# Patient Record
Sex: FEMALE | Race: WHITE | NOT HISPANIC OR LATINO | Employment: OTHER | URBAN - METROPOLITAN AREA
[De-identification: names, ages, dates, MRNs, and addresses within clinical notes are randomized per-mention and may not be internally consistent; named-entity substitution may affect disease eponyms.]

---

## 2020-07-05 ENCOUNTER — TRANSFERRED RECORDS (OUTPATIENT)
Dept: HEALTH INFORMATION MANAGEMENT | Facility: CLINIC | Age: 70
End: 2020-07-05

## 2020-07-10 ENCOUNTER — TRANSFERRED RECORDS (OUTPATIENT)
Dept: HEALTH INFORMATION MANAGEMENT | Facility: CLINIC | Age: 70
End: 2020-07-10

## 2020-07-13 ENCOUNTER — TRANSFERRED RECORDS (OUTPATIENT)
Dept: HEALTH INFORMATION MANAGEMENT | Facility: CLINIC | Age: 70
End: 2020-07-13

## 2020-10-21 ENCOUNTER — TRANSFERRED RECORDS (OUTPATIENT)
Dept: HEALTH INFORMATION MANAGEMENT | Facility: CLINIC | Age: 70
End: 2020-10-21

## 2021-02-09 ENCOUNTER — TRANSFERRED RECORDS (OUTPATIENT)
Dept: HEALTH INFORMATION MANAGEMENT | Facility: CLINIC | Age: 71
End: 2021-02-09

## 2021-02-09 LAB
ALBUMIN (URINE) MG/SPEC: 4 MG/DL
ALBUMIN/CREATININE RATIO: 19.9 MG/G CR (ref 0–30)
ALT SERPL-CCNC: 42 U/L (ref 12–78)
AST SERPL-CCNC: 37 U/L (ref 15–37)
CHOLEST SERPL-MCNC: 155 MG/DL (ref 0–200)
CREAT SERPL-MCNC: 0.87 MG/DL (ref 0.6–1)
CREATININE (URINE): 200 MG/DL (ref 30–150)
GFR SERPL CREATININE-BSD FRML MDRD: >60 ML/MIN/1.73M2 (ref 60–99)
GLUCOSE SERPL-MCNC: 180 MG/DL (ref 70–100)
HBA1C MFR BLD: 8.4 % (ref 4.2–6.3)
HDLC SERPL-MCNC: 66 MG/DL (ref 40–60)
LDLC SERPL CALC-MCNC: 63.7 MG/DL (ref 0–130)
POTASSIUM SERPL-SCNC: 3.9 MEQ/L (ref 3.5–5.1)
TRIGL SERPL-MCNC: 125 MG/DL (ref 30–150)

## 2021-04-13 ENCOUNTER — OFFICE VISIT (OUTPATIENT)
Dept: FAMILY MEDICINE | Facility: CLINIC | Age: 71
End: 2021-04-13
Payer: MEDICARE

## 2021-04-13 ENCOUNTER — TELEPHONE (OUTPATIENT)
Dept: FAMILY MEDICINE | Facility: CLINIC | Age: 71
End: 2021-04-13

## 2021-04-13 VITALS
DIASTOLIC BLOOD PRESSURE: 70 MMHG | HEIGHT: 60 IN | HEART RATE: 80 BPM | SYSTOLIC BLOOD PRESSURE: 122 MMHG | BODY MASS INDEX: 34.08 KG/M2 | RESPIRATION RATE: 20 BRPM | OXYGEN SATURATION: 99 % | WEIGHT: 173.6 LBS | TEMPERATURE: 98.1 F

## 2021-04-13 DIAGNOSIS — M54.50 CHRONIC BILATERAL LOW BACK PAIN, UNSPECIFIED WHETHER SCIATICA PRESENT: Primary | ICD-10-CM

## 2021-04-13 DIAGNOSIS — I10 BENIGN ESSENTIAL HYPERTENSION: ICD-10-CM

## 2021-04-13 DIAGNOSIS — M06.9 RHEUMATOID ARTHRITIS INVOLVING BOTH HANDS, UNSPECIFIED WHETHER RHEUMATOID FACTOR PRESENT (H): ICD-10-CM

## 2021-04-13 DIAGNOSIS — J45.40 MODERATE PERSISTENT ASTHMA, UNSPECIFIED WHETHER COMPLICATED: ICD-10-CM

## 2021-04-13 DIAGNOSIS — G89.29 CHRONIC BILATERAL LOW BACK PAIN, UNSPECIFIED WHETHER SCIATICA PRESENT: Primary | ICD-10-CM

## 2021-04-13 DIAGNOSIS — E11.9 TYPE 2 DIABETES MELLITUS WITHOUT COMPLICATION, WITHOUT LONG-TERM CURRENT USE OF INSULIN (H): ICD-10-CM

## 2021-04-13 DIAGNOSIS — K50.919 CROHN'S DISEASE WITH COMPLICATION, UNSPECIFIED GASTROINTESTINAL TRACT LOCATION (H): ICD-10-CM

## 2021-04-13 LAB — HBA1C MFR BLD: 7 % (ref 0–5.6)

## 2021-04-13 PROCEDURE — 80048 BASIC METABOLIC PNL TOTAL CA: CPT | Performed by: FAMILY MEDICINE

## 2021-04-13 PROCEDURE — 83036 HEMOGLOBIN GLYCOSYLATED A1C: CPT | Performed by: FAMILY MEDICINE

## 2021-04-13 PROCEDURE — 36415 COLL VENOUS BLD VENIPUNCTURE: CPT | Performed by: FAMILY MEDICINE

## 2021-04-13 PROCEDURE — 99204 OFFICE O/P NEW MOD 45 MIN: CPT | Performed by: FAMILY MEDICINE

## 2021-04-13 RX ORDER — FOLIC ACID 1 MG/1
1 TABLET ORAL DAILY
COMMUNITY
End: 2021-05-11

## 2021-04-13 RX ORDER — MULTIPLE VITAMINS W/ MINERALS TAB 9MG-400MCG
1 TAB ORAL DAILY
COMMUNITY

## 2021-04-13 RX ORDER — AMLODIPINE BESYLATE 2.5 MG/1
2.5 TABLET ORAL DAILY
COMMUNITY
End: 2021-05-11

## 2021-04-13 RX ORDER — BUDESONIDE AND FORMOTEROL FUMARATE DIHYDRATE 160; 4.5 UG/1; UG/1
2 AEROSOL RESPIRATORY (INHALATION) 2 TIMES DAILY
COMMUNITY
End: 2021-04-13

## 2021-04-13 RX ORDER — TRAMADOL HYDROCHLORIDE 50 MG/1
50 TABLET ORAL EVERY 6 HOURS PRN
COMMUNITY
End: 2021-04-13

## 2021-04-13 RX ORDER — GLIPIZIDE 5 MG/1
5 TABLET ORAL
COMMUNITY
End: 2021-05-11

## 2021-04-13 RX ORDER — ALBUTEROL SULFATE 90 UG/1
2 AEROSOL, METERED RESPIRATORY (INHALATION) 4 TIMES DAILY PRN
COMMUNITY
End: 2023-08-21

## 2021-04-13 RX ORDER — MONTELUKAST SODIUM 10 MG/1
10 TABLET ORAL AT BEDTIME
COMMUNITY
End: 2021-08-05

## 2021-04-13 RX ORDER — METFORMIN HCL 500 MG
500 TABLET, EXTENDED RELEASE 24 HR ORAL
COMMUNITY
End: 2021-08-05

## 2021-04-13 RX ORDER — MULTIVIT-MIN/IRON/FOLIC ACID/K 18-600-40
2 CAPSULE ORAL DAILY
COMMUNITY

## 2021-04-13 RX ORDER — BUDESONIDE AND FORMOTEROL FUMARATE DIHYDRATE 160; 4.5 UG/1; UG/1
2 AEROSOL RESPIRATORY (INHALATION) 2 TIMES DAILY
Qty: 10.2 G | Refills: 4 | Status: SHIPPED | OUTPATIENT
Start: 2021-04-13 | End: 2023-05-08

## 2021-04-13 RX ORDER — GABAPENTIN 100 MG/1
100 CAPSULE ORAL AT BEDTIME
Qty: 30 CAPSULE | Refills: 0 | Status: SHIPPED | OUTPATIENT
Start: 2021-04-13 | End: 2022-04-26

## 2021-04-13 RX ORDER — LOSARTAN POTASSIUM 100 MG/1
100 TABLET ORAL DAILY
COMMUNITY
End: 2021-07-20

## 2021-04-13 RX ORDER — HYDROCODONE BITARTRATE AND ACETAMINOPHEN 5; 325 MG/1; MG/1
1 TABLET ORAL EVERY 6 HOURS PRN
COMMUNITY
End: 2021-12-27

## 2021-04-13 RX ORDER — ESOMEPRAZOLE MAGNESIUM 40 MG/1
40 CAPSULE, DELAYED RELEASE ORAL
COMMUNITY
End: 2021-06-30

## 2021-04-13 ASSESSMENT — PAIN SCALES - GENERAL: PAINLEVEL: MILD PAIN (3)

## 2021-04-13 ASSESSMENT — MIFFLIN-ST. JEOR: SCORE: 1229.97

## 2021-04-13 NOTE — PROGRESS NOTES
Assessment & Plan     Chronic bilateral low back pain, unspecified whether sciatica present    The patient has a history of an L4-L5 fusion.  I have given her some basic low back stretches and clamshell strengthening exercise.  I am also having her trial low-dose gabapentin at night.  Follow-up in 2 to 4 weeks to recheck her pain.    - gabapentin (NEURONTIN) 100 MG capsule; Take 1 capsule (100 mg) by mouth At Bedtime    Type 2 diabetes mellitus without complication, without long-term current use of insulin (H)  We will check an A1c today as this was last checked 2 months ago and she had started glipizide 2 months ago also  She is not on a statin because her LDL is less than 70  - Hemoglobin A1c  - Basic metabolic panel  (Ca, Cl, CO2, Creat, Gluc, K, Na, BUN)    Rheumatoid arthritis involving both hands, unspecified whether rheumatoid factor present (H)  She is to establish care with rheumatology.  She has been off methotrexate for over a year due to shortness of breath  - Rheumatology Referral; Future    Crohn's disease with complication, unspecified gastrointestinal tract location (H)  Needs to establish care with GI  - GASTROENTEROLOGY ADULT REF CONSULT ONLY; Future    Moderate persistent asthma, unspecified whether complicated  It is unclear why she had a severe episode of shortness of breath last year.  She states her Covid was negative x2.  She was diagnosed with asthma at that time.  We will get pulmonology consult and refill her  Symbicort  - PULMONARY MEDICINE REFERRAL  - budesonide-formoterol (SYMBICORT) 160-4.5 MCG/ACT Inhaler; Inhale 2 puffs into the lungs 2 times daily    Benign essential hypertension  The current medical regimen is effective;  continue present plan and medications.    - Basic metabolic panel  (Ca, Cl, CO2, Creat, Gluc, K, Na, BUN)    Review of prior external note(s) from - Outside records from [place]  48 minutes spent on the date of the encounter doing chart review, review of  "outside records, review of test results, interpretation of tests, patient visit, documentation and discussion with family        BMI:   Estimated body mass index is 34.19 kg/m  as calculated from the following:    Height as of this encounter: 1.518 m (4' 11.75\").    Weight as of this encounter: 78.7 kg (173 lb 9.6 oz).   Weight management plan: Discussed healthy diet and exercise guidelines        Return in about 4 weeks (around 5/11/2021) for pain.    Iza Cage DO  [facility]    Mary Boyce is a 70 year old who presents for the following health issues  accompanied by her spouse:    HPI     New Patient/Transfer of Care- moved here last month from [place]    Diabetes Follow-up    How often are you checking your blood sugar? One time daily  What time of day are you checking your blood sugars (select all that apply)?  Before meals  Have you had any blood sugars above 200?  No  Have you had any blood sugars below 70?  No    What symptoms do you notice when your blood sugar is low?  None    What concerns do you have today about your diabetes? None     Do you have any of these symptoms? (Select all that apply)  Numbness in feet and Burning in feet     Glipizide 5 mg twice daily and Metformin 500 mg daily    The glipizide was started two month ago.        BP Readings from Last 2 Encounters:   04/13/21 122/70     No results found for: A1C, LDL            Hypertension Follow-up      Do you check your blood pressure regularly outside of the clinic? No     Are you following a low salt diet? No    Are your blood pressures ever more than 140 on the top number (systolic) OR more   than 90 on the bottom number (diastolic), for example 140/90? Yes   Cozaar 100 mg daily and Norvasc 2.5 mg daily    Asthma Follow-Up    Was ACT completed today?  Yes  No flowsheet data found.   Symbicort and albuterol.  She also has mild copd        How many days per week do you miss taking your asthma controller " "medication?  1    Please describe any recent triggers for your asthma: dust mites, pollens and Trees    Have you had any Emergency Room Visits, Urgent Care Visits, or Hospital Admissions since your last office visit?  Yes  Number of hospitalizations for asthma:2     Patient has rheumatoid arthritis and is on methotrexate.  She also has a prescription for Norco and tramadol.  She is looking for a referral to see a rheumatologist.  She has been off methotrexate due to breathing issues for the last year.  She was given hydrocodone and ultram in the past but they do not help much.  She has a lumbar fusion at L4-L5.  She was never on cymbalta or gabapentin.  She doesn't like the tramadol.  She has trouble with sleep due to pain.          How many servings of fruits and vegetables do you eat daily?  2-3    On average, how many sweetened beverages do you drink each day (Examples: soda, juice, sweet tea, etc.  Do NOT count diet or artificially sweetened beverages)?   0-1    How many days per week do you exercise enough to make your heart beat faster? 3 or less    How many minutes a day do you exercise enough to make your heart beat faster? 9 or less  How many days per week do you miss taking your medication? 2-3    What makes it hard for you to take your medications?  remembering to take      Review of Systems   Constitutional, HEENT, cardiovascular, pulmonary, gi and gu systems are negative, except as otherwise noted.      Objective    /70 (BP Location: Right arm, Patient Position: Sitting, Cuff Size: Adult Large)   Pulse 80   Temp 98.1  F (36.7  C) (Oral)   Resp 20   Ht 1.518 m (4' 11.75\")   Wt 78.7 kg (173 lb 9.6 oz)   SpO2 99%   BMI 34.19 kg/m    Body mass index is 34.19 kg/m .  Physical Exam   GENERAL: healthy, alert and no distress  HENT: normal cephalic/atraumatic, oropharynx clear and oral mucous membranes moist  NECK: no adenopathy, no asymmetry, masses, or scars and thyroid normal to palpation  RESP: " lungs clear to auscultation - no rales, rhonchi or wheezes  CV: regular rate and rhythm, normal S1 S2, no S3 or S4, no murmur, click or rub, no peripheral edema and peripheral pulses strong  MS: no gross musculoskeletal defects noted, no edema  Comprehensive back pain exam:  Tenderness of Lumbosacral junction and Lower extremity reflexes within normal limits bilaterally  PSYCH: mentation appears normal, affect normal/bright

## 2021-04-13 NOTE — TELEPHONE ENCOUNTER
Please call the patient and let her know her hemoglobin A1c was very good at 7.0.  You can ask her if she would like to set up a [employer] account because she can get her results quicker that way.    Iza Cage DO

## 2021-04-13 NOTE — PATIENT INSTRUCTIONS
Passive spinal twist both sides          Figure four stretch **      Psoas/hip flexor stretch       Clam shell     Do these daily     Start gabapentin 100 mg at night to help with the pain overnight.  If you tolerate this we can increase it and add a daytime dose    Follow-up in person or virtually in 2  to 4 weeks    I have sent referrals for GI, rheumatology, and pulmonology    Iza Cage D.O.

## 2021-04-14 DIAGNOSIS — E87.6 HYPOKALEMIA: Primary | ICD-10-CM

## 2021-04-14 LAB
ANION GAP SERPL CALCULATED.3IONS-SCNC: 9 MMOL/L (ref 3–14)
BUN SERPL-MCNC: 8 MG/DL (ref 7–30)
CALCIUM SERPL-MCNC: 9.5 MG/DL (ref 8.5–10.1)
CHLORIDE SERPL-SCNC: 105 MMOL/L (ref 94–109)
CO2 SERPL-SCNC: 27 MMOL/L (ref 20–32)
CREAT SERPL-MCNC: 0.76 MG/DL (ref 0.52–1.04)
GFR SERPL CREATININE-BSD FRML MDRD: 79 ML/MIN/{1.73_M2}
GLUCOSE SERPL-MCNC: 119 MG/DL (ref 70–99)
POTASSIUM SERPL-SCNC: 3 MMOL/L (ref 3.4–5.3)
SODIUM SERPL-SCNC: 141 MMOL/L (ref 133–144)

## 2021-04-14 RX ORDER — POTASSIUM CHLORIDE 1500 MG/1
20 TABLET, EXTENDED RELEASE ORAL DAILY
Qty: 30 TABLET | Refills: 1 | Status: SHIPPED | OUTPATIENT
Start: 2021-04-14 | End: 2021-05-11

## 2021-04-14 NOTE — TELEPHONE ENCOUNTER
Patient was pleased to hear that her A1c was very good. I also sent her a text with [employer] information so that she can sign up.

## 2021-04-15 NOTE — TELEPHONE ENCOUNTER
Please call the patient and let her know her potassium is low.  I do not see that she is on a medication that should be causing low potassium.  But she is a new patient to me so it is possible she did not let us know she was on a diuretic.  Please find out if she is taking Lasix or hydrochlorothiazide.  I have called her in some potassium to take daily.  We need to recheck her lab in about a week.  I want to see her in the office in the next few weeks also so we can talk about possible causes of low potassium.  Here are some foods that are high in potassium:  bananas, mushrooms, white beans, fish, yogurt, spinach and potatoes with the skins.      Iza Cage DO

## 2021-04-15 NOTE — TELEPHONE ENCOUNTER
Routing back to PCP. Pleas sign pending order for potassium if agreeable.  (Wasn't sure if you wanted a BMP or potassium only.)    Patient verbalized understanding of below message and agreed to plan.  She already has an upcoming appointment scheduled with you on 5/11.  Assisted making lab appointment with patient for one week.    Diaz Gooden RN

## 2021-04-19 DIAGNOSIS — J45.909 ASTHMA: Primary | ICD-10-CM

## 2021-04-19 NOTE — TELEPHONE ENCOUNTER
RECORDS RECEIVED FROM: Internal/In process    DATE RECEIVED: 6.30.21    NOTES STATUS DETAILS   OFFICE NOTE from referring provider Internal     OFFICE NOTE from other specialist In process     DISCHARGE SUMMARY from hospital In process     DISCHARGE REPORT from the ER In process     MEDICATION LIST Internal     IMAGING  (NEED IMAGES AND REPORTS)     CT SCAN In process     CHEST XRAY (CXR) In process     TESTS     PULMONARY FUNCTION TESTING (PFT) Internal  Scheduled 6.30.21        Action 4.19.21 sv    Action Taken Called and and LVM about gahtering records from [place] on where she was seen    Message sent to nurse pool to place PFT and CXR orders     ---5.25.21 sv --- called and spoke with pt about getting records from her [facility] location, was informed that she was seen at [facility]. Per pt she connected with staff back in April to send records. Called [facility] and their [facility] department is currently still closed due to Covid. Was promted that records can individually request records through mail but may take longer then nornal. Request was sent as STAT. Called and informed pt on request statues and she said whe will call [facility] as well to try to get records another faster way   -- 5.25.21 -- received reports but no images, images are on the way --    6-1: Received records from [facility], gave to Zak to be uploaded into PACs

## 2021-04-22 DIAGNOSIS — E87.6 HYPOKALEMIA: ICD-10-CM

## 2021-04-22 LAB — POTASSIUM SERPL-SCNC: 3.6 MMOL/L (ref 3.4–5.3)

## 2021-04-22 PROCEDURE — 84132 ASSAY OF SERUM POTASSIUM: CPT | Performed by: FAMILY MEDICINE

## 2021-04-22 PROCEDURE — 36415 COLL VENOUS BLD VENIPUNCTURE: CPT | Performed by: FAMILY MEDICINE

## 2021-05-02 ENCOUNTER — HEALTH MAINTENANCE LETTER (OUTPATIENT)
Age: 71
End: 2021-05-02

## 2021-05-11 ENCOUNTER — OFFICE VISIT (OUTPATIENT)
Dept: FAMILY MEDICINE | Facility: CLINIC | Age: 71
End: 2021-05-11
Payer: MEDICARE

## 2021-05-11 ENCOUNTER — OFFICE VISIT (OUTPATIENT)
Dept: ORTHOPEDICS | Facility: CLINIC | Age: 71
End: 2021-05-11
Payer: MEDICARE

## 2021-05-11 ENCOUNTER — ANCILLARY PROCEDURE (OUTPATIENT)
Dept: GENERAL RADIOLOGY | Facility: CLINIC | Age: 71
End: 2021-05-11
Attending: FAMILY MEDICINE
Payer: MEDICARE

## 2021-05-11 VITALS
DIASTOLIC BLOOD PRESSURE: 76 MMHG | SYSTOLIC BLOOD PRESSURE: 130 MMHG | HEIGHT: 60 IN | BODY MASS INDEX: 33.57 KG/M2 | WEIGHT: 171 LBS

## 2021-05-11 VITALS
OXYGEN SATURATION: 99 % | RESPIRATION RATE: 16 BRPM | HEIGHT: 60 IN | WEIGHT: 171.8 LBS | DIASTOLIC BLOOD PRESSURE: 76 MMHG | SYSTOLIC BLOOD PRESSURE: 130 MMHG | TEMPERATURE: 98.9 F | HEART RATE: 84 BPM | BODY MASS INDEX: 33.73 KG/M2

## 2021-05-11 DIAGNOSIS — R19.7 DIARRHEA, UNSPECIFIED TYPE: ICD-10-CM

## 2021-05-11 DIAGNOSIS — K50.919 CROHN'S DISEASE WITH COMPLICATION, UNSPECIFIED GASTROINTESTINAL TRACT LOCATION (H): ICD-10-CM

## 2021-05-11 DIAGNOSIS — Z12.31 ENCOUNTER FOR SCREENING MAMMOGRAM FOR MALIGNANT NEOPLASM OF BREAST: ICD-10-CM

## 2021-05-11 DIAGNOSIS — I10 ESSENTIAL HYPERTENSION: ICD-10-CM

## 2021-05-11 DIAGNOSIS — S92.502A CLOSED FRACTURE OF PHALANX OF LEFT FIFTH TOE, INITIAL ENCOUNTER: Primary | ICD-10-CM

## 2021-05-11 DIAGNOSIS — M25.579 PAIN IN JOINT, ANKLE AND FOOT, UNSPECIFIED LATERALITY: ICD-10-CM

## 2021-05-11 DIAGNOSIS — M79.672 LEFT FOOT PAIN: ICD-10-CM

## 2021-05-11 DIAGNOSIS — M54.50 CHRONIC BILATERAL LOW BACK PAIN, UNSPECIFIED WHETHER SCIATICA PRESENT: ICD-10-CM

## 2021-05-11 DIAGNOSIS — E87.6 HYPOKALEMIA: Primary | ICD-10-CM

## 2021-05-11 DIAGNOSIS — G89.29 CHRONIC BILATERAL LOW BACK PAIN, UNSPECIFIED WHETHER SCIATICA PRESENT: ICD-10-CM

## 2021-05-11 DIAGNOSIS — E11.9 TYPE 2 DIABETES MELLITUS WITHOUT COMPLICATION, WITHOUT LONG-TERM CURRENT USE OF INSULIN (H): ICD-10-CM

## 2021-05-11 PROCEDURE — 99204 OFFICE O/P NEW MOD 45 MIN: CPT | Performed by: FAMILY MEDICINE

## 2021-05-11 PROCEDURE — 73630 X-RAY EXAM OF FOOT: CPT | Mod: LT | Performed by: RADIOLOGY

## 2021-05-11 PROCEDURE — 99215 OFFICE O/P EST HI 40 MIN: CPT | Performed by: FAMILY MEDICINE

## 2021-05-11 RX ORDER — LIRAGLUTIDE 6 MG/ML
1.2 INJECTION SUBCUTANEOUS DAILY
Qty: 3 ML | Refills: 3 | Status: SHIPPED | OUTPATIENT
Start: 2021-05-11 | End: 2021-08-02

## 2021-05-11 RX ORDER — POTASSIUM CHLORIDE 1500 MG/1
20 TABLET, EXTENDED RELEASE ORAL DAILY
Qty: 30 TABLET | Refills: 3 | Status: SHIPPED | OUTPATIENT
Start: 2021-05-11 | End: 2021-09-27

## 2021-05-11 RX ORDER — AMLODIPINE BESYLATE 2.5 MG/1
2.5 TABLET ORAL DAILY
Qty: 90 TABLET | Refills: 1 | Status: SHIPPED | OUTPATIENT
Start: 2021-05-11 | End: 2022-01-04

## 2021-05-11 RX ORDER — METFORMIN HCL 500 MG
500 TABLET, EXTENDED RELEASE 24 HR ORAL
Qty: 90 TABLET | Refills: 1 | Status: CANCELLED | OUTPATIENT
Start: 2021-05-11

## 2021-05-11 RX ORDER — PEN NEEDLE, DIABETIC 32GX 5/32"
NEEDLE, DISPOSABLE MISCELLANEOUS
Qty: 100 EACH | Refills: 3 | Status: SHIPPED | OUTPATIENT
Start: 2021-05-11 | End: 2022-11-07

## 2021-05-11 RX ORDER — GLUCOSAMINE HCL/CHONDROITIN SU 500-400 MG
CAPSULE ORAL
Qty: 100 EACH | Refills: 3 | Status: SHIPPED | OUTPATIENT
Start: 2021-05-11

## 2021-05-11 RX ORDER — FOLIC ACID 1 MG/1
1 TABLET ORAL DAILY
Qty: 90 TABLET | Refills: 1 | Status: SHIPPED | OUTPATIENT
Start: 2021-05-11 | End: 2021-12-14

## 2021-05-11 RX ORDER — GLIPIZIDE 5 MG/1
5 TABLET ORAL
Qty: 180 TABLET | Refills: 1 | Status: SHIPPED | OUTPATIENT
Start: 2021-05-11 | End: 2021-08-20

## 2021-05-11 ASSESSMENT — MIFFLIN-ST. JEOR
SCORE: 1218.18
SCORE: 1221.81

## 2021-05-11 ASSESSMENT — PAIN SCALES - GENERAL: PAINLEVEL: MODERATE PAIN (5)

## 2021-05-11 NOTE — PROGRESS NOTES
Assessment & Plan     Hypokalemia    This patient's hypokalemia is likely related to her daily episodes of diarrhea.  She attributes this to her Crohn's disease or Metformin.  We will have her stop her Metformin to see if it improves.  She will continue her potassium orally until her diarrhea stops    - potassium chloride ER (KLOR-CON M) 20 MEQ CR tablet; Take 1 tablet (20 mEq) by mouth daily    Chronic bilateral low back pain, unspecified whether sciatica present  The patient states she is not sure if the gabapentin has helped but she is tolerating it well.  She does not wish to increase at this time    Diarrhea, unspecified type  This could be from the Metformin so we will stop this also could be from Crohn's disease.    Pain in joint, ankle and foot, unspecified laterality  I am concerned about a fracture in the fifth metatarsal.  Her x-ray is down today so we will send her to walk-in [facility]    Type 2 diabetes mellitus without complication, without long-term current use of insulin (H)  We will have her stop Metformin due to diarrhea and hypokalemia use Victoza instead.  - glipiZIDE (GLUCOTROL) 5 MG tablet; Take 1 tablet (5 mg) by mouth 2 times daily (before meals)  - liraglutide (VICTOZA) 18 MG/3ML solution; Inject 1.2 mg Subcutaneous daily  - insulin pen needle (ULTICARE MICRO) 32G X 4 MM miscellaneous; Use 1 pen needles daily or as directed.  - alcohol swab prep pads; Use to swab area of injection/venkata as directed.    Crohn's disease with complication, unspecified gastrointestinal tract location (H)  Managed by GI  - folic acid (FOLVITE) 1 MG tablet; Take 1 tablet (1 mg) by mouth daily    Essential hypertension  The current medical regimen is effective;  continue present plan and medications.  - amLODIPine (NORVASC) 2.5 MG tablet; Take 1 tablet (2.5 mg) by mouth daily    Encounter for screening mammogram for malignant neoplasm of breast    - MA SCREENING DIGITAL BILAT - Future  (s+30); Future      40  minutes spent on the date of the encounter doing chart review, review of test results, interpretation of tests, patient visit, documentation and discussion with other provider(s)          Return in about 4 weeks (around 6/8/2021) for diabetes, in person.    Iza Cage DO  [facility]    Mary Boyce is a 70 year old who presents for the following health issues     HPI     Chronic/Recurring Back Pain Follow Up      Where is your back pain located? (Select all that apply) low back bilateral    How would you describe your back pain?  dull ache    Where does your back pain spread? the right and left buttock    Since your last clinic visit for back pain, how has your pain changed? always present, but gets better and worse    Does your back pain interfere with your job? Not applicable    Since your last visit, have you tried any new treatment? Yes -  Gabapentin     Gabapentin was started about 1 month ago for the back pain      How many servings of fruits and vegetables do you eat daily?  2-3    On average, how many sweetened beverages do you drink each day (Examples: soda, juice, sweet tea, etc.  Do NOT count diet or artificially sweetened beverages)?   1    How many days per week do you exercise enough to make your heart beat faster? 3 or less    How many minutes a day do you exercise enough to make your heart beat faster? 9 or less  How many days per week do you miss taking your medication? 2-3    What makes it hard for you to take your medications?  remembering to take    Musculoskeletal problem/pain  Onset/Duration: 1 week  Description  Location: foot - left  Joint Swelling: YES  Redness: YES  Pain: YES  Warmth: no  Intensity:  mild, moderate  Progression of Symptoms:  improving  Accompanying signs and symptoms:   Fevers: no  Numbness/tingling/weakness: no  History  Trauma to the area: YES- jammed it into a box  Recent illness:  no  Previous similar problem: YES   Previous  "evaluation:  no  Precipitating or alleviating factors:  Aggravating factors include: walking/shoes  Therapies tried and outcome: Had aircast boot that she was wearing    She was found to have low potassium when she was seen here about a month ago.  She was counseled to take oral potassium tabs and recheck.  When she rechecked her potassium 1 week later it was in the normal range.  She is not on medications that would lower her potassium.  She has ongoing diarrhea from crohns disease.  This is every morning.  She is on metformin.      She was seen for her diabetes hypertension and dyslipidemia 1 month ago and everything was within the normal range at that time.  She did not need refills at that appointment but today she needs refills.      Review of Systems   Constitutional, HEENT, cardiovascular, pulmonary, gi and gu systems are negative, except as otherwise noted.      Objective    /76 (BP Location: Right arm, Patient Position: Sitting, Cuff Size: Adult Large)   Pulse 84   Temp 98.9  F (37.2  C) (Oral)   Resp 16   Ht 1.518 m (4' 11.75\")   Wt 77.9 kg (171 lb 12.8 oz)   SpO2 99%   BMI 33.83 kg/m    Body mass index is 33.83 kg/m .  Physical Exam   GENERAL: healthy, alert and no distress  NECK: no adenopathy, no asymmetry, masses, or scars and thyroid normal to palpation  RESP: lungs clear to auscultation - no rales, rhonchi or wheezes  CV: regular rate and rhythm, normal S1 S2, no S3 or S4, no murmur, click or rub, no peripheral edema and peripheral pulses strong  MS:  ankle exam, non-tender medial malleoli, non-tender lateral malleoli, non tender mid foot and forefoot,  Full ROM, ligaments tenderness over the fourth and fifth metatarsals worse distally and tenderness of the fifth digit  SKIN: Slight bruising on her toes of the left foot  PSYCH: mentation appears normal, affect normal/bright              "

## 2021-05-11 NOTE — PATIENT INSTRUCTIONS
Stop metformin     Start victozia    Continue the potassium    Go to walk in [facility]    There is an acute-injury clinic at the [facility] location. No appointment is needed. Simply walk in and you will be seen by a sports medicine / orthopedic provider. They treat broken bones, concussions, sprains, strains and joint injuries.    Address:  [address] [place] [id number] [address]     Hours:   Monday-Thursday 8am-8pm  Friday 8am-5pm  Saturday 8am-2pm  Closed Sundays.       [facility]:    Scheduling Appointments    North Region: [facility]  Call: [phone]    South Region: [facility]  Call: [phone]    Central Region: [facility]  Call: [phone]    East Region: [facility]  Call: [phone]

## 2021-05-11 NOTE — LETTER
5/11/2021         RE: Carli Monreal  [address]  [place] [address]        Dear Colleague,    Thank you for referring your patient, Carli Monreal, to the [facility] [place]. Please see a copy of my visit note below.    ASSESSMENT & PLAN  Carli was seen today for pain.    Diagnoses and all orders for this visit:    Closed fracture of phalanx of left fifth toe, initial encounter  -     XR Foot LT G/E 3 vw; Future    Left foot pain  -     XR Foot LT G/E 3 vw; Future      Patient is a 70 year old female presenting for evaluation of   Chief Complaint   Patient presents with     Left Foot - Pain     # Left 5th Toe Fracture: Noted after hitting a cardboard box on around 5/4/21.  Pain over 5th toe with redness and swelling.  Pain with ambulation even with a shoe.  X-rays showing non-displaced 5th toe fracture.  Counseled patient on nature of condition and treatment options.  Given this plan as below, follow-up 3 weeks  Image Findings: non-displaced 5th toe fracture  Treatment: CAM boot short can transition to supportive shoe in 1-2 weeks as tolerated  Medications/Injections: No family history pertinent to the patient's problem today  Follow-up: 3 weeks     Concerning signs/sx that would warrant urgent evaluation were discussed.  All questions were answered, patient understands and agrees with plan.      Return in about 3 weeks (around 6/1/2021).      -----    SUBJECTIVE  Carli Monreal is a/an 70 year old female who is seen in consultation at the request of Dr. Cage, [facility] for evaluation of left foot pain. The patient is seen by themselves.    Onset: 1 week(s) ago. Patient describes injury as walking in the middle of the night and hit the lateral portion of her foot on a cardboard box. Reviewed previous provider's note referral for concern of foot fracture.  Location of Pain: left lateral foot   Rating of Pain at worst: 10/10  Rating of Pain Currently: 6/10  Worsened by:  walking, weight bearing   Better with: rest   Treatments tried: rest/activity avoidance and CAM boot   Associated symptoms: swelling, numbness, tingling and bruising   Orthopedic history: YES ankle fracture   Relevant surgical history: NO  History reviewed. No pertinent past medical history.  Social History     Socioeconomic History     Marital status:      Spouse name: None     Number of children: None     Years of education: None     Highest education level: None   Occupational History     None   Social Needs     Financial resource strain: None     Food insecurity     Worry: None     Inability: None     Transportation needs     Medical: None     Non-medical: None   Tobacco Use     Smoking status: Former Smoker     Types: Cigarettes     Quit date: 1991     Years since quittin.0     Smokeless tobacco: Never Used   Substance and Sexual Activity     Alcohol use: Yes     Comment: Wine- couple x/ week     Drug use: Never     Sexual activity: Not Currently     Partners: Male   Lifestyle     Physical activity     Days per week: None     Minutes per session: None     Stress: None   Relationships     Social connections     Talks on phone: None     Gets together: None     Attends [employer] service: None     Active member of club or organization: None     Attends meetings of clubs or organizations: None     Relationship status: None     Intimate partner violence     Fear of current or ex partner: None     Emotionally abused: None     Physically abused: None     Forced sexual activity: None   Other Topics Concern     None   Social History Narrative     None         Patient's past medical, surgical, social, and family histories were reviewed today and no changes are noted.  No family history pertinent to the patient's problem today     REVIEW OF SYSTEMS:  10 point ROS is negative other than symptoms noted above in HPI, Past Medical History or as stated below  Constitutional: NEGATIVE for fever, chills, change  "in weight  Skin: NEGATIVE for worrisome rashes, moles or lesions  GI/: NEGATIVE for bowel or bladder changes  Neuro: NEGATIVE for weakness, dizziness or paresthesias    OBJECTIVE:  /76   Ht 1.518 m (4' 11.75\")   Wt 77.6 kg (171 lb)   BMI 33.68 kg/m     General: healthy, alert and in no distress  HEENT: no scleral icterus or conjunctival erythema  Skin: no suspicious lesions or rash. No jaundice.  CV:  no pedal edema  Resp: normal respiratory effort without conversational dyspnea   Psych: normal mood and affect  Gait: normal steady gait with appropriate coordination and balance  Neuro: Normal light sensory exam of lower extremity  MSK:  LEFT FOOT  Inspection:  Swelling over 5th digit  Palpation:    Tender about the 5th phalanx. Otherwise remainder of bony/ligamentous landmarks are non-tender.  Range of Motion:     Grossly intact and non-painful  Strength:    Grossly intact in all planes  Special Tests:   Mo testing due to     LEFT ANKLE  Inspection:    No swelling or ecchymosis is observed  Palpation:   Nontender.  Range of Motion:     Plantarflexion full / dorsiflexion full / inversion limited slightly by pain / eversion full  Strength:    Full mild pain with resisted eversion  Special Tests:    negative anterior drawer, negative talar tilt, negative valgus stress, negative forced external rotation/eversion, negative Rashid sign, negative squeeze test. Unable to perform heel raise and Unable to hop.    Independent visualization of the below image:  No results found for this or any previous visit (from the past 24 hour(s)).    I  ordered, visualized and reviewed these images with the patient  Non-displaced 5th proximal phalanx fx at the base         Artur Vergara MD, [facility]          Again, thank you for allowing me to participate in the care of your patient.        Sincerely,        Artur Vergara MD    "

## 2021-05-11 NOTE — PATIENT INSTRUCTIONS
# Left 5th Toe Fracture: Noted after hitting a cardboard box on around 5/4/21.  Pain over 5th toe with redness and swelling.  Pain with ambulation even with a shoe.  X-rays showing non-displaced 5th toe fracture.  Counseled patient on nature of condition and treatment options.  Given this plan as below, follow-up 3 weeks  Image Findings: non-displaced 5th toe fracture  Treatment: CAM boot short can transition to supportive shoe in 1-2 weeks as tolerated  Medications/Injections: No family history pertinent to the patient's problem today  Follow-up: 3 weeks     Please call [phone]   Ask for my team if you have any questions or concerns    It was great seeing you today!    Artur Vergara MD, [facility]    Patient Education     Closed Toe Fracture  Your toe is broken (fractured). This causes pain, swelling, and sometimes bruising. This injury usually takes about 4 to 6 weeks to heal, but can sometimes take longer. Toe injuries are often treated by taping the injured toe to the next one (sandra taping). Or you may have a hard shoe, splint, or cast. These protect the injured toe and hold it in position.   If the toenail has been severely injured, it may fall off in 1 to 2 weeks. It takes up to 12 months for a new toenail to grow back.   Home care  Follow these guidelines when caring for yourself at home:    You may be given a cast shoe to wear to keep your toe from moving. If not, you can use a sandal or any shoe that doesn t put pressure on the injured toe until the swelling and pain go away. If using a sandal, be careful not to strike your foot against anything. Another injury could make the fracture worse. If you were given crutches, don t put full weight on the injured foot until you can do so without pain, or as directed by your healthcare provider.    Keep your foot elevated to reduce pain and swelling. When sleeping, put a pillow under the injured leg. When sitting, support the injured leg so it is above your  waist. This is very important during the first 2 days (48 hours).    Put an ice pack on the injured area. Do this for 20 minutes every 1 to 2 hours the first day for pain relief. You can make an ice pack by wrapping a plastic bag of ice cubes in a thin towel. As the ice melts, be careful that any cloth or paper tape doesn t get wet. Continue using the ice pack 3 to 4 times a day for the next 2 days. Then use the ice pack as needed to ease pain and swelling.    If buddy tape was used and it becomes wet or dirty, change it. You may replace it with paper, plastic, or cloth tape. Cloth tape and paper tapes must be kept dry.    You may use acetaminophen or ibuprofen to control pain, unless another pain medicine was prescribed. If you have chronic liver or kidney disease, talk with your healthcare provider before using these medicines. Also talk with your provider if you ve had a stomach ulcer or gastrointestinal bleeding.    You may return to sports or physical education activities after 4 weeks when you can run without pain, or as directed by your healthcare provider.  Follow-up care  Follow up with your healthcare provider or as advised. This is to make sure the bone is healing the way it should.   X-rays may be taken. You will be told of any new findings that may affect your care.  When to seek medical advice  Call your healthcare provider right away if any of these occur:    Pain or swelling gets worse    The cast/splint cracks    The cast and padding get wet and stays wet more than 24 hours    Bad odor from the cast/splint or wound fluid stains the cast    Tightness or pressure under the cast/splint gets worse    Toe becomes cold, blue, numb, or tingly    You can t move the toe    Signs of infection: fever, redness, warmth, swelling, or drainage from the wound or cast    Fever of 100.4 F (38 C) or higher, or as directed by your healthcare provider    Danielle Carlin last reviewed this educational content on  2/1/2020 2000-2021 [employer]. All rights reserved. This information is not intended as a substitute for professional medical care. Always follow your healthcare professional's instructions.

## 2021-05-11 NOTE — PROGRESS NOTES
ASSESSMENT & PLAN  Carli was seen today for pain.    Diagnoses and all orders for this visit:    Closed fracture of phalanx of left fifth toe, initial encounter  -     XR Foot LT G/E 3 vw; Future    Left foot pain  -     XR Foot LT G/E 3 vw; Future      Patient is a 70 year old female presenting for evaluation of   Chief Complaint   Patient presents with     Left Foot - Pain     # Left 5th Toe Fracture: Noted after hitting a cardboard box on around 5/4/21.  Pain over 5th toe with redness and swelling.  Pain with ambulation even with a shoe.  X-rays showing non-displaced 5th toe fracture.  Counseled patient on nature of condition and treatment options.  Given this plan as below, follow-up 3 weeks  Image Findings: non-displaced 5th toe fracture  Treatment: CAM boot short can transition to supportive shoe in 1-2 weeks as tolerated  Medications/Injections: No family history pertinent to the patient's problem today  Follow-up: 3 weeks     Concerning signs/sx that would warrant urgent evaluation were discussed.  All questions were answered, patient understands and agrees with plan.      Return in about 3 weeks (around 6/1/2021).      -----    SUBJECTIVE  Carli Monreal is a/an 70 year old female who is seen in consultation at the request of DORI Anaya for evaluation of left foot pain. The patient is seen by themselves.    Onset: 1 week(s) ago. Patient describes injury as walking in the middle of the night and hit the lateral portion of her foot on a cardboard box. Reviewed previous provider's note referral for concern of foot fracture.  Location of Pain: left lateral foot   Rating of Pain at worst: 10/10  Rating of Pain Currently: 6/10  Worsened by: walking, weight bearing   Better with: rest   Treatments tried: rest/activity avoidance and CAM boot   Associated symptoms: swelling, numbness, tingling and bruising   Orthopedic history: YES ankle fracture   Relevant surgical history: NO  History reviewed. No pertinent  "past medical history.  Social History     Socioeconomic History     Marital status:      Spouse name: None     Number of children: None     Years of education: None     Highest education level: None   Occupational History     None   Social Needs     Financial resource strain: None     Food insecurity     Worry: None     Inability: None     Transportation needs     Medical: None     Non-medical: None   Tobacco Use     Smoking status: Former Smoker     Types: Cigarettes     Quit date: 1991     Years since quittin.0     Smokeless tobacco: Never Used   Substance and Sexual Activity     Alcohol use: Yes     Comment: Wine- couple x/ week     Drug use: Never     Sexual activity: Not Currently     Partners: Male   Lifestyle     Physical activity     Days per week: None     Minutes per session: None     Stress: None   Relationships     Social connections     Talks on phone: None     Gets together: None     Attends Sikhism service: None     Active member of club or organization: None     Attends meetings of clubs or organizations: None     Relationship status: None     Intimate partner violence     Fear of current or ex partner: None     Emotionally abused: None     Physically abused: None     Forced sexual activity: None   Other Topics Concern     None   Social History Narrative     None         Patient's past medical, surgical, social, and family histories were reviewed today and no changes are noted.  No family history pertinent to the patient's problem today     REVIEW OF SYSTEMS:  10 point ROS is negative other than symptoms noted above in HPI, Past Medical History or as stated below  Constitutional: NEGATIVE for fever, chills, change in weight  Skin: NEGATIVE for worrisome rashes, moles or lesions  GI/: NEGATIVE for bowel or bladder changes  Neuro: NEGATIVE for weakness, dizziness or paresthesias    OBJECTIVE:  /76   Ht 1.518 m (4' 11.75\")   Wt 77.6 kg (171 lb)   BMI 33.68 kg/m     General: " healthy, alert and in no distress  HEENT: no scleral icterus or conjunctival erythema  Skin: no suspicious lesions or rash. No jaundice.  CV:  no pedal edema  Resp: normal respiratory effort without conversational dyspnea   Psych: normal mood and affect  Gait: normal steady gait with appropriate coordination and balance  Neuro: Normal light sensory exam of lower extremity  MSK:  LEFT FOOT  Inspection:  Swelling over 5th digit  Palpation:    Tender about the 5th phalanx. Otherwise remainder of bony/ligamentous landmarks are non-tender.  Range of Motion:     Grossly intact and non-painful  Strength:    Grossly intact in all planes  Special Tests:   Mo testing due to     LEFT ANKLE  Inspection:    No swelling or ecchymosis is observed  Palpation:   Nontender.  Range of Motion:     Plantarflexion full / dorsiflexion full / inversion limited slightly by pain / eversion full  Strength:    Full mild pain with resisted eversion  Special Tests:    negative anterior drawer, negative talar tilt, negative valgus stress, negative forced external rotation/eversion, negative Rashid sign, negative squeeze test. Unable to perform heel raise and Unable to hop.    Independent visualization of the below image:  No results found for this or any previous visit (from the past 24 hour(s)).    I  ordered, visualized and reviewed these images with the patient  Non-displaced 5th proximal phalanx fx at the base         Artur Vergara MD, [facility]

## 2021-05-12 ASSESSMENT — ASTHMA QUESTIONNAIRES: ACT_TOTALSCORE: 15

## 2021-05-15 NOTE — TELEPHONE ENCOUNTER
NOTES Status Details   OFFICE NOTE from referring provider Internal 04.13.2021 Iza Cage DO   OFFICE NOTE from other specialist N/A    DISCHARGE SUMMARY from hospital N/A    DISCHARGE REPORT from the ER N/A    MEDICATION LIST Internal    LABS (Any and all labs)      Internal    Biopsy/pathology (Anything related to diagnoses I.e. fluid aspirations, lip biopsy, muscle biopsy)      N/A     N/A    Imaging (All imaging related to diagnoses)     Echo N/A    HRCT N/A    CXR N/A    EMG N/A                   Scleroderma/Dermatomyositis diagnoses     Previous Cardiology notes  N/A    Previous Pulmonary notes N/A    Previous Dermatology notes N/A    Previous GI notes N/A    Lupus diagnoses     Previous Nephrology notes N/A    Previous Dermatology notes N/A    Previous Cardiology notes N/A

## 2021-05-17 ENCOUNTER — TELEPHONE (OUTPATIENT)
Dept: FAMILY MEDICINE | Facility: CLINIC | Age: 71
End: 2021-05-17

## 2021-05-17 ENCOUNTER — MYC MEDICAL ADVICE (OUTPATIENT)
Dept: FAMILY MEDICINE | Facility: CLINIC | Age: 71
End: 2021-05-17

## 2021-05-17 DIAGNOSIS — E11.9 TYPE 2 DIABETES MELLITUS WITHOUT COMPLICATION, WITHOUT LONG-TERM CURRENT USE OF INSULIN (H): Primary | ICD-10-CM

## 2021-05-17 NOTE — TELEPHONE ENCOUNTER
Forms received from [facility]/ Diabetic Testing Supplies - Test Strips, Lancets (no form date) for Iza Cage DO.  Forms placed in provider 'sign me' folder.  Please fax forms to [phone] after completion.    Alejandro Hazel RT (R) (ARRT)  Radiology Dept

## 2021-05-18 ENCOUNTER — VIRTUAL VISIT (OUTPATIENT)
Dept: RHEUMATOLOGY | Facility: CLINIC | Age: 71
End: 2021-05-18
Attending: INTERNAL MEDICINE
Payer: MEDICARE

## 2021-05-18 ENCOUNTER — PRE VISIT (OUTPATIENT)
Dept: RHEUMATOLOGY | Facility: CLINIC | Age: 71
End: 2021-05-18

## 2021-05-18 DIAGNOSIS — M07.60 ARTHROPATHY IN CROHN'S DISEASE (H): Primary | ICD-10-CM

## 2021-05-18 DIAGNOSIS — M06.9 RHEUMATOID ARTHRITIS INVOLVING BOTH HANDS, UNSPECIFIED WHETHER RHEUMATOID FACTOR PRESENT (H): ICD-10-CM

## 2021-05-18 DIAGNOSIS — K50.90 ARTHROPATHY IN CROHN'S DISEASE (H): Primary | ICD-10-CM

## 2021-05-18 DIAGNOSIS — K50.019 CROHN'S DISEASE OF SMALL INTESTINE WITH COMPLICATION (H): ICD-10-CM

## 2021-05-18 PROCEDURE — 99205 OFFICE O/P NEW HI 60 MIN: CPT | Mod: 95 | Performed by: INTERNAL MEDICINE

## 2021-05-18 PROCEDURE — 99417 PROLNG OP E/M EACH 15 MIN: CPT | Mod: 95 | Performed by: INTERNAL MEDICINE

## 2021-05-18 ASSESSMENT — PAIN SCALES - GENERAL: PAINLEVEL: NO PAIN (0)

## 2021-05-18 NOTE — LETTER
5/18/2021       RE: Carli Monreal  [address]  [place] [address]     Dear Colleague,    Thank you for referring your patient, Carli Monreal, to the [facility] RHEUMAT[facility]IN[facility] [place] at [facility]. Please see a copy of my visit note below.    Carli is a 70 year old who is being evaluated via a billable video visit.      How would you like to obtain your AVS? MyChart  If the video visit is dropped, the invitation should be resent by: Text to cell phone: [phone]  Will anyone else be joining your video visit? No      Video Start Time: 11:06 AM    Carli Monreal presents in consultation at the request of Dr. Iza Cage to evaluate Rheumatoid arthritis involving both hands, unspecified whether rheumatoid factor present (H). Recently moved from [place].    Ms. Monreal has a history of longstanding Crohn's disease, and question of rheumatoid arthritis. She also has osteoarthritis. Failed sulfasalazine due to intolerance. No treatment with azathioprine. Previously treated with remicade and humira. No history of simponi use. Most recent treatment with methotrexate 15 mg weekly and Cimzia.     Her hands and low back have been affected by arthritis. Hands and feet have been swollen in the past, but no swelling, redness, or warmth today. No true joint deformities, and generally her joint function is good. She will use tramadol or hydrocodone for severe pains. No use of ibuprofen or aleve. She has been prescribed methotrexate for joints but she has not restarted this. Energy is good. She has chronic back stiffness, but otherwise no AM stiffness.     Her Crohn's has been severe and required a partial small bowel resection and previously required Cimzia treatment. Course complicated by breathing issues and moving to [place]. She cannot currently afford the copay for Cimzia. She is having chronic and frequent diarrhea, likely with active  Crohn's. Not much abdominal pain.    She was treated a single time with reclast last December, but this was complicated by shakes and fevers. She will not treated with any other bisphosphonate prior to this. Her management is complicated by vitamin D insufficiency and treated with vitamin D 2000 mg daily.     She is COVID vaccinated.    Past Medical Illness:  Medical-osteoarthritis, osteoporosis, Crohn's, rheumatoid arthritis, RBBB, emphysema, Graham's neuroma, vitamin D insuficfiency, type II diabetes, HTN, , chronic pain syndrome, lumbar DDD/DJD with chronic low back pain  Surgical-partial small bowel resection, lumbar spine fusion L4-5, surgery for tennis elbow, appendectomy, cholecystectomy, hernia repair, bilateral carpal tunnel release  Injuries-bilateral foot fractures, left ankle fracture, left patella fracture, whiplash injury    Active Problems:    Patient Active Problem List   Diagnosis     Crohn's disease with complication, unspecified gastrointestinal tract location (H)     Moderate persistent asthma, unspecified whether complicated     Essential hypertension     Type 2 diabetes mellitus without complication, without long-term current use of insulin (H)     Rheumatoid arthritis involving both hands, unspecified whether rheumatoid factor present (H)     Chronic bilateral low back pain, unspecified whether sciatica present     Allergies   Allergen Reactions     Seasonal Allergies      Social History:   with two children, former smoker, EtOH is 3 per week    Social History     Socioeconomic History     Marital status:      Spouse name: Not on file     Number of children: Not on file     Years of education: Not on file     Highest education level: Not on file   Occupational History     Not on file   Social Needs     Financial resource strain: Not on file     Food insecurity     Worry: Not on file     Inability: Not on file     Transportation needs     Medical: Not on file     Non-medical: Not  on file   Tobacco Use     Smoking status: Former Smoker     Types: Cigarettes     Quit date: 1991     Years since quittin.1     Smokeless tobacco: Never Used   Substance and Sexual Activity     Alcohol use: Yes     Comment: Wine- couple x/ week     Drug use: Never     Sexual activity: Not Currently     Partners: Male   Lifestyle     Physical activity     Days per week: Not on file     Minutes per session: Not on file     Stress: Not on file   Relationships     Social connections     Talks on phone: Not on file     Gets together: Not on file     Attends Adventist service: Not on file     Active member of club or organization: Not on file     Attends meetings of clubs or organizations: Not on file     Relationship status: Not on file     Intimate partner violence     Fear of current or ex partner: Not on file     Emotionally abused: Not on file     Physically abused: Not on file     Forced sexual activity: Not on file   Other Topics Concern     Not on file   Social History Narrative     Not on file     Family History:  Aunt with RA  Mother dead with RA, CAD, MI, CHF, CKD  Father dead with A.fib, dementia  Brother dead with Agent Orange  Daughter with MS  Son with autism    Review Of Systems:  +MUSE  +numbness in the toes and paresthesias  +chronic insomnia with diarrhea  +seasonal allergies  Remainder of the 14 point ROS obtained and found negative.    Physical Exam:  Constitutional: [id number] cooperative  Eyes: nl EOM, sclera  ENT: nl external ears, nose, hearing, lips  Neck: no visible thyroid enlargement  MS: +reduced neck rotation and flexion to 30 degrees, no extension; All other shoulder, elbow, wrist, hand joints were examined and otherwise found normal.  No active synovitis or deformity. Normal tuck and prayer.  Skin: no alopecia, rash  Neuro: nl cranial nerves  Psych: nl judgement, affect.    Laboratory:    MR of low back with DDD and DJD,       Component      Latest Ref Rng & Units 2021   AST       15 - 37 U/L 37   ALT      12 - 78 U/L 42     Recent Results (from the past 1344 hour(s))   Hemoglobin A1c    Collection Time: 04/13/21  4:04 PM   Result Value Ref Range    Hemoglobin A1C 7.0 (H) 0 - 5.6 %   Basic metabolic panel  (Ca, Cl, CO2, Creat, Gluc, K, Na, BUN)    Collection Time: 04/13/21  4:04 PM   Result Value Ref Range    Sodium 141 133 - 144 mmol/L    Potassium 3.0 (L) 3.4 - 5.3 mmol/L    Chloride 105 94 - 109 mmol/L    Carbon Dioxide 27 20 - 32 mmol/L    Anion Gap 9 3 - 14 mmol/L    Glucose 119 (H) 70 - 99 mg/dL    Urea Nitrogen 8 7 - 30 mg/dL    Creatinine 0.76 0.52 - 1.04 mg/dL    GFR Estimate 79 >60 mL/min/[1.73_m2]    GFR Estimate If Black >90 >60 mL/min/[1.73_m2]    Calcium 9.5 8.5 - 10.1 mg/dL   **Potassium FUTURE anytime    Collection Time: 04/22/21 10:58 AM   Result Value Ref Range    Potassium 3.6 3.4 - 5.3 mmol/L     WBC 6.9, Hgb 12.3, plat 206, creat 0.87, AST 37, ALT, 42, Alb 3.6, vitamin D 34, ferritin 40.    Impression:    Chronic inflammatory polyarthritis-associated with Crohn's disease and involving spine, hands, knees, and feet. Given the patient's active diarrhea, Crohn's arthropathy is highest on the differential, although family history of RA is also concerning. Against a diagnosis of RA, she is currently off all immunosuppression but demonstrates no current hand synovitis or typical deformities by my examine. Systemic inflammation may in fact be relatively modest given her normal platelet level. Past use of TNF inhibitor and DMARDS may reflect the severity of Crohn's rather than her arthropathy. Based on all of this, we will not initiate immunosuppression for her joints today. Rather, we will investigate the etiology of her arthritis with serologies, Xrays and inflammatory markers. Consider DMARD vs biologic use next visit.    Crohn's-severe by history based on partial small bowel resection and use of TNF inhibitors. Now with problematic diarrhea. This may reflect uncontrolled GI  tract inflammation. Therefore, I will defer any immunosuppressive drug institution to GI consultants who will treat her major problem. If this offers incomplete control of arthritis then I would consider the addition of a second agent at that time.    Bone health-with frequent fractures and reportedly osteoporosis, s/p a single reclast treatment 5 months ago. Plan to re-evaluate her bone care at the end of this year. Poor tolerance of reclast, so may wish to consider prolia vs. Forteo/Tymlos. Continue the vitamin D and calcium supplements.    Plan follow up with me in 3 months.    I appreciated the opportunity to play a role in this patient's care.    Mc Martinez MD  Professor of Medicine  Director, Division of Rheumatic and Autoimmune Diseases    A total of 94 minutes was spent in face to face patient interaction and chart review on the day of service.    Video-Visit Details    Type of service:  Video Visit    Video End Time:12:12 PM    Originating Location (pt. Location): Home    Distant Location (provider location):  [facility] RHEUMAT[facility]INIC [place]     Platform used for Video Visit: BeSmart

## 2021-05-18 NOTE — PROGRESS NOTES
Carli is a 70 year old who is being evaluated via a billable video visit.      How would you like to obtain your AVS? MyChart  If the video visit is dropped, the invitation should be resent by: Text to cell phone: [phone]  Will anyone else be joining your video visit? No      Video Start Time: 11:06 AM    Carli Monreal presents in consultation at the request of Dr. Iza Cage to evaluate Rheumatoid arthritis involving both hands, unspecified whether rheumatoid factor present (H). Recently moved from [place].    Ms. Monreal has a history of longstanding Crohn's disease, and question of rheumatoid arthritis. She also has osteoarthritis. Failed sulfasalazine due to intolerance. No treatment with azathioprine. Previously treated with remicade and humira. No history of simponi use. Most recent treatment with methotrexate 15 mg weekly and Cimzia.     Her hands and low back have been affected by arthritis. Hands and feet have been swollen in the past, but no swelling, redness, or warmth today. No true joint deformities, and generally her joint function is good. She will use tramadol or hydrocodone for severe pains. No use of ibuprofen or aleve. She has been prescribed methotrexate for joints but she has not restarted this. Energy is good. She has chronic back stiffness, but otherwise no AM stiffness.     Her Crohn's has been severe and required a partial small bowel resection and previously required Cimzia treatment. Course complicated by breathing issues and moving to [place]. She cannot currently afford the copay for Cimzia. She is having chronic and frequent diarrhea, likely with active Crohn's. Not much abdominal pain.    She was treated a single time with reclast last December, but this was complicated by shakes and fevers. She will not treated with any other bisphosphonate prior to this. Her management is complicated by vitamin D insufficiency and treated with vitamin D 2000 mg daily.     She is COVID  vaccinated.    Past Medical Illness:  Medical-osteoarthritis, osteoporosis, Crohn's, rheumatoid arthritis, RBBB, emphysema, Graham's neuroma, vitamin D insuficfiency, type II diabetes, HTN, , chronic pain syndrome, lumbar DDD/DJD with chronic low back pain  Surgical-partial small bowel resection, lumbar spine fusion L4-5, surgery for tennis elbow, appendectomy, cholecystectomy, hernia repair, bilateral carpal tunnel release  Injuries-bilateral foot fractures, left ankle fracture, left patella fracture, whiplash injury    Active Problems:    Patient Active Problem List   Diagnosis     Crohn's disease with complication, unspecified gastrointestinal tract location (H)     Moderate persistent asthma, unspecified whether complicated     Essential hypertension     Type 2 diabetes mellitus without complication, without long-term current use of insulin (H)     Rheumatoid arthritis involving both hands, unspecified whether rheumatoid factor present (H)     Chronic bilateral low back pain, unspecified whether sciatica present     Allergies   Allergen Reactions     Seasonal Allergies      Social History:   with two children, former smoker, EtOH is 3 per week    Social History     Socioeconomic History     Marital status:      Spouse name: Not on file     Number of children: Not on file     Years of education: Not on file     Highest education level: Not on file   Occupational History     Not on file   Social Needs     Financial resource strain: Not on file     Food insecurity     Worry: Not on file     Inability: Not on file     Transportation needs     Medical: Not on file     Non-medical: Not on file   Tobacco Use     Smoking status: Former Smoker     Types: Cigarettes     Quit date: 1991     Years since quittin.1     Smokeless tobacco: Never Used   Substance and Sexual Activity     Alcohol use: Yes     Comment: Wine- couple x/ week     Drug use: Never     Sexual activity: Not Currently     Partners:  Male   Lifestyle     Physical activity     Days per week: Not on file     Minutes per session: Not on file     Stress: Not on file   Relationships     Social connections     Talks on phone: Not on file     Gets together: Not on file     Attends Anglican service: Not on file     Active member of club or organization: Not on file     Attends meetings of clubs or organizations: Not on file     Relationship status: Not on file     Intimate partner violence     Fear of current or ex partner: Not on file     Emotionally abused: Not on file     Physically abused: Not on file     Forced sexual activity: Not on file   Other Topics Concern     Not on file   Social History Narrative     Not on file     Family History:  Aunt with RA  Mother dead with RA, CAD, MI, CHF, CKD  Father dead with A.fib, dementia  Brother dead with Agent Orange  Daughter with MS  Son with autism    Review Of Systems:  +MUSE  +numbness in the toes and paresthesias  +chronic insomnia with diarrhea  +seasonal allergies  Remainder of the 14 point ROS obtained and found negative.    Physical Exam:  Constitutional: WD-WN-WG cooperative  Eyes: nl EOM, sclera  ENT: nl external ears, nose, hearing, lips  Neck: no visible thyroid enlargement  MS: +reduced neck rotation and flexion to 30 degrees, no extension; All other shoulder, elbow, wrist, hand joints were examined and otherwise found normal.  No active synovitis or deformity. Normal tuck and prayer.  Skin: no alopecia, rash  Neuro: nl cranial nerves  Psych: nl judgement, affect.    Laboratory:    MR of low back with DDD and DJD,       Component      Latest Ref Rng & Units 2/9/2021   AST      15 - 37 U/L 37   ALT      12 - 78 U/L 42     Recent Results (from the past 1344 hour(s))   Hemoglobin A1c    Collection Time: 04/13/21  4:04 PM   Result Value Ref Range    Hemoglobin A1C 7.0 (H) 0 - 5.6 %   Basic metabolic panel  (Ca, Cl, CO2, Creat, Gluc, K, Na, BUN)    Collection Time: 04/13/21  4:04 PM   Result Value  Ref Range    Sodium 141 133 - 144 mmol/L    Potassium 3.0 (L) 3.4 - 5.3 mmol/L    Chloride 105 94 - 109 mmol/L    Carbon Dioxide 27 20 - 32 mmol/L    Anion Gap 9 3 - 14 mmol/L    Glucose 119 (H) 70 - 99 mg/dL    Urea Nitrogen 8 7 - 30 mg/dL    Creatinine 0.76 0.52 - 1.04 mg/dL    GFR Estimate 79 >60 mL/min/[1.73_m2]    GFR Estimate If Black >90 >60 mL/min/[1.73_m2]    Calcium 9.5 8.5 - 10.1 mg/dL   **Potassium FUTURE anytime    Collection Time: 04/22/21 10:58 AM   Result Value Ref Range    Potassium 3.6 3.4 - 5.3 mmol/L     WBC 6.9, Hgb 12.3, plat 206, creat 0.87, AST 37, ALT, 42, Alb 3.6, vitamin D 34, ferritin 40.    Impression:    Chronic inflammatory polyarthritis-associated with Crohn's disease and involving spine, hands, knees, and feet. Given the patient's active diarrhea, Crohn's arthropathy is highest on the differential, although family history of RA is also concerning. Against a diagnosis of RA, she is currently off all immunosuppression but demonstrates no current hand synovitis or typical deformities by my examine. Systemic inflammation may in fact be relatively modest given her normal platelet level. Past use of TNF inhibitor and DMARDS may reflect the severity of Crohn's rather than her arthropathy. Based on all of this, we will not initiate immunosuppression for her joints today. Rather, we will investigate the etiology of her arthritis with serologies, Xrays and inflammatory markers. Consider DMARD vs biologic use next visit.    Crohn's-severe by history based on partial small bowel resection and use of TNF inhibitors. Now with problematic diarrhea. This may reflect uncontrolled GI tract inflammation. Therefore, I will defer any immunosuppressive drug institution to GI consultants who will treat her major problem. If this offers incomplete control of arthritis then I would consider the addition of a second agent at that time.    Bone health-with frequent fractures and reportedly osteoporosis, s/p a  single reclast treatment 5 months ago. Plan to re-evaluate her bone care at the end of this year. Poor tolerance of reclast, so may wish to consider prolia vs. Forteo/Tymlos. Continue the vitamin D and calcium supplements.    Plan follow up with me in 3 months.    I appreciated the opportunity to play a role in this patient's care.    Mc Martinez MD  Professor of Medicine  Director, Division of Rheumatic and Autoimmune Diseases    A total of 94 minutes was spent in face to face patient interaction and chart review on the day of service.      Video-Visit Details    Type of service:  Video Visit    Video End Time:12:12 PM    Originating Location (pt. Location): Home    Distant Location (provider location):  [facility] [place]     Platform used for Video Visit: [employer]

## 2021-05-18 NOTE — PATIENT INSTRUCTIONS
Get Xrays and laboratory testing now, to include a stool specimen  Plan for follow up with me in 3 months

## 2021-05-19 ENCOUNTER — MYC MEDICAL ADVICE (OUTPATIENT)
Dept: FAMILY MEDICINE | Facility: CLINIC | Age: 71
End: 2021-05-19

## 2021-05-19 NOTE — TELEPHONE ENCOUNTER
Patient Quality Outreach      Summary:    Patient has the following on her problem list/HM:     Asthma review       ACT Total Scores 2021   ACT TOTAL SCORE (Goal Greater than or Equal to 20) 15   In the past 12 months, how many times did you visit the emergency room for your asthma without being admitted to the hospital? 3   In the past 12 months, how many times were you hospitalized overnight because of your asthma? 3          Diabetes    Last A1C:  Lab Results   Component Value Date    A1C 7.0 2021    A1C 8.4 2021       Last LDL:    Lab Results   Component Value Date    LDL 63.7 2021       Is the patient on a Statin? No          Is the patient on Aspirin? No        Last three blood pressure readings:  BP Readings from Last 3 Encounters:   21 130/76   21 130/76   21 122/70            Tobacco Use      Smoking status: Former Smoker        Types: Cigarettes        Quit date: 1991        Years since quittin.1      Smokeless tobacco: Never Used          Hypertension   Last three blood pressure readings:  BP Readings from Last 3 Encounters:   21 130/76   21 130/76   21 122/70     Blood pressure: Passed    HTN Guidelines:  ? 139/89     Patient is due/failing the following:   Colonoscopy, Breast Cancer Screening - Mammogram and Annual wellness, date due: Overdue    Type of outreach:    Mammogram scheduled for 21. Still awaiting for records from [place]    Questions for provider review:    None                                                                                    Shantal Irby MA

## 2021-05-20 NOTE — TELEPHONE ENCOUNTER
REFERRAL INFORMATION:    Referring Provider:  Dr. Niles Couch     Referring Clinic:  [facility]    Reason for Visit/Diagnosis: Crohn's     FUTURE VISIT INFORMATION:    Appointment Date: 6/16/2021    Appointment Time: 1:40 PM      NOTES STATUS DETAILS   OFFICE NOTE from Referring Provider Internal 5/11/2021 Office visit with Dr. Cage      OFFICE NOTE from Other Specialist Internal 5/18/2021 Office visit with Dr. Mc Martinez ([facility])      HOSPITAL DISCHARGE SUMMARY/  ED VISITS N/A    OPERATIVE REPORT N/A    MEDICATION LIST Internal         ENDOSCOPY  N/A    COLONOSCOPY N/A    ERCP N/A    EUS N/A    STOOL TESTING N/A    PERTINENT LABS Internal    PATHOLOGY REPORTS (RELATED) N/A    IMAGING (CT, MRI, EGD, MRCP, Small Bowel Follow Through/SBT, MR/CT Enterography) N/A      6/1/2021 2:15pm Fax request sent to [facility] [place] [facility] ([phone]) for med recs. -Linus     6/2/2021 9:28am Received recs from [facility] [place] [facility]; sent to scan. No recs pertaining to GI.  -Linus

## 2021-05-21 ENCOUNTER — TELEPHONE (OUTPATIENT)
Dept: PULMONOLOGY | Facility: CLINIC | Age: 71
End: 2021-05-21

## 2021-05-21 NOTE — TELEPHONE ENCOUNTER
Pt returned call and had long discussion with her. She requested to see Dr. Rasheed in person for next available and informed her that it would be in August for [place]. Offered [place], but Dr. Rasheed's clinic had an error while schdeduling, thus offered Dr. Luke as it would be a sooner appt. Testing would have to be completed at the [facility] as PFT lab in the hospital is booked until July. Discussed this with pt and pt is agreeable to plan of doing testing here and then driving to [place] for appt. Details confirmed with pt who requested hard copy be mailed to her home and mailing address confirmed.

## 2021-05-21 NOTE — TELEPHONE ENCOUNTER
EDSONM with direct call back to discuss rescheduling appt with Dr. Rasheed and testing as he is no longer available on 6/30.

## 2021-05-25 ENCOUNTER — TELEPHONE (OUTPATIENT)
Dept: PULMONOLOGY | Facility: CLINIC | Age: 71
End: 2021-05-25

## 2021-05-25 NOTE — TELEPHONE ENCOUNTER
Spoke with pt about her appt with Dr. Luke on 6/14 and informed her provider is unfortunately unavailable. Apologized for the inconvenience and informed her we could get her in for an appt as soon as tomorrow with testing. Pt was grateful for this and informed her it would be with a different provider and pt voiced understanding.

## 2021-05-26 ENCOUNTER — OFFICE VISIT (OUTPATIENT)
Dept: PULMONOLOGY | Facility: CLINIC | Age: 71
End: 2021-05-26
Attending: INTERNAL MEDICINE
Payer: MEDICARE

## 2021-05-26 ENCOUNTER — ANCILLARY PROCEDURE (OUTPATIENT)
Dept: GENERAL RADIOLOGY | Facility: CLINIC | Age: 71
End: 2021-05-26
Payer: MEDICARE

## 2021-05-26 VITALS
RESPIRATION RATE: 18 BRPM | WEIGHT: 171 LBS | OXYGEN SATURATION: 98 % | BODY MASS INDEX: 33.68 KG/M2 | SYSTOLIC BLOOD PRESSURE: 158 MMHG | DIASTOLIC BLOOD PRESSURE: 75 MMHG | HEART RATE: 83 BPM

## 2021-05-26 DIAGNOSIS — J45.909 ASTHMA: ICD-10-CM

## 2021-05-26 DIAGNOSIS — M06.9 RHEUMATOID ARTHRITIS INVOLVING BOTH HANDS, UNSPECIFIED WHETHER RHEUMATOID FACTOR PRESENT (H): ICD-10-CM

## 2021-05-26 DIAGNOSIS — K50.90 ARTHROPATHY IN CROHN'S DISEASE (H): ICD-10-CM

## 2021-05-26 DIAGNOSIS — M07.60 ARTHROPATHY IN CROHN'S DISEASE (H): ICD-10-CM

## 2021-05-26 DIAGNOSIS — K50.019 CROHN'S DISEASE OF SMALL INTESTINE WITH COMPLICATION (H): ICD-10-CM

## 2021-05-26 DIAGNOSIS — R05.9 COUGH: Primary | ICD-10-CM

## 2021-05-26 DIAGNOSIS — J45.40 MODERATE PERSISTENT ASTHMA, UNSPECIFIED WHETHER COMPLICATED: ICD-10-CM

## 2021-05-26 LAB
ALBUMIN SERPL-MCNC: 3.6 G/DL (ref 3.4–5)
ALBUMIN UR-MCNC: NEGATIVE MG/DL
ALP SERPL-CCNC: 98 U/L (ref 40–150)
ALT SERPL W P-5'-P-CCNC: 38 U/L (ref 0–50)
ANION GAP SERPL CALCULATED.3IONS-SCNC: 4 MMOL/L (ref 3–14)
APPEARANCE UR: CLEAR
AST SERPL W P-5'-P-CCNC: 35 U/L (ref 0–45)
BASOPHILS # BLD AUTO: 0 10E9/L (ref 0–0.2)
BASOPHILS NFR BLD AUTO: 0.5 %
BILIRUB SERPL-MCNC: 0.3 MG/DL (ref 0.2–1.3)
BILIRUB UR QL STRIP: NEGATIVE
BUN SERPL-MCNC: 10 MG/DL (ref 7–30)
CALCIUM SERPL-MCNC: 9 MG/DL (ref 8.5–10.1)
CHLORIDE SERPL-SCNC: 110 MMOL/L (ref 94–109)
CO2 SERPL-SCNC: 28 MMOL/L (ref 20–32)
COLOR UR AUTO: YELLOW
CREAT SERPL-MCNC: 0.65 MG/DL (ref 0.52–1.04)
CRP SERPL-MCNC: <2.9 MG/L (ref 0–8)
DIFFERENTIAL METHOD BLD: NORMAL
EOSINOPHIL # BLD AUTO: 0.1 10E9/L (ref 0–0.7)
EOSINOPHIL NFR BLD AUTO: 1.3 %
ERYTHROCYTE [DISTWIDTH] IN BLOOD BY AUTOMATED COUNT: 13.2 % (ref 10–15)
ERYTHROCYTE [SEDIMENTATION RATE] IN BLOOD BY WESTERGREN METHOD: 60 MM/H (ref 0–30)
GFR SERPL CREATININE-BSD FRML MDRD: 90 ML/MIN/{1.73_M2}
GLUCOSE SERPL-MCNC: 91 MG/DL (ref 70–99)
GLUCOSE UR STRIP-MCNC: NEGATIVE MG/DL
HCT VFR BLD AUTO: 35.8 % (ref 35–47)
HGB BLD-MCNC: 11.7 G/DL (ref 11.7–15.7)
HGB UR QL STRIP: ABNORMAL
IMM GRANULOCYTES # BLD: 0 10E9/L (ref 0–0.4)
IMM GRANULOCYTES NFR BLD: 0.3 %
KETONES UR STRIP-MCNC: NEGATIVE MG/DL
LEUKOCYTE ESTERASE UR QL STRIP: NEGATIVE
LYMPHOCYTES # BLD AUTO: 1.8 10E9/L (ref 0.8–5.3)
LYMPHOCYTES NFR BLD AUTO: 28.8 %
MCH RBC QN AUTO: 27.6 PG (ref 26.5–33)
MCHC RBC AUTO-ENTMCNC: 32.7 G/DL (ref 31.5–36.5)
MCV RBC AUTO: 84 FL (ref 78–100)
MONOCYTES # BLD AUTO: 0.6 10E9/L (ref 0–1.3)
MONOCYTES NFR BLD AUTO: 8.9 %
MUCOUS THREADS #/AREA URNS LPF: PRESENT /LPF
NEUTROPHILS # BLD AUTO: 3.7 10E9/L (ref 1.6–8.3)
NEUTROPHILS NFR BLD AUTO: 60.2 %
NITRATE UR QL: NEGATIVE
NRBC # BLD AUTO: 0 10*3/UL
NRBC BLD AUTO-RTO: 0 /100
PH UR STRIP: 5 PH (ref 5–7)
PLATELET # BLD AUTO: 191 10E9/L (ref 150–450)
POTASSIUM SERPL-SCNC: 3.7 MMOL/L (ref 3.4–5.3)
PROT SERPL-MCNC: 7.9 G/DL (ref 6.8–8.8)
RBC # BLD AUTO: 4.24 10E12/L (ref 3.8–5.2)
RBC #/AREA URNS AUTO: 1 /HPF (ref 0–2)
SODIUM SERPL-SCNC: 142 MMOL/L (ref 133–144)
SOURCE: ABNORMAL
SP GR UR STRIP: 1.01 (ref 1–1.03)
SQUAMOUS #/AREA URNS AUTO: <1 /HPF (ref 0–1)
UROBILINOGEN UR STRIP-MCNC: 0 MG/DL (ref 0–2)
WBC # BLD AUTO: 6.2 10E9/L (ref 4–11)
WBC #/AREA URNS AUTO: 2 /HPF (ref 0–5)

## 2021-05-26 PROCEDURE — 86039 ANTINUCLEAR ANTIBODIES (ANA): CPT | Performed by: INTERNAL MEDICINE

## 2021-05-26 PROCEDURE — G0463 HOSPITAL OUTPT CLINIC VISIT: HCPCS | Mod: 25

## 2021-05-26 PROCEDURE — 86200 CCP ANTIBODY: CPT | Performed by: INTERNAL MEDICINE

## 2021-05-26 PROCEDURE — 80053 COMPREHEN METABOLIC PANEL: CPT | Performed by: PATHOLOGY

## 2021-05-26 PROCEDURE — 81001 URINALYSIS AUTO W/SCOPE: CPT | Performed by: PATHOLOGY

## 2021-05-26 PROCEDURE — 36415 COLL VENOUS BLD VENIPUNCTURE: CPT | Performed by: PATHOLOGY

## 2021-05-26 PROCEDURE — 94726 PLETHYSMOGRAPHY LUNG VOLUMES: CPT | Performed by: INTERNAL MEDICINE

## 2021-05-26 PROCEDURE — 86140 C-REACTIVE PROTEIN: CPT | Performed by: PATHOLOGY

## 2021-05-26 PROCEDURE — 71046 X-RAY EXAM CHEST 2 VIEWS: CPT | Performed by: RADIOLOGY

## 2021-05-26 PROCEDURE — 85652 RBC SED RATE AUTOMATED: CPT | Performed by: PATHOLOGY

## 2021-05-26 PROCEDURE — 86431 RHEUMATOID FACTOR QUANT: CPT | Performed by: INTERNAL MEDICINE

## 2021-05-26 PROCEDURE — 94375 RESPIRATORY FLOW VOLUME LOOP: CPT | Performed by: INTERNAL MEDICINE

## 2021-05-26 PROCEDURE — 81374 HLA I TYPING 1 ANTIGEN LR: CPT | Performed by: INTERNAL MEDICINE

## 2021-05-26 PROCEDURE — 85025 COMPLETE CBC W/AUTO DIFF WBC: CPT | Performed by: PATHOLOGY

## 2021-05-26 PROCEDURE — 94729 DIFFUSING CAPACITY: CPT | Performed by: INTERNAL MEDICINE

## 2021-05-26 PROCEDURE — 86038 ANTINUCLEAR ANTIBODIES: CPT | Performed by: INTERNAL MEDICINE

## 2021-05-26 PROCEDURE — 99204 OFFICE O/P NEW MOD 45 MIN: CPT | Mod: 25 | Performed by: STUDENT IN AN ORGANIZED HEALTH CARE EDUCATION/TRAINING PROGRAM

## 2021-05-26 ASSESSMENT — PAIN SCALES - GENERAL: PAINLEVEL: NO PAIN (0)

## 2021-05-26 NOTE — PATIENT INSTRUCTIONS
CT scan and blood tests today    STart taking Breo in place of Symbicort    Try using albuterol when coughing or short of breath, can pre-treat    Clinic will call to check in about Pulmonary Rehab, will try to schedule for this summer

## 2021-05-26 NOTE — PROGRESS NOTES
[facility]    Pulmonary, Allergy, Critical Care and Sleep Medicine    Initial Consultation  05/25/2021    Carli Monreal MRN# [id number]   Age: 70 year old YOB: 1950      Assessment and Recommendations:    Carli Monreal is a 70 year old female with a history of reported asthma, Crohn's disease and inflammatory arthritis previously on immunosuppression, DMII, and HTN who presents for evaluation of chronic dyspnea and cough.    Dyspnea on Exertion  Cough  Crohn's Disease  Greater than one year of relatively stable dyspnea and cough. Very short of breath after walking to the bathroom in clinic. Non-specific spirometry today. Given diagnosis of asthma in the past and is on Symbicort but frequently forgets to take so will switch to daily inhaler. Could have Crohn's related lung disease such as bronchiectasis, bronchitis, or bronchiolitis obliterans and will do a CT with expiratory views to assess for parenchymal disease, small airways disease. On numerous immunosuppressive drugs in recent years that can cause pulmonary toxicity but symptoms largely in past year when has been off immunosuppression and denies respiratory symptoms while on these meds in the past. Is overweight and has been less active in the past year so likely some degree of deconditioning and will benefit from rehab.   - Transition Symbicort to Breo to help with compliance  - HRCT of the chest with expiratory views to assess for Crohn's related disease, small airways disease  - Send to lab today for Rheumatology labs  - Once left foot healing will send to Pulmonary Rehab    Follow up in 3 months    Seen and discussed with Dr. Mily Recinos MD PhD  Pulmonary and Critical Care Fellow   Pager [phone]      I saw and evaluated patient with Fellow.  Case discussed - agree with note.  I reviewed PFT: nonspecific spirometry pattern suggests obstructive lung disease.  Could be consistent with his history of  asthma or small airways disease associated with Crohns.    CINDY SALINAS M.D.        Chief Complaint and History of Present Illness:    CC:  Asthma    HPI:   History taken from patient and chart review. Limited records available prior to visit, patient recently moved to [place] from [place]. Established with primary care in April where reported being off methotrexate due to dyspnea for the past year. Respiratory symptoms brought a diagnosis of asthma and start of Symbicort. Referred to Pulm for additional evaluation. Seen last week in Rheum clinic. Crohn's disease, arthritis, and possible RA. Previous treatment with remicade and humira and more recently 15 mg methotrexate weekly and Cimzia. Experiencing chronic diarrhea. Suspicion for chronic inflammatory polyarthritis associated with Crohn's and plan for further workup, plan for deferral of immunosuppression to GI.    Today seen in clinic alongside  who helps provide some of the history. Confirmed numerous immunosupresive treatments in the past for her Crohn's disease/arthritis. Onset of dyspnea in Feb 2020 and evaluated for cardiac issues in the hospital. Started to have breathing trouble again in July and ultimately admitted to the hospital again, Covid testing negative x2. Thinks she received antibiotics and was given nebs which maybe helped. Not discharged on any medications and remained off immunosuppressive treatment. States that in October was told her breathing issues were due to mild emphysema and asthma.     Primary symptom is dyspnea on exertion. Has good days and bad in terms of breathing. Humidity makes symptoms worse, does not have a sat probe at home. Also has a cough that comes and goes throughout the year, feels like there may be phlegm but never coughs anything up. Cough can be present for weeks to months and then go away for same length of time. No pattern to when cough recurs, but does tend to cough more later in the day when can also lose voice a  boby. Denies cough relationship to food. Initially stated Nexium is controlling her reflux, but later reported sternal discomfort that can occur on days when drinks a lot of coffee. Denied PND. Does not think cough or respiratory symptoms ever fluctuated based on immunosuppression regimen or improved during times when took steroids. Activity level has been decreased since COVID. Given Symbicort last year, remembers to take 5-6 days per week, never thinks to use albuterol when short of breath.     Exposure history  Former smoker, quit in 1983 after 1-1.5 ppd for 15-20 years. Worked as a  and . Two cats at home, no hot tub use, no mold or water damage in home.     Family: Both father and brother with asbestosis, father's exposure when worked on railroad. He changed his clothes at work or immediately went to basement to change when he came home.    Review of Systems:  Complete 12 point ROS negative unless mentioned in HPI    Histories, Prior to Admission Medications, Allergies:    Past Medical History:  Asthma  Crohn's disease s/p partial small bowel resection  Type II Diabetes - recent A1C of 7  Hypertension  Rheumatoid arthritis?  Osteoarthritis     Past Surgical History:  L4-L5 lumbar fusion  Partial small bowel resection  Appendectomy  Cholecystectomy  Hernia repair  Bilateral carpal tunnel release     Past Social History:  Social History     Socioeconomic History     Marital status:      Spouse name: Not on file     Number of children: Not on file     Years of education: Not on file     Highest education level: Not on file   Occupational History     Not on file   Social Needs     Financial resource strain: Not on file     Food insecurity     Worry: Not on file     Inability: Not on file     Transportation needs     Medical: Not on file     Non-medical: Not on file   Tobacco Use     Smoking status: Former Smoker     Types: Cigarettes     Quit date: 4/13/1991     Years since  quittin.1     Smokeless tobacco: Never Used   Substance and Sexual Activity     Alcohol use: Yes     Comment: Wine- couple x/ week     Drug use: Never     Sexual activity: Not Currently     Partners: Male   Lifestyle     Physical activity     Days per week: Not on file     Minutes per session: Not on file     Stress: Not on file   Relationships     Social connections     Talks on phone: Not on file     Gets together: Not on file     Attends Mu-ism service: Not on file     Active member of club or organization: Not on file     Attends meetings of clubs or organizations: Not on file     Relationship status: Not on file     Intimate partner violence     Fear of current or ex partner: Not on file     Emotionally abused: Not on file     Physically abused: Not on file     Forced sexual activity: Not on file   Other Topics Concern     Not on file   Social History Narrative     Not on file     Family History:  No family history on file.    Medications:  Current Outpatient Medications   Medication     albuterol (PROAIR HFA/PROVENTIL HFA/VENTOLIN HFA) 108 (90 Base) MCG/ACT inhaler     alcohol swab prep pads     amLODIPine (NORVASC) 2.5 MG tablet     blood glucose (NO BRAND SPECIFIED) lancets standard     blood glucose (NO BRAND SPECIFIED) test strip     budesonide-formoterol (SYMBICORT) 160-4.5 MCG/ACT Inhaler     Cyanocobalamin 3000 MCG SUBL     esomeprazole (NEXIUM) 40 MG DR capsule     folic acid (FOLVITE) 1 MG tablet     gabapentin (NEURONTIN) 100 MG capsule     glipiZIDE (GLUCOTROL) 5 MG tablet     HYDROcodone-acetaminophen (NORCO) 5-325 MG tablet     insulin pen needle (ULTICARE MICRO) 32G X 4 MM miscellaneous     liraglutide (VICTOZA) 18 MG/3ML solution     losartan (COZAAR) 100 MG tablet     metFORMIN (GLUCOPHAGE-XR) 500 MG 24 hr tablet     METHOTREXATE PO     montelukast (SINGULAIR) 10 MG tablet     multivitamin w/minerals (MULTI-VITAMIN) tablet     potassium chloride ER (KLOR-CON M) 20 MEQ CR tablet      Vitamin D, Cholecalciferol, 25 MCG (1000 UT) TABS     No current facility-administered medications for this visit.      Allergies:  Allergies   Allergen Reactions     Seasonal Allergies      Physical Exam:       BP (!) 158/75   Pulse 83   Resp 18   Wt 77.6 kg (171 lb)   SpO2 98%   BMI 33.68 kg/m      General: Sitting up, NAD  HEENT: anicteric  Neck: no palpable lymphadenopathy  Chest: CTAB, no wheezing  Cardiac: RRR no murmurs  Abdomen: Soft, obese  Extremities: No LE Edema  Neuro: A&Ox3, no focal deficits   Skin: no rash noted  Psych: Appropriate    Laboratory, imaging, and microbiologic data:    All laboratory and imaging data reviewed, pertinent results discussed above.    Most Recent [facility] Pulmonary Function Testing    FVC-Pred   Date Value Ref Range Status   05/26/2021 2.40 L      FVC-Pre   Date Value Ref Range Status   05/26/2021 1.73 L      FVC-%Pred-Pre   Date Value Ref Range Status   05/26/2021 72 %      FEV1-Pre   Date Value Ref Range Status   05/26/2021 1.37 L      FEV1-%Pred-Pre   Date Value Ref Range Status   05/26/2021 72 %      FEV1FVC-Pred   Date Value Ref Range Status   05/26/2021 79 %      FEV1FVC-Pre   Date Value Ref Range Status   05/26/2021 79 %      No results found for: 20029  FEFMax-Pred   Date Value Ref Range Status   05/26/2021 4.98 L/sec      FEFMax-Pre   Date Value Ref Range Status   05/26/2021 4.49 L/sec      FEFMax-%Pred-Pre   Date Value Ref Range Status   05/26/2021 90 %      ExpTime-Pre   Date Value Ref Range Status   05/26/2021 6.70 sec      FIFMax-Pre   Date Value Ref Range Status   05/26/2021 3.53 L/sec      FEV1FEV6-Pred   Date Value Ref Range Status   05/26/2021 79 %      FEV1FEV6-Pre   Date Value Ref Range Status   05/26/2021 78 %      No results found for: 20055

## 2021-05-26 NOTE — NURSING NOTE
Chief Complaint   Patient presents with     Consult     Asthma      Medications reviewed and updated.  Vitals taken  Elaine Gallardo CMA

## 2021-05-26 NOTE — LETTER
5/26/2021         RE: Carli Monreal  [address]  [place] [address]        Dear Colleague,    Thank you for referring your patient, Carli Monreal, to the [facility] [place]. Please see a copy of my visit note below.    [facility]    Pulmonary, Allergy, Critical Care and Sleep Medicine    Initial Consultation  05/25/2021    Carli Monreal MRN# [id number]   Age: 70 year old YOB: 1950      Assessment and Recommendations:    Carli Monreal is a 70 year old female with a history of reported asthma, Crohn's disease and inflammatory arthritis previously on immunosuppression, DMII, and HTN who presents for evaluation of chronic dyspnea and cough.    Dyspnea on Exertion  Cough  Crohn's Disease  Greater than one year of relatively stable dyspnea and cough. Very short of breath after walking to the bathroom in clinic. Non-specific spirometry today. Given diagnosis of asthma in the past and is on Symbicort but frequently forgets to take so will switch to daily inhaler. Could have Crohn's related lung disease such as bronchiectasis, bronchitis, or bronchiolitis obliterans and will do a CT with expiratory views to assess for parenchymal disease, small airways disease. On numerous immunosuppressive drugs in recent years that can cause pulmonary toxicity but symptoms largely in past year when has been off immunosuppression and denies respiratory symptoms while on these meds in the past. Is overweight and has been less active in the past year so likely some degree of deconditioning and will benefit from rehab.   - Transition Symbicort to Breo to help with compliance  - HRCT of the chest with expiratory views to assess for Crohn's related disease, small airways disease  - Send to lab today for Rheumatology labs  - Once left foot healing will send to Pulmonary Rehab    Follow up in 3 months    Seen and discussed with   Mily Recinos MD PhD  Pulmonary and Critical Care Fellow   Pager [phone]      I saw and evaluated patient with Fellow.  Case discussed - agree with note.  I reviewed PFT: nonspecific spirometry pattern suggests obstructive lung disease.  Could be consistent with his history of asthma or small airways disease associated with Crohns.    CINDY SALINAS M.D.        Chief Complaint and History of Present Illness:    CC:  Asthma    HPI:   History taken from patient and chart review. Limited records available prior to visit, patient recently moved to [place] from [place]. Established with primary care in April where reported being off methotrexate due to dyspnea for the past year. Respiratory symptoms brought a diagnosis of asthma and start of Symbicort. Referred to Pulm for additional evaluation. Seen last week in Rheum clinic. Crohn's disease, arthritis, and possible RA. Previous treatment with remicade and humira and more recently 15 mg methotrexate weekly and Cimzia. Experiencing chronic diarrhea. Suspicion for chronic inflammatory polyarthritis associated with Crohn's and plan for further workup, plan for deferral of immunosuppression to GI.    Today seen in clinic alongside  who helps provide some of the history. Confirmed numerous immunosupresive treatments in the past for her Crohn's disease/arthritis. Onset of dyspnea in Feb 2020 and evaluated for cardiac issues in the hospital. Started to have breathing trouble again in July and ultimately admitted to the hospital again, Covid testing negative x2. Thinks she received antibiotics and was given nebs which maybe helped. Not discharged on any medications and remained off immunosuppressive treatment. States that in October was told her breathing issues were due to mild emphysema and asthma.     Primary symptom is dyspnea on exertion. Has good days and bad in terms of breathing. Humidity makes symptoms worse, does not have a sat probe at home. Also has a cough  that comes and goes throughout the year, feels like there may be phlegm but never coughs anything up. Cough can be present for weeks to months and then go away for same length of time. No pattern to when cough recurs, but does tend to cough more later in the day when can also lose voice a little. Denies cough relationship to food. Initially stated Nexium is controlling her reflux, but later reported sternal discomfort that can occur on days when drinks a lot of coffee. Denied PND. Does not think cough or respiratory symptoms ever fluctuated based on immunosuppression regimen or improved during times when took steroids. Activity level has been decreased since COVID. Given Symbicort last year, remembers to take 5-6 days per week, never thinks to use albuterol when short of breath.     Exposure history  Former smoker, quit in 1983 after 1-1.5 ppd for 15-20 years. Worked as a  and . Two cats at home, no hot tub use, no mold or water damage in home.     Family: Both father and brother with asbestosis, father's exposure when worked on railroad. He changed his clothes at work or immediately went to basement to change when he came home.    Review of Systems:  Complete 12 point ROS negative unless mentioned in HPI    Histories, Prior to Admission Medications, Allergies:    Past Medical History:  Asthma  Crohn's disease s/p partial small bowel resection  Type II Diabetes - recent A1C of 7  Hypertension  Rheumatoid arthritis?  Osteoarthritis     Past Surgical History:  L4-L5 lumbar fusion  Partial small bowel resection  Appendectomy  Cholecystectomy  Hernia repair  Bilateral carpal tunnel release     Past Social History:  Social History     Socioeconomic History     Marital status:      Spouse name: Not on file     Number of children: Not on file     Years of education: Not on file     Highest education level: Not on file   Occupational History     Not on file   Social Needs     Financial  resource strain: Not on file     Food insecurity     Worry: Not on file     Inability: Not on file     Transportation needs     Medical: Not on file     Non-medical: Not on file   Tobacco Use     Smoking status: Former Smoker     Types: Cigarettes     Quit date: 1991     Years since quittin.1     Smokeless tobacco: Never Used   Substance and Sexual Activity     Alcohol use: Yes     Comment: Wine- couple x/ week     Drug use: Never     Sexual activity: Not Currently     Partners: Male   Lifestyle     Physical activity     Days per week: Not on file     Minutes per session: Not on file     Stress: Not on file   Relationships     Social connections     Talks on phone: Not on file     Gets together: Not on file     Attends Christian service: Not on file     Active member of club or organization: Not on file     Attends meetings of clubs or organizations: Not on file     Relationship status: Not on file     Intimate partner violence     Fear of current or ex partner: Not on file     Emotionally abused: Not on file     Physically abused: Not on file     Forced sexual activity: Not on file   Other Topics Concern     Not on file   Social History Narrative     Not on file     Family History:  No family history on file.    Medications:  Current Outpatient Medications   Medication     albuterol (PROAIR HFA/PROVENTIL HFA/VENTOLIN HFA) 108 (90 Base) MCG/ACT inhaler     alcohol swab prep pads     amLODIPine (NORVASC) 2.5 MG tablet     blood glucose (NO BRAND SPECIFIED) lancets standard     blood glucose (NO BRAND SPECIFIED) test strip     budesonide-formoterol (SYMBICORT) 160-4.5 MCG/ACT Inhaler     Cyanocobalamin 3000 MCG SUBL     esomeprazole (NEXIUM) 40 MG DR capsule     folic acid (FOLVITE) 1 MG tablet     gabapentin (NEURONTIN) 100 MG capsule     glipiZIDE (GLUCOTROL) 5 MG tablet     HYDROcodone-acetaminophen (NORCO) 5-325 MG tablet     insulin pen needle (ULTICARE MICRO) 32G X 4 MM miscellaneous     liraglutide  (VICTOZA) 18 MG/3ML solution     losartan (COZAAR) 100 MG tablet     metFORMIN (GLUCOPHAGE-XR) 500 MG 24 hr tablet     METHOTREXATE PO     montelukast (SINGULAIR) 10 MG tablet     multivitamin w/minerals (MULTI-VITAMIN) tablet     potassium chloride ER (KLOR-CON M) 20 MEQ CR tablet     Vitamin D, Cholecalciferol, 25 MCG (1000 UT) TABS     No current facility-administered medications for this visit.      Allergies:  Allergies   Allergen Reactions     Seasonal Allergies      Physical Exam:       BP (!) 158/75   Pulse 83   Resp 18   Wt 77.6 kg (171 lb)   SpO2 98%   BMI 33.68 kg/m      General: Sitting up, NAD  HEENT: anicteric  Neck: no palpable lymphadenopathy  Chest: CTAB, no wheezing  Cardiac: RRR no murmurs  Abdomen: Soft, obese  Extremities: No LE Edema  Neuro: A&Ox3, no focal deficits   Skin: no rash noted  Psych: Appropriate    Laboratory, imaging, and microbiologic data:    All laboratory and imaging data reviewed, pertinent results discussed above.    Most Recent [facility] Pulmonary Function Testing    FVC-Pred   Date Value Ref Range Status   05/26/2021 2.40 L      FVC-Pre   Date Value Ref Range Status   05/26/2021 1.73 L      FVC-%Pred-Pre   Date Value Ref Range Status   05/26/2021 72 %      FEV1-Pre   Date Value Ref Range Status   05/26/2021 1.37 L      FEV1-%Pred-Pre   Date Value Ref Range Status   05/26/2021 72 %      FEV1FVC-Pred   Date Value Ref Range Status   05/26/2021 79 %      FEV1FVC-Pre   Date Value Ref Range Status   05/26/2021 79 %      No results found for: 20029  FEFMax-Pred   Date Value Ref Range Status   05/26/2021 4.98 L/sec      FEFMax-Pre   Date Value Ref Range Status   05/26/2021 4.49 L/sec      FEFMax-%Pred-Pre   Date Value Ref Range Status   05/26/2021 90 %      ExpTime-Pre   Date Value Ref Range Status   05/26/2021 6.70 sec      FIFMax-Pre   Date Value Ref Range Status   05/26/2021 3.53 L/sec      FEV1FEV6-Pred   Date Value Ref Range Status   05/26/2021 79 %      FEV1FEV6-Pre   Date  Value Ref Range Status   05/26/2021 78 %      No results found for: 20055    Again, thank you for allowing me to participate in the care of your patient.        Sincerely,        Marina Recinos MD

## 2021-05-27 LAB
CCP AB SER IA-ACNC: 2 U/ML
DLCOUNC-%PRED-PRE: 104 %
DLCOUNC-PRE: 17.11 ML/MIN/MMHG
DLCOUNC-PRED: 16.37 ML/MIN/MMHG
ERV-%PRED-PRE: 106 %
ERV-PRE: 0.24 L
ERV-PRED: 0.23 L
EXPTIME-PRE: 6.7 SEC
FEF2575-%PRED-PRE: 66 %
FEF2575-PRE: 1.13 L/SEC
FEF2575-PRED: 1.7 L/SEC
FEFMAX-%PRED-PRE: 90 %
FEFMAX-PRE: 4.49 L/SEC
FEFMAX-PRED: 4.98 L/SEC
FEV1-%PRED-PRE: 72 %
FEV1-PRE: 1.37 L
FEV1FEV6-PRE: 78 %
FEV1FEV6-PRED: 79 %
FEV1FVC-PRE: 79 %
FEV1FVC-PRED: 79 %
FEV1SVC-PRE: 79 %
FEV1SVC-PRED: 77 %
FIFMAX-PRE: 3.53 L/SEC
FRCPLETH-%PRED-PRE: 94 %
FRCPLETH-PRE: 2.28 L
FRCPLETH-PRED: 2.43 L
FVC-%PRED-PRE: 72 %
FVC-PRE: 1.73 L
FVC-PRED: 2.4 L
IC-%PRED-PRE: 66 %
IC-PRE: 1.48 L
IC-PRED: 2.22 L
RHEUMATOID FACT SER NEPH-ACNC: <7 IU/ML (ref 0–20)
RVPLETH-%PRED-PRE: 111 %
RVPLETH-PRE: 2.03 L
RVPLETH-PRED: 1.82 L
TLCPLETH-%PRED-PRE: 91 %
TLCPLETH-PRE: 3.77 L
TLCPLETH-PRED: 4.1 L
VA-%PRED-PRE: 84 %
VA-PRE: 3.22 L
VC-%PRED-PRE: 70 %
VC-PRE: 1.73 L
VC-PRED: 2.45 L

## 2021-05-28 LAB
ANA PAT SER IF-IMP: ABNORMAL
ANA SER QL IF: POSITIVE
ANA TITR SER IF: ABNORMAL {TITER}

## 2021-06-01 LAB
B LOCUS: NORMAL
B27TEST METHOD: NORMAL

## 2021-06-03 ENCOUNTER — ANCILLARY PROCEDURE (OUTPATIENT)
Dept: GENERAL RADIOLOGY | Facility: CLINIC | Age: 71
End: 2021-06-03
Attending: FAMILY MEDICINE
Payer: MEDICARE

## 2021-06-03 ENCOUNTER — OFFICE VISIT (OUTPATIENT)
Dept: ORTHOPEDICS | Facility: CLINIC | Age: 71
End: 2021-06-03
Payer: MEDICARE

## 2021-06-03 VITALS
HEIGHT: 60 IN | SYSTOLIC BLOOD PRESSURE: 132 MMHG | WEIGHT: 171 LBS | BODY MASS INDEX: 33.57 KG/M2 | DIASTOLIC BLOOD PRESSURE: 70 MMHG

## 2021-06-03 DIAGNOSIS — S92.502G: Primary | ICD-10-CM

## 2021-06-03 DIAGNOSIS — S92.502A: ICD-10-CM

## 2021-06-03 PROCEDURE — 99213 OFFICE O/P EST LOW 20 MIN: CPT | Performed by: FAMILY MEDICINE

## 2021-06-03 PROCEDURE — 73630 X-RAY EXAM OF FOOT: CPT | Mod: LT | Performed by: RADIOLOGY

## 2021-06-03 ASSESSMENT — MIFFLIN-ST. JEOR: SCORE: 1218.18

## 2021-06-03 NOTE — LETTER
6/3/2021         RE: Carli Monreal  [address]  [place] [address]        Dear Colleague,    Thank you for referring your patient, Carli Monreal, to the [facility] [place]. Please see a copy of my visit note below.    ASSESSMENT & PLAN  Carli was seen today for follow up.    Diagnoses and all orders for this visit:    Closed fracture of phalanx of left fifth toe with delayed healing, subsequent encounter  -     XR Foot LT G/E 3 vw; Future  -     Crutches Order      Patient is a 70 year old female presenting for evaluation of   Chief Complaint   Patient presents with     Left 5th Toe - Follow Up     # Left 5th Toe Fracture: Noted after hitting a cardboard box on around 5/4/21.  Pain over 5th toe with redness and swelling.  Pain with ambulation even with a shoe still.  X-rays showing 5th toe fracture with mild angulation and no evidence of healing.  Counseled patient on nature of condition and treatment options.  Given this plan as below, follow-up 2  weeks  Image Findings: 5th toe fracture mild angulation  Treatment: CAM boot short for two weeks, crutches as needed  Medications/Injections: Tylenol for pain  Follow-up: 2  weeks     Concerning signs/sx that would warrant urgent evaluation were discussed.  All questions were answered, patient understands and agrees with plan.      Return in about 2 weeks (around 6/17/2021).      -----    SUBJECTIVE  Carli Monreal is a/an 70 year old female who is seen in consultation at the request of DORI Anyaa for evaluation of left foot pain. The patient is seen by themselves.    Onset: 1 week(s) ago. Patient describes injury as walking in the middle of the night and hit the lateral portion of her foot on a cardboard box. Reviewed previous provider's note referral for concern of foot fracture.  Location of Pain: left lateral foot   Rating of Pain at worst: 10/10  Rating of Pain Currently: 6/10  Worsened by: walking, weight  bearing   Better with: rest   Treatments tried: rest/activity avoidance and CAM boot   Associated symptoms: swelling, numbness, tingling and bruising   Orthopedic history: YES ankle fracture   Relevant surgical history: NO    Interim History Arabella 3, 2021  Carli Monreal is now 4 weeks out from injury.  Notes persisting pain in left lateral foot. Currently weight bearing in tennis shoe. Since last visit on 2021 patient repeat radiograph taken prior to seeing the patient.  Pt reporting heaviness in the boot been in tennis shoe for one week.      No past medical history on file.  Social History     Socioeconomic History     Marital status:      Spouse name: None     Number of children: None     Years of education: None     Highest education level: None   Occupational History     None   Social Needs     Financial resource strain: None     Food insecurity     Worry: None     Inability: None     Transportation needs     Medical: None     Non-medical: None   Tobacco Use     Smoking status: Former Smoker     Types: Cigarettes     Quit date: 1991     Years since quittin.0     Smokeless tobacco: Never Used   Substance and Sexual Activity     Alcohol use: Yes     Comment: Wine- couple x/ week     Drug use: Never     Sexual activity: Not Currently     Partners: Male   Lifestyle     Physical activity     Days per week: None     Minutes per session: None     Stress: None   Relationships     Social connections     Talks on phone: None     Gets together: None     Attends Restorationism service: None     Active member of club or organization: None     Attends meetings of clubs or organizations: None     Relationship status: None     Intimate partner violence     Fear of current or ex partner: None     Emotionally abused: None     Physically abused: None     Forced sexual activity: None   Other Topics Concern     None   Social History Narrative     None       Patient's past medical, surgical, social, and family  "histories were reviewed today and no changes are noted.  No family history pertinent to the patient's problem today     REVIEW OF SYSTEMS:  10 point ROS is negative other than symptoms noted above in HPI, Past Medical History or as stated below  Constitutional: NEGATIVE for fever, chills, change in weight  Skin: NEGATIVE for worrisome rashes, moles or lesions  GI/: NEGATIVE for bowel or bladder changes  Neuro: NEGATIVE for weakness, dizziness or paresthesias    OBJECTIVE:  /70   Ht 1.518 m (4' 11.75\")   Wt 77.6 kg (171 lb)   BMI 33.68 kg/m     General: healthy, alert and in no distress  HEENT: no scleral icterus or conjunctival erythema  Skin: no suspicious lesions or rash. No jaundice.  CV:  no pedal edema  Resp: normal respiratory effort without conversational dyspnea   Psych: normal mood and affect  Gait: normal steady gait with appropriate coordination and balance  Neuro: Normal light sensory exam of lower extremity  MSK:  LEFT FOOT  Inspection:  Swelling over 5th digit  Palpation:    Tender about the 5th phalanx. Otherwise remainder of bony/ligamentous landmarks are non-tender.  Range of Motion:     Grossly intact and non-painful  Strength:    Grossly intact in all planes  Special Tests:   Mo testing due to     LEFT ANKLE  Inspection:    No swelling or ecchymosis is observed  Palpation:   Nontender.  Range of Motion:     Plantarflexion full / dorsiflexion full / inversion limited slightly by pain / eversion full  Strength:    Full mild pain with resisted eversion  Special Tests:    negative anterior drawer, negative talar tilt, negative valgus stress, negative forced external rotation/eversion, negative Rashid sign, negative squeeze test. Unable to perform heel raise and Unable to hop.    Independent visualization of the below image:  Recent Results (from the past 24 hour(s))   XR Foot LT G/E 3 vw    Narrative    Examination:  XR FOOT LEFT G/E 3 VIEWS    Date:  6/3/2021 2:10 PM     Clinical " Information: Closed fracture of fifth toe of left foot.     Comparison: 5/11/2021..      Impression    Impression:    1.  Transverse fracture through the proximal metaphysis of the left  fifth toe proximal phalanx. Since the prior exam, the fracture is  minimally impacted, but is nondisplaced. Fracture lucency remains well  visible, with no definite bridging callus are present. Alignment at  the fifth MTP joint remains normal. Soft tissue swelling remains  present in the toe. Bones are demineralized. No new bone or joint  abnormality elsewhere.    ORALIA PERKINS MD       I  ordered, visualized and reviewed these images with the patient  Mild angulation of 5th proximal phalanx fx at the base with no evidence of healing         Artur Vergara MD, [facility]          Again, thank you for allowing me to participate in the care of your patient.        Sincerely,        Artur Vergara MD

## 2021-06-03 NOTE — PROGRESS NOTES
ASSESSMENT & PLAN  Carli was seen today for follow up.    Diagnoses and all orders for this visit:    Closed fracture of phalanx of left fifth toe with delayed healing, subsequent encounter  -     XR Foot LT G/E 3 vw; Future  -     Crutches Order      Patient is a 70 year old female presenting for evaluation of   Chief Complaint   Patient presents with     Left 5th Toe - Follow Up     # Left 5th Toe Fracture: Noted after hitting a cardboard box on around 5/4/21.  Pain over 5th toe with redness and swelling.  Pain with ambulation even with a shoe still.  X-rays showing 5th toe fracture with mild angulation and no evidence of healing.  Counseled patient on nature of condition and treatment options.  Given this plan as below, follow-up 2  weeks  Image Findings: 5th toe fracture mild angulation  Treatment: CAM boot short for two weeks, crutches as needed  Medications/Injections: Tylenol for pain  Follow-up: 2  weeks     Concerning signs/sx that would warrant urgent evaluation were discussed.  All questions were answered, patient understands and agrees with plan.      Return in about 2 weeks (around 6/17/2021).      -----    SUBJECTIVE  Carli Monreal is a/an 70 year old female who is seen in consultation at the request of DORI Anaya for evaluation of left foot pain. The patient is seen by themselves.    Onset: 1 week(s) ago. Patient describes injury as walking in the middle of the night and hit the lateral portion of her foot on a cardboard box. Reviewed previous provider's note referral for concern of foot fracture.  Location of Pain: left lateral foot   Rating of Pain at worst: 10/10  Rating of Pain Currently: 6/10  Worsened by: walking, weight bearing   Better with: rest   Treatments tried: rest/activity avoidance and CAM boot   Associated symptoms: swelling, numbness, tingling and bruising   Orthopedic history: YES ankle fracture   Relevant surgical history: NO    Interim History Arabella 3, 2021  Carli Monreal is  now 4 weeks out from injury.  Notes persisting pain in left lateral foot. Currently weight bearing in tennis shoe. Since last visit on 2021 patient repeat radiograph taken prior to seeing the patient.  Pt reporting heaviness in the boot been in tennis shoe for one week.      No past medical history on file.  Social History     Socioeconomic History     Marital status:      Spouse name: None     Number of children: None     Years of education: None     Highest education level: None   Occupational History     None   Social Needs     Financial resource strain: None     Food insecurity     Worry: None     Inability: None     Transportation needs     Medical: None     Non-medical: None   Tobacco Use     Smoking status: Former Smoker     Types: Cigarettes     Quit date: 1991     Years since quittin.0     Smokeless tobacco: Never Used   Substance and Sexual Activity     Alcohol use: Yes     Comment: Wine- couple x/ week     Drug use: Never     Sexual activity: Not Currently     Partners: Male   Lifestyle     Physical activity     Days per week: None     Minutes per session: None     Stress: None   Relationships     Social connections     Talks on phone: None     Gets together: None     Attends Uatsdin service: None     Active member of club or organization: None     Attends meetings of clubs or organizations: None     Relationship status: None     Intimate partner violence     Fear of current or ex partner: None     Emotionally abused: None     Physically abused: None     Forced sexual activity: None   Other Topics Concern     None   Social History Narrative     None       Patient's past medical, surgical, social, and family histories were reviewed today and no changes are noted.  No family history pertinent to the patient's problem today     REVIEW OF SYSTEMS:  10 point ROS is negative other than symptoms noted above in HPI, Past Medical History or as stated below  Constitutional: NEGATIVE for  "fever, chills, change in weight  Skin: NEGATIVE for worrisome rashes, moles or lesions  GI/: NEGATIVE for bowel or bladder changes  Neuro: NEGATIVE for weakness, dizziness or paresthesias    OBJECTIVE:  /70   Ht 1.518 m (4' 11.75\")   Wt 77.6 kg (171 lb)   BMI 33.68 kg/m     General: healthy, alert and in no distress  HEENT: no scleral icterus or conjunctival erythema  Skin: no suspicious lesions or rash. No jaundice.  CV:  no pedal edema  Resp: normal respiratory effort without conversational dyspnea   Psych: normal mood and affect  Gait: normal steady gait with appropriate coordination and balance  Neuro: Normal light sensory exam of lower extremity  MSK:  LEFT FOOT  Inspection:  Swelling over 5th digit  Palpation:    Tender about the 5th phalanx. Otherwise remainder of bony/ligamentous landmarks are non-tender.  Range of Motion:     Grossly intact and non-painful  Strength:    Grossly intact in all planes  Special Tests:   Mo testing due to     LEFT ANKLE  Inspection:    No swelling or ecchymosis is observed  Palpation:   Nontender.  Range of Motion:     Plantarflexion full / dorsiflexion full / inversion limited slightly by pain / eversion full  Strength:    Full mild pain with resisted eversion  Special Tests:    negative anterior drawer, negative talar tilt, negative valgus stress, negative forced external rotation/eversion, negative Rashid sign, negative squeeze test. Unable to perform heel raise and Unable to hop.    Independent visualization of the below image:  Recent Results (from the past 24 hour(s))   XR Foot LT G/E 3 vw    Narrative    Examination:  XR FOOT LEFT G/E 3 VIEWS    Date:  6/3/2021 2:10 PM     Clinical Information: Closed fracture of fifth toe of left foot.     Comparison: 5/11/2021..      Impression    Impression:    1.  Transverse fracture through the proximal metaphysis of the left  fifth toe proximal phalanx. Since the prior exam, the fracture is  minimally impacted, but is " nondisplaced. Fracture lucency remains well  visible, with no definite bridging callus are present. Alignment at  the fifth MTP joint remains normal. Soft tissue swelling remains  present in the toe. Bones are demineralized. No new bone or joint  abnormality elsewhere.    ORALIA PERKINS MD       I  ordered, visualized and reviewed these images with the patient  Mild angulation of 5th proximal phalanx fx at the base with no evidence of healing         Artur Vergara MD, [facility]

## 2021-06-03 NOTE — PATIENT INSTRUCTIONS
# Left 5th Toe Fracture: Noted after hitting a cardboard box on around 5/4/21.  Pain over 5th toe with redness and swelling.  Pain with ambulation even with a shoe still.  X-rays showing 5th toe fracture with mild angulation and no evidence of healing.  Counseled patient on nature of condition and treatment options.  Given this plan as below, follow-up 2  weeks  Image Findings: 5th toe fracture mild angulation  Treatment: CAM boot short for two weeks, crutches as needed  Medications/Injections: Tylenol for pain  Follow-up: 2  weeks     It was great seeing you again today!    Artur Vergara

## 2021-06-09 ENCOUNTER — ANCILLARY PROCEDURE (OUTPATIENT)
Dept: MAMMOGRAPHY | Facility: CLINIC | Age: 71
End: 2021-06-09
Attending: FAMILY MEDICINE
Payer: MEDICARE

## 2021-06-09 DIAGNOSIS — Z12.31 ENCOUNTER FOR SCREENING MAMMOGRAM FOR MALIGNANT NEOPLASM OF BREAST: ICD-10-CM

## 2021-06-09 PROCEDURE — 77067 SCR MAMMO BI INCL CAD: CPT | Mod: TC | Performed by: RADIOLOGY

## 2021-06-10 DIAGNOSIS — M06.9 RHEUMATOID ARTHRITIS INVOLVING BOTH HANDS, UNSPECIFIED WHETHER RHEUMATOID FACTOR PRESENT (H): ICD-10-CM

## 2021-06-10 DIAGNOSIS — K50.019 CROHN'S DISEASE OF SMALL INTESTINE WITH COMPLICATION (H): ICD-10-CM

## 2021-06-10 DIAGNOSIS — K50.90 ARTHROPATHY IN CROHN'S DISEASE (H): ICD-10-CM

## 2021-06-10 DIAGNOSIS — M07.60 ARTHROPATHY IN CROHN'S DISEASE (H): ICD-10-CM

## 2021-06-10 PROCEDURE — 83993 ASSAY FOR CALPROTECTIN FECAL: CPT | Performed by: INTERNAL MEDICINE

## 2021-06-11 LAB — CALPROTECTIN STL-MCNT: 50.2 MG/KG (ref 0–49.9)

## 2021-06-15 ENCOUNTER — ANCILLARY PROCEDURE (OUTPATIENT)
Dept: GENERAL RADIOLOGY | Facility: CLINIC | Age: 71
End: 2021-06-15
Attending: FAMILY MEDICINE
Payer: MEDICARE

## 2021-06-15 ENCOUNTER — OFFICE VISIT (OUTPATIENT)
Dept: ORTHOPEDICS | Facility: CLINIC | Age: 71
End: 2021-06-15
Payer: MEDICARE

## 2021-06-15 VITALS
SYSTOLIC BLOOD PRESSURE: 112 MMHG | DIASTOLIC BLOOD PRESSURE: 70 MMHG | BODY MASS INDEX: 33.57 KG/M2 | HEIGHT: 60 IN | WEIGHT: 171 LBS

## 2021-06-15 DIAGNOSIS — S92.502G: Primary | ICD-10-CM

## 2021-06-15 DIAGNOSIS — S92.502G: ICD-10-CM

## 2021-06-15 PROCEDURE — 99213 OFFICE O/P EST LOW 20 MIN: CPT | Performed by: FAMILY MEDICINE

## 2021-06-15 PROCEDURE — 73630 X-RAY EXAM OF FOOT: CPT | Mod: LT | Performed by: RADIOLOGY

## 2021-06-15 ASSESSMENT — MIFFLIN-ST. JEOR: SCORE: 1218.18

## 2021-06-15 NOTE — PATIENT INSTRUCTIONS
# Left 5th Toe Fracture: Noted after hitting a cardboard box on around 5/4/21.  Pain improved 5th toe with resolving redness and swelling.  Mild pain with walking without CAM boot.  X-rays showing 5th toe fracture with mild angulation and evidence of healing.  There is still some pain on exam likely fracture is not quite healed.  Counseled patient on nature of condition and treatment options.  Given this plan as below, follow-up 2 weeks as needed  Image Findings: 5th toe fracture mild angulation evidence of healing on today's x-rays  Treatment: CAM boot short transition to shoes this week, wear shoes at all times for 1-2 weeks pending pain improvement/resolution  Medications/Injections: Tylenol for pain  Follow-up: 2 weeks as needed     Please call [phone]     It was great seeing you again today!    Artur Vergara

## 2021-06-15 NOTE — PROGRESS NOTES
ASSESSMENT & PLAN  Carli was seen today for follow up.    Diagnoses and all orders for this visit:    Closed fracture of phalanx of left fifth toe with delayed healing, subsequent encounter  -     XR Foot LT G/E 3 vw; Future      Patient is a 70 year old female presenting for evaluation of   Chief Complaint   Patient presents with     Left 5th Toe - Follow Up     # Left 5th Toe Fracture: Noted after hitting a cardboard box on around 5/4/21.  Pain improved 5th toe with resolving redness and swelling.  Mild pain with walking without CAM boot.  X-rays showing 5th toe fracture with mild angulation and evidence of healing.  There is still some pain on exam likely fracture is not quite healed.  Counseled patient on nature of condition and treatment options.  Given this plan as below, follow-up 2 weeks as needed  Image Findings: 5th toe fracture mild angulation evidence of healing on today's x-rays  Treatment: CAM boot short transition to shoes this week, wear shoes at all times for 1-2 weeks pending pain improvement/resolution  Medications/Injections: Tylenol for pain  Follow-up: 2 weeks as needed       Concerning signs/sx that would warrant urgent evaluation were discussed.  All questions were answered, patient understands and agrees with plan.      Return in about 2 weeks (around 6/29/2021).      -----    SUBJECTIVE  Carli Monreal is a/an 70 year old female who is seen in consultation at the request of DORI Anaya for evaluation of left foot pain. The patient is seen by themselves.    Onset: 1 week(s) ago. Patient describes injury as walking in the middle of the night and hit the lateral portion of her foot on a cardboard box. Reviewed previous provider's note referral for concern of foot fracture.  Location of Pain: left lateral foot   Rating of Pain at worst: 10/10  Rating of Pain Currently: 6/10  Worsened by: walking, weight bearing   Better with: rest   Treatments tried: rest/activity avoidance and CAM boot    Associated symptoms: swelling, numbness, tingling and bruising   Orthopedic history: YES ankle fracture   Relevant surgical history: NO    Interim History Arabella 3, 2021  Carli Monreal is now 4 weeks out from injury.  Notes persisting pain in left lateral foot. Currently weight bearing in tennis shoe. Since last visit on 2021 patient repeat radiograph taken prior to seeing the patient.  Pt reporting heaviness in the boot been in tennis shoe for one week.      Interim History Arabella 15, 2021  Carli Monreal is now 6 weeks out from injury.  Since last visit on 6/3/2021 patient reports improved pain though still some when outside of boot.  No significant pain in boot.  Repeat x-rays taken today.  Pt has cruise planned at the end of the summer wants to go without boot.       No past medical history on file.  Social History     Socioeconomic History     Marital status:      Spouse name: None     Number of children: None     Years of education: None     Highest education level: None   Occupational History     None   Social Needs     Financial resource strain: None     Food insecurity     Worry: None     Inability: None     Transportation needs     Medical: None     Non-medical: None   Tobacco Use     Smoking status: Former Smoker     Types: Cigarettes     Quit date: 1991     Years since quittin.0     Smokeless tobacco: Never Used   Substance and Sexual Activity     Alcohol use: Yes     Comment: Wine- couple x/ week     Drug use: Never     Sexual activity: Not Currently     Partners: Male   Lifestyle     Physical activity     Days per week: None     Minutes per session: None     Stress: None   Relationships     Social connections     Talks on phone: None     Gets together: None     Attends [employer] service: None     Active member of club or organization: None     Attends meetings of clubs or organizations: None     Relationship status: None     Intimate partner violence     Fear of current or ex  "partner: None     Emotionally abused: None     Physically abused: None     Forced sexual activity: None   Other Topics Concern     None   Social History Narrative     None       Patient's past medical, surgical, social, and family histories were reviewed today and no changes are noted.  No family history pertinent to the patient's problem today     REVIEW OF SYSTEMS:  10 point ROS is negative other than symptoms noted above in HPI, Past Medical History or as stated below  Constitutional: NEGATIVE for fever, chills, change in weight  Skin: NEGATIVE for worrisome rashes, moles or lesions  GI/: NEGATIVE for bowel or bladder changes  Neuro: NEGATIVE for weakness, dizziness or paresthesias    OBJECTIVE:  /70   Ht 1.518 m (4' 11.75\")   Wt 77.6 kg (171 lb)   BMI 33.68 kg/m     General: healthy, alert and in no distress  HEENT: no scleral icterus or conjunctival erythema  Skin: no suspicious lesions or rash. No jaundice.  CV:  no pedal edema  Resp: normal respiratory effort without conversational dyspnea   Psych: normal mood and affect  Gait: normal steady gait with appropriate coordination and balance  Neuro: Normal light sensory exam of lower extremity  MSK:  LEFT FOOT  Inspection:  Improved swelling over 5th digit  Palpation:    Mild TTP about the 5th phalanx. Otherwise remainder of bony/ligamentous landmarks are non-tender.  Range of Motion:     Grossly intact and non-painful  Strength:    Grossly intact in all planes  Special Tests:   Mo testing due to     LEFT ANKLE  Inspection:    No swelling or ecchymosis is observed  Palpation:   Nontender.  Range of Motion:     Plantarflexion full / dorsiflexion full / inversion limited slightly by pain / eversion full  Strength:    Full mild pain with resisted eversion  Special Tests:    negative anterior drawer, negative talar tilt, negative valgus stress, negative forced external rotation/eversion, negative Rashid sign, negative squeeze test. Unable to perform heel " raise and Unable to hop.    Independent visualization of the below image:  No results found for this or any previous visit (from the past 24 hour(s)).    I  ordered, visualized and reviewed these images with the patient  Mild angulation of 5th proximal phalanx fx at the base with evidence of healing    IMPRESSION: Mild hypertrophic callus adjacent to the fifth proximal  phalanx fracture. Mild displacement.     MD Artur MANRIQUEZ MD, [facility]

## 2021-06-15 NOTE — LETTER
6/15/2021         RE: Carli Monreal  [address]  [place] [address]        Dear Colleague,    Thank you for referring your patient, Carli Monreal, to the [facility] [place]. Please see a copy of my visit note below.    ASSESSMENT & PLAN  Carli was seen today for follow up.    Diagnoses and all orders for this visit:    Closed fracture of phalanx of left fifth toe with delayed healing, subsequent encounter  -     XR Foot LT G/E 3 vw; Future      Patient is a 70 year old female presenting for evaluation of   Chief Complaint   Patient presents with     Left 5th Toe - Follow Up     # Left 5th Toe Fracture: Noted after hitting a cardboard box on around 5/4/21.  Pain improved 5th toe with resolving redness and swelling.  Mild pain with walking without CAM boot.  X-rays showing 5th toe fracture with mild angulation and evidence of healing.  There is still some pain on exam likely fracture is not quite healed.  Counseled patient on nature of condition and treatment options.  Given this plan as below, follow-up 2 weeks as needed  Image Findings: 5th toe fracture mild angulation evidence of healing on today's x-rays  Treatment: CAM boot short transition to shoes this week, wear shoes at all times for 1-2 weeks pending pain improvement/resolution  Medications/Injections: Tylenol for pain  Follow-up: 2 weeks as needed       Concerning signs/sx that would warrant urgent evaluation were discussed.  All questions were answered, patient understands and agrees with plan.      Return in about 2 weeks (around 6/29/2021).      -----    SUBJECTIVE  Carli Monreal is a/an 70 year old female who is seen in consultation at the request of DORI Anaya for evaluation of left foot pain. The patient is seen by themselves.    Onset: 1 week(s) ago. Patient describes injury as walking in the middle of the night and hit the lateral portion of her foot on a cardboard box. Reviewed previous  provider's note referral for concern of foot fracture.  Location of Pain: left lateral foot   Rating of Pain at worst: 10/10  Rating of Pain Currently: 6/10  Worsened by: walking, weight bearing   Better with: rest   Treatments tried: rest/activity avoidance and CAM boot   Associated symptoms: swelling, numbness, tingling and bruising   Orthopedic history: YES ankle fracture   Relevant surgical history: NO    Interim History Arabella 3, 2021  Carli Monreal is now 4 weeks out from injury.  Notes persisting pain in left lateral foot. Currently weight bearing in tennis shoe. Since last visit on 2021 patient repeat radiograph taken prior to seeing the patient.  Pt reporting heaviness in the boot been in tennis shoe for one week.      Interim History Arabella 15, 2021  Carli Monreal is now 6 weeks out from injury.  Since last visit on 6/3/2021 patient reports improved pain though still some when outside of boot.  No significant pain in boot.  Repeat x-rays taken today.  Pt has cruise planned at the end of the summer wants to go without boot.       No past medical history on file.  Social History     Socioeconomic History     Marital status:      Spouse name: None     Number of children: None     Years of education: None     Highest education level: None   Occupational History     None   Social Needs     Financial resource strain: None     Food insecurity     Worry: None     Inability: None     Transportation needs     Medical: None     Non-medical: None   Tobacco Use     Smoking status: Former Smoker     Types: Cigarettes     Quit date: 1991     Years since quittin.0     Smokeless tobacco: Never Used   Substance and Sexual Activity     Alcohol use: Yes     Comment: Wine- couple x/ week     Drug use: Never     Sexual activity: Not Currently     Partners: Male   Lifestyle     Physical activity     Days per week: None     Minutes per session: None     Stress: None   Relationships     Social connections      "Talks on phone: None     Gets together: None     Attends Confucianism service: None     Active member of club or organization: None     Attends meetings of clubs or organizations: None     Relationship status: None     Intimate partner violence     Fear of current or ex partner: None     Emotionally abused: None     Physically abused: None     Forced sexual activity: None   Other Topics Concern     None   Social History Narrative     None       Patient's past medical, surgical, social, and family histories were reviewed today and no changes are noted.  No family history pertinent to the patient's problem today     REVIEW OF SYSTEMS:  10 point ROS is negative other than symptoms noted above in HPI, Past Medical History or as stated below  Constitutional: NEGATIVE for fever, chills, change in weight  Skin: NEGATIVE for worrisome rashes, moles or lesions  GI/: NEGATIVE for bowel or bladder changes  Neuro: NEGATIVE for weakness, dizziness or paresthesias    OBJECTIVE:  /70   Ht 1.518 m (4' 11.75\")   Wt 77.6 kg (171 lb)   BMI 33.68 kg/m     General: healthy, alert and in no distress  HEENT: no scleral icterus or conjunctival erythema  Skin: no suspicious lesions or rash. No jaundice.  CV:  no pedal edema  Resp: normal respiratory effort without conversational dyspnea   Psych: normal mood and affect  Gait: normal steady gait with appropriate coordination and balance  Neuro: Normal light sensory exam of lower extremity  MSK:  LEFT FOOT  Inspection:  Improved swelling over 5th digit  Palpation:    Mild TTP about the 5th phalanx. Otherwise remainder of bony/ligamentous landmarks are non-tender.  Range of Motion:     Grossly intact and non-painful  Strength:    Grossly intact in all planes  Special Tests:   Mo testing due to     LEFT ANKLE  Inspection:    No swelling or ecchymosis is observed  Palpation:   Nontender.  Range of Motion:     Plantarflexion full / dorsiflexion full / inversion limited slightly by pain / " eversion full  Strength:    Full mild pain with resisted eversion  Special Tests:    negative anterior drawer, negative talar tilt, negative valgus stress, negative forced external rotation/eversion, negative Rashid sign, negative squeeze test. Unable to perform heel raise and Unable to hop.    Independent visualization of the below image:  No results found for this or any previous visit (from the past 24 hour(s)).    I  ordered, visualized and reviewed these images with the patient  Mild angulation of 5th proximal phalanx fx at the base with evidence of healing    IMPRESSION: Mild hypertrophic callus adjacent to the fifth proximal  phalanx fracture. Mild displacement.     MD Artur MANRIQUEZ MD, [facility]          Again, thank you for allowing me to participate in the care of your patient.        Sincerely,        Artur Vergara MD

## 2021-06-16 ENCOUNTER — VIRTUAL VISIT (OUTPATIENT)
Dept: GASTROENTEROLOGY | Facility: CLINIC | Age: 71
End: 2021-06-16
Payer: MEDICARE

## 2021-06-16 ENCOUNTER — PRE VISIT (OUTPATIENT)
Dept: GASTROENTEROLOGY | Facility: CLINIC | Age: 71
End: 2021-06-16

## 2021-06-16 VITALS — HEIGHT: 60 IN | WEIGHT: 171 LBS | BODY MASS INDEX: 33.57 KG/M2

## 2021-06-16 DIAGNOSIS — K50.919 CROHN'S DISEASE WITH COMPLICATION, UNSPECIFIED GASTROINTESTINAL TRACT LOCATION (H): ICD-10-CM

## 2021-06-16 PROCEDURE — 99205 OFFICE O/P NEW HI 60 MIN: CPT | Mod: 95 | Performed by: INTERNAL MEDICINE

## 2021-06-16 RX ORDER — CHOLESTYRAMINE LIGHT 4 G/5.7G
4 POWDER, FOR SUSPENSION ORAL
Qty: 60 EACH | Refills: 0 | Status: SHIPPED | OUTPATIENT
Start: 2021-06-16 | End: 2021-12-27

## 2021-06-16 ASSESSMENT — MIFFLIN-ST. JEOR: SCORE: 1218.18

## 2021-06-16 NOTE — PROGRESS NOTES
"Carli Monreal is a 70 year old female who is being evaluated via a billable video visit.      The patient has been notified of following:     \"This video visit will be conducted via a call between you and your physician/provider. We have found that certain health care needs can be provided without the need for an in-person physical exam.  This service lets us provide the care you need with a video conversation.  If a prescription is necessary we can send it directly to your pharmacy.  If lab work is needed we can place an order for that and you can then stop by our lab to have the test done at a later time.    If during the course of the call the physician/provider feels a video visit is not appropriate, you will not be charged for this service.\"     Patient confirmed that they are in [place] for today's visit yes.    Video-Visit Details  Type of service:  Video Visit    Video Start Time: 1:44 PM    Originating Location (pt. Location): [place]    Distant Location (provider location):  [facility] [place]     Platform used: [email or web address]      CD NEW    PATIENT: Carli Monreal    MRN: [id number]    Date of Birth 1950    Tel: [phone] (home)     PCP: Iza Cage     HPI: Ms. Monreal is a 70 year old female here to establish care for Crohn's ileitis.     When she was 19, was told she had colitis in the setting of loose stools +/- blood. Diagnosed with CD in 2000/2001 when presented to the hospital with severe abd pain.     Was on Remicade around 2005, but then stopped due to an infusion reaction (sharp back and chest pains). Transitioned to Humira but then stopped due to inability to pay. In 2012 had an SBO and underwent surgical resection.  Then switched to Cimzia/MTX, got sick and then stopped this medication Summer 2020.  Was hospitalized in July 2020 for breathing issues. Has not been on any Crohn's maintenance therapy since then.       Last cscope was in Jan 2020, per patient " report. Unsure of findings.     Noteworthy diet history- healthy diet in general    HBI  General well-being 1 = slightly below average  Abdominal pain 0 = none  Number of liquid stools per day 5-10. No blood.   Abdominal mass cannot assess virtually  Current Complications Arthralgia (spine, hands, knees, and feet)    Constitutional symptoms:  Fever NO  Weight loss NO    Other GI symptoms present none     Extraintestinal manifestations (anytime during the course of disease)  arthralgias    Patric Classification  AGE AT DIAGNOSIS: A3 above 40 y  CURRENT DISEASE LOCATION: L1: ileal  L4 upper GI: NO  DISEASE BEHAVIOR (since disease onset): B2: stricturing  Perianal disease: NO    Total number of IBD surgeries (except perianal): 1 (resection in )    Current IBD Medications:  None    Past IBD Medications:  Remicade  Humira  Cimzia +MTX      Past Medical History:   Diagnosis Date     Diabetes mellitus (H)      Right bundle branch block      Uncomplicated asthma         Past Surgical History:   Procedure Laterality Date     SMALL BOWEL RESECTION         Social History     Tobacco Use     Smoking status: Former Smoker     Types: Cigarettes     Quit date: 1991     Years since quittin.1     Smokeless tobacco: Never Used   Substance Use Topics     Alcohol use: Yes     Comment: Wine- couple x/ week       Family History   Problem Relation Age of Onset     Rheumatoid Arthritis Mother      Inflammatory Bowel Disease No family hx of      Colon Cancer No family hx of        Allergies   Allergen Reactions     Seasonal Allergies         Outpatient Encounter Medications as of 2021   Medication Sig Dispense Refill     albuterol (PROAIR HFA/PROVENTIL HFA/VENTOLIN HFA) 108 (90 Base) MCG/ACT inhaler Inhale 2 puffs into the lungs 4 times daily       alcohol swab prep pads Use to swab area of injection/venkata as directed. 100 each 3     amLODIPine (NORVASC) 2.5 MG tablet Take 1 tablet (2.5 mg) by mouth daily 90 tablet  1     blood glucose (NO BRAND SPECIFIED) lancets standard Use to test blood sugar 1 times daily or as directed. 100 each 11     blood glucose (NO BRAND SPECIFIED) test strip Use to test blood sugar 1 times daily or as directed. 100 strip 1     budesonide-formoterol (SYMBICORT) 160-4.5 MCG/ACT Inhaler Inhale 2 puffs into the lungs 2 times daily 10.2 g 4     cholestyramine light (QUESTRAN) 4 GM packet Take 1 packet (4 g) by mouth 3 times daily (with meals) 60 each 0     Cyanocobalamin 3000 MCG SUBL Place 1,500 mg under the tongue daily       esomeprazole (NEXIUM) 40 MG DR capsule Take 40 mg by mouth every morning (before breakfast) Take 30-60 minutes before eating.       fluticasone-vilanterol (BREO ELLIPTA) 200-25 MCG/INH inhaler Inhale 1 puff into the lungs daily 1 each 3     folic acid (FOLVITE) 1 MG tablet Take 1 tablet (1 mg) by mouth daily 90 tablet 1     gabapentin (NEURONTIN) 100 MG capsule Take 1 capsule (100 mg) by mouth At Bedtime 30 capsule 0     glipiZIDE (GLUCOTROL) 5 MG tablet Take 1 tablet (5 mg) by mouth 2 times daily (before meals) 180 tablet 1     HYDROcodone-acetaminophen (NORCO) 5-325 MG tablet Take 1 tablet by mouth every 6 hours as needed for severe pain       insulin pen needle (ULTICARE MICRO) 32G X 4 MM miscellaneous Use 1 pen needles daily or as directed. 100 each 3     liraglutide (VICTOZA) 18 MG/3ML solution Inject 1.2 mg Subcutaneous daily 3 mL 3     losartan (COZAAR) 100 MG tablet Take 100 mg by mouth daily       metFORMIN (GLUCOPHAGE-XR) 500 MG 24 hr tablet Take 500 mg by mouth daily (with dinner)       METHOTREXATE PO Take 12 mg by mouth once a week       montelukast (SINGULAIR) 10 MG tablet Take 10 mg by mouth At Bedtime       multivitamin w/minerals (MULTI-VITAMIN) tablet Take 1 tablet by mouth daily       potassium chloride ER (KLOR-CON M) 20 MEQ CR tablet Take 1 tablet (20 mEq) by mouth daily 30 tablet 3     Vitamin D, Cholecalciferol, 25 MCG (1000 UT) TABS Take 2 tablets by mouth  "daily       No facility-administered encounter medications on file as of 6/16/2021.       NSAID  NO    Review of Systems  Complete 10 System ROS performed. All are negative except as documented below, in the HPI, or in patient questionnaire from today's visit.    1) Constitutional: No fevers, chills, night sweats or malaise, weight loss or gain  2) Skin: No rash  3) Pulmonary: No wheeze, SOB, cough, sputum or hemoptysis  4) Cardiovascular: No Chest pain or palpitations  5) Genitourinary: No blood in urine or dysuria  6) Endocrine: No increased sweating, hunger, thirst or thyroid problems  7) Hematologic: No bruising and easy bleeding  8) Musculoskeletal: no new pain in joints or limitation in ROM  9) Neurologic: No dizziness, paresthesias or weakness or falls  10) Psychiatric:  not depressed/anxious, no sleep problems    PHYSICAL EXAM  Vitals: Ht 1.518 m (4' 11.75\")   Wt 77.6 kg (171 lb)   BMI 33.68 kg/m      General appearance  Healthy appearing adult, in no acute distress     Eyes  Sclera anicteric  Pupils round and reactive to light     Ears, nose, mouth and throat  No obvious external lesions of ears and nose  Hearing intact     Neck  Symmetric  No obvious external lesions     Respiratory  Normal respiration, no use of accessory muscles      MSK  Gait normal     Skin  No rashes or jaundice      Psychiatric  Oriented to person, place and time  Appropriate mood and affect.       DATA:  Reviewed in detail past documentation, medications and prior workup available in electronic health records or through outside records.    PERTINENT STUDIES:   6/2021: 50    Most recent CBC:  WBC   Date Value Ref Range Status   05/26/2021 6.2 4.0 - 11.0 10e9/L Final   ]  Hemoglobin   Date Value Ref Range Status   05/26/2021 11.7 11.7 - 15.7 g/dL Final   ]   Platelet Count   Date Value Ref Range Status   05/26/2021 191 150 - 450 10e9/L Final       Most recent coag:  No results found for: INR    Most recent hepatic panel:  AST "   Date Value Ref Range Status   05/26/2021 35 0 - 45 U/L Final     ALT   Date Value Ref Range Status   05/26/2021 38 0 - 50 U/L Final     No results found for: BILICONJ   Bilirubin Total   Date Value Ref Range Status   05/26/2021 0.3 0.2 - 1.3 mg/dL Final     Albumin   Date Value Ref Range Status   05/26/2021 3.6 3.4 - 5.0 g/dL Final     Alkaline Phosphatase   Date Value Ref Range Status   05/26/2021 98 40 - 150 U/L Final       Most recent creatinine:  Creatinine   Date Value Ref Range Status   05/26/2021 0.65 0.52 - 1.04 mg/dL Final     DRUG MONITORING  None available    Endoscopy:   No reports available.    Imaging:  None recently    IMPRESSION:  Ms. Monreal is a 70 year old here with Crohn's ileitis, likely stricturing, requiring resection in 2012, previously on Remicade (infusion reaction), Humira (stopped for financial reasons), Cimzia and methotrexate (stopped due to hospitalization for breathing issues, but unrelated to Crohn's or treatment), currently on no maintenance therapy. Carli does report frequent loose stools, but her fecal calprotectin recently was only 50. This may be related to her surgical resection. We will begin by restaging her disease.     If there is evidence of disease, then she will need biologic therapy. Given her age and previous exposure to 3 anti-TNF medications, I think Stelara may be a good choice, especially given excellent safety profile.       DIAGNOSIS:  (K50.919) Crohn's disease with complication, unspecified gastrointestinal tract location (H)    PLAN:  ---Obtain outside records (Dr. Small at the [facility] in [place], [place])  ---Retry cholestyramine  ----Space it out from glipizide   ---MRE, then colonoscopy under MAC  ---Stool studies     Misc:  -- Avoid tobacco use  -- Avoid NSAIDs as there is potentially a 25% chance of causing an IBD flare    Return in about 2 months (around 8/16/2021).    Keesha Mancuso MD   of Medicine  Division of Gastroenterology,  Hepatology and Nutrition  [facility]    June 16, 2021    60 minutes spent on the date of the encounter performing chart review, history and exam, documentation and further activities as noted above.

## 2021-06-16 NOTE — LETTER
"    6/16/2021         RE: Carli Monreal  [address]  [place] [address]        Dear Colleague,    Thank you for referring your patient, Carli Monreal, to the [facility] [place]. Please see a copy of my visit note below.    Carli Monreal is a 70 year old female who is being evaluated via a billable video visit.      The patient has been notified of following:     \"This video visit will be conducted via a call between you and your physician/provider. We have found that certain health care needs can be provided without the need for an in-person physical exam.  This service lets us provide the care you need with a video conversation.  If a prescription is necessary we can send it directly to your pharmacy.  If lab work is needed we can place an order for that and you can then stop by our lab to have the test done at a later time.    If during the course of the call the physician/provider feels a video visit is not appropriate, you will not be charged for this service.\"     Patient confirmed that they are in [place] for today's visit yes.    Video-Visit Details  Type of service:  Video Visit    Video Start Time: 1:44 PM    Originating Location (pt. Location): Home    Distant Location (provider location):  [facility] [place]     Platform used: SocialShield      CD NEW    PATIENT: Carli Monreal    MRN: [id number]    Date of Birth 1950    Tel: [phone] (home)     PCP: Iza Cage     HPI: Ms. Monreal is a 70 year old female here to establish care for Crohn's ileitis.     When she was 19, was told she had colitis in the setting of loose stools +/- blood. Diagnosed with CD in 2000/2001 when presented to the hospital with severe abd pain.     Was on Remicade around 2005, but then stopped due to an infusion reaction (sharp back and chest pains). Transitioned to Humira but then stopped due to inability to pay. In 2012 had an SBO and " underwent surgical resection.  Then switched to Cimzia/MTX, got sick and then stopped this medication Summer 2020.  Was hospitalized in 2020 for breathing issues. Has not been on any Crohn's maintenance therapy since then.       Last cscope was in 2020, per patient report. Unsure of findings.     Noteworthy diet history- healthy diet in general    HBI  General well-being 1 = slightly below average  Abdominal pain 0 = none  Number of liquid stools per day 5-10. No blood.   Abdominal mass cannot assess virtually  Current Complications Arthralgia (spine, hands, knees, and feet)    Constitutional symptoms:  Fever NO  Weight loss NO    Other GI symptoms present none     Extraintestinal manifestations (anytime during the course of disease)  arthralgias    Greenwood Classification  AGE AT DIAGNOSIS: A3 above 40 y  CURRENT DISEASE LOCATION: L1: ileal  L4 upper GI: NO  DISEASE BEHAVIOR (since disease onset): B2: stricturing  Perianal disease: NO    Total number of IBD surgeries (except perianal): 1 (resection in )    Current IBD Medications:  None    Past IBD Medications:  Remicade  Humira  Cimzia +MTX      Past Medical History:   Diagnosis Date     Diabetes mellitus (H)      Right bundle branch block      Uncomplicated asthma         Past Surgical History:   Procedure Laterality Date     SMALL BOWEL RESECTION         Social History     Tobacco Use     Smoking status: Former Smoker     Types: Cigarettes     Quit date: 1991     Years since quittin.1     Smokeless tobacco: Never Used   Substance Use Topics     Alcohol use: Yes     Comment: Wine- couple x/ week       Family History   Problem Relation Age of Onset     Rheumatoid Arthritis Mother      Inflammatory Bowel Disease No family hx of      Colon Cancer No family hx of        Allergies   Allergen Reactions     Seasonal Allergies         Outpatient Encounter Medications as of 2021   Medication Sig Dispense Refill     albuterol (PROAIR  HFA/PROVENTIL HFA/VENTOLIN HFA) 108 (90 Base) MCG/ACT inhaler Inhale 2 puffs into the lungs 4 times daily       alcohol swab prep pads Use to swab area of injection/venkata as directed. 100 each 3     amLODIPine (NORVASC) 2.5 MG tablet Take 1 tablet (2.5 mg) by mouth daily 90 tablet 1     blood glucose (NO BRAND SPECIFIED) lancets standard Use to test blood sugar 1 times daily or as directed. 100 each 11     blood glucose (NO BRAND SPECIFIED) test strip Use to test blood sugar 1 times daily or as directed. 100 strip 1     budesonide-formoterol (SYMBICORT) 160-4.5 MCG/ACT Inhaler Inhale 2 puffs into the lungs 2 times daily 10.2 g 4     cholestyramine light (QUESTRAN) 4 GM packet Take 1 packet (4 g) by mouth 3 times daily (with meals) 60 each 0     Cyanocobalamin 3000 MCG SUBL Place 1,500 mg under the tongue daily       esomeprazole (NEXIUM) 40 MG DR capsule Take 40 mg by mouth every morning (before breakfast) Take 30-60 minutes before eating.       fluticasone-vilanterol (BREO ELLIPTA) 200-25 MCG/INH inhaler Inhale 1 puff into the lungs daily 1 each 3     folic acid (FOLVITE) 1 MG tablet Take 1 tablet (1 mg) by mouth daily 90 tablet 1     gabapentin (NEURONTIN) 100 MG capsule Take 1 capsule (100 mg) by mouth At Bedtime 30 capsule 0     glipiZIDE (GLUCOTROL) 5 MG tablet Take 1 tablet (5 mg) by mouth 2 times daily (before meals) 180 tablet 1     HYDROcodone-acetaminophen (NORCO) 5-325 MG tablet Take 1 tablet by mouth every 6 hours as needed for severe pain       insulin pen needle (ULTICARE MICRO) 32G X 4 MM miscellaneous Use 1 pen needles daily or as directed. 100 each 3     liraglutide (VICTOZA) 18 MG/3ML solution Inject 1.2 mg Subcutaneous daily 3 mL 3     losartan (COZAAR) 100 MG tablet Take 100 mg by mouth daily       metFORMIN (GLUCOPHAGE-XR) 500 MG 24 hr tablet Take 500 mg by mouth daily (with dinner)       METHOTREXATE PO Take 12 mg by mouth once a week       montelukast (SINGULAIR) 10 MG tablet Take 10 mg by  "mouth At Bedtime       multivitamin w/minerals (MULTI-VITAMIN) tablet Take 1 tablet by mouth daily       potassium chloride ER (KLOR-CON M) 20 MEQ CR tablet Take 1 tablet (20 mEq) by mouth daily 30 tablet 3     Vitamin D, Cholecalciferol, 25 MCG (1000 UT) TABS Take 2 tablets by mouth daily       No facility-administered encounter medications on file as of 6/16/2021.       NSAID  NO    Review of Systems  Complete 10 System ROS performed. All are negative except as documented below, in the HPI, or in patient questionnaire from today's visit.    1) Constitutional: No fevers, chills, night sweats or malaise, weight loss or gain  2) Skin: No rash  3) Pulmonary: No wheeze, SOB, cough, sputum or hemoptysis  4) Cardiovascular: No Chest pain or palpitations  5) Genitourinary: No blood in urine or dysuria  6) Endocrine: No increased sweating, hunger, thirst or thyroid problems  7) Hematologic: No bruising and easy bleeding  8) Musculoskeletal: no new pain in joints or limitation in ROM  9) Neurologic: No dizziness, paresthesias or weakness or falls  10) Psychiatric:  not depressed/anxious, no sleep problems    PHYSICAL EXAM  Vitals: Ht 1.518 m (4' 11.75\")   Wt 77.6 kg (171 lb)   BMI 33.68 kg/m      General appearance  Healthy appearing adult, in no acute distress     Eyes  Sclera anicteric  Pupils round and reactive to light     Ears, nose, mouth and throat  No obvious external lesions of ears and nose  Hearing intact     Neck  Symmetric  No obvious external lesions     Respiratory  Normal respiration, no use of accessory muscles      MSK  Gait normal     Skin  No rashes or jaundice      Psychiatric  Oriented to person, place and time  Appropriate mood and affect.       DATA:  Reviewed in detail past documentation, medications and prior workup available in electronic health records or through outside records.    PERTINENT STUDIES:   6/2021: 50    Most recent CBC:  WBC   Date Value Ref Range Status   05/26/2021 6.2 4.0 - " 11.0 10e9/L Final   ]  Hemoglobin   Date Value Ref Range Status   05/26/2021 11.7 11.7 - 15.7 g/dL Final   ]   Platelet Count   Date Value Ref Range Status   05/26/2021 191 150 - 450 10e9/L Final       Most recent coag:  No results found for: INR    Most recent hepatic panel:  AST   Date Value Ref Range Status   05/26/2021 35 0 - 45 U/L Final     ALT   Date Value Ref Range Status   05/26/2021 38 0 - 50 U/L Final     No results found for: BILICONJ   Bilirubin Total   Date Value Ref Range Status   05/26/2021 0.3 0.2 - 1.3 mg/dL Final     Albumin   Date Value Ref Range Status   05/26/2021 3.6 3.4 - 5.0 g/dL Final     Alkaline Phosphatase   Date Value Ref Range Status   05/26/2021 98 40 - 150 U/L Final       Most recent creatinine:  Creatinine   Date Value Ref Range Status   05/26/2021 0.65 0.52 - 1.04 mg/dL Final     DRUG MONITORING  None available    Endoscopy:   No reports available.    Imaging:  None recently    IMPRESSION:  Ms. Monreal is a 70 year old here with Crohn's ileitis, likely stricturing, requiring resection in 2012, previously on Remicade (infusion reaction), Humira (stopped for financial reasons), Cimzia and methotrexate (stopped due to hospitalization for breathing issues, but unrelated to Crohn's or treatment), currently on no maintenance therapy. Carli does report frequent loose stools, but her fecal calprotectin recently was only 50. This may be related to her surgical resection. We will begin by restaging her disease.     If there is evidence of disease, then she will need biologic therapy. Given her age and previous exposure to 3 anti-TNF medications, I think Stelara may be a good choice, especially given excellent safety profile.       DIAGNOSIS:  (K50.919) Crohn's disease with complication, unspecified gastrointestinal tract location (H)    PLAN:  ---Obtain outside records (Dr. Small at the [facility] in [place], [place])  ---Retry cholestyramine  ----Space it out from glipizide   ---MRE, then  colonoscopy under MAC  ---Stool studies     Misc:  -- Avoid tobacco use  -- Avoid NSAIDs as there is potentially a 25% chance of causing an IBD flare    Return in about 2 months (around 8/16/2021).    Keesha Mancuso MD   of Medicine  Division of Gastroenterology, Hepatology and Nutrition  [facility]    June 16, 2021    60 minutes spent on the date of the encounter performing chart review, history and exam, documentation and further activities as noted above.                  Again, thank you for allowing me to participate in the care of your patient.        Sincerely,        Keesha Mancuso MD

## 2021-06-16 NOTE — NURSING NOTE
"Chief Complaint   Patient presents with     New Patient       Vitals:    06/16/21 1311   Weight: 77.6 kg (171 lb)   Height: 1.518 m (4' 11.75\")       Body mass index is 33.68 kg/m .    Angélica Jay CMA    "

## 2021-06-23 ENCOUNTER — TELEPHONE (OUTPATIENT)
Dept: GASTROENTEROLOGY | Facility: CLINIC | Age: 71
End: 2021-06-23

## 2021-06-23 ENCOUNTER — HOSPITAL ENCOUNTER (OUTPATIENT)
Facility: AMBULATORY SURGERY CENTER | Age: 71
End: 2021-06-23
Attending: INTERNAL MEDICINE
Payer: MEDICARE

## 2021-06-23 DIAGNOSIS — K50.919 CROHN'S DISEASE WITH COMPLICATION, UNSPECIFIED GASTROINTESTINAL TRACT LOCATION (H): ICD-10-CM

## 2021-06-23 DIAGNOSIS — Z11.59 ENCOUNTER FOR SCREENING FOR OTHER VIRAL DISEASES: ICD-10-CM

## 2021-06-23 LAB
C DIFF TOX B STL QL: NEGATIVE
SPECIMEN SOURCE: NORMAL

## 2021-06-23 PROCEDURE — 87209 SMEAR COMPLEX STAIN: CPT | Performed by: INTERNAL MEDICINE

## 2021-06-23 PROCEDURE — 87328 CRYPTOSPORIDIUM AG IA: CPT | Performed by: INTERNAL MEDICINE

## 2021-06-23 PROCEDURE — 87177 OVA AND PARASITES SMEARS: CPT | Performed by: INTERNAL MEDICINE

## 2021-06-23 PROCEDURE — 36415 COLL VENOUS BLD VENIPUNCTURE: CPT | Performed by: INTERNAL MEDICINE

## 2021-06-23 PROCEDURE — 87506 IADNA-DNA/RNA PROBE TQ 6-11: CPT | Performed by: INTERNAL MEDICINE

## 2021-06-23 PROCEDURE — 87329 GIARDIA AG IA: CPT | Mod: 59 | Performed by: INTERNAL MEDICINE

## 2021-06-23 PROCEDURE — 87493 C DIFF AMPLIFIED PROBE: CPT | Mod: 59 | Performed by: INTERNAL MEDICINE

## 2021-06-23 NOTE — TELEPHONE ENCOUNTER
Screening Questions  1. What insurance is in the chart? Medicare/AARP     2.  Ordering/Referring Provider: ALESSANDRA Mancuso    3. BMI 34.3    4. Are you on daily home oxygen? N    5. Do you have a history of difficult airway? N    6. Have you had a heart, lung, or liver transplant? N    7. Are you currently on dialysis? N    8. Have you had a stroke or Transient ischemic atttack (TIA) within 6 months? N    9. In the past 6 months, have you had any heart related issues including cardiomyopathy or heart attack?         If yes, did it require cardiac stenting or other implantable device?N    10. Do you have any implantable devices in your body (pacemaker, defib, LVAD)? N    11. Do you take nitroglycerin? If yes, how often? N    12. Are you currently taking any blood thinners?N    13. Are you a diabetic? Yes (not insulin dependant)     14. (Females) Are you currently pregnant?   If yes, how many weeks?    15. Have you had a procedure in the past that was difficult to tolerate with conscious sedation? Any allergies to Fentanyl or Versed N    16. Are you taking any scheduled prescription narcotics more than once daily? N    17. Do you have any chemical dependencies such as alcohol, street drugs, or methadone? N    18. Do you have any history of post-traumatic stress syndrome or mental health issues? N    19. Do you transfer independently? Y    20.  Do you have any issues with constipation? N     21. Preferred Pharmacy for Pre Prescription On Chart     Scheduling Details    Procedure Scheduled: Colonoscopy  Provider/Surgeon: ALESSANDRA Mancuso  Date of Procedure: 08/23/2021  Location: [facility] [place]  Caller (Please ask for phone number if not scheduled by patient): Carli Monreal/ Self       Sedation Type: MAC  Conscious Sedation- Needs  for 6 hours after the procedure  MAC/General-Needs  for 24 hours after procedure    Pre-op Required at [facility] and OR for  MAC sedation:   (if yes advise patient they will need a pre-op prior to  procedure)      Is patient on blood thinners? -N (If yes- inform patient to follow up with PCP or provider for follow up instructions)     Informed patient they will need an adult  Y  Cannot take any type of public or medical transportation alone    Informed Patient of COVID Test Requirement Y    Confirmed Nurse will call to complete assessment Y    Additional comments: N

## 2021-06-24 LAB
C COLI+JEJUNI+LARI FUSA STL QL NAA+PROBE: NOT DETECTED
C PARVUM AG STL QL IA: NEGATIVE
EC STX1 GENE STL QL NAA+PROBE: NOT DETECTED
EC STX2 GENE STL QL NAA+PROBE: NOT DETECTED
ENTERIC PATHOGEN COMMENT: NORMAL
G LAMBLIA AG STL QL IA: NEGATIVE
NOROV GI+II ORF1-ORF2 JNC STL QL NAA+PR: NOT DETECTED
O+P STL MICRO: NORMAL
O+P STL MICRO: NORMAL
RVA NSP5 STL QL NAA+PROBE: NOT DETECTED
SALMONELLA SP RPOD STL QL NAA+PROBE: NOT DETECTED
SHIGELLA SP+EIEC IPAH STL QL NAA+PROBE: NOT DETECTED
SPECIMEN SOURCE: NORMAL
SPECIMEN SOURCE: NORMAL
V CHOL+PARA RFBL+TRKH+TNAA STL QL NAA+PR: NOT DETECTED
Y ENTERO RECN STL QL NAA+PROBE: NOT DETECTED

## 2021-06-30 ENCOUNTER — MYC MEDICAL ADVICE (OUTPATIENT)
Dept: FAMILY MEDICINE | Facility: CLINIC | Age: 71
End: 2021-06-30

## 2021-06-30 ENCOUNTER — PRE VISIT (OUTPATIENT)
Dept: PULMONOLOGY | Facility: CLINIC | Age: 71
End: 2021-06-30

## 2021-06-30 DIAGNOSIS — K21.00 GASTROESOPHAGEAL REFLUX DISEASE WITH ESOPHAGITIS WITHOUT HEMORRHAGE: Primary | ICD-10-CM

## 2021-06-30 RX ORDER — ESOMEPRAZOLE MAGNESIUM 40 MG/1
40 CAPSULE, DELAYED RELEASE ORAL
Qty: 90 CAPSULE | Refills: 1 | Status: SHIPPED | OUTPATIENT
Start: 2021-06-30 | End: 2022-01-25

## 2021-07-12 ENCOUNTER — PATIENT OUTREACH (OUTPATIENT)
Dept: GASTROENTEROLOGY | Facility: CLINIC | Age: 71
End: 2021-07-12

## 2021-07-12 NOTE — PROGRESS NOTES
Patient called in with questions in regards to the MRE and the cholestyramine she was taking.      She states that she was taking the cholestyramine three times a day and it made her very constipated so she stopped and went back to imodium.  I suggested that she take the medication once daily and stop the imodium to see if this would control her symptoms.     Reasoning behind the MRE was explained to the patient.  Patient then transferred to the imaging department  To schedule

## 2021-07-20 ENCOUNTER — MYC MEDICAL ADVICE (OUTPATIENT)
Dept: FAMILY MEDICINE | Facility: CLINIC | Age: 71
End: 2021-07-20

## 2021-07-20 DIAGNOSIS — I10 ESSENTIAL HYPERTENSION: Primary | ICD-10-CM

## 2021-07-20 RX ORDER — LOSARTAN POTASSIUM 100 MG/1
100 TABLET ORAL DAILY
Qty: 20 TABLET | Refills: 0 | Status: SHIPPED | OUTPATIENT
Start: 2021-07-20 | End: 2021-08-08

## 2021-07-20 NOTE — TELEPHONE ENCOUNTER
Routing to PCP- see Fortino ramires    Okay to refill Losartan?  Its listed has historical in chart - see pts Mychart response below    Last saw pt in clinic 5/11/21    Rx pending approval    April Koehler RN

## 2021-07-21 ENCOUNTER — TELEPHONE (OUTPATIENT)
Dept: GASTROENTEROLOGY | Facility: CLINIC | Age: 71
End: 2021-07-21

## 2021-07-21 NOTE — TELEPHONE ENCOUNTER
Caller: Left generic voicemail for Carli    Procedure: Colonoscopy    Date of Procedure Cancelled: 08/23/2021    Ordering Provider:ARSH MANCUSO    Reason for cancel: Dr. Mancuso out     Rescheduled: No, left voicemail for patient to call us back to reschedule     If rescheduled n/a    Date:    Location:    Note any change or update to original order/sedation:

## 2021-07-28 ENCOUNTER — HOSPITAL ENCOUNTER (OUTPATIENT)
Dept: MRI IMAGING | Facility: CLINIC | Age: 71
Discharge: HOME | End: 2021-07-28
Attending: INTERNAL MEDICINE | Admitting: INTERNAL MEDICINE
Payer: MEDICARE

## 2021-07-28 DIAGNOSIS — K50.919 CROHN'S DISEASE WITH COMPLICATION, UNSPECIFIED GASTROINTESTINAL TRACT LOCATION (H): ICD-10-CM

## 2021-07-28 PROCEDURE — G1004 CDSM NDSC: HCPCS | Performed by: RADIOLOGY

## 2021-07-28 PROCEDURE — 74183 MRI ABD W/O CNTR FLWD CNTR: CPT | Mod: MG

## 2021-07-28 PROCEDURE — 72197 MRI PELVIS W/O & W/DYE: CPT | Mod: 26 | Performed by: RADIOLOGY

## 2021-07-28 PROCEDURE — 250N000011 HC RX IP 250 OP 636: Performed by: INTERNAL MEDICINE

## 2021-07-28 PROCEDURE — A9585 GADOBUTROL INJECTION: HCPCS | Performed by: INTERNAL MEDICINE

## 2021-07-28 PROCEDURE — 74183 MRI ABD W/O CNTR FLWD CNTR: CPT | Mod: 26 | Performed by: RADIOLOGY

## 2021-07-28 PROCEDURE — 255N000002 HC RX 255 OP 636: Performed by: INTERNAL MEDICINE

## 2021-07-28 RX ORDER — GADOBUTROL 604.72 MG/ML
10 INJECTION INTRAVENOUS ONCE
Status: COMPLETED | OUTPATIENT
Start: 2021-07-28 | End: 2021-07-28

## 2021-07-28 RX ADMIN — GLUCAGON HYDROCHLORIDE 1 MG: 1 INJECTION, POWDER, FOR SOLUTION INTRAMUSCULAR; INTRAVENOUS; SUBCUTANEOUS at 10:05

## 2021-07-28 RX ADMIN — GADOBUTROL 7 ML: 604.72 INJECTION INTRAVENOUS at 10:31

## 2021-08-02 ENCOUNTER — MYC MEDICAL ADVICE (OUTPATIENT)
Dept: FAMILY MEDICINE | Facility: CLINIC | Age: 71
End: 2021-08-02

## 2021-08-02 DIAGNOSIS — E11.9 TYPE 2 DIABETES MELLITUS WITHOUT COMPLICATION, WITHOUT LONG-TERM CURRENT USE OF INSULIN (H): Primary | ICD-10-CM

## 2021-08-02 RX ORDER — INSULIN GLARGINE AND LIXISENATIDE 100; 33 U/ML; UG/ML
INJECTION, SOLUTION SUBCUTANEOUS
Status: CANCELLED | OUTPATIENT
Start: 2021-08-02

## 2021-08-02 RX ORDER — DULAGLUTIDE 0.75 MG/.5ML
0.75 INJECTION, SOLUTION SUBCUTANEOUS
Qty: 0.5 ML | Refills: 3 | Status: SHIPPED | OUTPATIENT
Start: 2021-08-02 | End: 2021-08-05

## 2021-08-02 NOTE — TELEPHONE ENCOUNTER
Please let the patient know I have called her in Trulicity to take instead of Victoza.  The medication her insurance company suggested was inappropriate.    Iza Cage DO

## 2021-08-02 NOTE — TELEPHONE ENCOUNTER
I am not sure if this medication (coming from.  This is a combination diabetic drug I am unfamiliar with.  Please try and get some pertinent details from the patient..  She is due for hypertension checkup also I would suggest she come into try this medicine    Iza Cage D.O.

## 2021-08-02 NOTE — TELEPHONE ENCOUNTER
Due to patient's insurance, Victoza medication prescribed at 5/11/21 visit would now cost patient $314, which she cannot afford.     Patient contacted her insurance for cheaper alternative, and they recommended Soliqua.     Patient is already scheduled on 8/20/21 with PCP for BP /medication follow up visit.     Routing to Dr. Cage to review - if prefer to discuss at 8/20/21 appointment, patient will be without Victoza or alternative medication until visit as she cannot afford current cost of Victoza.     Nataly Small, RN, BSN, PHN  [facility]: [place]

## 2021-08-03 NOTE — TELEPHONE ENCOUNTER
Routing PerSer Corphart message to PCP      You have openings on Thursday for phone visit only.  Would you like me to schedule phone visit?        Judith Acosta RN

## 2021-08-03 NOTE — TELEPHONE ENCOUNTER
Routing to Dr. Cage - see MyChart mesg    Spoke with pharmacy here at [facility]. Being Medicare unfortunately there are no coupons or other options including GoodRX to get these medications cheaper for the pt.     April Koehler RN

## 2021-08-03 NOTE — TELEPHONE ENCOUNTER
DanceOn message sent. Will wait for pt response regarding alternative medication form insurance company      Judith Acosta RN

## 2021-08-03 NOTE — TELEPHONE ENCOUNTER
I suggest the patient contact her  insurance company and see what is covered as an alternative to Victoza that is not insulin.  If there is nothing covered she may need to start insulin but I would like to talk to her in person before we start insulin.    Iza Cage DO

## 2021-08-05 ENCOUNTER — VIRTUAL VISIT (OUTPATIENT)
Dept: FAMILY MEDICINE | Facility: CLINIC | Age: 71
End: 2021-08-05
Payer: MEDICARE

## 2021-08-05 DIAGNOSIS — G47.00 INSOMNIA, UNSPECIFIED TYPE: ICD-10-CM

## 2021-08-05 DIAGNOSIS — E87.6 HYPOKALEMIA: ICD-10-CM

## 2021-08-05 DIAGNOSIS — K50.919 CROHN'S DISEASE WITH COMPLICATION, UNSPECIFIED GASTROINTESTINAL TRACT LOCATION (H): ICD-10-CM

## 2021-08-05 DIAGNOSIS — I10 ESSENTIAL HYPERTENSION: ICD-10-CM

## 2021-08-05 DIAGNOSIS — E11.9 TYPE 2 DIABETES MELLITUS WITHOUT COMPLICATION, WITHOUT LONG-TERM CURRENT USE OF INSULIN (H): Primary | ICD-10-CM

## 2021-08-05 PROCEDURE — 99442 PR PHYSICIAN TELEPHONE EVALUATION 11-20 MIN: CPT | Mod: 95 | Performed by: FAMILY MEDICINE

## 2021-08-05 NOTE — PROGRESS NOTES
Carli is a 71 year old who is being evaluated via a billable telephone visit.      What phone number would you like to be contacted at? [phone]  How would you like to obtain your AVS? MyChart    Assessment & Plan     Type 2 diabetes mellitus without complication, without long-term current use of insulin (H)    Because stopping Metformin did not improve her diarrhea will restart the Metformin.  She cannot continue Victoza due to cost.  She is also taking glipizide 5 mg twice daily.  She will continue to check her sugars once a day.    - metFORMIN (GLUCOPHAGE) 1000 MG tablet; Take 1 tablet (1,000 mg) by mouth 2 times daily (with meals)    Insomnia, unspecified type  I suggested the patient start with melatonin for this as Ambien has the potential for unpleasant and dangerous side effects    Crohn's disease with complication, unspecified gastrointestinal tract location (H)  Managed by gastroenterology    Hypokalemia  The patient is continuing to take her  Potassium 20 mEq daily.  We will recheck this lab at her appointment in a few weeks    Essential hypertension  The current medical regimen is effective;  continue present plan and medications.      18 minutes spent on the date of the encounter doing chart review, review of test results, interpretation of tests, patient visit and documentation          No follow-ups on file.    Iza Cage, [facility]    Subjective   Carli is a 71 year old who presents for the following health issues     HPI     --Patient is currently in the donut hole and cannot afford Victoza- alternative that was sent in for Trulicity is also very expensive. Would like to talk about possible insulin?    --Patient wondering if she can also get a prescription for sleep medication. Has always had issues with sleep- last night only got about 3-4 hours of sleep. In the past has tried Ambien 5mg.  The sleep issue comes and goes.      --Patient does have office visit  scheduled for 8/20/21    Diabetes Follow-up    How often are you checking your blood sugar? One time daily  What time of day are you checking your blood sugars (select all that apply)?  Before meals  Have you had any blood sugars above 200?  No- today was 124  Have you had any blood sugars below 70?  Yes sometimes    What symptoms do you notice when your blood sugar is low?  Shaky, Dizzy and Weak    What concerns do you have today about your diabetes? Other: cost of medication     Do you have any of these symptoms? (Select all that apply)  Numbness in feet, Burning in feet and Redness, sores, or blisters on feet    Have you had a diabetic eye exam in the last 12 months? No     This patient was on Metformin which was causing significant diarrhea.  She was found to have hypokalemia related to the diarrhea.  So the Metformin was stopped.  Stopping metformin didn't help the diarrhea     She is on glipizide 5 mg twice daily and was placed on Victoza instead of Metformin.    Recently her insurance stopped covering Victoza and she is looking for an alternative medication.  Lab Results   Component Value Date    A1C 7.0 04/13/2021    A1C 8.4 02/09/2021           Diarrhea causing hypokalemia.  The patient's Metformin was stopped but she also has Crohn's disease.  She was given oral potassium to supplement with.    She was asked to follow-up June 2021.        BP Readings from Last 2 Encounters:   06/15/21 112/70   06/03/21 132/70     Hemoglobin A1C (%)   Date Value   04/13/2021 7.0 (H)   02/09/2021 8.4 (A)     LDL Cholesterol Calculated (mg/dL)   Date Value   02/09/2021 63.7         Hypertension Follow-up      Do you check your blood pressure regularly outside of the clinic? No     Are you following a low salt diet? No    Are your blood pressures ever more than 140 on the top number (systolic) OR more   than 90 on the bottom number (diastolic), for example 140/90? No      How many servings of fruits and vegetables do you eat  daily?  0-1    On average, how many sweetened beverages do you drink each day (Examples: soda, juice, sweet tea, etc.  Do NOT count diet or artificially sweetened beverages)?   0-1    How many days per week do you exercise enough to make your heart beat faster? 3 or less    How many minutes a day do you exercise enough to make your heart beat faster? 9 or less    How many days per week do you miss taking your medication? 0        Review of Systems   Constitutional, HEENT, cardiovascular, pulmonary, gi and gu systems are negative, except as otherwise noted.      Objective           Vitals:  No vitals were obtained today due to virtual visit.    Physical Exam   healthy, alert and no distress  PSYCH: Alert and oriented times 3; coherent speech, normal   rate and volume, able to articulate logical thoughts, able   to abstract reason, no tangential thoughts, no hallucinations   or delusions  Her affect is normal  RESP: No cough, no audible wheezing, able to talk in full sentences  Remainder of exam unable to be completed due to telephone visits          Phone call duration: 13 minutes

## 2021-08-06 DIAGNOSIS — I10 ESSENTIAL HYPERTENSION: ICD-10-CM

## 2021-08-08 RX ORDER — LOSARTAN POTASSIUM 100 MG/1
100 TABLET ORAL DAILY
Qty: 20 TABLET | Refills: 0 | Status: SHIPPED | OUTPATIENT
Start: 2021-08-08 | End: 2021-08-20

## 2021-08-20 ENCOUNTER — OFFICE VISIT (OUTPATIENT)
Dept: FAMILY MEDICINE | Facility: CLINIC | Age: 71
End: 2021-08-20
Payer: MEDICARE

## 2021-08-20 VITALS
HEART RATE: 88 BPM | WEIGHT: 168.6 LBS | HEIGHT: 60 IN | BODY MASS INDEX: 33.1 KG/M2 | TEMPERATURE: 99 F | DIASTOLIC BLOOD PRESSURE: 72 MMHG | OXYGEN SATURATION: 99 % | SYSTOLIC BLOOD PRESSURE: 132 MMHG | RESPIRATION RATE: 16 BRPM

## 2021-08-20 DIAGNOSIS — M81.0 AGE RELATED OSTEOPOROSIS, UNSPECIFIED PATHOLOGICAL FRACTURE PRESENCE: ICD-10-CM

## 2021-08-20 DIAGNOSIS — E11.9 TYPE 2 DIABETES MELLITUS WITHOUT COMPLICATION, WITHOUT LONG-TERM CURRENT USE OF INSULIN (H): Primary | ICD-10-CM

## 2021-08-20 DIAGNOSIS — R30.0 DYSURIA: ICD-10-CM

## 2021-08-20 DIAGNOSIS — N30.00 ACUTE CYSTITIS WITHOUT HEMATURIA: ICD-10-CM

## 2021-08-20 DIAGNOSIS — Z11.59 NEED FOR HEPATITIS C SCREENING TEST: ICD-10-CM

## 2021-08-20 DIAGNOSIS — I10 ESSENTIAL HYPERTENSION: ICD-10-CM

## 2021-08-20 DIAGNOSIS — E87.6 HYPOKALEMIA: ICD-10-CM

## 2021-08-20 LAB
ALBUMIN UR-MCNC: ABNORMAL MG/DL
APPEARANCE UR: CLEAR
BACTERIA #/AREA URNS HPF: ABNORMAL /HPF
BILIRUB UR QL STRIP: NEGATIVE
COLOR UR AUTO: YELLOW
GLUCOSE UR STRIP-MCNC: NEGATIVE MG/DL
HBA1C MFR BLD: 5.6 % (ref 0–5.6)
HGB UR QL STRIP: ABNORMAL
HOLD SPECIMEN: NORMAL
KETONES UR STRIP-MCNC: NEGATIVE MG/DL
LEUKOCYTE ESTERASE UR QL STRIP: ABNORMAL
NITRATE UR QL: NEGATIVE
PH UR STRIP: 5.5 [PH] (ref 5–7)
RBC #/AREA URNS AUTO: ABNORMAL /HPF
SP GR UR STRIP: 1.01 (ref 1–1.03)
SQUAMOUS #/AREA URNS AUTO: ABNORMAL /LPF
UROBILINOGEN UR STRIP-ACNC: 0.2 E.U./DL
WBC #/AREA URNS AUTO: ABNORMAL /HPF

## 2021-08-20 PROCEDURE — 81001 URINALYSIS AUTO W/SCOPE: CPT | Performed by: FAMILY MEDICINE

## 2021-08-20 PROCEDURE — 99215 OFFICE O/P EST HI 40 MIN: CPT | Mod: 25 | Performed by: FAMILY MEDICINE

## 2021-08-20 PROCEDURE — 80048 BASIC METABOLIC PNL TOTAL CA: CPT | Performed by: FAMILY MEDICINE

## 2021-08-20 PROCEDURE — 99207 PR FOOT EXAM NO CHARGE: CPT | Performed by: FAMILY MEDICINE

## 2021-08-20 PROCEDURE — 87086 URINE CULTURE/COLONY COUNT: CPT | Performed by: FAMILY MEDICINE

## 2021-08-20 PROCEDURE — 87186 SC STD MICRODIL/AGAR DIL: CPT | Performed by: FAMILY MEDICINE

## 2021-08-20 PROCEDURE — 86803 HEPATITIS C AB TEST: CPT | Performed by: FAMILY MEDICINE

## 2021-08-20 PROCEDURE — 90715 TDAP VACCINE 7 YRS/> IM: CPT | Performed by: FAMILY MEDICINE

## 2021-08-20 PROCEDURE — 36415 COLL VENOUS BLD VENIPUNCTURE: CPT | Performed by: FAMILY MEDICINE

## 2021-08-20 PROCEDURE — 83036 HEMOGLOBIN GLYCOSYLATED A1C: CPT | Performed by: FAMILY MEDICINE

## 2021-08-20 PROCEDURE — 90471 IMMUNIZATION ADMIN: CPT | Performed by: FAMILY MEDICINE

## 2021-08-20 RX ORDER — LOSARTAN POTASSIUM 100 MG/1
100 TABLET ORAL DAILY
Qty: 90 TABLET | Refills: 1 | Status: SHIPPED | OUTPATIENT
Start: 2021-08-20 | End: 2022-03-15

## 2021-08-20 RX ORDER — NITROFURANTOIN 25; 75 MG/1; MG/1
100 CAPSULE ORAL 2 TIMES DAILY
Qty: 14 CAPSULE | Refills: 0 | Status: SHIPPED | OUTPATIENT
Start: 2021-08-20 | End: 2021-12-27

## 2021-08-20 RX ORDER — ATORVASTATIN CALCIUM 10 MG/1
10 TABLET, FILM COATED ORAL DAILY
Qty: 90 TABLET | Refills: 3 | Status: SHIPPED | OUTPATIENT
Start: 2021-08-20 | End: 2022-05-06

## 2021-08-20 ASSESSMENT — PAIN SCALES - GENERAL: PAINLEVEL: NO PAIN (0)

## 2021-08-20 ASSESSMENT — MIFFLIN-ST. JEOR: SCORE: 1202.29

## 2021-08-20 NOTE — NURSING NOTE
Prior to immunization administration, verified patients identity using patient s name and date of birth. Please see Immunization Activity for additional information.     Screening Questionnaire for Adult Immunization    Are you sick today?   No   Do you have allergies to medications, food, a vaccine component or latex?   No   Have you ever had a serious reaction after receiving a vaccination?   No   Do you have a long-term health problem with heart, lung, kidney, or metabolic disease (e.g., diabetes), asthma, a blood disorder, no spleen, complement component deficiency, a cochlear implant, or a spinal fluid leak?  Are you on long-term aspirin therapy?   No   Do you have cancer, leukemia, HIV/AIDS, or any other immune system problem?   No   Do you have a parent, brother, or sister with an immune system problem?   No   In the past 3 months, have you taken medications that affect  your immune system, such as prednisone, other steroids, or anticancer drugs; drugs for the treatment of rheumatoid arthritis, Crohn s disease, or psoriasis; or have you had radiation treatments?   No   Have you had a seizure, or a brain or other nervous system problem?   No   During the past year, have you received a transfusion of blood or blood    products, or been given immune (gamma) globulin or antiviral drug?   No   For women: Are you pregnant or is there a chance you could become       pregnant during the next month?   No   Have you received any vaccinations in the past 4 weeks?   No     Immunization questionnaire answers were all negative.        Per orders of Dr. Cage, injection of Adacel given by Shantal Irby CMA. Patient instructed to remain in clinic for 15 minutes afterwards, and to report any adverse reaction to me immediately.       Screening performed by Shantal Irby CMA on 8/20/2021 at 2:58 PM.

## 2021-08-20 NOTE — PATIENT INSTRUCTIONS
Stop the glipizide    Try to take your second Metformin with dinner    Start Lipitor 10 mg daily    You will likely need a Covid vaccine booster    I strongly suggest you get a flu shot this fall    Follow-up with me in 6 months.    Iza Cage DO

## 2021-08-20 NOTE — PROGRESS NOTES
Assessment & Plan     Type 2 diabetes mellitus without complication, without long-term current use of insulin (H)  Diabetes is currently overtreated.  We will stop the glipizide.  We will recheck labs in 6 months.    - HEMOGLOBIN A1C; Future  - OPTOMETRY REFERRAL; Future  - FOOT EXAM  - HEMOGLOBIN A1C  - Basic metabolic panel  (Ca, Cl, CO2, Creat, Gluc, K, Na, BUN); Future  - atorvastatin (LIPITOR) 10 MG tablet; Take 1 tablet (10 mg) by mouth daily  - Basic metabolic panel  (Ca, Cl, CO2, Creat, Gluc, K, Na, BUN)    Dysuria    - UA with Microscopic reflex to Culture - lab collect; Future  - UA with Microscopic reflex to Culture - lab collect  - Urine Microscopic  - Urine Culture    Hypokalemia  This is related to her chronic diarrhea from Crohn's disease.  We will continue supplemental potassium  - Basic metabolic panel  (Ca, Cl, CO2, Creat, Gluc, K, Na, BUN); Future  - Basic metabolic panel  (Ca, Cl, CO2, Creat, Gluc, K, Na, BUN)    Essential hypertension  The current medical regimen is effective;  continue present plan and medications.    - losartan (COZAAR) 100 MG tablet; Take 1 tablet (100 mg) by mouth daily    Age related osteoporosis, unspecified pathological fracture presence  The patient is not taking any calcium or vitamin D at this time.  We will recheck a DEXA scan  - DEXA HIP/PELVIS/SPINE - Future; Future    Need for hepatitis C screening test    - Hepatitis C Screen Reflex to HCV RNA Quant and Genotype; Future  - Hepatitis C Screen Reflex to HCV RNA Quant and Genotype    Acute cystitis without hematuria  We will treat acute cystitis today  - nitroFURantoin macrocrystal-monohydrate (MACROBID) 100 MG capsule; Take 1 capsule (100 mg) by mouth 2 times daily    40 minutes spent on the date of the encounter doing chart review, review of test results, interpretation of tests, patient visit and documentation          Return in about 6 months (around 2/20/2022) for diabetes.    DO JJ Pereira  [facility]    Mary Boyce is a 71 year old who presents for the following health issues     HPI     Diabetes Follow-up    How often are you checking your blood sugar? One time daily  What time of day are you checking your blood sugars (select all that apply)?  Before and after meals  Have you had any blood sugars above 200?  No  Have you had any blood sugars below 70?  Yes     What symptoms do you notice when your blood sugar is low?  Shaky and Weak    What concerns do you have today about your diabetes? None     Do you have any of these symptoms? (Select all that apply)  No numbness or tingling in feet.  No redness, sores or blisters on feet.  No complaints of excessive thirst.  No reports of blurry vision.  No significant changes to weight.    Have you had a diabetic eye exam in the last 12 months? No   Lab Results   Component Value Date    A1C 5.6 08/20/2021    A1C 7.0 04/13/2021    A1C 8.4 02/09/2021       Glipizide 5 mg daily and Metformin at 1000 mg twice daily        BP Readings from Last 2 Encounters:   08/20/21 132/72   06/15/21 112/70     Hemoglobin A1C (%)   Date Value   08/20/2021 5.6   04/13/2021 7.0 (H)   02/09/2021 8.4 (A)     LDL Cholesterol Calculated (mg/dL)   Date Value   02/09/2021 63.7         Hypertension Follow-up      Do you check your blood pressure regularly outside of the clinic? No     Are you following a low salt diet? Yes    Are your blood pressures ever more than 140 on the top number (systolic) OR more   than 90 on the bottom number (diastolic), for example 140/90? No   Norvasc 2.5 mg daily and losartan 100 mg daily    Asthma Follow-Up    Was ACT completed today?    Yes    ACT Total Scores 8/20/2021   ACT TOTAL SCORE (Goal Greater than or Equal to 20) 21   In the past 12 months, how many times did you visit the emergency room for your asthma without being admitted to the hospital? 0   In the past 12 months, how many times were you hospitalized overnight  "because of your asthma? 0          How many days per week do you miss taking your asthma controller medication?  I do not have an asthma controller medication- only use inhaler as needed    Please describe any recent triggers for your asthma: smoke, humidity and exercise or sports    Have you had any Emergency Room Visits, Urgent Care Visits, or Hospital Admissions since your last office visit?  No     The patient has Crohn's disease and ongoing diarrhea which is causing hypokalemia.  We stopped her Metformin and this did not improve the diarrhea so this has been restarted.      How many servings of fruits and vegetables do you eat daily?  0-1    On average, how many sweetened beverages do you drink each day (Examples: soda, juice, sweet tea, etc.  Do NOT count diet or artificially sweetened beverages)?   0    How many days per week do you exercise enough to make your heart beat faster? 3 or less    How many minutes a day do you exercise enough to make your heart beat faster? 9 or less  How many days per week do you miss taking your medication? 1    What makes it hard for you to take your medications?  remembering to take    She is having issues with dysuria and urgency.  She has some scant urination also.  This started a few days ago.      Review of Systems   Constitutional, HEENT, cardiovascular, pulmonary, gi and gu systems are negative, except as otherwise noted.      Objective    /72 (BP Location: Right arm, Patient Position: Sitting, Cuff Size: Adult Large)   Pulse 88   Temp 99  F (37.2  C) (Oral)   Resp 16   Ht 1.518 m (4' 11.75\")   Wt 76.5 kg (168 lb 9.6 oz)   SpO2 99%   BMI 33.20 kg/m    Body mass index is 33.2 kg/m .  Physical Exam   GENERAL: healthy, alert and no distress  NECK: no adenopathy, no asymmetry, masses, or scars and thyroid normal to palpation  RESP: lungs clear to auscultation - no rales, rhonchi or wheezes  CV: regular rate and rhythm, normal S1 S2, no S3 or S4, no murmur, click " or rub, no peripheral edema and peripheral pulses strong  ABDOMEN: tenderness suprapubic and bowel sounds normal  MS: no gross musculoskeletal defects noted, no edema  SKIN: no suspicious lesions or rashes  NEURO: Normal strength and tone, mentation intact and speech normal  PSYCH: mentation appears normal, affect normal/bright  Diabetic foot exam: normal DP and PT pulses, no trophic changes or ulcerative lesions, normal sensory exam and normal monofilament exam    Results for orders placed or performed in visit on 08/20/21 (from the past 24 hour(s))   HEMOGLOBIN A1C   Result Value Ref Range    Hemoglobin A1C 5.6 0.0 - 5.6 %   Extra Tube    Narrative    The following orders were created for panel order Extra Tube.  Procedure                               Abnormality         Status                     ---------                               -----------         ------                     Extra Red Top Tube[[id number]]                               Final result               Extra Green Top (Lithium...[[id number]]                      Final result               Extra Purple Top Tube[[id number]]                            Final result                 Please view results for these tests on the individual orders.   Extra Red Top Tube   Result Value Ref Range    Hold Specimen JIC    Extra Green Top (Lithium Heparin) Tube   Result Value Ref Range    Hold Specimen JIC    Extra Purple Top Tube   Result Value Ref Range    Hold Specimen JIC    UA with Microscopic reflex to Culture - lab collect    Specimen: Urine, Clean Catch   Result Value Ref Range    Color Urine Yellow Colorless, Straw, Light Yellow, Yellow    Appearance Urine Clear Clear    Glucose Urine Negative Negative mg/dL    Bilirubin Urine Negative Negative    Ketones Urine Negative Negative mg/dL    Specific Gravity Urine 1.010 1.003 - 1.035    Blood Urine Moderate (A) Negative    pH Urine 5.5 5.0 - 7.0    Protein Albumin Urine Trace (A) Negative mg/dL    Urobilinogen  Urine 0.2 0.2, 1.0 E.U./dL    Nitrite Urine Negative Negative    Leukocyte Esterase Urine Moderate (A) Negative   Urine Microscopic   Result Value Ref Range    Bacteria Urine Many (A) None Seen /HPF    RBC Urine 2-5 (A) 0-2 /HPF /HPF    WBC Urine 10-25 (A) 0-5 /HPF /HPF    Squamous Epithelials Urine Few (A) None Seen /LPF    Narrative    Urine Culture ordered based on laboratory criteria

## 2021-08-21 LAB
ANION GAP SERPL CALCULATED.3IONS-SCNC: 3 MMOL/L (ref 3–14)
BUN SERPL-MCNC: 8 MG/DL (ref 7–30)
CALCIUM SERPL-MCNC: 9.4 MG/DL (ref 8.5–10.1)
CHLORIDE BLD-SCNC: 111 MMOL/L (ref 94–109)
CO2 SERPL-SCNC: 26 MMOL/L (ref 20–32)
CREAT SERPL-MCNC: 0.76 MG/DL (ref 0.52–1.04)
GFR SERPL CREATININE-BSD FRML MDRD: 80 ML/MIN/1.73M2
GLUCOSE BLD-MCNC: 107 MG/DL (ref 70–99)
POTASSIUM BLD-SCNC: 4.2 MMOL/L (ref 3.4–5.3)
SODIUM SERPL-SCNC: 140 MMOL/L (ref 133–144)

## 2021-08-21 ASSESSMENT — ASTHMA QUESTIONNAIRES: ACT_TOTALSCORE: 21

## 2021-08-22 LAB — BACTERIA UR CULT: ABNORMAL

## 2021-08-23 LAB — HCV AB SERPL QL IA: NONREACTIVE

## 2021-08-26 ENCOUNTER — ANCILLARY PROCEDURE (OUTPATIENT)
Dept: GENERAL RADIOLOGY | Facility: CLINIC | Age: 71
End: 2021-08-26
Attending: FAMILY MEDICINE
Payer: MEDICARE

## 2021-08-26 ENCOUNTER — OFFICE VISIT (OUTPATIENT)
Dept: ORTHOPEDICS | Facility: CLINIC | Age: 71
End: 2021-08-26
Payer: MEDICARE

## 2021-08-26 VITALS
HEIGHT: 60 IN | SYSTOLIC BLOOD PRESSURE: 118 MMHG | WEIGHT: 168 LBS | DIASTOLIC BLOOD PRESSURE: 70 MMHG | BODY MASS INDEX: 32.98 KG/M2

## 2021-08-26 DIAGNOSIS — M79.641 RIGHT HAND PAIN: ICD-10-CM

## 2021-08-26 DIAGNOSIS — S69.91XA HAND INJURY, RIGHT, INITIAL ENCOUNTER: Primary | ICD-10-CM

## 2021-08-26 PROCEDURE — 99213 OFFICE O/P EST LOW 20 MIN: CPT | Mod: 25 | Performed by: FAMILY MEDICINE

## 2021-08-26 PROCEDURE — 29125 APPL SHORT ARM SPLINT STATIC: CPT | Performed by: FAMILY MEDICINE

## 2021-08-26 PROCEDURE — 73130 X-RAY EXAM OF HAND: CPT | Mod: RT | Performed by: RADIOLOGY

## 2021-08-26 ASSESSMENT — MIFFLIN-ST. JEOR: SCORE: 1199.57

## 2021-08-26 NOTE — PROGRESS NOTES
ASSESSMENT & PLAN    Carli was seen today for pain.    Diagnoses and all orders for this visit:    Hand injury, right, initial encounter  -     XR Hand Right G/E 3 Views; Future  -     Cast/splint application    Right hand pain  -     XR Hand Right G/E 3 Views; Future  -     Cast/splint application    Other orders  -     Cancel: Large Joint Injection/Arthocentesis      This issue is acute and Worsening.    # Right Hand Injury: Fall on outstretched hand occurring 2 hours ago today 8/26 last was in 21 with pain diffusely over the hand worse at the MCP joints.  There is diffuse tenderness to palpation over her hand.  X-rays today negative for fracture.  Differential hand sprain versus fracture that has not shown up yet on x-ray.  Given this plan to immobilize for 1 week and follow-up for repeat evaluation and x-rays.  Patient understands agrees with plan.  Plan otherwise as below  Image Findings: Arthritis at the STT joint, no fracture noted  Treatment: Activities as tolerated, hand splint today  Medications/Injections: Limited tylenol/ibuprofen for pain for 1-2 weeks  Follow-up: 1 week for repeat evaluation and x-rays     Artur Vergara MD  [facility] [place]    -----  Chief Complaint   Patient presents with     Right Hand - Pain       SUBJECTIVE  Carli Monreal is a/an 71 year old female who is seen as a self referral, [facility] for evaluation of right hand injury.     The patient is seen by themselves.  The patient is Right handed    Onset: 8/26/21, 2 hour(s) ago. Patient describes injury as FOOSH injury.  Pain diffusely throughout hand.    Location of Pain: right dorsal hand   Worsened by: gripping, use   Better with: nothing   Treatments tried: no treatment tried to date  Associated symptoms: swelling    Orthopedic/Surgical history: NO  Social History/Occupation: Retired     No family history pertinent to patient's problem today.      REVIEW OF SYSTEMS:  Review of  "Systems  Constitutional, HEENT, cardiovascular, pulmonary, GI, , musculoskeletal, neuro, skin, endocrine and psych systems are negative, except as otherwise noted.    OBJECTIVE:  /71   Ht 1.518 m (4' 11.75\")   Wt 76.2 kg (168 lb)   BMI 33.09 kg/m     General: healthy, alert and in no distress  HEENT: no scleral icterus or conjunctival erythema  Skin: no suspicious lesions or rash. No jaundice.  CV: distal perfusion intact    Resp: normal respiratory effort without conversational dyspnea   Psych: normal mood and affect  Gait: normal steady gait with appropriate coordination and balance    Neuro: Normal light sensory exam of right upper extremity     RIGHT HAND  Inspection:    No swelling, bruising, discoloration, or obvious deformity or asymmetry  Palpation: Hand diffusely TTP   Range of Motion: grossly intact finger range of motion, no flexor/extensor lag  Strength: Intact but limited 2/2 pain      Special Tests:    Positive: None    Negative: flexor digitorum superficialis testing, flexor digitorum profundus testing    RADIOLOGY:  I independently ordered, visualized and reviewed these images with the patient  No fracture significant STT joint arthritis       Cast/splint application    Date/Time: 8/26/2021 5:00 PM  Performed by: Artur Vergara MD  Authorized by: Artur Vergara MD     Consent:     Consent obtained:  Verbal    Consent given by:  Patient    Risks discussed:  Discoloration, numbness, pain and swelling    Alternatives discussed:  Observation and alternative treatment  Pre-procedure details:     Sensation:  Normal  Procedure details:     Laterality:  Right    Location:  Hand    Hand:  R hand    Splint type: Volar.    Supplies:  Fiberglass  Post-procedure details:     Pain:  Improved    Sensation:  Normal    Patient tolerance of procedure:  Tolerated well, no immediate complications    Patient provided with cast or splint care instructions: Yes          "

## 2021-08-26 NOTE — LETTER
8/26/2021         RE: Carli Monreal  [address]  [place] [address]        Dear Colleague,    Thank you for referring your patient, Carli Monreal, to the [facility] [place]. Please see a copy of my visit note below.    ASSESSMENT & PLAN    Carli was seen today for pain.    Diagnoses and all orders for this visit:    Hand injury, right, initial encounter  -     XR Hand Right G/E 3 Views; Future  -     Cast/splint application    Right hand pain  -     XR Hand Right G/E 3 Views; Future  -     Cast/splint application    Other orders  -     Cancel: Large Joint Injection/Arthocentesis      This issue is acute and Worsening.    # Right Hand Injury: Fall on outstretched hand occurring 2 hours ago today 8/26 last was in 21 with pain diffusely over the hand worse at the MCP joints.  There is diffuse tenderness to palpation over her hand.  X-rays today negative for fracture.  Differential hand sprain versus fracture that has not shown up yet on x-ray.  Given this plan to immobilize for 1 week and follow-up for repeat evaluation and x-rays.  Patient understands agrees with plan.  Plan otherwise as below  Image Findings: Arthritis at the STT joint, no fracture noted  Treatment: Activities as tolerated, hand splint today  Medications/Injections: Limited tylenol/ibuprofen for pain for 1-2 weeks  Follow-up: 1 week for repeat evaluation and x-rays     Artur Vergara MD  [facility] [place]    -----  Chief Complaint   Patient presents with     Right Hand - Pain       SUBJECTIVE  Carli Monreal is a/an 71 year old female who is seen as a self referral, [facility] for evaluation of right hand injury.     The patient is seen by themselves.  The patient is Right handed    Onset: 8/26/21, 2 hour(s) ago. Patient describes injury as FOOSH injury.  Pain diffusely throughout hand.    Location of Pain: right dorsal hand   Worsened by: gripping, use   Better with:  "nothing   Treatments tried: no treatment tried to date  Associated symptoms: swelling    Orthopedic/Surgical history: NO  Social History/Occupation: Retired     No family history pertinent to patient's problem today.      REVIEW OF SYSTEMS:  Review of Systems  Constitutional, HEENT, cardiovascular, pulmonary, GI, , musculoskeletal, neuro, skin, endocrine and psych systems are negative, except as otherwise noted.    OBJECTIVE:  /71   Ht 1.518 m (4' 11.75\")   Wt 76.2 kg (168 lb)   BMI 33.09 kg/m     General: healthy, alert and in no distress  HEENT: no scleral icterus or conjunctival erythema  Skin: no suspicious lesions or rash. No jaundice.  CV: distal perfusion intact    Resp: normal respiratory effort without conversational dyspnea   Psych: normal mood and affect  Gait: normal steady gait with appropriate coordination and balance    Neuro: Normal light sensory exam of right upper extremity     RIGHT HAND  Inspection:    No swelling, bruising, discoloration, or obvious deformity or asymmetry  Palpation: Hand diffusely TTP   Range of Motion: grossly intact finger range of motion, no flexor/extensor lag  Strength: Intact but limited 2/2 pain      Special Tests:    Positive: None    Negative: flexor digitorum superficialis testing, flexor digitorum profundus testing    RADIOLOGY:  I independently ordered, visualized and reviewed these images with the patient  No fracture significant STT joint arthritis       Cast/splint application    Date/Time: 8/26/2021 5:00 PM  Performed by: Artur Vergara MD  Authorized by: Artur Vergara MD     Consent:     Consent obtained:  Verbal    Consent given by:  Patient    Risks discussed:  Discoloration, numbness, pain and swelling    Alternatives discussed:  Observation and alternative treatment  Pre-procedure details:     Sensation:  Normal  Procedure details:     Laterality:  Right    Location:  Hand    Hand:  R hand    Splint type: Volar.    Supplies:  " Fiberglass  Post-procedure details:     Pain:  Improved    Sensation:  Normal    Patient tolerance of procedure:  Tolerated well, no immediate complications    Patient provided with cast or splint care instructions: Yes              Again, thank you for allowing me to participate in the care of your patient.        Sincerely,        Artur Vergara MD

## 2021-08-26 NOTE — PATIENT INSTRUCTIONS
# Right Hand Injury: Fall on outstretched hand occurring 2 hours ago today 8/26 last was in 21 with pain diffusely over the hand worse at the MCP joints.  There is diffuse tenderness to palpation over her hand.  X-rays today negative for fracture.  Differential hand sprain versus fracture that has not shown up yet on x-ray.  Given this plan to immobilize for 1 week and follow-up for repeat evaluation and x-rays.  Patient understands agrees with plan.  Plan otherwise as below  Image Findings: Arthritis at the STT joint, no fracture noted  Treatment: Activities as tolerated, hand splint today  Medications/Injections: Limited tylenol/ibuprofen for pain for 1-2 weeks  Follow-up: 1 week for repeat evaluation and x-rays    Please call [phone]   Ask for my team if you have any questions or concerns    It was great seeing you today!    Artur Vergara MD, CAM     Patient Education     Hand Sprain  A sprain is a stretching or tearing of the ligaments that hold a joint together. There are no broken bones. Sprains take 3 to 6 weeks, or longer to heal. A sprained hand may be treated with a splint or elastic wrap for support.  Home care    Keep your arm elevated to reduce pain and swelling. This is most important during the first 48 hours.    Apply an ice pack over the injured area for 15 to 20 minutes every 3 to 6 hours. You should do this for the first 24 to 48 hours. You can make an ice pack by filling a plastic bag that seals at the top with ice cubes and then wrapping it with a thin towel. Continue the use of ice packs for relief of pain and swelling as needed. As the ice melts, be careful to avoid getting any wrap or splint wet. After 48 hours, apply heat (warm shower or warm bath) for 15 to 20 minutes several times a day, or alternate ice and heat.    You may use over-the-counter pain medicine to control pain, unless another pain medicine was prescribed. If you have chronic liver or kidney disease or ever had a  stomach ulcer or gastrointestinal bleeding, talk with your healthcare provider before using these medicines.    If you were given a splint or elastic wrap, wear it until your pain improves.  Follow-up care  Follow up with your healthcare provider, or as advised. Sometimes fractures don t show up on the first X-ray. Bruises and sprains can sometimes hurt as much as a fracture. These injuries can take time to heal completely. If your symptoms don t improve or they get worse, talk with your healthcare provider. You may need a repeat X-ray or other tests.  When to seek medical advice  Call your healthcare provider right away if any of these occur:    Pain or swelling increases    Fingers or hand becomes cold, blue, numb, or tingly  [employer] last reviewed this educational content on 5/1/2018 2000-2021 [employer]. All rights reserved. This information is not intended as a substitute for professional medical care. Always follow your healthcare professional's instructions.

## 2021-09-02 ENCOUNTER — OFFICE VISIT (OUTPATIENT)
Dept: ORTHOPEDICS | Facility: CLINIC | Age: 71
End: 2021-09-02
Payer: MEDICARE

## 2021-09-02 ENCOUNTER — ANCILLARY PROCEDURE (OUTPATIENT)
Dept: GENERAL RADIOLOGY | Facility: CLINIC | Age: 71
End: 2021-09-02
Attending: FAMILY MEDICINE
Payer: MEDICARE

## 2021-09-02 VITALS
DIASTOLIC BLOOD PRESSURE: 70 MMHG | WEIGHT: 168 LBS | BODY MASS INDEX: 32.98 KG/M2 | SYSTOLIC BLOOD PRESSURE: 122 MMHG | HEIGHT: 60 IN

## 2021-09-02 DIAGNOSIS — S69.91XD HAND INJURY, RIGHT, SUBSEQUENT ENCOUNTER: Primary | ICD-10-CM

## 2021-09-02 DIAGNOSIS — S69.91XA HAND INJURY, RIGHT, INITIAL ENCOUNTER: ICD-10-CM

## 2021-09-02 PROCEDURE — 99213 OFFICE O/P EST LOW 20 MIN: CPT | Performed by: FAMILY MEDICINE

## 2021-09-02 PROCEDURE — 73130 X-RAY EXAM OF HAND: CPT | Mod: RT | Performed by: RADIOLOGY

## 2021-09-02 ASSESSMENT — MIFFLIN-ST. JEOR: SCORE: 1199.57

## 2021-09-02 NOTE — PATIENT INSTRUCTIONS
# Right Hand Injury: Fall on outstretched hand occurring on 8/26/21 with pain diffusely over the hand worse at the 2nd/3rd metacarpal bones.  There is diffuse tenderness to palpation over her hand.  Repeat X-rays today negative for fracture.  Differential flare of arthritis versus fracture that has not shown up yet on x-ray.  Given this plan to immobilize for 2 weeks and follow-up for repeat evaluation and x-rays.  Patient understands agrees with plan.  Plan otherwise as below  Image Findings: Arthritis at the STT joint, no fracture noted  Treatment: Activities as tolerated, continue hand splint today work on home range of motion, can be out of splint at home   Medications/Injections: Limited tylenol/ibuprofen for pain for 1-2 weeks, Voltaren Gel   Follow-up: 2 week for repeat evaluation and x-rays    It was great seeing you again today!     Artur Vergara

## 2021-09-02 NOTE — PROGRESS NOTES
ASSESSMENT & PLAN    Carli was seen today for pain.    Diagnoses and all orders for this visit:    Hand injury, right, initial encounter  -     XR Hand Right G/E 3 Views; Future      This issue is acute and Worsening.    # Right Hand Injury: Fall on outstretched hand occurring on 8/26/21 with pain diffusely over the hand worse at the 2nd/3rd metacarpal bones.  There is diffuse tenderness to palpation over her hand.  Repeat X-rays today negative for fracture.  Differential flare of arthritis versus fracture that has not shown up yet on x-ray.  Given this plan to immobilize for 2 weeks and follow-up for repeat evaluation and x-rays.  Patient understands agrees with plan.  Plan otherwise as below  Image Findings: Arthritis at the STT joint, no fracture noted  Treatment: Activities as tolerated, continue hand splint today work on home range of motion, can be out of splint at home   Medications/Injections: Limited tylenol/ibuprofen for pain for 1-2 weeks, Voltaren Gel   Follow-up: 2 week for repeat evaluation and x-rays     Artur Vergara MD  [facility] [place]    -----  Chief Complaint   Patient presents with     Right Hand - Pain       SUBJECTIVE  Carli Monreal is a/an 71 year old female who is seen as a self referral, [facility] for evaluation of right hand injury.     The patient is seen by themselves.  The patient is Right handed    Onset: 8/26/21, 2 hour(s) ago. Patient describes injury as FOOSH injury.  Pain diffusely throughout hand.    Location of Pain: right dorsal hand   Worsened by: gripping, use   Better with: nothing   Treatments tried: no treatment tried to date  Associated symptoms: swelling    Orthopedic/Surgical history: NO  Social History/Occupation: Retired     Interim History September 2, 2021  Carli Monreal is now 1 week out from injury.  Since last visit on 8/26/2021 patient reports improvement of swelling, still has pain particularly with gripping.  No new injury.  No  "numbness/tingling.       No family history pertinent to patient's problem today.      REVIEW OF SYSTEMS:  Review of Systems  Constitutional, HEENT, cardiovascular, pulmonary, GI, , musculoskeletal, neuro, skin, endocrine and psych systems are negative, except as otherwise noted.    OBJECTIVE:  /71   Ht 1.518 m (4' 11.75\")   Wt 76.2 kg (168 lb)   BMI 33.09 kg/m     General: healthy, alert and in no distress  HEENT: no scleral icterus or conjunctival erythema  Skin: no suspicious lesions or rash. No jaundice.  CV: distal perfusion intact    Resp: normal respiratory effort without conversational dyspnea   Psych: normal mood and affect  Gait: normal steady gait with appropriate coordination and balance    Neuro: Normal light sensory exam of right upper extremity     RIGHT HAND  Inspection:    No swelling, bruising, discoloration, or obvious deformity or asymmetry  Palpation: Hand diffusely TTP   Range of Motion: grossly intact finger range of motion, no flexor/extensor lag  Strength: Intact but limited 2/2 pain      Special Tests:    Positive: None    Negative: flexor digitorum superficialis testing, flexor digitorum profundus testing    RADIOLOGY:  I independently ordered, visualized and reviewed these images with the patient  No fracture significant STT joint arthritis on repeat x-ray    XR HAND RT G/E 3 VW 9/2/2021 2:57 PM      HISTORY: Hand injury, right, initial encounter     Pressure: Scaphoid trapezium trapezoid degenerative arthrosis. No  evidence of fracture or dislocation.     SANDY REINA MD           Procedures    "

## 2021-09-02 NOTE — LETTER
9/2/2021         RE: Carli Monreal  [address]  [place] [address]        Dear Colleague,    Thank you for referring your patient, Carli Monreal, to the [facility] [place]. Please see a copy of my visit note below.    ASSESSMENT & PLAN    Carli was seen today for pain.    Diagnoses and all orders for this visit:    Hand injury, right, initial encounter  -     XR Hand Right G/E 3 Views; Future      This issue is acute and Worsening.    # Right Hand Injury: Fall on outstretched hand occurring on 8/26/21 with pain diffusely over the hand worse at the 2nd/3rd metacarpal bones.  There is diffuse tenderness to palpation over her hand.  Repeat X-rays today negative for fracture.  Differential flare of arthritis versus fracture that has not shown up yet on x-ray.  Given this plan to immobilize for 2 weeks and follow-up for repeat evaluation and x-rays.  Patient understands agrees with plan.  Plan otherwise as below  Image Findings: Arthritis at the STT joint, no fracture noted  Treatment: Activities as tolerated, continue hand splint today work on home range of motion, can be out of splint at home   Medications/Injections: Limited tylenol/ibuprofen for pain for 1-2 weeks, Voltaren Gel   Follow-up: 2 week for repeat evaluation and x-rays     Artur Vergara MD  [facility] [place]    -----  Chief Complaint   Patient presents with     Right Hand - Pain       SUBJECTIVE  Carli Monreal is a/an 71 year old female who is seen as a self referral, [facility] for evaluation of right hand injury.     The patient is seen by themselves.  The patient is Right handed    Onset: 8/26/21, 2 hour(s) ago. Patient describes injury as FOOSH injury.  Pain diffusely throughout hand.    Location of Pain: right dorsal hand   Worsened by: gripping, use   Better with: nothing   Treatments tried: no treatment tried to date  Associated symptoms:  "swelling    Orthopedic/Surgical history: NO  Social History/Occupation: Retired     Interim History September 2, 2021  Carli Monreal is now 1 week out from injury.  Since last visit on 8/26/2021 patient reports improvement of swelling, still has pain particularly with gripping.  No new injury.  No numbness/tingling.       No family history pertinent to patient's problem today.      REVIEW OF SYSTEMS:  Review of Systems  Constitutional, HEENT, cardiovascular, pulmonary, GI, , musculoskeletal, neuro, skin, endocrine and psych systems are negative, except as otherwise noted.    OBJECTIVE:  /70   Ht 1.518 m (4' 11.75\")   Wt 76.2 kg (168 lb)   BMI 33.09 kg/m     General: healthy, alert and in no distress  HEENT: no scleral icterus or conjunctival erythema  Skin: no suspicious lesions or rash. No jaundice.  CV: distal perfusion intact    Resp: normal respiratory effort without conversational dyspnea   Psych: normal mood and affect  Gait: normal steady gait with appropriate coordination and balance    Neuro: Normal light sensory exam of right upper extremity     RIGHT HAND  Inspection:    No swelling, bruising, discoloration, or obvious deformity or asymmetry  Palpation: Hand diffusely TTP   Range of Motion: grossly intact finger range of motion, no flexor/extensor lag  Strength: Intact but limited 2/2 pain      Special Tests:    Positive: None    Negative: flexor digitorum superficialis testing, flexor digitorum profundus testing    RADIOLOGY:  I independently ordered, visualized and reviewed these images with the patient  No fracture significant STT joint arthritis on repeat x-ray    XR HAND RT G/E 3 VW 9/2/2021 2:57 PM      HISTORY: Hand injury, right, initial encounter     Pressure: Scaphoid trapezium trapezoid degenerative arthrosis. No  evidence of fracture or dislocation.     SANDY REINA MD           Procedures        Again, thank you for allowing me to participate in the care of your patient.  "       Sincerely,        Artur Vergara MD

## 2021-09-13 ENCOUNTER — TELEPHONE (OUTPATIENT)
Dept: GASTROENTEROLOGY | Facility: CLINIC | Age: 71
End: 2021-09-13

## 2021-09-13 NOTE — TELEPHONE ENCOUNTER
"Writer reviewed pre-assessment questions with patient prior to upcoming colonoscopy on 9.20.2021.      Covid test scheduled: 9.17.2021    Arrival time: 0910    Facility location: [facility]    Sedation type: MAC    Electronic Implantable devices? No    Blood thinners/Antiplatelet medication? No    Per chart review pt would be ordered Goltyely prep d/t diabetes.  Per GI order \"Magnesium citrate prep OK, per patient preference\"  Upon talking with patient, patient states she is going to take magnesium citrate and ducolax tablets for her prep.  RN clarified with patient that she does not intend to drink miralax and gatorade as part of her prep.  Pt states it is \"just too much.\"  Per pt she has cleared her prep with Dr. Mancuso.      Per pt she will be on a clear liquid diet the day prior to procedure.    9AM- 10 oz bottle of magnesium citrate  10AM- 3 ducolax tablets  5PM- 10 oz bottle of magnesium citrate  6PM- 3 ducolax tablets    Staff message to Dr. Mancuso to confirm prep.    Designated  policy reviewed with patient.     Patient verbalized understanding.  No further questions or concerns.    Makenzie Clements RN    "

## 2021-09-16 ENCOUNTER — ANCILLARY PROCEDURE (OUTPATIENT)
Dept: GENERAL RADIOLOGY | Facility: CLINIC | Age: 71
End: 2021-09-16
Attending: FAMILY MEDICINE
Payer: MEDICARE

## 2021-09-16 ENCOUNTER — THERAPY VISIT (OUTPATIENT)
Dept: OCCUPATIONAL THERAPY | Facility: CLINIC | Age: 71
End: 2021-09-16
Attending: FAMILY MEDICINE
Payer: MEDICARE

## 2021-09-16 ENCOUNTER — OFFICE VISIT (OUTPATIENT)
Dept: ORTHOPEDICS | Facility: CLINIC | Age: 71
End: 2021-09-16
Payer: MEDICARE

## 2021-09-16 VITALS
DIASTOLIC BLOOD PRESSURE: 64 MMHG | SYSTOLIC BLOOD PRESSURE: 122 MMHG | HEIGHT: 60 IN | WEIGHT: 168 LBS | BODY MASS INDEX: 32.98 KG/M2

## 2021-09-16 DIAGNOSIS — S69.91XD HAND INJURY, RIGHT, SUBSEQUENT ENCOUNTER: ICD-10-CM

## 2021-09-16 DIAGNOSIS — M25.531 RIGHT WRIST PAIN: Primary | ICD-10-CM

## 2021-09-16 DIAGNOSIS — S69.91XD HAND INJURY, RIGHT, SUBSEQUENT ENCOUNTER: Primary | ICD-10-CM

## 2021-09-16 PROCEDURE — 97165 OT EVAL LOW COMPLEX 30 MIN: CPT | Mod: GO | Performed by: OCCUPATIONAL THERAPIST

## 2021-09-16 PROCEDURE — 99214 OFFICE O/P EST MOD 30 MIN: CPT | Performed by: FAMILY MEDICINE

## 2021-09-16 PROCEDURE — 97110 THERAPEUTIC EXERCISES: CPT | Mod: GO | Performed by: OCCUPATIONAL THERAPIST

## 2021-09-16 PROCEDURE — 73130 X-RAY EXAM OF HAND: CPT | Mod: RT | Performed by: RADIOLOGY

## 2021-09-16 RX ORDER — CELECOXIB 100 MG/1
100 CAPSULE ORAL 2 TIMES DAILY
Qty: 20 CAPSULE | Refills: 0 | Status: SHIPPED | OUTPATIENT
Start: 2021-09-16 | End: 2021-12-27

## 2021-09-16 ASSESSMENT — MIFFLIN-ST. JEOR: SCORE: 1199.57

## 2021-09-16 NOTE — LETTER
9/16/2021         RE: Carli Monreal  [address]  [place] [address]        Dear Colleague,    Thank you for referring your patient, Carli Monreal, to the [facility] [place]. Please see a copy of my visit note below.    ASSESSMENT & PLAN    Carli was seen today for pain.    Diagnoses and all orders for this visit:    Hand injury, right, subsequent encounter  -     XR Hand Right G/E 3 Views; Future  -     [facility] PT and Hand Referral; Future  -     celecoxib (CELEBREX) 100 MG capsule; Take 1 capsule (100 mg) by mouth 2 times daily      This issue is acute and Worsening.    # Right Hand Injury: Fall on outstretched hand occurring on 8/26/21 with pain diffusely over the hand worse at the 2nd/3rd metacarpal bones.  There is diffuse tenderness to palpation over her hand.  Repeat X-rays today again negative for fracture. Pain improved but not resolved.  Given this plan to refer to hand therapy for mobilization and strengthening.  Patient understands agrees with plan.  Plan otherwise as below  Image Findings: Arthritis at the STT joint, again no fracture noted  Treatment: Activities as tolerated, can transition out of splint, can use as needed  Medications/Injections: Celebrex for pain control  Follow-up: as needed  Can Consider: Targeted steroid injection     Artur Vergara MD  [facility] [place]    -----  Chief Complaint   Patient presents with     Right Hand - Pain       SUBJECTIVE  Carli Monreal is a/an 71 year old female who is seen as a self referral, [facility] for evaluation of right hand injury.     The patient is seen by themselves.  The patient is Right handed    Onset: 8/26/21, 2 hour(s) ago. Patient describes injury as FOOSH injury.  Pain diffusely throughout hand.    Location of Pain: right dorsal hand   Worsened by: gripping, use   Better with: nothing   Treatments tried: no treatment tried to date  Associated symptoms:  "swelling    Orthopedic/Surgical history: NO  Social History/Occupation: Retired     Interim History September 2, 2021  Carli Monreal is now 1 week out from injury.  Since last visit on 8/26/2021 patient reports improvement of swelling, still has pain particularly with gripping.  No new injury.  No numbness/tingling.     Interim History September 16, 2021  Carli Monreal is now 3 weeks out from injury.  Overall improvement of pain.  States that this past Sunday 9/12/21, rated pain 50/10. Tried gabapentin, Voltaren and ice. Now using new OTC wrist brace which she states is more comfortable. Since last visit on 9/2/2021 patient repeat radiograph taken prior to seeing the patient.  Overall pain not bad today.      No family history pertinent to patient's problem today.      REVIEW OF SYSTEMS:  Review of Systems  Constitutional, HEENT, cardiovascular, pulmonary, GI, , musculoskeletal, neuro, skin, endocrine and psych systems are negative, except as otherwise noted.    OBJECTIVE:  /64   Ht 1.518 m (4' 11.75\")   Wt 76.2 kg (168 lb)   BMI 33.09 kg/m     General: healthy, alert and in no distress  HEENT: no scleral icterus or conjunctival erythema  Skin: no suspicious lesions or rash. No jaundice.  CV: distal perfusion intact    Resp: normal respiratory effort without conversational dyspnea   Psych: normal mood and affect  Gait: normal steady gait with appropriate coordination and balance    Neuro: Normal light sensory exam of right upper extremity     RIGHT HAND  Inspection:    No swelling, bruising, discoloration, or obvious deformity or asymmetry  Palpation: Hand diffusely TTP worse over STT joint  Range of Motion: intact finger/wrist range of motion, mild pain over end flexion/extension  Strength: Mild pain with  strength, intact wrist strength, mild pain with pronation/supination.     Special Tests:    Positive: None    Negative: flexor digitorum superficialis testing, flexor digitorum profundus " testing    RADIOLOGY:  I independently ordered, visualized and reviewed these images with the patient  No fracture significant STT joint arthritis on repeat x-ray    XR HAND RT G/E 3 VW 9/16/2021 1:56 PM      HISTORY: Hand injury, right, subsequent encounter                                                                      IMPRESSION: Scaphoid trapezium trapezoid advanced degenerative  arthrosis. No apparent chondrocalcinosis. No evidence of hand  fracture.     SANDY REINA MD     XR HAND RT G/E 3 VW 9/2/2021 2:57 PM      HISTORY: Hand injury, right, initial encounter     Pressure: Scaphoid trapezium trapezoid degenerative arthrosis. No  evidence of fracture or dislocation.     SANDY REINA MD               Again, thank you for allowing me to participate in the care of your patient.        Sincerely,        Artur Vergara MD

## 2021-09-16 NOTE — LETTER
DEPARTMENT OF HEALTH AND HUMAN SERVICES  CENTERS FOR MEDICARE & MEDICAID SERVICES    PLAN/UPDATED PLAN OF PROGRESS FOR OUTPATIENT REHABILITATION    PATIENTS NAME:  Carli Monreal   : 1950    PROVIDER NUMBER:  [id number]  HICN: [id number]  PROVIDER NAME: [facility] [place]  MEDICAL RECORD NUMBER: [id number]   START OF CARE DATE:    SOC Date: 21   TYPE:  OT  PRIMARY/TREATMENT DIAGNOSIS: (Pertinent Medical Diagnosis)  Hand injury, right, subsequent encounter  Right wrist pain  VISITS FROM START OF CARE:  Rxs Used: 1     Hand Therapy Initial Evaluation  Current Date:  2021    Subjective:  Carli Monreal is a 71 year old right hand dominant female.  Diagnosis:   Right wrist injury  DOI:  2021  Patient reports symptoms of pain, stiffness/loss of motion, weakness/loss of strength and edema of the right wrist and hand which occurred due to FOOSH when fell in bathtub. Since onset symptoms are gradually getting better.  Special tests:  x-ray negative for fracture, shows STT arthritis.  Previous treatment: OTC wrist splint full time 1 week now as needed.  General health as reported by patient is good.  Pertinent medical history includes:Anemia, Asthma, Diabetes, High Blood Pressure, History of Fractures, Osteoarthritis, Osteoporosis, Overweight, Rheumatoid Arthritis, Cold Extremity, Crohn's disease.  Medical allergies:none.  Surgical history: cancer: cervical, orthopedic: lumbar fusion, other: Crohn's.  Medication history: High Blood Pressure, Pain, Sleep.    Occupational Profile Information:  Current occupation is Retired   Home Tasks: Computer Work, Driving, Lifting, Carrying, Prolonged Sitting, Repetitive Tasks  Prior functional level:  independent-shared household chores  Barriers include:none  Mobility: No difficulty  Transportation: drives    Functional Outcome Measure:  Upper Extremity Functional Index  SCORE:   Column Totals: 40/80  (A lower score indicates greater  disability.)    Objective:  Pain Level (Scale 0-10)        PATIENTS NAME:  Carli Monreal         : 1950   At Rest 4-5   With Use 6-8     Pain Description  Date 2021   Location wrist and hand   Pain Quality Aching   Frequency constant     Pain is worst  daytime and sometimes nighttime   Exacerbated by  using hand   Relieved by rest and splint wear   Progression Gradually improving      Sensation:  WNL throughout all nerve distributions; per patient report    Edema  Moderate of volar and dorsal wrist and into MCP joints     ROM  Wrist 2021   AROM (PROM) Left Right   Extension 65 55+   Flexion 80 45++   RD 20 10+   UD 45 15++   Supination 85 70++   Pronation 85 80+     Thumb 2021   AROM  (PROM) Left Right   MP /85 /75   IP /50 /50   Kapandji Opposition Scale (0-10/10) 10/10 810     Strength   (Measured in pounds)  Pain Report: - none  + mild    ++ moderate    +++ severe    2021   Trials Left Right   1  2  3 34  27  26 6++  1 trial due to pain   Average 29      Assessment:  Patient presents with symptoms consistent with diagnosis of right wrist and hand pain         PATIENTS NAME:  Carli Monreal         : 1950    due to injury/fall, with conservative intervention.     Patient's limitations or Problem List includes:  Pain, Decreased ROM/motion, Increased edema, Weakness, Decreased stability and Decreased  of the right wrist, hand and thumb which interferes with the patient's ability to perform Self Care Tasks (dressing, eating, bathing), Work Tasks, Sleep Patterns, Recreational Activities, Household Chores and Driving  as compared to previous level of function.    Rehab Potential:  Good - Return to full activity, some limitations    Patient will benefit from skilled Occupational Therapy to increase ROM, flexibility, overall strength,  strength and stability of wrist and decrease pain and edema to return to previous activity level  and resume normal daily tasks and to reach their rehab potential.    Barriers to Learning:  No barrier    Communication Issues:  Patient appears to be able to clearly communicate and understand verbal and written communication and follow directions correctly.    Chart Review: Chart Review and Simple history review with patient    Identified Performance Deficits: bathing/showering, dressing, communication management, driving and community mobility, home establishment and management, meal preparation and cleanup, sleep, work and leisure activities    Assessment of Occupational Performance:  5 or more Performance Deficits    Clinical Decision Making (Complexity): Low complexity    Treatment Explanation:  The following has been discussed with the patient:  RX ordered/plan of care  Anticipated outcomes  Possible risks and side effects    Plan:  Frequency:  1 X week, once daily  Duration:  for 2 months    Treatment Plan:    Modalities:    US and Paraffin   Therapeutic Exercise:    AROM, AAROM, PROM, Tendon Gliding, Isotonics, Isometrics and Stabilization  Therapeutic Activities:   Functional activities   Neuromuscular re-ed:   Kinesiotaping  Manual Techniques:   Joint mobilization, Myofascial release and Manual edema mobilization  Orthotic Fabrication:    Forearm based        PATIENTS NAME:  Carli Monreal         : 1950  Self Care:    Self Care Tasks    Discharge Plan:    Achieve all LTG.  Independent in home treatment program.  Reach maximal therapeutic benefit.    Home Program:   Warmth for stiffness  AROM fingers with 3 fist tendon gliding  AROM thumb E/F and opposition   AROM of wrist and forearm all planes  PROM for wrist E/F and S/P  Trial tubigrip sleeve for wrist support and edema control  OTC wrist splint as needed with ADL's and sleeping  Avoid activities that exacerbate pain     Next Visit:  See in 1 week  Assess response to HEP and tubigrip sleeve  Progress to PROM of wrist,  strengthening and  "wrist stabilization exercises as able       Caregiver Signature/Credentials ______________________________ Date ________       Treating Provider: ULISSES Ruiz/KELSEY,ELLIOTT      I have reviewed and certified the need for these services and plan of treatment while under my care.        PHYSICIAN'S SIGNATURE:   _____________________________________  Date___________      Artur Vergara MD     Certification period: Beginning of Cert date period: 09/16/21 End of Cert period date: 11/14/21     Functional Level Progress Report: Please see attached \"Goal Flow sheet for Functional level.\"    ___X_____ Continue Services or       ________ DC Services                Service dates: SOC Date: 09/16/21  to present                                                                     "

## 2021-09-16 NOTE — TELEPHONE ENCOUNTER
Upon review still awaiting response from provider regarding modified prep.     Katharine Coates RN

## 2021-09-16 NOTE — PATIENT INSTRUCTIONS
# Right Hand Injury: Fall on outstretched hand occurring on 8/26/21 with pain diffusely over the hand worse at the 2nd/3rd metacarpal bones.  There is diffuse tenderness to palpation over her hand.  Repeat X-rays today again negative for fracture. Pain improved but not resolved.  Given this plan to refer to hand therapy for mobilization and strengthening.  Patient understands agrees with plan.  Plan otherwise as below  Image Findings: Arthritis at the STT joint, again no fracture noted  Treatment: Activities as tolerated, can transition out of splint, can use as needed  Medications/Injections: Celebrex for pain control  Follow-up: as needed  Can Consider: Targeted steroid injection     Please call [phone]   Ask for my team if you have any questions or concerns    It was great seeing you again today!    Artur Vergara MD, CAQSM

## 2021-09-16 NOTE — PROGRESS NOTES
ASSESSMENT & PLAN    Carli was seen today for pain.    Diagnoses and all orders for this visit:    Hand injury, right, subsequent encounter  -     XR Hand Right G/E 3 Views; Future  -     [facility] PT and Hand Referral; Future  -     celecoxib (CELEBREX) 100 MG capsule; Take 1 capsule (100 mg) by mouth 2 times daily      This issue is acute and Worsening.    # Right Hand Injury: Fall on outstretched hand occurring on 8/26/21 with pain diffusely over the hand worse at the 2nd/3rd metacarpal bones.  There is diffuse tenderness to palpation over her hand.  Repeat X-rays today again negative for fracture. Pain improved but not resolved.  Given this plan to refer to hand therapy for mobilization and strengthening.  Patient understands agrees with plan.  Plan otherwise as below  Image Findings: Arthritis at the STT joint, again no fracture noted  Treatment: Activities as tolerated, can transition out of splint, can use as needed  Medications/Injections: Celebrex for pain control  Follow-up: as needed  Can Consider: Targeted steroid injection     Artur Vergara MD  [facility] [place]    -----  Chief Complaint   Patient presents with     Right Hand - Pain       SUBJECTIVE  Carli Monreal is a/an 71 year old female who is seen as a self referral, [facility] for evaluation of right hand injury.     The patient is seen by themselves.  The patient is Right handed    Onset: 8/26/21, 2 hour(s) ago. Patient describes injury as FOOSH injury.  Pain diffusely throughout hand.    Location of Pain: right dorsal hand   Worsened by: gripping, use   Better with: nothing   Treatments tried: no treatment tried to date  Associated symptoms: swelling    Orthopedic/Surgical history: NO  Social History/Occupation: Retired     Interim History September 2, 2021  Carli Monreal is now 1 week out from injury.  Since last visit on 8/26/2021 patient reports improvement of swelling, still has pain particularly with gripping.  No  "new injury.  No numbness/tingling.     Interim History September 16, 2021  Carli Monreal is now 3 weeks out from injury.  Overall improvement of pain.  States that this past Sunday 9/12/21, rated pain 50/10. Tried gabapentin, Voltaren and ice. Now using new OTC wrist brace which she states is more comfortable. Since last visit on 9/2/2021 patient repeat radiograph taken prior to seeing the patient.  Overall pain not bad today.      No family history pertinent to patient's problem today.      REVIEW OF SYSTEMS:  Review of Systems  Constitutional, HEENT, cardiovascular, pulmonary, GI, , musculoskeletal, neuro, skin, endocrine and psych systems are negative, except as otherwise noted.    OBJECTIVE:  /64   Ht 1.518 m (4' 11.75\")   Wt 76.2 kg (168 lb)   BMI 33.09 kg/m     General: healthy, alert and in no distress  HEENT: no scleral icterus or conjunctival erythema  Skin: no suspicious lesions or rash. No jaundice.  CV: distal perfusion intact    Resp: normal respiratory effort without conversational dyspnea   Psych: normal mood and affect  Gait: normal steady gait with appropriate coordination and balance    Neuro: Normal light sensory exam of right upper extremity     RIGHT HAND  Inspection:    No swelling, bruising, discoloration, or obvious deformity or asymmetry  Palpation: Hand diffusely TTP worse over STT joint  Range of Motion: intact finger/wrist range of motion, mild pain over end flexion/extension  Strength: Mild pain with  strength, intact wrist strength, mild pain with pronation/supination.     Special Tests:    Positive: None    Negative: flexor digitorum superficialis testing, flexor digitorum profundus testing    RADIOLOGY:  I independently ordered, visualized and reviewed these images with the patient  No fracture significant STT joint arthritis on repeat x-ray    XR HAND RT G/E 3 VW 9/16/2021 1:56 PM      HISTORY: Hand injury, right, subsequent encounter                                   "                                    IMPRESSION: Scaphoid trapezium trapezoid advanced degenerative  arthrosis. No apparent chondrocalcinosis. No evidence of hand  fracture.     SANDY REINA MD     XR HAND RT G/E 3 VW 9/2/2021 2:57 PM      HISTORY: Hand injury, right, initial encounter     Pressure: Scaphoid trapezium trapezoid degenerative arthrosis. No  evidence of fracture or dislocation.     SANDY REINA MD

## 2021-09-17 ENCOUNTER — LAB (OUTPATIENT)
Dept: LAB | Facility: CLINIC | Age: 71
End: 2021-09-17
Payer: MEDICARE

## 2021-09-17 DIAGNOSIS — Z11.59 ENCOUNTER FOR SCREENING FOR OTHER VIRAL DISEASES: ICD-10-CM

## 2021-09-17 PROCEDURE — U0005 INFEC AGEN DETEC AMPLI PROBE: HCPCS

## 2021-09-17 PROCEDURE — U0003 INFECTIOUS AGENT DETECTION BY NUCLEIC ACID (DNA OR RNA); SEVERE ACUTE RESPIRATORY SYNDROME CORONAVIRUS 2 (SARS-COV-2) (CORONAVIRUS DISEASE [COVID-19]), AMPLIFIED PROBE TECHNIQUE, MAKING USE OF HIGH THROUGHPUT TECHNOLOGIES AS DESCRIBED BY CMS-2020-01-R: HCPCS

## 2021-09-18 LAB — SARS-COV-2 RNA RESP QL NAA+PROBE: NEGATIVE

## 2021-09-20 ENCOUNTER — HOSPITAL ENCOUNTER (OUTPATIENT)
Facility: AMBULATORY SURGERY CENTER | Age: 71
End: 2021-09-20
Attending: INTERNAL MEDICINE
Payer: MEDICARE

## 2021-09-20 ENCOUNTER — ANESTHESIA (OUTPATIENT)
Dept: SURGERY | Facility: AMBULATORY SURGERY CENTER | Age: 71
End: 2021-09-20
Payer: MEDICARE

## 2021-09-20 ENCOUNTER — ANESTHESIA EVENT (OUTPATIENT)
Dept: SURGERY | Facility: AMBULATORY SURGERY CENTER | Age: 71
End: 2021-09-20
Payer: MEDICARE

## 2021-09-20 VITALS
HEIGHT: 59 IN | RESPIRATION RATE: 16 BRPM | TEMPERATURE: 97.5 F | WEIGHT: 165 LBS | HEART RATE: 71 BPM | SYSTOLIC BLOOD PRESSURE: 94 MMHG | BODY MASS INDEX: 33.26 KG/M2 | OXYGEN SATURATION: 98 % | DIASTOLIC BLOOD PRESSURE: 58 MMHG

## 2021-09-20 VITALS — HEART RATE: 66 BPM

## 2021-09-20 LAB
COLONOSCOPY: NORMAL
GLUCOSE BLDC GLUCOMTR-MCNC: 155 MG/DL (ref 70–99)

## 2021-09-20 PROCEDURE — 45378 DIAGNOSTIC COLONOSCOPY: CPT

## 2021-09-20 RX ORDER — ONDANSETRON 4 MG/1
4 TABLET, ORALLY DISINTEGRATING ORAL EVERY 6 HOURS PRN
Status: CANCELLED | OUTPATIENT
Start: 2021-09-20

## 2021-09-20 RX ORDER — ONDANSETRON 2 MG/ML
4 INJECTION INTRAMUSCULAR; INTRAVENOUS
Status: DISCONTINUED | OUTPATIENT
Start: 2021-09-20 | End: 2021-09-21 | Stop reason: HOSPADM

## 2021-09-20 RX ORDER — PROPOFOL 10 MG/ML
INJECTION, EMULSION INTRAVENOUS CONTINUOUS PRN
Status: DISCONTINUED | OUTPATIENT
Start: 2021-09-20 | End: 2021-09-20

## 2021-09-20 RX ORDER — NALOXONE HYDROCHLORIDE 0.4 MG/ML
0.2 INJECTION, SOLUTION INTRAMUSCULAR; INTRAVENOUS; SUBCUTANEOUS
Status: CANCELLED | OUTPATIENT
Start: 2021-09-20

## 2021-09-20 RX ORDER — SODIUM CHLORIDE, SODIUM LACTATE, POTASSIUM CHLORIDE, CALCIUM CHLORIDE 600; 310; 30; 20 MG/100ML; MG/100ML; MG/100ML; MG/100ML
INJECTION, SOLUTION INTRAVENOUS CONTINUOUS PRN
Status: DISCONTINUED | OUTPATIENT
Start: 2021-09-20 | End: 2021-09-20

## 2021-09-20 RX ORDER — NALOXONE HYDROCHLORIDE 0.4 MG/ML
0.4 INJECTION, SOLUTION INTRAMUSCULAR; INTRAVENOUS; SUBCUTANEOUS
Status: CANCELLED | OUTPATIENT
Start: 2021-09-20

## 2021-09-20 RX ORDER — LIDOCAINE 40 MG/G
CREAM TOPICAL
Status: DISCONTINUED | OUTPATIENT
Start: 2021-09-20 | End: 2021-09-21 | Stop reason: HOSPADM

## 2021-09-20 RX ORDER — ONDANSETRON 2 MG/ML
4 INJECTION INTRAMUSCULAR; INTRAVENOUS EVERY 6 HOURS PRN
Status: CANCELLED | OUTPATIENT
Start: 2021-09-20

## 2021-09-20 RX ORDER — SIMETHICONE
LIQUID (ML) MISCELLANEOUS PRN
Status: DISCONTINUED | OUTPATIENT
Start: 2021-09-20 | End: 2021-09-20 | Stop reason: HOSPADM

## 2021-09-20 RX ORDER — PROPOFOL 10 MG/ML
INJECTION, EMULSION INTRAVENOUS PRN
Status: DISCONTINUED | OUTPATIENT
Start: 2021-09-20 | End: 2021-09-20

## 2021-09-20 RX ORDER — PROCHLORPERAZINE MALEATE 5 MG
5 TABLET ORAL EVERY 6 HOURS PRN
Status: CANCELLED | OUTPATIENT
Start: 2021-09-20

## 2021-09-20 RX ORDER — FLUMAZENIL 0.1 MG/ML
0.2 INJECTION, SOLUTION INTRAVENOUS
Status: CANCELLED | OUTPATIENT
Start: 2021-09-20 | End: 2021-09-21

## 2021-09-20 RX ADMIN — PROPOFOL 50 MG: 10 INJECTION, EMULSION INTRAVENOUS at 09:37

## 2021-09-20 RX ADMIN — SODIUM CHLORIDE, SODIUM LACTATE, POTASSIUM CHLORIDE, CALCIUM CHLORIDE: 600; 310; 30; 20 INJECTION, SOLUTION INTRAVENOUS at 09:19

## 2021-09-20 RX ADMIN — PROPOFOL 175 MCG/KG/MIN: 10 INJECTION, EMULSION INTRAVENOUS at 09:37

## 2021-09-20 ASSESSMENT — MIFFLIN-ST. JEOR: SCORE: 1174.07

## 2021-09-20 NOTE — H&P
Carli Monreal  [id number]  female  71 year old      Reason for procedure/surgery: Crohn's disease activity assessment    Patient Active Problem List   Diagnosis     Crohn's disease with complication, unspecified gastrointestinal tract location (H)     Moderate persistent asthma, unspecified whether complicated     Essential hypertension     Type 2 diabetes mellitus without complication, without long-term current use of insulin (H)     Rheumatoid arthritis involving both hands, unspecified whether rheumatoid factor present (H)     Chronic bilateral low back pain, unspecified whether sciatica present     Arthropathy in Crohn's disease (H)     Crohn's disease of small intestine with complication (H)     Hypokalemia     Insomnia, unspecified type     Hand injury, right, subsequent encounter     Right wrist pain       Past Surgical History:    Past Surgical History:   Procedure Laterality Date     SMALL BOWEL RESECTION         Past Medical History:   Past Medical History:   Diagnosis Date     Diabetes mellitus (H)      Right bundle branch block      Uncomplicated asthma        Social History:   Social History     Tobacco Use     Smoking status: Former Smoker     Types: Cigarettes     Quit date: 1991     Years since quittin.4     Smokeless tobacco: Never Used   Substance Use Topics     Alcohol use: Yes     Comment: Wine- couple x/ week       Family History:   Family History   Problem Relation Age of Onset     Rheumatoid Arthritis Mother      Inflammatory Bowel Disease No family hx of      Colon Cancer No family hx of        Allergies:   Allergies   Allergen Reactions     Seasonal Allergies        Active Medications:   Current Outpatient Medications   Medication Sig Dispense Refill     albuterol (PROAIR HFA/PROVENTIL HFA/VENTOLIN HFA) 108 (90 Base) MCG/ACT inhaler Inhale 2 puffs into the lungs 4 times daily       amLODIPine (NORVASC) 2.5 MG tablet Take 1 tablet (2.5 mg) by mouth daily 90 tablet 1      atorvastatin (LIPITOR) 10 MG tablet Take 1 tablet (10 mg) by mouth daily 90 tablet 3     budesonide-formoterol (SYMBICORT) 160-4.5 MCG/ACT Inhaler Inhale 2 puffs into the lungs 2 times daily 10.2 g 4     celecoxib (CELEBREX) 100 MG capsule Take 1 capsule (100 mg) by mouth 2 times daily 20 capsule 0     Cyanocobalamin 3000 MCG SUBL Place 1,500 mg under the tongue daily       esomeprazole (NEXIUM) 40 MG DR capsule Take 1 capsule (40 mg) by mouth every morning (before breakfast) Take 30-60 minutes before eating. 90 capsule 1     folic acid (FOLVITE) 1 MG tablet Take 1 tablet (1 mg) by mouth daily 90 tablet 1     gabapentin (NEURONTIN) 100 MG capsule Take 1 capsule (100 mg) by mouth At Bedtime 30 capsule 0     HYDROcodone-acetaminophen (NORCO) 5-325 MG tablet Take 1 tablet by mouth every 6 hours as needed for severe pain       losartan (COZAAR) 100 MG tablet Take 1 tablet (100 mg) by mouth daily 90 tablet 1     metFORMIN (GLUCOPHAGE) 1000 MG tablet Take 1 tablet (1,000 mg) by mouth 2 times daily (with meals) 60 tablet 1     multivitamin w/minerals (MULTI-VITAMIN) tablet Take 1 tablet by mouth daily       nitroFURantoin macrocrystal-monohydrate (MACROBID) 100 MG capsule Take 1 capsule (100 mg) by mouth 2 times daily 14 capsule 0     potassium chloride ER (KLOR-CON M) 20 MEQ CR tablet Take 1 tablet (20 mEq) by mouth daily 30 tablet 3     Vitamin D, Cholecalciferol, 25 MCG (1000 UT) TABS Take 2 tablets by mouth daily       alcohol swab prep pads Use to swab area of injection/venkata as directed. 100 each 3     blood glucose (NO BRAND SPECIFIED) lancets standard Use to test blood sugar 1 times daily or as directed. 100 each 11     blood glucose (NO BRAND SPECIFIED) test strip Use to test blood sugar 1 times daily or as directed. 100 strip 1     cholestyramine light (QUESTRAN) 4 GM packet Take 1 packet (4 g) by mouth 3 times daily (with meals) (Patient not taking: Reported on 8/5/2021) 60 each 0     insulin pen needle  "(ULTICARE MICRO) 32G X 4 MM miscellaneous Use 1 pen needles daily or as directed. 100 each 3       Systemic Review:   CONSTITUTIONAL: NEGATIVE for fever, chills, change in weight  ENT/MOUTH: NEGATIVE for ear, mouth and throat problems  RESP: NEGATIVE for significant cough or SOB  CV: NEGATIVE for chest pain, palpitations or peripheral edema    Physical Examination:   Vital Signs: /66   Pulse 74   Temp 96.9  F (36.1  C) (Temporal)   Resp 18   Ht 1.499 m (4' 11\")   Wt 74.8 kg (165 lb)   SpO2 96%   BMI 33.33 kg/m    GENERAL: healthy, alert and no distress  NECK: no adenopathy, no asymmetry, masses, or scars  RESP: lungs clear to auscultation - no rales, rhonchi or wheezes  CV: regular rate and rhythm, normal S1 S2, no S3 or S4, no murmur, click or rub, no peripheral edema and peripheral pulses strong  ABDOMEN: soft, nontender, no hepatosplenomegaly, no masses and bowel sounds normal  MS: no gross musculoskeletal defects noted, no edema    Plan: Appropriate to proceed as scheduled.      Keesha Mancuso MD  9/20/2021    PCP:  Iza Cage    "

## 2021-09-20 NOTE — ANESTHESIA PREPROCEDURE EVALUATION
Anesthesia Pre-Procedure Evaluation    Patient: Carli Monreal   MRN: [id number] : 1950        Preoperative Diagnosis: Crohn's disease with complication, unspecified gastrointestinal tract location (H) [K50.919]   Procedure : Procedure(s):  COLONOSCOPY     Past Medical History:   Diagnosis Date     Diabetes mellitus (H)      Right bundle branch block      Uncomplicated asthma       Past Surgical History:   Procedure Laterality Date     SMALL BOWEL RESECTION        Allergies   Allergen Reactions     Seasonal Allergies       Social History     Tobacco Use     Smoking status: Former Smoker     Types: Cigarettes     Quit date: 1991     Years since quittin.4     Smokeless tobacco: Never Used   Substance Use Topics     Alcohol use: Yes     Comment: Wine- couple x/ week      Wt Readings from Last 1 Encounters:   21 74.8 kg (165 lb)        Anesthesia Evaluation            ROS/MED HX  ENT/Pulmonary:     (+) asthma     Neurologic:       Cardiovascular:     (+) hypertension-----    METS/Exercise Tolerance:     Hematologic:       Musculoskeletal:   (+) arthritis (RA),     GI/Hepatic:     (+) Inflammatory bowel disease,     Renal/Genitourinary:       Endo:     (+) type II DM,     Psychiatric/Substance Use:       Infectious Disease:       Malignancy:       Other:            Physical Exam    Airway        Mallampati: II       Respiratory Devices and Support         Dental           Cardiovascular          Rhythm and rate: regular and normal     Pulmonary           breath sounds clear to auscultation           OUTSIDE LABS:  CBC:   Lab Results   Component Value Date    WBC 6.2 2021    HGB 11.7 2021    HCT 35.8 2021     2021     BMP:   Lab Results   Component Value Date     2021     2021    POTASSIUM 4.2 2021    POTASSIUM 3.7 2021    CHLORIDE 111 (H) 2021    CHLORIDE 110 (H) 2021    CO2 26 2021    CO2 28 2021    BUN  8 08/20/2021    BUN 10 05/26/2021    CR 0.76 08/20/2021    CR 0.65 05/26/2021     (H) 08/20/2021    GLC 91 05/26/2021     COAGS: No results found for: PTT, INR, FIBR  POC: No results found for: BGM, HCG, HCGS  HEPATIC:   Lab Results   Component Value Date    ALBUMIN 3.6 05/26/2021    PROTTOTAL 7.9 05/26/2021    ALT 38 05/26/2021    AST 35 05/26/2021    ALKPHOS 98 05/26/2021    BILITOTAL 0.3 05/26/2021     OTHER:   Lab Results   Component Value Date    A1C 5.6 08/20/2021    BANDAR 9.4 08/20/2021    CRP <2.9 05/26/2021    SED 60 (H) 05/26/2021       Anesthesia Plan    ASA Status:  2   NPO Status:  NPO Appropriate    Anesthesia Type: MAC.              Consents    Anesthesia Plan(s) and associated risks, benefits, and realistic alternatives discussed. Questions answered and patient/representative(s) expressed understanding.     - Discussed with:  Patient         Postoperative Care            Comments:         H&P reviewed: Unable to attach H&P to encounter due to EHR limitations. H&P Update: appropriate H&P reviewed, patient examined. No interval changes since H&P (within 30 days).         Hill Oswald MD

## 2021-09-20 NOTE — ANESTHESIA CARE TRANSFER NOTE
Patient: Carli Monrael    Procedure(s):  COLONOSCOPY    Diagnosis: Crohn's disease with complication, unspecified gastrointestinal tract location (H) [K50.919]  Diagnosis Additional Information: No value filed.    Anesthesia Type:   MAC     Note:    Oropharynx: spontaneously breathing  Level of Consciousness: awake  Oxygen Supplementation: room air    Independent Airway: airway patency satisfactory and stable  Dentition: dentition unchanged  Vital Signs Stable: post-procedure vital signs reviewed and stable  Report to RN Given: handoff report given  Patient transferred to: Phase II    Handoff Report: Identifed the Patient, Identified the Reponsible Provider, Reviewed the pertinent medical history, Discussed the surgical course, Reviewed Intra-OP anesthesia mangement and issues during anesthesia, Set expectations for post-procedure period and Allowed opportunity for questions and acknowledgement of understanding      Vitals:  Vitals Value Taken Time   BP     Temp     Pulse     Resp     SpO2         Electronically Signed By: CATRACHITA Napier CRNA  September 20, 2021  10:13 AM

## 2021-09-20 NOTE — ANESTHESIA POSTPROCEDURE EVALUATION
Patient: Carli Monreal    Procedure(s):  COLONOSCOPY    Diagnosis:Crohn's disease with complication, unspecified gastrointestinal tract location (H) [K50.380]  Diagnosis Additional Information: No value filed.    Anesthesia Type:  MAC    Note:  Disposition: Outpatient   Postop Pain Control: Uneventful            Sign Out: Well controlled pain   PONV: No   Neuro/Psych: Uneventful            Sign Out: Acceptable/Baseline neuro status   Airway/Respiratory: Uneventful            Sign Out: Acceptable/Baseline resp. status   CV/Hemodynamics: Uneventful            Sign Out: Acceptable CV status; No obvious hypovolemia; No obvious fluid overload   Other NRE: NONE   DID A NON-ROUTINE EVENT OCCUR? No           Last vitals:  Vitals Value Taken Time   BP 94/58 09/20/21 1025   Temp 36.4  C (97.5  F) 09/20/21 1013   Pulse 71 09/20/21 1025   Resp 16 09/20/21 1025   SpO2 98 % 09/20/21 1025       Electronically Signed By: Hill Oswald MD  September 20, 2021  1:01 PM

## 2021-09-23 ENCOUNTER — THERAPY VISIT (OUTPATIENT)
Dept: OCCUPATIONAL THERAPY | Facility: CLINIC | Age: 71
End: 2021-09-23
Attending: FAMILY MEDICINE
Payer: MEDICARE

## 2021-09-23 DIAGNOSIS — S69.91XD HAND INJURY, RIGHT, SUBSEQUENT ENCOUNTER: ICD-10-CM

## 2021-09-23 DIAGNOSIS — M25.531 RIGHT WRIST PAIN: ICD-10-CM

## 2021-09-23 PROCEDURE — 97110 THERAPEUTIC EXERCISES: CPT | Mod: GO | Performed by: OCCUPATIONAL THERAPIST

## 2021-09-23 NOTE — PROGRESS NOTES
SOAP note objective information for 9/23/2021:    Diagnosis:   Right wrist injury  DOI:  8/26/2021    Pain Level (Scale 0-10)   9/16/2021 9/23/2021   At Rest 4-5 1-2   With Use 6-8 4-5     ROM  Wrist 9/16/2021 9/16/2021 9/23/2021   AROM (PROM) Left Right Right   Extension 65 55+ 55-   Flexion 80 45++ 65+   RD 20 10+ 15-   UD 45 15++ 30+   Supination 85 70++ 75-   Pronation 85 80+ 80-     Thumb 9/16/2021 9/16/2021 9/23/2021   AROM  (PROM) Left Right Right   MP /85 /75    IP /50 /50    Kapandji Opposition Scale (0-10/10) 10/10 8/10 9/10     Strength   (Measured in pounds)  Pain Report: - none  + mild    ++ moderate    +++ severe    9/16/2021 9/16/2021   Trials Left Right   1  2  3 34  27  26 6++  1 trial due to pain   Average 29      Home Program:   Warmth for stiffness  AROM fingers with 3 fist tendon gliding  AROM thumb E/F and opposition   AROM of wrist and forearm all planes   strengthening  Wrist E/F isotonics   Trial tubigrip sleeve for wrist support and edema control  OTC wrist splint as needed with ADL's and sleeping  Avoid activities that exacerbate pain     Next Visit:  See in 1 week  Assess response to  strengthening and wrist isotonics  Add wrist stabilization exercises as indicated   Taper to HEP as ROM and strengthen improve    Please refer to the daily flowsheet for treatment today, total treatment time and time spent performing 1:1 timed codes.

## 2021-09-29 ENCOUNTER — THERAPY VISIT (OUTPATIENT)
Dept: OCCUPATIONAL THERAPY | Facility: CLINIC | Age: 71
End: 2021-09-29
Attending: FAMILY MEDICINE
Payer: MEDICARE

## 2021-09-29 DIAGNOSIS — M25.531 RIGHT WRIST PAIN: ICD-10-CM

## 2021-09-29 DIAGNOSIS — S69.91XD HAND INJURY, RIGHT, SUBSEQUENT ENCOUNTER: ICD-10-CM

## 2021-09-29 PROCEDURE — 97110 THERAPEUTIC EXERCISES: CPT | Mod: GO | Performed by: OCCUPATIONAL THERAPIST

## 2021-09-29 PROCEDURE — 97535 SELF CARE MNGMENT TRAINING: CPT | Mod: GO | Performed by: OCCUPATIONAL THERAPIST

## 2021-09-29 NOTE — PROGRESS NOTES
Hand Therapy Progress/Discharge Note    Current Date:  9/29/2021    Reporting period is 9/16/2021 to 9/29/2021    Diagnosis:   Right wrist injury  DOI:  8/26/2021    Subjective:   Subjective changes noted by patient:  The wrist is overall better and there is less pain and more motion and strength.   Functional changes noted by patient:  Improvement in Household Chores  Patient has noted adverse reaction to:  None    Functional Outcome Measure:  Upper Extremity Functional Index  SCORE:   Column Totals: 74/80  (A lower score indicates greater disability.)    Objective:  Pain Level (Scale 0-10)   9/16/2021 9/29/2021   At Rest 4-5    With Use 6-8 2-3     Pain Description  Date 9/16/2021   Location wrist and hand   Pain Quality Aching   Frequency constant     Pain is worst  daytime and sometimes nighttime   Exacerbated by  using hand   Relieved by rest and splint wear   Progression Gradually improving      Sensation:  WNL throughout all nerve distributions; per patient report    Edema  Mild to moderate of volar and dorsal wrist and into MCP joints    ROM  Wrist 9/16/2021 9/16/2021 9/29/2021   AROM (PROM) Left Right Right   Extension 65 55+ 55-   Flexion 80 45++ 70-   RD 20 10+ 15-   UD 45 15++ 35-   Supination 85 70++ 75-   Pronation 85 80+ 85-     Thumb 9/16/2021 9/16/2021 9/29/2021   AROM  (PROM) Left Right Right   MP /85 /75    IP /50 /50    Kapandji Opposition Scale (0-10/10) 10/10 8/10 9/10     Strength   (Measured in pounds)  Pain Report: - none  + mild    ++ moderate    +++ severe    9/16/2021 9/16/2021 9/29/2021   Trials Left Right Right   1  2  3 34  27  26 6++  1 trial due to pain 32+ slight   Average 29       Please refer to the daily flowsheet for treatment provided today.     Assessment:  Response to therapy has been improvement to:  ROM of Wrist:  All Planes  Strength:     Pain:  intensity of pain is decreased    Overall Assessment:  Patient's symptoms are resolving and patient is independent in  home exercise program  STG/LTG:  STGoals have been reviewed and progress or achievement has occurred;  see goal sheet for details and updates.  I have re-evaluated this patient and find that the nature, scope, duration and intensity of the therapy is appropriate for the medical condition of the patient.    Plan:  Frequency/Duration:  Discharge from Hand Therapy; continue home program.    Recommendations for Continued Therapy  Home Program:   Warmth for stiffness  AROM fingers with 3 fist tendon gliding  AROM thumb E/F and opposition   AROM of wrist and forearm all planes  Gentle PROM for wrist E/F   strengthening  Wrist E/F isotonics   OTC wrist splint as needed with ADL's and sleeping  Avoid activities that exacerbate pain

## 2021-10-04 NOTE — TELEPHONE ENCOUNTER
Please see [employer] message re: Victoza Rx. Per last visit note, plan to use Victoza and glipizide for DM control.    Rx for lancets and test strips - no current Rx on file. Needs provider signature, cued.    Nataly Small, RN, BSN, PHN  M [facility]: [place]         [address]  [facility] [place] EMERGENCY DEPT  [address] [place] [address]  Sheree Lan [address] [phone]    Work/School Note    Date: 10/4/2021    To Whom It May concern:    Humberto Bertrand was seen and treated today in the emergency room by the following provider(s):  Attending Provider: Sarath Siegel MD.      Humberto Bertrand may return to work on 10/6/2021, if light duty is available. He may not use his right hand until sutures are removed in 14 days. If unable to accommodate, please excuse from work for next 14 days.     Sincerely,          Kaila Vicente MD

## 2021-10-13 ENCOUNTER — LAB (OUTPATIENT)
Dept: LAB | Facility: CLINIC | Age: 71
End: 2021-10-13
Payer: MEDICARE

## 2021-10-13 ENCOUNTER — VIRTUAL VISIT (OUTPATIENT)
Dept: FAMILY MEDICINE | Facility: CLINIC | Age: 71
End: 2021-10-13
Payer: MEDICARE

## 2021-10-13 DIAGNOSIS — R30.0 DYSURIA: Primary | ICD-10-CM

## 2021-10-13 DIAGNOSIS — R30.0 DYSURIA: ICD-10-CM

## 2021-10-13 LAB
ALBUMIN UR-MCNC: 100 MG/DL
APPEARANCE UR: CLEAR
BACTERIA #/AREA URNS HPF: ABNORMAL /HPF
BILIRUB UR QL STRIP: NEGATIVE
COLOR UR AUTO: YELLOW
GLUCOSE UR STRIP-MCNC: NEGATIVE MG/DL
HGB UR QL STRIP: ABNORMAL
KETONES UR STRIP-MCNC: NEGATIVE MG/DL
LEUKOCYTE ESTERASE UR QL STRIP: ABNORMAL
NITRATE UR QL: NEGATIVE
PH UR STRIP: 5.5 [PH] (ref 5–7)
RBC #/AREA URNS AUTO: ABNORMAL /HPF
SP GR UR STRIP: 1.02 (ref 1–1.03)
SQUAMOUS #/AREA URNS AUTO: ABNORMAL /LPF
UROBILINOGEN UR STRIP-ACNC: 0.2 E.U./DL
WBC #/AREA URNS AUTO: >100 /HPF

## 2021-10-13 PROCEDURE — 99213 OFFICE O/P EST LOW 20 MIN: CPT | Mod: 95 | Performed by: NURSE PRACTITIONER

## 2021-10-13 PROCEDURE — 87186 SC STD MICRODIL/AGAR DIL: CPT

## 2021-10-13 PROCEDURE — 87086 URINE CULTURE/COLONY COUNT: CPT

## 2021-10-13 PROCEDURE — 81001 URINALYSIS AUTO W/SCOPE: CPT

## 2021-10-13 RX ORDER — SULFAMETHOXAZOLE/TRIMETHOPRIM 800-160 MG
1 TABLET ORAL 2 TIMES DAILY
Qty: 14 TABLET | Refills: 0 | Status: SHIPPED | OUTPATIENT
Start: 2021-10-13 | End: 2021-10-20

## 2021-10-13 NOTE — PROGRESS NOTES
"Carli is a 71 year old who is being evaluated via a billable video visit.      How would you like to obtain your AVS? MyChart  If the video visit is dropped, the invitation should be resent by: Text to cell phone: [phone]  Will anyone else be joining your video visit? No    Video Start Time: 10:39 AM    Assessment & Plan     Dysuria  Dysuria, suprapubic tenderness and frequency consistent with UTI last treated in August 2021, renal function ok, no known allergies, recommend leaving urine today treat with septra DS 7 days   - UA with Microscopic reflex to Culture - lab collect; Future  - sulfamethoxazole-trimethoprim (BACTRIM DS) 800-160 MG tablet; Take 1 tablet by mouth 2 times daily for 7 days             BMI:   Estimated body mass index is 33.33 kg/m  as calculated from the following:    Height as of 9/20/21: 1.499 m (4' 11\").    Weight as of 9/20/21: 74.8 kg (165 lb).           No follow-ups on file.    CATRACHITA Marquez [facility]    Subjective   Carli is a 71 year old who presents for the following health issues     HPI     Genitourinary - Female  Onset/Duration: 2-3 days   Description:   Painful urination (Dysuria): YES- sometimes            Frequency: YES- at times   Blood in urine (Hematuria): no  Delay in urine (Hesitency): YES  Intensity: moderate, severe  Progression of Symptoms:  intermittent  Accompanying Signs & Symptoms:  Fever/chills: no  Flank pain: no  Nausea and vomiting: no  Vaginal symptoms: none  Abdominal/Pelvic Pain: YES  History:   History of frequent UTI s: YES  History of kidney stones: no  Sexually Active: no  Possibility of pregnancy: No  Precipitating or alleviating factors: None  Therapies tried and outcome: none    Lat treated for UTI in August 2021     Says she has supra pubic tenderness, burning on urination. Frequency and hesitancy.   Says that she was on the verge of sepsis the last time.     Review of Systems   Constitutional, HEENT, " cardiovascular, pulmonary, GI, , musculoskeletal, neuro, skin, endocrine and psych systems are negative, except as otherwise noted.      Objective           Vitals:  No vitals were obtained today due to virtual visit.    Physical Exam   GENERAL: Healthy, alert and no distress  EYES: Eyes grossly normal to inspection.  No discharge or erythema, or obvious scleral/conjunctival abnormalities.  RESP: No audible wheeze, cough, or visible cyanosis.  No visible retractions or increased work of breathing.    SKIN: Visible skin clear. No significant rash, abnormal pigmentation or lesions.  NEURO: Cranial nerves grossly intact.  Mentation and speech appropriate for age.  PSYCH: Mentation appears normal, affect normal/bright, judgement and insight intact, normal speech and appearance well-groomed.    Hospital Outpatient Visit on 09/20/2021   Component Date Value Ref Range Status     COLONOSCOPY 09/20/2021    Final                    Value:[facility]  [address] [place], [place] [address]., [place] [address] [phone]     Endoscopy Department  _______________________________________________________________________________  Patient Name: Carli Monreal            Procedure Date: 9/20/2021 9:31 AM  MRN: [id number]                       Account Number: [id number]  YOB: 1950              Admit Type: Outpatient  Age: 71                               Room: Pro 5  Gender: Female                        Note Status: Finalized  Attending MD: Keesha Mancuso MD     Pause for the Cause: performed.  Total Sedation Time: per anesthesia.    _______________________________________________________________________________     Procedure:           Colonoscopy  Indications:         Disease activity assessment of Crohn's disease of the                        small bowel  Providers:           Keesha Mancuso MD, Patria Jara CRNA  Referring MD:          Medicines:           Monitored Anesthesia Care  Complications:                                  No immediate complications.  _______________________________________________________________________________  Procedure:           Pre-Anesthesia Assessment:                       - Please see EPIC H&P for pre-procedure assessment.                       After obtaining informed consent, the colonoscope was                        passed under direct vision. Throughout the procedure,                        the patient's blood pressure, pulse, and oxygen                        saturations were monitored continuously. The pediatric                        colonoscope was introduced through the anus and advanced                        to the ileocolonic anastomosis. The colonoscopy was                        performed with ease. The patient tolerated the procedure                        well. The quality of the bowel preparation was evaluated                        using the BBPS ([place] Bowel Preparation Scale) with                        scores of: Right Colon = 2 (minor amount of r                          esidual                        staining, small fragments of stool and/or opaque liquid,                        but mucosa seen well), Transverse Colon = 2 (minor                        amount of residual staining, small fragments of stool                        and/or opaque liquid, but mucosa seen well) and Left                        Colon = 2 (minor amount of residual staining, small                        fragments of stool and/or opaque liquid, but mucosa seen                        well). The total BBPS score equals 6. The quality of the                        bowel preparation was fair.                                                                                   Findings:       The perianal and digital rectal examinations were normal.       The Simple Endoscopic Score for Crohn's Disease was determined based on        the endoscopic appearance of the mucosa in the following  segments:       - Ileum: Findings include no ulcers present, no ulcerated surfaces, no        affecte                          d surfaces and no narrowings. Segment score: 0.       - Right Colon: Findings include no ulcers present, no ulcerated        surfaces, no affected surfaces and no narrowings. Segment score: 0.       - Transverse Colon: Findings include no ulcers present, no ulcerated        surfaces, no affected surfaces and no narrowings. Segment score: 0.       - Left Colon: Findings include no ulcers present, no ulcerated surfaces,        no affected surfaces and no narrowings. Segment score: 0.       - Rectum: Findings include no ulcers present, no ulcerated surfaces, no        affected surfaces and no narrowings. Segment score: 0.       - Total SES-CD aggregate score: 0.       Non-bleeding internal hemorrhoids were found during retroflexion. The        hemorrhoids were mild.       An inverted diverticulum (about 1 cm) was seen in the sigmoid colon.                                                                                   Impression:          - Preparation of the colon was fair.                                                 - Simple Endoscopic Score for Crohn's Disease: 0,                        mucosal inflammatory changes secondary to Crohn's                        disease, in remission.                       - Non-bleeding internal hemorrhoids.                       - No specimens collected.  Recommendation:      - Check fecal calprotectin in 6 months to screen for                        active disease. Follow up with me in IBD Clinic in 6                        months.                       - Discharge patient to home (ambulatory).                       - Resume previous diet.                       - Continue present medications.                       - The findings and recommendations were discussed with                        the patient.                                                                                      Electronically signed by: Keesha Mancuso MD  __________________  Keesha Mancuso MD  9/20/2021 10:31:09 AM  I was physically present for the entire viewing portion of th                          e exam.  __________________________  Signature of teaching physician  Keesha Mancuso MD  Number of Addenda: 0    Note Initiated On: 9/20/2021 9:31 AM  Scope In:  Scope Out:       GLUCOSE BY METER POCT 09/20/2021 155* 70 - 99 mg/dL Final               Video-Visit Details    Type of service:  Video Visit    Video End Time:10:48 AM    Originating Location (pt. Location): Home    Distant Location (provider location):  [facility]     Platform used for Video Visit: Pa

## 2021-10-15 LAB — BACTERIA UR CULT: ABNORMAL

## 2021-10-24 ENCOUNTER — HEALTH MAINTENANCE LETTER (OUTPATIENT)
Age: 71
End: 2021-10-24

## 2021-11-12 ENCOUNTER — APPOINTMENT (OUTPATIENT)
Dept: CT IMAGING | Facility: CLINIC | Age: 71
End: 2021-11-12
Attending: EMERGENCY MEDICINE
Payer: MEDICARE

## 2021-11-12 ENCOUNTER — TELEPHONE (OUTPATIENT)
Dept: FAMILY MEDICINE | Facility: CLINIC | Age: 71
End: 2021-11-12

## 2021-11-12 ENCOUNTER — HOSPITAL ENCOUNTER (OUTPATIENT)
Facility: CLINIC | Age: 71
Setting detail: OBSERVATION
Discharge: HOME | End: 2021-11-13
Attending: EMERGENCY MEDICINE | Admitting: NURSE PRACTITIONER
Payer: MEDICARE

## 2021-11-12 ENCOUNTER — OFFICE VISIT (OUTPATIENT)
Dept: FAMILY MEDICINE | Facility: CLINIC | Age: 71
End: 2021-11-12
Payer: MEDICARE

## 2021-11-12 VITALS
OXYGEN SATURATION: 98 % | SYSTOLIC BLOOD PRESSURE: 132 MMHG | BODY MASS INDEX: 32.16 KG/M2 | HEART RATE: 89 BPM | HEIGHT: 60 IN | RESPIRATION RATE: 24 BRPM | TEMPERATURE: 98.1 F | DIASTOLIC BLOOD PRESSURE: 76 MMHG | WEIGHT: 163.8 LBS

## 2021-11-12 DIAGNOSIS — I10 ESSENTIAL HYPERTENSION, MALIGNANT: ICD-10-CM

## 2021-11-12 DIAGNOSIS — R07.9 CHEST PAIN, UNSPECIFIED TYPE: Primary | ICD-10-CM

## 2021-11-12 DIAGNOSIS — E11.9 TYPE 2 DIABETES MELLITUS WITHOUT COMPLICATION, WITHOUT LONG-TERM CURRENT USE OF INSULIN (H): ICD-10-CM

## 2021-11-12 DIAGNOSIS — I45.10 RIGHT BUNDLE BRANCH BLOCK: ICD-10-CM

## 2021-11-12 DIAGNOSIS — J45.909 ASTHMATIC BRONCHITIS WITHOUT COMPLICATION, UNSPECIFIED ASTHMA SEVERITY, UNSPECIFIED WHETHER PERSISTENT: ICD-10-CM

## 2021-11-12 DIAGNOSIS — Z87.891 PERSONAL HISTORY OF TOBACCO USE, PRESENTING HAZARDS TO HEALTH: ICD-10-CM

## 2021-11-12 DIAGNOSIS — E10.9 TYPE 1 DIABETES MELLITUS WITHOUT COMPLICATION (H): ICD-10-CM

## 2021-11-12 DIAGNOSIS — Z79.84 LONG TERM CURRENT USE OF ORAL HYPOGLYCEMIC DRUG: ICD-10-CM

## 2021-11-12 DIAGNOSIS — J45.40 MODERATE PERSISTENT ASTHMA, UNSPECIFIED WHETHER COMPLICATED: ICD-10-CM

## 2021-11-12 DIAGNOSIS — Z11.52 ENCOUNTER FOR SCREENING LABORATORY TESTING FOR SEVERE ACUTE RESPIRATORY SYNDROME CORONAVIRUS 2 (SARS-COV-2): ICD-10-CM

## 2021-11-12 DIAGNOSIS — Z79.4 ENCOUNTER FOR LONG-TERM (CURRENT) USE OF INSULIN (H): ICD-10-CM

## 2021-11-12 DIAGNOSIS — R07.9 CHEST PAIN, UNSPECIFIED TYPE: ICD-10-CM

## 2021-11-12 DIAGNOSIS — R07.89 OTHER CHEST PAIN: ICD-10-CM

## 2021-11-12 DIAGNOSIS — I10 ESSENTIAL HYPERTENSION: ICD-10-CM

## 2021-11-12 LAB
ANION GAP SERPL CALCULATED.3IONS-SCNC: 6 MMOL/L (ref 3–14)
BASOPHILS # BLD AUTO: 0 10E3/UL (ref 0–0.2)
BASOPHILS NFR BLD AUTO: 0 %
BUN SERPL-MCNC: 7 MG/DL (ref 7–30)
CALCIUM SERPL-MCNC: 8.5 MG/DL (ref 8.5–10.1)
CHLORIDE BLD-SCNC: 108 MMOL/L (ref 94–109)
CO2 SERPL-SCNC: 26 MMOL/L (ref 20–32)
CREAT SERPL-MCNC: 0.77 MG/DL (ref 0.52–1.04)
EOSINOPHIL # BLD AUTO: 0 10E3/UL (ref 0–0.7)
EOSINOPHIL NFR BLD AUTO: 0 %
ERYTHROCYTE [DISTWIDTH] IN BLOOD BY AUTOMATED COUNT: 13.2 % (ref 10–15)
GFR SERPL CREATININE-BSD FRML MDRD: 78 ML/MIN/1.73M2
GLUCOSE BLD-MCNC: 107 MG/DL (ref 70–99)
HCT VFR BLD AUTO: 32.9 % (ref 35–47)
HGB BLD-MCNC: 10.7 G/DL (ref 11.7–15.7)
HOLD SPECIMEN: NORMAL
HOLD SPECIMEN: NORMAL
IMM GRANULOCYTES # BLD: 0 10E3/UL
IMM GRANULOCYTES NFR BLD: 0 %
LYMPHOCYTES # BLD AUTO: 2.1 10E3/UL (ref 0.8–5.3)
LYMPHOCYTES NFR BLD AUTO: 24 %
MCH RBC QN AUTO: 28.4 PG (ref 26.5–33)
MCHC RBC AUTO-ENTMCNC: 32.5 G/DL (ref 31.5–36.5)
MCV RBC AUTO: 87 FL (ref 78–100)
MONOCYTES # BLD AUTO: 0.9 10E3/UL (ref 0–1.3)
MONOCYTES NFR BLD AUTO: 10 %
NEUTROPHILS # BLD AUTO: 5.8 10E3/UL (ref 1.6–8.3)
NEUTROPHILS NFR BLD AUTO: 66 %
NRBC # BLD AUTO: 0 10E3/UL
NRBC BLD AUTO-RTO: 0 /100
PLATELET # BLD AUTO: 155 10E3/UL (ref 150–450)
POTASSIUM BLD-SCNC: 3.7 MMOL/L (ref 3.4–5.3)
RBC # BLD AUTO: 3.77 10E6/UL (ref 3.8–5.2)
SARS-COV-2 RNA RESP QL NAA+PROBE: NEGATIVE
SODIUM SERPL-SCNC: 140 MMOL/L (ref 133–144)
TROPONIN I SERPL-MCNC: <0.015 UG/L (ref 0–0.04)
TROPONIN I SERPL-MCNC: <0.015 UG/L (ref 0–0.04)
WBC # BLD AUTO: 8.9 10E3/UL (ref 4–11)

## 2021-11-12 PROCEDURE — 99220 PR INITIAL OBSERVATION CARE,LEVEL III: CPT | Performed by: NURSE PRACTITIONER

## 2021-11-12 PROCEDURE — 36415 COLL VENOUS BLD VENIPUNCTURE: CPT | Performed by: NURSE PRACTITIONER

## 2021-11-12 PROCEDURE — 71275 CT ANGIOGRAPHY CHEST: CPT

## 2021-11-12 PROCEDURE — U0003 INFECTIOUS AGENT DETECTION BY NUCLEIC ACID (DNA OR RNA); SEVERE ACUTE RESPIRATORY SYNDROME CORONAVIRUS 2 (SARS-COV-2) (CORONAVIRUS DISEASE [COVID-19]), AMPLIFIED PROBE TECHNIQUE, MAKING USE OF HIGH THROUGHPUT TECHNOLOGIES AS DESCRIBED BY CMS-2020-01-R: HCPCS | Performed by: EMERGENCY MEDICINE

## 2021-11-12 PROCEDURE — 36415 COLL VENOUS BLD VENIPUNCTURE: CPT | Performed by: EMERGENCY MEDICINE

## 2021-11-12 PROCEDURE — 93010 ELECTROCARDIOGRAM REPORT: CPT | Mod: 77 | Performed by: EMERGENCY MEDICINE

## 2021-11-12 PROCEDURE — 85025 COMPLETE CBC W/AUTO DIFF WBC: CPT | Performed by: EMERGENCY MEDICINE

## 2021-11-12 PROCEDURE — 93000 ELECTROCARDIOGRAM COMPLETE: CPT | Performed by: FAMILY MEDICINE

## 2021-11-12 PROCEDURE — 96374 THER/PROPH/DIAG INJ IV PUSH: CPT | Mod: 59 | Performed by: EMERGENCY MEDICINE

## 2021-11-12 PROCEDURE — 99285 EMERGENCY DEPT VISIT HI MDM: CPT | Mod: 25 | Performed by: EMERGENCY MEDICINE

## 2021-11-12 PROCEDURE — 84484 ASSAY OF TROPONIN QUANT: CPT | Performed by: NURSE PRACTITIONER

## 2021-11-12 PROCEDURE — G0378 HOSPITAL OBSERVATION PER HR: HCPCS

## 2021-11-12 PROCEDURE — 93005 ELECTROCARDIOGRAM TRACING: CPT | Performed by: EMERGENCY MEDICINE

## 2021-11-12 PROCEDURE — 250N000011 HC RX IP 250 OP 636: Performed by: EMERGENCY MEDICINE

## 2021-11-12 PROCEDURE — 71275 CT ANGIOGRAPHY CHEST: CPT | Mod: 26 | Performed by: RADIOLOGY

## 2021-11-12 PROCEDURE — C9803 HOPD COVID-19 SPEC COLLECT: HCPCS | Performed by: EMERGENCY MEDICINE

## 2021-11-12 PROCEDURE — 80048 BASIC METABOLIC PNL TOTAL CA: CPT | Performed by: EMERGENCY MEDICINE

## 2021-11-12 PROCEDURE — 84484 ASSAY OF TROPONIN QUANT: CPT | Mod: 91 | Performed by: EMERGENCY MEDICINE

## 2021-11-12 PROCEDURE — 99207 PR INPT ADMISSION FROM CLINIC: CPT | Performed by: FAMILY MEDICINE

## 2021-11-12 RX ORDER — KETOROLAC TROMETHAMINE 15 MG/ML
15 INJECTION, SOLUTION INTRAMUSCULAR; INTRAVENOUS ONCE
Status: COMPLETED | OUTPATIENT
Start: 2021-11-12 | End: 2021-11-12

## 2021-11-12 RX ORDER — IOPAMIDOL 755 MG/ML
100 INJECTION, SOLUTION INTRAVASCULAR ONCE
Status: COMPLETED | OUTPATIENT
Start: 2021-11-12 | End: 2021-11-12

## 2021-11-12 RX ORDER — ASPIRIN 325 MG
325 TABLET ORAL ONCE
Status: COMPLETED | OUTPATIENT
Start: 2021-11-12 | End: 2021-11-12

## 2021-11-12 RX ADMIN — KETOROLAC TROMETHAMINE 15 MG: 15 INJECTION, SOLUTION INTRAMUSCULAR; INTRAVENOUS at 20:22

## 2021-11-12 RX ADMIN — Medication 325 MG: at 14:59

## 2021-11-12 RX ADMIN — IOPAMIDOL 100 ML: 755 INJECTION, SOLUTION INTRAVENOUS at 19:03

## 2021-11-12 ASSESSMENT — ENCOUNTER SYMPTOMS
ARTHRALGIAS: 0
CONFUSION: 0
NECK STIFFNESS: 0
ABDOMINAL PAIN: 0
SHORTNESS OF BREATH: 1
FEVER: 0
EYE REDNESS: 0
COUGH: 1
COLOR CHANGE: 0
HEADACHES: 0
DIFFICULTY URINATING: 0

## 2021-11-12 ASSESSMENT — MIFFLIN-ST. JEOR: SCORE: 1180.52

## 2021-11-12 ASSESSMENT — PAIN SCALES - GENERAL: PAINLEVEL: EXTREME PAIN (8)

## 2021-11-12 NOTE — PROGRESS NOTES
Assessment & Plan     Chest pain, unspecified type    The patient has numerous risk factors for cardiac disease.  She is having escalating chest pain which started 5 days ago.  The chest pain is exertional and causing shortness of breath.  She was given a full-strength aspirin and an ambulance was called to transport her to the emergency room      - EKG 12-lead complete w/read - Clinics  - aspirin (ASA) tablet 325 mg    The 10-year ASCVD risk score (Dasha HAWLEY Jr., et al., 2013) is: 21.9%    Values used to calculate the score:      Age: 71 years      Sex: Female      Is Non- : No      Diabetic: Yes      Tobacco smoker: No      Systolic Blood Pressure: 132 mmHg      Is BP treated: Yes      HDL Cholesterol: 66 mg/dL      Total Cholesterol: 155 mg/dL      Moderate persistent asthma, unspecified whether complicated  The patient states she does not feel like her asthma is flaring up currently but does have pain in her chest when she coughs.  She does not have wheeze on exam    Type 2 diabetes mellitus without complication, without long-term current use of insulin (H)  The current medical regimen is effective;  continue present plan and medications.    Lab Results   Component Value Date    A1C 5.6 08/20/2021    A1C 7.0 04/13/2021    A1C 8.4 02/09/2021         Essential hypertension  Controlled        40 minutes spent on the date of the encounter doing chart review, review of test results, interpretation of tests, patient visit, documentation and discussion with other provider(s)         Return in about 1 week (around 11/19/2021) for Hospital follow-up.    Iza Cage DO  [employer]    Mary Boyce is a 71 year old who presents for the following health issues     HPI      Chest Pain  Onset/Duration: Sunday- Monday 5-6 days  Description:   Location: entire chest  Character: sharp  Radiation: from left side over shoulder, from right side over shoulder and between  shoulder blades  Duration: constant   Intensity: moderate, severe  Progression of Symptoms: worsening  Accompanying Signs & Symptoms:  Shortness of breath: YES  Sweating: no  Nausea/vomiting: no  Lightheadedness: no  Palpitations: no  Fever/Chills: no  Cough: YES           Heartburn: YES- takes Nexium  History:   Family history of heart disease: YES  Tobacco use: no- former  Previous similar symptoms: YES-  Down in [place]- years ago (was not her heart but she doesn't know what it was)   Precipitating factors:   Worse with exertion: YES  Worse with deep breaths: YES           Related to eating: no           Better with burping: no  Alleviating factors:  None  Therapies tried and outcome:  Pain medication      Back Pain  Onset/Duration: Since Sunday or Monday  Description:   Location of pain: low back bilateral, neck bilateral and shoulders bilateral  Character of pain: sharp  Pain radiation:  To arms  New numbness or weakness in legs, not attributed to pain: no   Intensity: moderate, severe  Progression of Symptoms: worsening  History:   Specific cause: none  Pain interferes with job: not applicable  History of back problems: no prior back problems  Any previous MRI or X-rays: None  Sees a specialist for back pain: No  Alleviating factors:   Improved by:  Old Rx of Oxycodone    Precipitating factors:  Worsened by: Anything  Therapies tried and outcome: Tramadol, Gabapentin, Oxycodone- Old Rx's- no relief    Accompanying Signs & Symptoms:  Risk of Fracture: None  Risk of Cauda Equina: None  Risk of Infection: None  Risk of Cancer: None  Risk of Ankylosing Spondylitis: Onset at age <35, male, AND morning back stiffness  no     The 10-year ASCVD risk score (Dasha HAWLEY Jr., et al., 2013) is: 21.9%    Values used to calculate the score:      Age: 71 years      Sex: Female      Is Non- : No      Diabetic: Yes      Tobacco smoker: No      Systolic Blood Pressure: 132 mmHg      Is BP treated: Yes      HDL  "Cholesterol: 66 mg/dL      Total Cholesterol: 155 mg/dL    She has a family history of heart disease    Review of Systems   Constitutional, HEENT, cardiovascular, pulmonary, gi and gu systems are negative, except as otherwise noted.      Objective    /76 (BP Location: Right arm, Patient Position: Sitting, Cuff Size: Adult Large)   Pulse 89   Temp 98.1  F (36.7  C) (Oral)   Resp 24   Ht 1.518 m (4' 11.75\")   Wt 74.3 kg (163 lb 12.8 oz)   SpO2 98%   BMI 32.26 kg/m    Body mass index is 32.26 kg/m .  Physical Exam   GENERAL: alert and mild distress three word dyspnea and clutching her chest   NECK: no adenopathy, no asymmetry, masses, or scars and thyroid normal to palpation  RESP: lungs clear to auscultation - no rales, rhonchi or wheezes  CV: regular rate and rhythm, normal S1 S2, no S3 or S4, no murmur, click or rub, no peripheral edema and peripheral pulses strong  MS: no gross musculoskeletal defects noted, no edema  PSYCH: mentation appears normal, affect flat, anxious and fatigued    EKG - Reviewed and interpreted by me appears normal, NSR, Right Bundle Branch Block, nonspecific ST-T changes        "

## 2021-11-12 NOTE — ED NOTES
Patient BIBA from clinic after presenting with chest pain.  EKG shows RBBB which is baseline for patient. Given 324 mg ASA by clinic and 3 nitroglycerin from medics with relief.

## 2021-11-12 NOTE — ED TRIAGE NOTES
Patient has been having chest pain (mid to left, radiating into neck and shoulders) for one week. Moving certain ways aggravating factors.

## 2021-11-12 NOTE — ED PROVIDER NOTES
"    [place] EMERGENCY DEPARTMENT ([facility])  11/12/21  History     Chief Complaint   Patient presents with     Chest Pain     The history is provided by the patient and medical records.     Carli Monreal is a 71 year old female with a past medical history significant for persistent asthma, type 2 diabetes mellitus (insulin controlled), hypertension, and right bundle branch block who presents to the Emergency Department for evaluation of chest pain. Patient reports this particular chest pain started on Sunday (11/7). She reports that the pain started into the left side of her chest but has since moved into the top of her chest into both the left and right sides. She reports this pain radiates up into both of her shoulders as well. Patient reports this is a \"dull, pressure\" pain with intermittent \"shooting\" components of the pain with certain movements. She reports coughing makes the pain worse. She did receive nitrogycerin while being evaluated at an outside clinic that did help alleviate some of her pain. She denies any fever or cough. She does endorse some baseline shortness of breath, however, not increased past baseline.  Patient reports she did smoke a number of years ago but has since quit.    Per chart review, patient was seen at [facility] on 11/12 for evaluation of chest pain. Patient was seen by Iza Cage DO.  During this visitation, it was noted that the patient has numerous risk factors for cardiac disease.  Patient reported having chest pain that started 5 days ago along with shortness of breath with exertion.  Patient was given full-strength aspirin and was transported to this ED for further evaluation.    Past Medical History:   Diagnosis Date     Diabetes mellitus (H)      Right bundle branch block      Uncomplicated asthma        Past Surgical History:   Procedure Laterality Date     COLONOSCOPY N/A 9/20/2021    Procedure: COLONOSCOPY;  Surgeon: Keesha Mancuso MD;  " Location: [facility] OR     SMALL BOWEL RESECTION         Family History   Problem Relation Age of Onset     Rheumatoid Arthritis Mother      Inflammatory Bowel Disease No family hx of      Colon Cancer No family hx of        Social History     Tobacco Use     Smoking status: Former Smoker     Types: Cigarettes     Quit date: 1991     Years since quittin.6     Smokeless tobacco: Never Used   Substance Use Topics     Alcohol use: Yes     Comment: Wine- couple x/ week       No current facility-administered medications for this encounter.     Current Outpatient Medications   Medication     albuterol (PROAIR HFA/PROVENTIL HFA/VENTOLIN HFA) 108 (90 Base) MCG/ACT inhaler     alcohol swab prep pads     amLODIPine (NORVASC) 2.5 MG tablet     atorvastatin (LIPITOR) 10 MG tablet     blood glucose (NO BRAND SPECIFIED) lancets standard     blood glucose (NO BRAND SPECIFIED) test strip     budesonide-formoterol (SYMBICORT) 160-4.5 MCG/ACT Inhaler     celecoxib (CELEBREX) 100 MG capsule     cholestyramine light (QUESTRAN) 4 GM packet     Cyanocobalamin 3000 MCG SUBL     esomeprazole (NEXIUM) 40 MG DR capsule     folic acid (FOLVITE) 1 MG tablet     gabapentin (NEURONTIN) 100 MG capsule     HYDROcodone-acetaminophen (NORCO) 5-325 MG tablet     insulin pen needle (ULTICARE MICRO) 32G X 4 MM miscellaneous     KLOR-CON 20 MEQ CR tablet     losartan (COZAAR) 100 MG tablet     metFORMIN (GLUCOPHAGE) 1000 MG tablet     multivitamin w/minerals (MULTI-VITAMIN) tablet     nitroFURantoin macrocrystal-monohydrate (MACROBID) 100 MG capsule     Vitamin D, Cholecalciferol, 25 MCG (1000 UT) TABS        Allergies   Allergen Reactions     Seasonal Allergies         I have reviewed the Medications, Allergies, Past Medical and Surgical History, and Social History in the Epic system.    Review of Systems   Constitutional: Negative for fever.   HENT: Negative for congestion.    Eyes: Negative for redness.   Respiratory: Positive for cough and  shortness of breath (baseline).    Cardiovascular: Positive for chest pain.   Gastrointestinal: Negative for abdominal pain.   Genitourinary: Negative for difficulty urinating.   Musculoskeletal: Negative for arthralgias and neck stiffness.   Skin: Negative for color change.   Neurological: Negative for headaches.   Psychiatric/Behavioral: Negative for confusion.   All other systems reviewed and are negative.        Physical Exam   BP: 112/88  Pulse: 87  Temp: 98.9  F (37.2  C)  Resp: 19  Weight: 74.3 kg (163 lb 12.8 oz)  SpO2: 99 %      Physical Exam  Vitals and nursing note reviewed.   Constitutional:       General: She is not in acute distress.     Appearance: She is well-developed. She is not ill-appearing, toxic-appearing or diaphoretic.      Comments: Patient is awake and alert, she appears uncomfortable especially with movements but is otherwise mentating normally and protecting her airway without difficulty.  She is able to speak in complete sentences.   HENT:      Head: Normocephalic and atraumatic.      Mouth/Throat:      Lips: Pink.      Mouth: Mucous membranes are moist.      Pharynx: Oropharynx is clear. No oropharyngeal exudate.   Eyes:      General: Lids are normal. No scleral icterus.     Extraocular Movements: Extraocular movements intact.      Right eye: No nystagmus.      Left eye: No nystagmus.      Conjunctiva/sclera: Conjunctivae normal.      Pupils: Pupils are equal, round, and reactive to light.   Neck:      Thyroid: No thyromegaly.      Vascular: No JVD.      Trachea: No tracheal deviation.   Cardiovascular:      Rate and Rhythm: Normal rate and regular rhythm.      Pulses: Normal pulses.      Heart sounds: Normal heart sounds. No murmur heard.  No friction rub. No gallop.    Pulmonary:      Effort: Pulmonary effort is normal. No respiratory distress.      Breath sounds: Normal breath sounds.   Chest:       Abdominal:      General: Bowel sounds are normal. There is no distension.       Palpations: Abdomen is soft. There is no mass.      Tenderness: There is no abdominal tenderness. There is no guarding or rebound.   Musculoskeletal:         General: No tenderness. Normal range of motion.      Cervical back: Normal range of motion and neck supple. No erythema or rigidity.      Right lower leg: No edema.      Left lower leg: No edema.   Lymphadenopathy:      Cervical: No cervical adenopathy.   Skin:     General: Skin is warm and dry.      Capillary Refill: Capillary refill takes less than 2 seconds.      Coloration: Skin is not pale.      Findings: No erythema or rash.   Neurological:      Mental Status: She is alert and oriented to person, place, and time.      Cranial Nerves: No cranial nerve deficit.      Sensory: No sensory deficit.      Motor: Motor function is intact.   Psychiatric:         Mood and Affect: Mood and affect normal.         Speech: Speech normal.         Behavior: Behavior normal.         ED Course     At 4:45 PM the patient was seen and examined by Johnathan Becker MD in Room ED15.        Procedures              EKG Interpretation:      Interpreted by Jaspal Becker MD    Symptoms at time of EKG: Chest pain  Rhythm: normal sinus   Rate: 76  Ectopy: none  Conduction: right bundle branch block (complete)  ST Segments/ T Waves: No acute ischemic changes  Q Waves: none  Comparison to prior: Unchanged    Clinical Impression: Right bundle branch block, no acute ischemic changes        Results for orders placed or performed during the hospital encounter of 11/12/21 (from the past 24 hour(s))   EKG 12 lead   Result Value Ref Range    Systolic Blood Pressure  mmHg    Diastolic Blood Pressure  mmHg    Ventricular Rate 76 BPM    Atrial Rate 76 BPM    MN Interval 160 ms    QRS Duration 124 ms     ms    QTc 463 ms    P Axis 44 degrees    R AXIS -42 degrees    T Axis 43 degrees    Interpretation ECG       Sinus rhythm  Left axis deviation  Right bundle branch block  Minimal  voltage criteria for LVH, may be normal variant  Abnormal ECG     CBC with Platelets & Differential    Narrative    The following orders were created for panel order CBC with Platelets & Differential.  Procedure                               Abnormality         Status                     ---------                               -----------         ------                     CBC with platelets and d...[[id number]]  Abnormal            Final result                 Please view results for these tests on the individual orders.   Basic metabolic panel   Result Value Ref Range    Sodium 140 133 - 144 mmol/L    Potassium 3.7 3.4 - 5.3 mmol/L    Chloride 108 94 - 109 mmol/L    Carbon Dioxide (CO2) 26 20 - 32 mmol/L    Anion Gap 6 3 - 14 mmol/L    Urea Nitrogen 7 7 - 30 mg/dL    Creatinine 0.77 0.52 - 1.04 mg/dL    Calcium 8.5 8.5 - 10.1 mg/dL    Glucose 107 (H) 70 - 99 mg/dL    GFR Estimate 78 >60 mL/min/1.73m2   Troponin I   Result Value Ref Range    Troponin I <0.015 0.000 - 0.045 ug/L   [employer] Draw    Narrative    The following orders were created for panel order [employer] Draw.  Procedure                               Abnormality         Status                     ---------                               -----------         ------                     Extra Blue Top Tube[[id number]]                              Final result               Extra Red Top Tube[[id number]]                               Final result                 Please view results for these tests on the individual orders.   CBC with platelets and differential   Result Value Ref Range    WBC Count 8.9 4.0 - 11.0 10e3/uL    RBC Count 3.77 (L) 3.80 - 5.20 10e6/uL    Hemoglobin 10.7 (L) 11.7 - 15.7 g/dL    Hematocrit 32.9 (L) 35.0 - 47.0 %    MCV 87 78 - 100 fL    MCH 28.4 26.5 - 33.0 pg    MCHC 32.5 31.5 - 36.5 g/dL    RDW 13.2 10.0 - 15.0 %    Platelet Count 155 150 - 450 10e3/uL    % Neutrophils 66 %    % Lymphocytes 24 %    % Monocytes 10 %    % Eosinophils 0 %     % Basophils 0 %    % Immature Granulocytes 0 %    NRBCs per 100 WBC 0 <1 /100    Absolute Neutrophils 5.8 1.6 - 8.3 10e3/uL    Absolute Lymphocytes 2.1 0.8 - 5.3 10e3/uL    Absolute Monocytes 0.9 0.0 - 1.3 10e3/uL    Absolute Eosinophils 0.0 0.0 - 0.7 10e3/uL    Absolute Basophils 0.0 0.0 - 0.2 10e3/uL    Absolute Immature Granulocytes 0.0 <=0.0 10e3/uL    Absolute NRBCs 0.0 10e3/uL   Extra Blue Top Tube   Result Value Ref Range    Hold Specimen JIC    Extra Red Top Tube   Result Value Ref Range    Hold Specimen JIC    CTA Chest with Contrast    Impression    RESIDENT PRELIMINARY INTERPRETATION  Impression:   1. No acute aortic pathology.  2. No infectious airspace consolidation.      Medications   iopamidol (ISOVUE-370) solution 100 mL (100 mLs Intravenous Given 11/12/21 1903)   sodium chloride (PF) 0.9% PF flush 100 mL (100 mLs Intravenous Given 11/12/21 1903)   ketorolac (TORADOL) injection 15 mg (15 mg Intravenous Given 11/12/21 2022)             Assessments & Plan (with Medical Decision Making)     This patient presented to the emergency department complaining of left-sided chest pain with radiation to her back into her neck.  This seems more exacerbated by movements then with exertion.  Twelve-lead EKG demonstrated no acute ischemic changes and was unchanged when compared to old EKG with continued right bundle branch block pattern.  Troponin is negative decreasing suspicion for NSTEMI.  CT of the chest was ordered and demonstrated no evidence of acute aortic pathology, pulmonary disease, pericardial or pleural effusions or other acute intrathoracic pathology.  At this point time, I suspect a musculoskeletal origin but do feel that further cardiac work-up to rule out cardiac disease consisting of serial troponins and cardiac stress testing in the morning is warranted.  Patient is agreeable with this plan.  I spoke with the TIFFANIE in the observation unit and patient be admitted to the observation unit for  cardiac protocol.    I have reviewed the nursing notes.    I have reviewed the findings, diagnosis, plan and need for follow up with the patient.    New Prescriptions    No medications on file       Final diagnoses:   Chest pain, unspecified type       I, Lewis Amaya am serving as a trained medical scribe to document services personally performed by Jaspal Becker MD, based on the provider's statements to me.      I, Jaspal Becker MD, was physically present and have reviewed and verified the accuracy of this note documented by Lewis Amaya.       Johnathan Becker MD  11/12/2021   [facility] EMERGENCY DEPARTMENT     Jaspal Becker MD  11/12/21 2230

## 2021-11-12 NOTE — TELEPHONE ENCOUNTER
Per PCP called ambulance  Pt experiencing escalating chest pain, clutching chest, speaking in 3 word intervals    She is Diabetic and hypertensive along with several autoimmune diseases  Was given full strength aspirin by PCP    Resp 24  BP stable  EKG unremarkable  Prefers [facility]    Face sheet, AVS, and EKG given to EMS.          Judith Acosta RN

## 2021-11-13 ENCOUNTER — APPOINTMENT (OUTPATIENT)
Dept: CARDIOLOGY | Facility: CLINIC | Age: 71
End: 2021-11-13
Attending: NURSE PRACTITIONER
Payer: MEDICARE

## 2021-11-13 VITALS
DIASTOLIC BLOOD PRESSURE: 67 MMHG | TEMPERATURE: 98.1 F | WEIGHT: 163.8 LBS | HEART RATE: 66 BPM | SYSTOLIC BLOOD PRESSURE: 130 MMHG | OXYGEN SATURATION: 98 % | BODY MASS INDEX: 32.26 KG/M2 | RESPIRATION RATE: 18 BRPM

## 2021-11-13 LAB
CHOLEST SERPL-MCNC: 91 MG/DL
FASTING STATUS PATIENT QL REPORTED: NORMAL
GLUCOSE BLDC GLUCOMTR-MCNC: 107 MG/DL (ref 70–99)
GLUCOSE BLDC GLUCOMTR-MCNC: 120 MG/DL (ref 70–99)
HDLC SERPL-MCNC: 53 MG/DL
HOLD SPECIMEN: NORMAL
LDLC SERPL CALC-MCNC: 19 MG/DL
NONHDLC SERPL-MCNC: 38 MG/DL
TRIGL SERPL-MCNC: 94 MG/DL
TROPONIN I SERPL-MCNC: <0.015 UG/L (ref 0–0.04)

## 2021-11-13 PROCEDURE — 80061 LIPID PANEL: CPT | Performed by: NURSE PRACTITIONER

## 2021-11-13 PROCEDURE — 93016 CV STRESS TEST SUPVJ ONLY: CPT | Performed by: STUDENT IN AN ORGANIZED HEALTH CARE EDUCATION/TRAINING PROGRAM

## 2021-11-13 PROCEDURE — 93321 DOPPLER ECHO F-UP/LMTD STD: CPT | Mod: 26 | Performed by: STUDENT IN AN ORGANIZED HEALTH CARE EDUCATION/TRAINING PROGRAM

## 2021-11-13 PROCEDURE — 82962 GLUCOSE BLOOD TEST: CPT

## 2021-11-13 PROCEDURE — C8928 TTE W OR W/O FOL W/CON,STRES: HCPCS

## 2021-11-13 PROCEDURE — 99217 PR OBSERVATION CARE DISCHARGE: CPT | Performed by: PHYSICIAN ASSISTANT

## 2021-11-13 PROCEDURE — G0378 HOSPITAL OBSERVATION PER HR: HCPCS

## 2021-11-13 PROCEDURE — 250N000009 HC RX 250: Performed by: STUDENT IN AN ORGANIZED HEALTH CARE EDUCATION/TRAINING PROGRAM

## 2021-11-13 PROCEDURE — 255N000002 HC RX 255 OP 636: Performed by: STUDENT IN AN ORGANIZED HEALTH CARE EDUCATION/TRAINING PROGRAM

## 2021-11-13 PROCEDURE — 93350 STRESS TTE ONLY: CPT | Mod: 26 | Performed by: STUDENT IN AN ORGANIZED HEALTH CARE EDUCATION/TRAINING PROGRAM

## 2021-11-13 PROCEDURE — 250N000013 HC RX MED GY IP 250 OP 250 PS 637: Performed by: NURSE PRACTITIONER

## 2021-11-13 PROCEDURE — 93325 DOPPLER ECHO COLOR FLOW MAPG: CPT | Mod: 26 | Performed by: STUDENT IN AN ORGANIZED HEALTH CARE EDUCATION/TRAINING PROGRAM

## 2021-11-13 PROCEDURE — 93018 CV STRESS TEST I&R ONLY: CPT | Performed by: STUDENT IN AN ORGANIZED HEALTH CARE EDUCATION/TRAINING PROGRAM

## 2021-11-13 PROCEDURE — 36415 COLL VENOUS BLD VENIPUNCTURE: CPT | Performed by: NURSE PRACTITIONER

## 2021-11-13 PROCEDURE — 84484 ASSAY OF TROPONIN QUANT: CPT | Performed by: NURSE PRACTITIONER

## 2021-11-13 PROCEDURE — 250N000011 HC RX IP 250 OP 636: Performed by: STUDENT IN AN ORGANIZED HEALTH CARE EDUCATION/TRAINING PROGRAM

## 2021-11-13 RX ORDER — LIDOCAINE 40 MG/G
CREAM TOPICAL
Status: DISCONTINUED | OUTPATIENT
Start: 2021-11-13 | End: 2021-11-13 | Stop reason: HOSPADM

## 2021-11-13 RX ORDER — LOSARTAN POTASSIUM 100 MG/1
100 TABLET ORAL DAILY
Status: DISCONTINUED | OUTPATIENT
Start: 2021-11-13 | End: 2021-11-13 | Stop reason: HOSPADM

## 2021-11-13 RX ORDER — ATROPINE SULFATE 0.4 MG/ML
.2-2 AMPUL (ML) INJECTION
Status: DISCONTINUED | OUTPATIENT
Start: 2021-11-13 | End: 2021-11-13 | Stop reason: HOSPADM

## 2021-11-13 RX ORDER — NICOTINE POLACRILEX 4 MG
15-30 LOZENGE BUCCAL
Status: DISCONTINUED | OUTPATIENT
Start: 2021-11-13 | End: 2021-11-13 | Stop reason: HOSPADM

## 2021-11-13 RX ORDER — ACETAMINOPHEN 325 MG/1
650 TABLET ORAL EVERY 4 HOURS PRN
Status: DISCONTINUED | OUTPATIENT
Start: 2021-11-13 | End: 2021-11-13 | Stop reason: HOSPADM

## 2021-11-13 RX ORDER — HYDROCODONE BITARTRATE AND ACETAMINOPHEN 5; 325 MG/1; MG/1
1 TABLET ORAL EVERY 6 HOURS PRN
Status: DISCONTINUED | OUTPATIENT
Start: 2021-11-13 | End: 2021-11-13 | Stop reason: HOSPADM

## 2021-11-13 RX ORDER — METOPROLOL TARTRATE 1 MG/ML
1-20 INJECTION, SOLUTION INTRAVENOUS
Status: ACTIVE | OUTPATIENT
Start: 2021-11-13 | End: 2021-11-13

## 2021-11-13 RX ORDER — NITROGLYCERIN 0.4 MG/1
0.4 TABLET SUBLINGUAL EVERY 5 MIN PRN
Status: DISCONTINUED | OUTPATIENT
Start: 2021-11-13 | End: 2021-11-13 | Stop reason: HOSPADM

## 2021-11-13 RX ORDER — ASPIRIN 325 MG
325 TABLET ORAL ONCE
Status: DISCONTINUED | OUTPATIENT
Start: 2021-11-13 | End: 2022-05-06

## 2021-11-13 RX ORDER — DOBUTAMINE HYDROCHLORIDE 200 MG/100ML
10-50 INJECTION INTRAVENOUS CONTINUOUS
Status: ACTIVE | OUTPATIENT
Start: 2021-11-13 | End: 2021-11-13

## 2021-11-13 RX ORDER — ASPIRIN 81 MG/1
81 TABLET ORAL DAILY
Status: DISCONTINUED | OUTPATIENT
Start: 2021-11-13 | End: 2021-11-13 | Stop reason: HOSPADM

## 2021-11-13 RX ORDER — NALOXONE HYDROCHLORIDE 0.4 MG/ML
0.2 INJECTION, SOLUTION INTRAMUSCULAR; INTRAVENOUS; SUBCUTANEOUS
Status: DISCONTINUED | OUTPATIENT
Start: 2021-11-13 | End: 2021-11-13 | Stop reason: HOSPADM

## 2021-11-13 RX ORDER — IPRATROPIUM BROMIDE AND ALBUTEROL SULFATE 2.5; .5 MG/3ML; MG/3ML
3 SOLUTION RESPIRATORY (INHALATION) EVERY 4 HOURS PRN
Status: DISCONTINUED | OUTPATIENT
Start: 2021-11-13 | End: 2021-11-13 | Stop reason: HOSPADM

## 2021-11-13 RX ORDER — ASPIRIN 81 MG/1
162 TABLET, CHEWABLE ORAL ONCE
Status: COMPLETED | OUTPATIENT
Start: 2021-11-13 | End: 2021-11-13

## 2021-11-13 RX ORDER — NALOXONE HYDROCHLORIDE 0.4 MG/ML
0.4 INJECTION, SOLUTION INTRAMUSCULAR; INTRAVENOUS; SUBCUTANEOUS
Status: DISCONTINUED | OUTPATIENT
Start: 2021-11-13 | End: 2021-11-13 | Stop reason: HOSPADM

## 2021-11-13 RX ORDER — DEXTROSE MONOHYDRATE 25 G/50ML
25-50 INJECTION, SOLUTION INTRAVENOUS
Status: DISCONTINUED | OUTPATIENT
Start: 2021-11-13 | End: 2021-11-13 | Stop reason: HOSPADM

## 2021-11-13 RX ORDER — AMLODIPINE BESYLATE 2.5 MG/1
2.5 TABLET ORAL DAILY
Status: DISCONTINUED | OUTPATIENT
Start: 2021-11-13 | End: 2021-11-13 | Stop reason: HOSPADM

## 2021-11-13 RX ORDER — MAGNESIUM HYDROXIDE/ALUMINUM HYDROXICE/SIMETHICONE 120; 1200; 1200 MG/30ML; MG/30ML; MG/30ML
30 SUSPENSION ORAL EVERY 4 HOURS PRN
Status: DISCONTINUED | OUTPATIENT
Start: 2021-11-13 | End: 2021-11-13 | Stop reason: HOSPADM

## 2021-11-13 RX ADMIN — DOBUTAMINE HYDROCHLORIDE 10 MCG/KG/MIN: 200 INJECTION INTRAVENOUS at 09:58

## 2021-11-13 RX ADMIN — METOPROLOL TARTRATE 5 MG: 1 INJECTION, SOLUTION INTRAVENOUS at 10:06

## 2021-11-13 RX ADMIN — HYDROCODONE BITARTRATE AND ACETAMINOPHEN 1 TABLET: 5; 325 TABLET ORAL at 00:24

## 2021-11-13 RX ADMIN — ASPIRIN 81 MG CHEWABLE TABLET 162 MG: 81 TABLET CHEWABLE at 00:24

## 2021-11-13 RX ADMIN — AMLODIPINE BESYLATE 2.5 MG: 2.5 TABLET ORAL at 07:36

## 2021-11-13 RX ADMIN — LOSARTAN POTASSIUM 100 MG: 100 TABLET, FILM COATED ORAL at 07:40

## 2021-11-13 RX ADMIN — PERFLUTREN 7 ML: 6.52 INJECTION, SUSPENSION INTRAVENOUS at 10:07

## 2021-11-13 RX ADMIN — ASPIRIN 81 MG: 81 TABLET ORAL at 07:36

## 2021-11-13 RX ADMIN — HYDROCODONE BITARTRATE AND ACETAMINOPHEN 1 TABLET: 5; 325 TABLET ORAL at 12:15

## 2021-11-13 NOTE — DISCHARGE SUMMARY
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 patient did not exhibit any symptoms during drug infusion.     Stress Results                                       Maximum Predicted HR:   150 bpm             Target HR: 128 bpm        % Maximum Predicted HR: 89 %                           Stage DurationHeart Rate  BP   Dose                               (mm:ss)   (bpm)                      Baseline  0:00      82    168/73                        Peak    6:21      133   166/6130.00                          Stress Duration:   6:21 mm:ss                       Maximum Stress HR: 133 bpm     Contrast  Definity (NDC #02752-356-23) given intravenously. Patient was given 7ml  mixture of 1.5ml Definity and 8.5ml saline. 3 ml wasted. Definity Lot # [id number] .  Definity Expiration 22 .     ______________________________________________________________________________  MMode/2D Measurements & Calculations  asc Aorta Diam: 3.0 cm     Doppler Measurements & Calculations  MV E max malka: 76.0 cm/sec  MV A max malka: 82.4 cm/sec  MV E/A: 0.92  MV dec time: 0.18 sec  E/E' av.6  Lateral E/e': 10.5     Medial E/e': 14.7      ______________________________________________________________________________  Report approved by: MD Pascual Pitts 11/13/2021 11:52 AM                      Pending Results:   Unresulted Labs Ordered in the Past 30 Days of this Admission     No orders found for last 31 day(s).                  Discharge Instructions and Follow-Up:     Discharge Procedure Orders   Reason for your hospital stay   Order Comments: Chest pain     Activity   Order Comments: Your activity upon discharge: activity as tolerated     Order Specific Question Answer Comments   Is discharge order? Yes      When to contact your care team   Order Comments: Please go to your nearest emergency room If you were to have a change in the type of chest pain i.e more severe, lasting longer or radiating to your shoulder, arm, neck, jaw or back, shortness of breath or increased pain with breathing, coughing up blood, feel dizzy or lightheaded, or notice swelling in one leg.     Discharge Instructions   Order Comments: You were admitted to the observation unit for evaluation of your chest pain.  Your EKG was normal. Troponins (a lab test to check if there was damage to the heart tissue) were checked and these were negative. Chest x-ray was normal.  You underwent a stress test and this was normal. The chest pain you came into the emergency room for does not appear to be cardiac chest pain. I recommend continuing your home medications and it will be very important to follow up with your primary care doctor early next week or sooner if your symptoms return.     Full Code     Order Specific Question Answer Comments   Code status determined by: Discussion with patient/ legal decision maker      Diet   Order Comments: Follow this diet upon discharge: Regular     Order Specific Question Answer Comments   Is discharge order? Yes           Attestation:  Viola Casas PA-C.       This patient was discussed with the Care Team in the OBS Unit.   The patient's chart was reviewed and the patient was also seen and evaluated by me.  The plan of care was discussed and reviewed with the Care Team.  The above documentation reflects the evaluation, medical decision making and plan under my supervision.    Lowell Arellano MD, FACEP  [facility] Staff Emergency Physician

## 2021-11-13 NOTE — PLAN OF CARE
Discharge instructions given and explained to the patient. The patient verbalized understanding. The PIV was removed.

## 2021-11-13 NOTE — PLAN OF CARE
Observation Goals:  - Serial troponins and stress test complete: in progress - troponins negative x2; dobutamine stress echocardiogram scheduled for today  - Seen and cleared by consultant if applicable: in progress  - Adequate pain control on oral analgesia: in progress - pt reported 3/10 at rest, but can go up to 8/10 with activity  - Vital signs normal or at patient baseline: met  - Safe disposition plan has been identified: in progress    Nurse to notify provider when observation goals have been met and patient is ready for discharge.  Patient denied n/v this AM. Patient was able to ambulate to bathroom with 5/10 pain - pain decreased to 3/10 with rest    Temp: 97.9  F (36.6  C) Temp src: Oral BP: (!) 143/65 Pulse: 87   Resp: 15 SpO2: 97 % O2 Device: None (Room air)      Patricia Cheney, Student Nurse

## 2021-11-13 NOTE — PROGRESS NOTES
- Serial troponins and stress test complete. In process - trops are wdl, stress test to be completed.  - Seen and cleared by consultant if applicable   - Adequate pain control on oral analgesia Met  - Vital signs normal or at patient baseline Met  - Safe disposition plan has been identified Not met

## 2021-11-13 NOTE — PROGRESS NOTES
Pt here for dobutamine stress test.  Test, meds and side effects reviewed with patient.  Achieved target HR at 30 mcg Dobutamine.  Gave a total of 5 mg IV Metoprolol to bring HR back to baseline.  Post monitoring complete and VSS.  Patient transported back to Obs in wheelchair via echo staff.
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
## 2021-11-13 NOTE — PLAN OF CARE
[id number]    Activity: SBA  Neuros: Intact. A&O x4.   Cardiac: RBBB at baseline. On tele. Chest pain with activity.  Respiratory: WDL on RA. Denies SOB. LS CTA,  GI/: Voiding spontaneously. +BS, passing flatus.   Diet: Regular (cardiac)  Skin: CDI  Lines: L PIV SL  Labs: Troponins are WDL, next draw this AM  Pain/nausea: Denies nausea, reports pain at 8 with activity - as low as 2-4 with rest and medication.  New changes this shift: Pt arrived from ED at approximately 0015  Plan: Monitor overnight, reassess in AM - stress test?

## 2021-11-13 NOTE — PROGRESS NOTES
Patient discharged via wheelchair with belongings and discharge instructions. Peripheral IV was removed.

## 2021-11-14 LAB
ATRIAL RATE - MUSE: 76 BPM
DIASTOLIC BLOOD PRESSURE - MUSE: NORMAL MMHG
INTERPRETATION ECG - MUSE: NORMAL
P AXIS - MUSE: 44 DEGREES
PR INTERVAL - MUSE: 160 MS
QRS DURATION - MUSE: 124 MS
QT - MUSE: 412 MS
QTC - MUSE: 463 MS
R AXIS - MUSE: -42 DEGREES
SYSTOLIC BLOOD PRESSURE - MUSE: NORMAL MMHG
T AXIS - MUSE: 43 DEGREES
VENTRICULAR RATE- MUSE: 76 BPM

## 2021-11-15 ENCOUNTER — PATIENT OUTREACH (OUTPATIENT)
Dept: CARE COORDINATION | Facility: CLINIC | Age: 71
End: 2021-11-15
Payer: MEDICARE

## 2021-11-15 DIAGNOSIS — Z71.89 OTHER SPECIFIED COUNSELING: ICD-10-CM

## 2021-11-15 NOTE — PROGRESS NOTES
Clinic Care Coordination Contact  [facility]: Post-Discharge Note  SITUATION                                                      Admission:    Admission Date: 11/12/21   Reason for Admission: Chest pain, unspecified type  Discharge:   Discharge Date: 11/13/21  Discharge Diagnosis: Chest pain, unspecified type    BACKGROUND                                                      Carli Monreal is a 71 year old female admitted on 11/12/2021. She has a past medical history significant for persistent asthma, type 2 diabetes mellitus, hypertension, and right bundle branch block who presents to the Emergency Department for evaluation of chest pain.    ASSESSMENT      Enrollment  Primary Care Care Coordination Status: Declined    Discharge Assessment  How are you doing now that you are home?: Patient reports she is doing much better.  No questions or concerns  How are your symptoms? (Red Flag symptoms escalate to triage hotline per guidelines): Improved  Do you feel your condition is stable enough to be safe at home until your provider visit?: Yes  Does the patient have their discharge instructions? : Yes  Does the patient have questions regarding their discharge instructions? : No  Were you started on any new medications or were there changes to any of your previous medications? : Yes  Discharge follow-up appointment scheduled within 14 calendar days? : No  Is patient agreeable to assistance with scheduling? : No                  PLAN                                                      Outpatient Plan:      Discharge Instructions  You were admitted to the observation unit for evaluation of your chest pain. Your EKG was normal. Troponins (a  lab test to check if there was damage to the heart tissue) were checked and these were negative. Chest x-ray  was normal. You underwent a stress test and this was normal. The chest pain you came into the emergency  room for does not appear to be cardiac chest pain. I recommend  continuing your home medications and it will  be very important to follow up with your primary care doctor early next week or sooner if your symptoms  return.    Future Appointments   Date Time Provider Department Center   2/21/2022 11:00 AM Iza Cage DO [facility]         For any urgent concerns, please contact our 24 hour nurse triage line: [phone] ([phone])         AVERY Marques  [phone]  [facility]

## 2021-12-19 ENCOUNTER — OFFICE VISIT (OUTPATIENT)
Dept: URGENT CARE | Facility: URGENT CARE | Age: 71
End: 2021-12-19
Payer: MEDICARE

## 2021-12-19 ENCOUNTER — HEALTH MAINTENANCE LETTER (OUTPATIENT)
Age: 71
End: 2021-12-19

## 2021-12-19 ENCOUNTER — NURSE TRIAGE (OUTPATIENT)
Dept: NURSING | Facility: CLINIC | Age: 71
End: 2021-12-19
Payer: MEDICARE

## 2021-12-19 VITALS
BODY MASS INDEX: 31.82 KG/M2 | TEMPERATURE: 96.9 F | HEART RATE: 95 BPM | DIASTOLIC BLOOD PRESSURE: 68 MMHG | SYSTOLIC BLOOD PRESSURE: 138 MMHG | OXYGEN SATURATION: 99 % | WEIGHT: 161.6 LBS

## 2021-12-19 DIAGNOSIS — R05.9 COUGH: ICD-10-CM

## 2021-12-19 DIAGNOSIS — J20.9 ACUTE BRONCHITIS, UNSPECIFIED ORGANISM: Primary | ICD-10-CM

## 2021-12-19 DIAGNOSIS — E11.9 TYPE 2 DIABETES MELLITUS WITHOUT COMPLICATION, WITHOUT LONG-TERM CURRENT USE OF INSULIN (H): ICD-10-CM

## 2021-12-19 DIAGNOSIS — J45.21 MILD INTERMITTENT ASTHMA WITH ACUTE EXACERBATION: ICD-10-CM

## 2021-12-19 PROCEDURE — U0003 INFECTIOUS AGENT DETECTION BY NUCLEIC ACID (DNA OR RNA); SEVERE ACUTE RESPIRATORY SYNDROME CORONAVIRUS 2 (SARS-COV-2) (CORONAVIRUS DISEASE [COVID-19]), AMPLIFIED PROBE TECHNIQUE, MAKING USE OF HIGH THROUGHPUT TECHNOLOGIES AS DESCRIBED BY CMS-2020-01-R: HCPCS | Performed by: NURSE PRACTITIONER

## 2021-12-19 PROCEDURE — 99214 OFFICE O/P EST MOD 30 MIN: CPT | Performed by: NURSE PRACTITIONER

## 2021-12-19 PROCEDURE — U0005 INFEC AGEN DETEC AMPLI PROBE: HCPCS | Performed by: NURSE PRACTITIONER

## 2021-12-19 RX ORDER — PREDNISONE 20 MG/1
20 TABLET ORAL 2 TIMES DAILY
Qty: 10 TABLET | Refills: 0 | Status: SHIPPED | OUTPATIENT
Start: 2021-12-19 | End: 2021-12-24

## 2021-12-19 RX ORDER — CODEINE PHOSPHATE/GUAIFENESIN 10-100MG/5
5 LIQUID (ML) ORAL EVERY 4 HOURS PRN
Qty: 200 ML | Refills: 0 | Status: SHIPPED | OUTPATIENT
Start: 2021-12-19 | End: 2021-12-29

## 2021-12-19 ASSESSMENT — ENCOUNTER SYMPTOMS
FEVER: 1
COUGH: 1
HEADACHES: 1
RHINORRHEA: 1
MYALGIAS: 1

## 2021-12-19 NOTE — PATIENT INSTRUCTIONS
Patient Education     Viral Bronchitis (Adult)    You have a viral bronchitis. Bronchitis is inflammation and swelling of the lining of the lungs. This is often caused by an infection. Symptoms include a dry, hacking cough that is worse at night. The cough may bring up yellow-green mucus. You may also feel short of breath or wheeze. Other symptoms may include tiredness, chest discomfort, and chills.  Bronchitis that is caused by a virus is not treated with antibiotics. Instead, medicines may be given to help relieve symptoms. Symptoms can last up to 2 weeks, although the cough may last much longer.  This illness is contagious during the first few days and is spread through the air by coughing and sneezing, or by direct contact (touching the sick person and then touching your own eyes, nose, or mouth).  Most viral illnesses resolve within 10 to 14 days with rest and simple home remedies, although they may sometimes last for several weeks.  Home care    If symptoms are severe, rest at home for the first 2 to 3 days. When you go back to your usual activities, don't let yourself get too tired.    Do not smoke. Also avoid being exposed to secondhand smoke.    You may use over-the-counter medicine to control fever or pain, unless another pain medicine was prescribed. If you have chronic liver or kidney disease or have ever had a stomach ulcer or gastrointestinal bleeding, talk with your healthcare provider before using these medicines. Also talk to your provider if you are taking medicine to prevent blood clots. Aspirin should never be given to anyone younger than 18 years of age who is ill with a viral infection or fever. It may cause severe liver or brain damage.    Your appetite may be poor, so a light diet is fine. Avoid dehydration by drinking 6 to 8 glasses of fluids per day (such as water, soft drinks, sports drinks, juices, tea, or soup). Extra fluids will help loosen secretions in the nose and  lungs.    Over-the-counter cough, cold, and sore-throat medicines will not shorten the length of the illness, but they may help to reduce symptoms. Don't use decongestants if you have high blood pressure.  Follow-up care  Follow up with your healthcare provider, or as advised. If you had an X-ray or ECG (electrocardiogram), a specialist will review it. You will be notified of any new findings that may affect your care.  If you are age 65 or older, or if you have a chronic lung disease or condition that affects your immune system, or you smoke, ask your healthcare provider about getting a pneumococcal vaccine and a yearly flu shot (influenza vaccine).  When to seek medical advice  Call your healthcare provider right away if any of these occur:    Fever of 100.4 F (38 C) or higher, or as directed by your healthcare provider    Coughing up increased amounts of colored sputum    Weakness, drowsiness, headache, facial pain, ear pain, or a stiff neck  Call 911  Call 911 if any of these occur:    Coughing up blood    Worsening weakness, drowsiness, headache, or stiff neck    Trouble breathing, wheezing, or pain with breathing  [employer] last reviewed this educational content on 6/1/2018 2000-2021 [employer]. All rights reserved. This information is not intended as a substitute for professional medical care. Always follow your healthcare professional's instructions.

## 2021-12-19 NOTE — PROGRESS NOTES
SUBJECTIVE:   Carli Monreal is a 71 year old female presenting with a chief complaint of   Chief Complaint   Patient presents with     Cough     X4 DAYS. Pt was tested for covid on 12/12 [place] the [place]. Pt travelled to [facility].     Fever     Fatigue       She is an established patient of [place].    URI Adult    Onset of symptoms was 4 day(s) ago.  Course of illness is worsening.    Severity moderate  Current and Associated symptoms: fever, runny nose, stuffy nose, cough - productive, headache and body aches  Treatment measures tried include OTC Cough med.  Predisposing factors include asthma.  She is requesting for cough syrup with codeine for her cough.      Review of Systems   Constitutional: Positive for fever.   HENT: Positive for congestion and rhinorrhea.    Respiratory: Positive for cough.    Musculoskeletal: Positive for myalgias.   Neurological: Positive for headaches.   All other systems reviewed and are negative.      Past Medical History:   Diagnosis Date     Diabetes mellitus (H)      Right bundle branch block      Uncomplicated asthma      Family History   Problem Relation Age of Onset     Rheumatoid Arthritis Mother      Inflammatory Bowel Disease No family hx of      Colon Cancer No family hx of      Current Outpatient Medications   Medication Sig Dispense Refill     albuterol (PROAIR HFA/PROVENTIL HFA/VENTOLIN HFA) 108 (90 Base) MCG/ACT inhaler Inhale 2 puffs into the lungs 4 times daily       alcohol swab prep pads Use to swab area of injection/venkata as directed. 100 each 3     amLODIPine (NORVASC) 2.5 MG tablet Take 1 tablet (2.5 mg) by mouth daily 90 tablet 1     atorvastatin (LIPITOR) 10 MG tablet Take 1 tablet (10 mg) by mouth daily 90 tablet 3     blood glucose (NO BRAND SPECIFIED) lancets standard Use to test blood sugar 1 times daily or as directed. 100 each 11     blood glucose (NO BRAND SPECIFIED) test strip Use to test blood sugar 1 times daily or as  directed. 100 strip 1     budesonide-formoterol (SYMBICORT) 160-4.5 MCG/ACT Inhaler Inhale 2 puffs into the lungs 2 times daily 10.2 g 4     esomeprazole (NEXIUM) 40 MG DR capsule Take 1 capsule (40 mg) by mouth every morning (before breakfast) Take 30-60 minutes before eating. 90 capsule 1     folic acid (FOLVITE) 1 MG tablet TAKE ONE TABLET BY MOUTH ONE TIME DAILY 90 tablet 0     gabapentin (NEURONTIN) 100 MG capsule Take 1 capsule (100 mg) by mouth At Bedtime 30 capsule 0     guaiFENesin-codeine (GUAIFENESIN AC) 100-10 MG/5ML syrup Take 5 mLs by mouth every 4 hours as needed for congestion or cough 200 mL 0     insulin pen needle (ULTICARE MICRO) 32G X 4 MM miscellaneous Use 1 pen needles daily or as directed. 100 each 3     losartan (COZAAR) 100 MG tablet Take 1 tablet (100 mg) by mouth daily 90 tablet 1     metFORMIN (GLUCOPHAGE) 1000 MG tablet Take 1 tablet (1,000 mg) by mouth 2 times daily (with meals) 180 tablet 1     multivitamin w/minerals (MULTI-VITAMIN) tablet Take 1 tablet by mouth daily       nitroFURantoin macrocrystal-monohydrate (MACROBID) 100 MG capsule Take 1 capsule (100 mg) by mouth 2 times daily 14 capsule 0     predniSONE (DELTASONE) 20 MG tablet Take 1 tablet (20 mg) by mouth 2 times daily for 5 days 10 tablet 0     Vitamin D, Cholecalciferol, 25 MCG (1000 UT) TABS Take 2 tablets by mouth daily       celecoxib (CELEBREX) 100 MG capsule Take 1 capsule (100 mg) by mouth 2 times daily (Patient not taking: Reported on 12/19/2021) 20 capsule 0     cholestyramine light (QUESTRAN) 4 GM packet Take 1 packet (4 g) by mouth 3 times daily (with meals) (Patient not taking: Reported on 12/19/2021) 60 each 0     Cyanocobalamin 3000 MCG SUBL Place 1,500 mg under the tongue daily (Patient not taking: Reported on 12/19/2021)       HYDROcodone-acetaminophen (NORCO) 5-325 MG tablet Take 1 tablet by mouth every 6 hours as needed for severe pain (Patient not taking: Reported on 12/19/2021)       KLOR-CON 20 MEQ  CR tablet TAKE 1 TABLET BY MOUTH ONCE DAILY (Patient not taking: Reported on 2021) 90 tablet 0     Social History     Tobacco Use     Smoking status: Former Smoker     Types: Cigarettes     Quit date: 1991     Years since quittin.7     Smokeless tobacco: Never Used   Substance Use Topics     Alcohol use: Yes     Comment: Wine- couple x/ week       OBJECTIVE  /68 (BP Location: Left arm, Patient Position: Sitting, Cuff Size: Adult Regular)   Pulse 95   Temp 96.9  F (36.1  C) (Tympanic)   Wt 73.3 kg (161 lb 9.6 oz)   SpO2 99%   BMI 31.82 kg/m      Physical Exam  Vitals and nursing note reviewed.   Constitutional:       General: She is not in acute distress.  HENT:      Head: Normocephalic and atraumatic.      Right Ear: Tympanic membrane and external ear normal.      Left Ear: Tympanic membrane and external ear normal.      Nose: Congestion and rhinorrhea present.   Eyes:      Pupils: Pupils are equal, round, and reactive to light.   Pulmonary:      Effort: Pulmonary effort is normal. No respiratory distress.      Breath sounds: Examination of the right-upper field reveals rhonchi. Examination of the left-upper field reveals rhonchi. Rhonchi (clears with cough) present.   Musculoskeletal:      Cervical back: Normal range of motion and neck supple.   Lymphadenopathy:      Cervical: No cervical adenopathy.   Skin:     General: Skin is warm and dry.   Neurological:      Mental Status: She is alert.         ASSESSMENT:      ICD-10-CM    1. Acute bronchitis, unspecified organism  J20.9 predniSONE (DELTASONE) 20 MG tablet   2. Cough  R05.9 Symptomatic; Unknown COVID-19 Virus (Coronavirus) by PCR Nose     guaiFENesin-codeine (GUAIFENESIN AC) 100-10 MG/5ML syrup   3. Mild intermittent asthma with acute exacerbation  J45.21    4. Type 2 diabetes mellitus without complication, without long-term current use of insulin (H)  E11.9         PLAN:  Plan of care as above  I recommend follow up with PCP in 3  days or sooner if symptoms are getting worse  Advised to take medications as prescribed  Side effects of medications discussed  I have discouraged the patient on the use of codeine because of possibility of suppressing her breathing, but she has insisted saying she has used it since when she was young.   In addition to the above, diabetes was also briefly addressed today. Health maintenance, medications and recent results reviewed.  Worrisome symptoms are discussed and instructions to go to the ER.  All questions are answered and patient verbalized understanding and agrees with this plan.  Theresa Toro  [facility]  Family Nurse Practitoner        Patient Instructions     Patient Education     Viral Bronchitis (Adult)    You have a viral bronchitis. Bronchitis is inflammation and swelling of the lining of the lungs. This is often caused by an infection. Symptoms include a dry, hacking cough that is worse at night. The cough may bring up yellow-green mucus. You may also feel short of breath or wheeze. Other symptoms may include tiredness, chest discomfort, and chills.  Bronchitis that is caused by a virus is not treated with antibiotics. Instead, medicines may be given to help relieve symptoms. Symptoms can last up to 2 weeks, although the cough may last much longer.  This illness is contagious during the first few days and is spread through the air by coughing and sneezing, or by direct contact (touching the sick person and then touching your own eyes, nose, or mouth).  Most viral illnesses resolve within 10 to 14 days with rest and simple home remedies, although they may sometimes last for several weeks.  Home care    If symptoms are severe, rest at home for the first 2 to 3 days. When you go back to your usual activities, don't let yourself get too tired.    Do not smoke. Also avoid being exposed to secondhand smoke.    You may use over-the-counter medicine to control fever or pain, unless another pain medicine was  prescribed. If you have chronic liver or kidney disease or have ever had a stomach ulcer or gastrointestinal bleeding, talk with your healthcare provider before using these medicines. Also talk to your provider if you are taking medicine to prevent blood clots. Aspirin should never be given to anyone younger than 18 years of age who is ill with a viral infection or fever. It may cause severe liver or brain damage.    Your appetite may be poor, so a light diet is fine. Avoid dehydration by drinking 6 to 8 glasses of fluids per day (such as water, soft drinks, sports drinks, juices, tea, or soup). Extra fluids will help loosen secretions in the nose and lungs.    Over-the-counter cough, cold, and sore-throat medicines will not shorten the length of the illness, but they may help to reduce symptoms. Don't use decongestants if you have high blood pressure.  Follow-up care  Follow up with your healthcare provider, or as advised. If you had an X-ray or ECG (electrocardiogram), a specialist will review it. You will be notified of any new findings that may affect your care.  If you are age 65 or older, or if you have a chronic lung disease or condition that affects your immune system, or you smoke, ask your healthcare provider about getting a pneumococcal vaccine and a yearly flu shot (influenza vaccine).  When to seek medical advice  Call your healthcare provider right away if any of these occur:    Fever of 100.4 F (38 C) or higher, or as directed by your healthcare provider    Coughing up increased amounts of colored sputum    Weakness, drowsiness, headache, facial pain, ear pain, or a stiff neck  Call 911  Call 911 if any of these occur:    Coughing up blood    Worsening weakness, drowsiness, headache, or stiff neck    Trouble breathing, wheezing, or pain with breathing  [employer] last reviewed this educational content on 6/1/2018 2000-2021 [employer]. All rights reserved. This information is not  intended as a substitute for professional medical care. Always follow your healthcare professional's instructions.

## 2021-12-19 NOTE — TELEPHONE ENCOUNTER
"Nurse Triage SBAR    Is this a 2nd Level Triage? YES, LICENSED PRACTITIONER REVIEW IS REQUIRED    Situation  :Spouse calling with patient present.   Patient stating \"cough\" starting 12/15/21.    Background : Reports history of asthma/bronchitis. Recent travel to [place] returning 12/12/21.    Assessment: Symptoms starting 12/15/21 with dry frequent cough, and wheezing. Reporting frequent non stop coughing over past 2 hours with wheezing.  Stating she has not taken inhaler treatment since symptoms started on 12/15/21.  Patient had negative COVID 19 test on 12/12/21 after returning from [place].  Afebrile.  Disposition per guidelines Urgent Home Treatment with Follow Up Call.    Patient agrees to try back to back Albuterol Inhaler treatment now per care advice/triage guidelines.    Advised RN will place follow up call to patient after 30 minutes at .    835 a.m. Placed follow up call to patient. Patient reporting some improvement after back to back Albuterol treatment. Patient continues to cough frequently in background. Reporting ongoing wheezing.  Stating there is \"more time between cough's.\"   Disposition ED/or PCP Triage.    Recommendation : Paging provider for recommendation on 2nd level triage.  839 a.m. Paged Dr Cage through [facility] to call Izzy at .  851 a.m. Placed call to [facility]. Connected via cell phone with Dr Cage.  Dr Cage advised ED if patient is having any difficulty breathing as Urgent Care would send patient to ED if hypoxic.       Protocol Recommended Disposition: Emergency department or PCP Triage.    Nurse Triage Follow Up:   Reached patient/caregiver. Informed of providers recommendations and reasons to call back or seek care in the ED.  Patient verbalized understanding and disagrees with the plan.      Patient spouse Amilcar notified. Stating they plan to start with Urgent Care now. Caller verbalized understanding " patient may be sent to ED setting.    COVID 19 Nurse Triage Plan/Patient Instructions    Please be aware that novel coronavirus (COVID-19) may be circulating in the community. If you develop symptoms such as fever, cough, or SOB or if you have concerns about the presence of another infection including coronavirus (COVID-19), please contact your health care provider or visit [email or web address].     Disposition/Instructions    ED Visit recommended. Follow protocol based instructions.     Bring Your Own Device:  Please also bring your smart device(s) (smart phones, tablets, laptops) and their charging cables for your personal use and to communicate with your care team during your visit.    Thank you for taking steps to prevent the spread of this virus.  o Limit your contact with others.  o Wear a simple mask to cover your cough.  o Wash your hands well and often.    Resources    [facility]: About COVID-19: [email or web address]    CDC: What to Do If You're Sick: www.cdc.gov/coronavirus/2019-ncov/about/steps-when-sick.html    CDC: Ending Home Isolation: www.cdc.gov/coronavirus/2019-ncov/hcp/disposition-in-home-patients.html     CDC: Caring for Someone: www.cdc.gov/coronavirus/2019-ncov/if-you-are-sick/care-for-someone.html     [facility]: Interim Guidance for Hospital Discharge to Home: www.[email or web address]/diseases/coronavirus/hcp/hospdischarge.pdf    [facility] clinical trials (COVID-19 research studies): [email or web address]     Below are the COVID-19 hotlines at the [place] Department of Health ([facility]). Interpreters are available.   o For health questions: Call 372-152-0422 or 1-613.448.3765 (7 a.m. to 7 p.m.)  o For questions about schools and childcare: Call 500-182-9636 or 1-123.245.6658 (7 a.m. to 7 p.m.)                     Reason for Disposition    [1] MODERATE asthma attack (e.g., SOB at rest, speaks in phrases, audible wheezes) AND [2] not resolved  after 2 nebulizer or inhaler treatments given 20 minutes apart    Additional Information    Negative: Severe difficulty breathing (e.g., struggling for each breath, speak in single words, pulse > 120)    Negative: Bluish (or gray) lips or face now    Negative: Difficult to awaken or acting confused (e.g., disoriented, slurred speech)    Negative: Passed out (i.e., lost consciousness, collapsed and was not responding)    Negative: Wheezing started suddenly after medicine, an allergic food, or bee sting    Negative: Sounds like a life-threatening emergency to the triager    Negative: [1] SEVERE asthma attack (e.g., very SOB at rest, speaks in single words, loud wheezes) AND [2] not resolved after 2 nebulizer or inhaler treatments    Negative: Peak flow rate less than 50% of baseline level (RED zone)    Negative: [1] Severe wheezing or coughing AND [2] doesn't have neb or inhaler available    Negative: Chest pain    Negative: [1] Hospitalized before with asthma AND [2] feels the same now    Protocols used: ASTHMA ATTACK-A-AH

## 2021-12-21 ENCOUNTER — ANCILLARY PROCEDURE (OUTPATIENT)
Dept: CT IMAGING | Facility: CLINIC | Age: 71
End: 2021-12-21
Attending: STUDENT IN AN ORGANIZED HEALTH CARE EDUCATION/TRAINING PROGRAM
Payer: MEDICARE

## 2021-12-21 DIAGNOSIS — J45.40 MODERATE PERSISTENT ASTHMA, UNSPECIFIED WHETHER COMPLICATED: ICD-10-CM

## 2021-12-21 DIAGNOSIS — R05.9 COUGH: ICD-10-CM

## 2021-12-21 LAB — SARS-COV-2 RNA RESP QL NAA+PROBE: NEGATIVE

## 2021-12-21 PROCEDURE — G1004 CDSM NDSC: HCPCS | Performed by: RADIOLOGY

## 2021-12-21 PROCEDURE — 71250 CT THORAX DX C-: CPT | Mod: MG | Performed by: RADIOLOGY

## 2021-12-27 ENCOUNTER — VIRTUAL VISIT (OUTPATIENT)
Dept: INTERNAL MEDICINE | Facility: CLINIC | Age: 71
End: 2021-12-27
Payer: MEDICARE

## 2021-12-27 DIAGNOSIS — J45.40 MODERATE PERSISTENT ASTHMA, UNSPECIFIED WHETHER COMPLICATED: ICD-10-CM

## 2021-12-27 DIAGNOSIS — J20.9 ACUTE BRONCHITIS, UNSPECIFIED ORGANISM: Primary | ICD-10-CM

## 2021-12-27 DIAGNOSIS — R30.0 DYSURIA: ICD-10-CM

## 2021-12-27 DIAGNOSIS — N39.0 FREQUENT UTI: ICD-10-CM

## 2021-12-27 PROCEDURE — 99214 OFFICE O/P EST MOD 30 MIN: CPT | Mod: 95 | Performed by: INTERNAL MEDICINE

## 2021-12-27 NOTE — PROGRESS NOTES
Carli is a 71 year old who is being evaluated via a billable video visit.      How would you like to obtain your AVS? MyChart  If the video visit is dropped, the invitation should be resent by: Send to e-mail at: [email or web address]  Will anyone else be joining your video visit? No      Video Start Time: 1228      Office Visit - Follow Up   Carli Monreal   71 year old female    Date of Visit: 12/27/2021    Chief Complaint   Patient presents with     Cough     Amwell Video Sxs: dry cough, chest congestion, mild wheezing, sinus/HA pressure x 1 wk (was seen in  12/19) - no fevers, no loss taste or smell     UTI     Sxs: ua frequency along with dysuria x 4-5 days No hematuria         Assessment and Plan   1. Acute bronchitis, unspecified organism  Given the persistence we will go ahead and treat her with antibiotics as she does have underlying asthma as well.  Augmentin 875 twice daily for a week.  This should help with the urine infection as well.  See below.  I did warn her about diarrhea that can occur with this medication.  - amoxicillin-clavulanate (AUGMENTIN) 875-125 MG tablet; Take 1 tablet by mouth 2 times daily for 7 days  Dispense: 14 tablet; Refill: 0    2. Moderate persistent asthma, unspecified whether complicated  I have urged her to resume her Symbicort during this acute illness to see if that helps her symptoms as well.  She needs close follow-up with her primary care provider.    3. Dysuria  She has had several UTIs this year she tells me.  May benefit from a urologic evaluation.  - amoxicillin-clavulanate (AUGMENTIN) 875-125 MG tablet; Take 1 tablet by mouth 2 times daily for 7 days  Dispense: 14 tablet; Refill: 0    4. Frequent UTI  As above.      Return in about 1 week (around 1/3/2022) for Follow up, in person, with PCP only.     History of Present Illness   This 71 year old nice patient followed at the [facility] normally joins me on a video visit today.  She has not been well for  "several weeks now.  She has had this ongoing cough.  She was in urgent care here recently.  They gave her prednisone and some codeine cough syrup.  She continues to have a productive cough.  She has \"sinusitis\".  She now also has dysuria of about for 5 days.  She has recurrent urinary tract infections.  She cannot get any rest.  She just feels poorly.  She wants to feel better.  She has asthma but was told she does not need to take her inhalers anymore.  She has been using the albuterol on occasion.  She does not know if she is wheezing but she feels a rattling in her chest.  She had some chest pain back in November and has not followed up with her primary regarding this either.    Review of Systems: A comprehensive review of systems was negative except as noted.     Medications, Allergies and Problem List   Reviewed, reconciled and updated  Post Discharge Medication Reconciliation Status:      Physical Exam   General Appearance:   Pleasant woman who is in no respiratory distress but does have a coarse cough throughout the interview.  She otherwise looks well.    There were no vitals taken for this visit.         Additional Information   Current Outpatient Medications   Medication Sig Dispense Refill     albuterol (PROAIR HFA/PROVENTIL HFA/VENTOLIN HFA) 108 (90 Base) MCG/ACT inhaler Inhale 2 puffs into the lungs 4 times daily as needed for shortness of breath / dyspnea        alcohol swab prep pads Use to swab area of injection/venkata as directed. 100 each 3     amLODIPine (NORVASC) 2.5 MG tablet Take 1 tablet (2.5 mg) by mouth daily 90 tablet 1     amoxicillin-clavulanate (AUGMENTIN) 875-125 MG tablet Take 1 tablet by mouth 2 times daily for 7 days 14 tablet 0     atorvastatin (LIPITOR) 10 MG tablet Take 1 tablet (10 mg) by mouth daily 90 tablet 3     blood glucose (NO BRAND SPECIFIED) lancets standard Use to test blood sugar 1 times daily or as directed. 100 each 11     blood glucose (NO BRAND SPECIFIED) test strip " Use to test blood sugar 1 times daily or as directed. 100 strip 1     Cyanocobalamin 3000 MCG SUBL Place 1,500 mg under the tongue daily        esomeprazole (NEXIUM) 40 MG DR capsule Take 1 capsule (40 mg) by mouth every morning (before breakfast) Take 30-60 minutes before eating. 90 capsule 1     folic acid (FOLVITE) 1 MG tablet TAKE ONE TABLET BY MOUTH ONE TIME DAILY 90 tablet 0     gabapentin (NEURONTIN) 100 MG capsule Take 1 capsule (100 mg) by mouth At Bedtime 30 capsule 0     guaiFENesin-codeine (GUAIFENESIN AC) 100-10 MG/5ML syrup Take 5 mLs by mouth every 4 hours as needed for congestion or cough 200 mL 0     insulin pen needle (ULTICARE MICRO) 32G X 4 MM miscellaneous Use 1 pen needles daily or as directed. 100 each 3     KLOR-CON 20 MEQ CR tablet TAKE 1 TABLET BY MOUTH ONCE DAILY 90 tablet 0     losartan (COZAAR) 100 MG tablet Take 1 tablet (100 mg) by mouth daily 90 tablet 1     metFORMIN (GLUCOPHAGE) 1000 MG tablet Take 1 tablet (1,000 mg) by mouth 2 times daily (with meals) 180 tablet 1     multivitamin w/minerals (MULTI-VITAMIN) tablet Take 1 tablet by mouth daily       Vitamin D, Cholecalciferol, 25 MCG (1000 UT) TABS Take 2 tablets by mouth daily       budesonide-formoterol (SYMBICORT) 160-4.5 MCG/ACT Inhaler Inhale 2 puffs into the lungs 2 times daily (Patient not taking: Reported on 2021) 10.2 g 4     Allergies   Allergen Reactions     Seasonal Allergies      Social History     Tobacco Use     Smoking status: Former Smoker     Types: Cigarettes     Quit date: 1991     Years since quittin.7     Smokeless tobacco: Never Used   Vaping Use     Vaping Use: Never used   Substance Use Topics     Alcohol use: Yes     Comment: Wine- couple x/ week     Drug use: Never       Review and/or order of clinical lab tests:  Review and/or order of radiology tests:  Review and/or order of medicine tests:  Discussion of test results with performing physician:  Decision to obtain old records and/or  obtain history from someone other than the patient:  Review and summarization of old records and/or obtaining history from someone other than the patient and.or discussion of case with another health care provider:  Independent visualization of image, tracing or specimen itself:    Time:      KRYSTA CERVANTES MD  Video-Visit Details    Type of service:  Video Visit    Video End Time:1238    Originating Location (pt. Location): Home    Distant Location (provider location):  [facility]     Platform used for Video Visit: Infectious

## 2021-12-27 NOTE — Clinical Note
Please contact patient to schedule a 1 week follow-up with her primary care provider only.  I believe she goes to the [facility].  Thanks.

## 2022-01-04 ENCOUNTER — OFFICE VISIT (OUTPATIENT)
Dept: FAMILY MEDICINE | Facility: CLINIC | Age: 72
End: 2022-01-04
Payer: COMMERCIAL

## 2022-01-04 VITALS
RESPIRATION RATE: 20 BRPM | OXYGEN SATURATION: 99 % | DIASTOLIC BLOOD PRESSURE: 64 MMHG | WEIGHT: 159 LBS | SYSTOLIC BLOOD PRESSURE: 130 MMHG | HEART RATE: 82 BPM | BODY MASS INDEX: 31.22 KG/M2 | HEIGHT: 60 IN | TEMPERATURE: 98.7 F

## 2022-01-04 DIAGNOSIS — K50.918 CROHN'S DISEASE, UNSPECIFIED, WITH OTHER COMPLICATION (H): ICD-10-CM

## 2022-01-04 DIAGNOSIS — M06.9 RHEUMATOID ARTHRITIS INVOLVING BOTH HANDS, UNSPECIFIED WHETHER RHEUMATOID FACTOR PRESENT (H): ICD-10-CM

## 2022-01-04 DIAGNOSIS — E11.9 TYPE 2 DIABETES MELLITUS WITHOUT COMPLICATION, WITHOUT LONG-TERM CURRENT USE OF INSULIN (H): ICD-10-CM

## 2022-01-04 DIAGNOSIS — I10 ESSENTIAL HYPERTENSION: ICD-10-CM

## 2022-01-04 DIAGNOSIS — J45.41 MODERATE PERSISTENT ASTHMA WITH EXACERBATION: Primary | ICD-10-CM

## 2022-01-04 DIAGNOSIS — N30.90 RECURRENT CYSTITIS: ICD-10-CM

## 2022-01-04 LAB — HBA1C MFR BLD: 6.8 % (ref 0–5.6)

## 2022-01-04 PROCEDURE — 36415 COLL VENOUS BLD VENIPUNCTURE: CPT | Performed by: FAMILY MEDICINE

## 2022-01-04 PROCEDURE — 80048 BASIC METABOLIC PNL TOTAL CA: CPT | Performed by: FAMILY MEDICINE

## 2022-01-04 PROCEDURE — 83036 HEMOGLOBIN GLYCOSYLATED A1C: CPT | Performed by: FAMILY MEDICINE

## 2022-01-04 PROCEDURE — 99214 OFFICE O/P EST MOD 30 MIN: CPT | Performed by: FAMILY MEDICINE

## 2022-01-04 RX ORDER — AMLODIPINE BESYLATE 2.5 MG/1
2.5 TABLET ORAL DAILY
Qty: 90 TABLET | Refills: 1 | Status: SHIPPED | OUTPATIENT
Start: 2022-01-04 | End: 2022-05-06

## 2022-01-04 RX ORDER — PREDNISONE 20 MG/1
20 TABLET ORAL DAILY
Qty: 5 TABLET | Refills: 0 | Status: SHIPPED | OUTPATIENT
Start: 2022-01-04 | End: 2022-05-04

## 2022-01-04 ASSESSMENT — PAIN SCALES - GENERAL: PAINLEVEL: NO PAIN (0)

## 2022-01-04 ASSESSMENT — MIFFLIN-ST. JEOR: SCORE: 1158.75

## 2022-01-04 NOTE — PROGRESS NOTES
"  Assessment & Plan     Moderate persistent asthma with exacerbation  Try to use the symbicort bid and low dose pred for 5 days.  She will send and update via ubigrate next week   - predniSONE (DELTASONE) 20 MG tablet; Take 1 tablet (20 mg) by mouth daily    Essential hypertension  The current medical regimen is effective;  continue present plan and medications.  - amLODIPine (NORVASC) 2.5 MG tablet; Take 1 tablet (2.5 mg) by mouth daily  - Basic metabolic panel  (Ca, Cl, CO2, Creat, Gluc, K, Na, BUN); Future    Recurrent cystitis  Trial premarin   - conjugated estrogens (PREMARIN) 0.625 MG/GM vaginal cream; Place 1 g vaginally once a week    Type 2 diabetes mellitus without complication, without long-term current use of insulin (H)  The current medical regimen is effective;  continue present plan and medications.    - Hemoglobin A1c; Future    Rheumatoid arthritis involving both hands, unspecified whether rheumatoid factor present (H)  Managed by rheum     Crohn's disease, unspecified, with other complication (H)  Managed by GI         30 minutes spent on the date of the encounter doing chart review, history and exam, documentation and further activities per the note       BMI:   Estimated body mass index is 31.31 kg/m  as calculated from the following:    Height as of this encounter: 1.518 m (4' 11.75\").    Weight as of this encounter: 72.1 kg (159 lb).     No follow-ups on file.    Iza Cage DO  [facility]    Mary Boyce is a 71 year old who presents for the following health issues     HPI     Concern - F/u on Bronchitis  Onset: 3-4 weeks  Description: Follow-up on bronchitis- cough is better but still having some shortness of breath- stuffy head  Intensity: mild  Progression of Symptoms:  improving  Accompanying Signs & Symptoms: None  Previous history of similar problem: none  Precipitating factors:        Worsened by: none  Alleviating factors:        Improved by: " none  Therapies tried and outcome: -- see through  on 12/19/21- had virtual visit on 12/27/21  Negative Covid on 12/19/2021  She was treated with prednisone and cough syrup 12/19/2021 and Augmentin 12/27/2021 and told to resume her's Symbicort.  Her wheezing has improved and cough but the sob with exertion.  She had a stress test 11/2021    Symbicort 160/4.52 puffs twice daily but she often forgets this.     Diabetes Follow-up    How often are you checking your blood sugar? One time daily  What time of day are you checking your blood sugars (select all that apply)?  Before meals  Have you had any blood sugars above 200?  No  Have you had any blood sugars below 70?  No    What symptoms do you notice when your blood sugar is low?  None    What concerns do you have today about your diabetes? None     Do you have any of these symptoms? (Select all that apply)  No numbness or tingling in feet.  No redness, sores or blisters on feet.  No complaints of excessive thirst.  No reports of blurry vision.  No significant changes to weight.    Have you had a diabetic eye exam in the last 12 months? No  Lab Results   Component Value Date    A1C 5.6 08/20/2021    A1C 7.0 04/13/2021    A1C 8.4 02/09/2021     Metformin 1000 mg twice daily      BP Readings from Last 2 Encounters:   01/04/22 130/64   12/19/21 138/68     Hemoglobin A1C POCT (%)   Date Value   04/13/2021 7.0 (H)   02/09/2021 8.4 (A)     Hemoglobin A1C (%)   Date Value   08/20/2021 5.6     LDL Cholesterol Calculated (mg/dL)   Date Value   11/13/2021 19   02/09/2021 63.7         Hypertension Follow-up      Do you check your blood pressure regularly outside of the clinic? Yes     Are you following a low salt diet? Yes    Are your blood pressures ever more than 140 on the top number (systolic) OR more   than 90 on the bottom number (diastolic), for example 140/90? No    Losartan 100 mg daily and amlodipine 2.5 mg daily      Review of Systems   Constitutional, HEENT,  "cardiovascular, pulmonary, gi and gu systems are negative, except as otherwise noted.      Objective    /64 (BP Location: Right arm, Patient Position: Sitting, Cuff Size: Adult Regular)   Pulse 82   Temp 98.7  F (37.1  C) (Oral)   Resp 20   Ht 1.518 m (4' 11.75\")   Wt 72.1 kg (159 lb)   SpO2 99%   BMI 31.31 kg/m    Body mass index is 31.31 kg/m .  Physical Exam   GENERAL: healthy, alert and no distress  NECK: no adenopathy, no asymmetry, masses, or scars and thyroid normal to palpation  RESP: lungs clear to auscultation - no rales, rhonchi or wheezes  CV: regular rate and rhythm, normal S1 S2, no S3 or S4, no murmur, click or rub, no peripheral edema and peripheral pulses strong  MS: no gross musculoskeletal defects noted, no edema  PSYCH: mentation appears normal, affect normal/bright              "

## 2022-01-05 LAB
ANION GAP SERPL CALCULATED.3IONS-SCNC: 6 MMOL/L (ref 3–14)
BUN SERPL-MCNC: 8 MG/DL (ref 7–30)
CALCIUM SERPL-MCNC: 8.7 MG/DL (ref 8.5–10.1)
CHLORIDE BLD-SCNC: 113 MMOL/L (ref 94–109)
CO2 SERPL-SCNC: 22 MMOL/L (ref 20–32)
CREAT SERPL-MCNC: 0.68 MG/DL (ref 0.52–1.04)
GFR SERPL CREATININE-BSD FRML MDRD: >90 ML/MIN/1.73M2
GLUCOSE BLD-MCNC: 145 MG/DL (ref 70–99)
POTASSIUM BLD-SCNC: 3.6 MMOL/L (ref 3.4–5.3)
SODIUM SERPL-SCNC: 141 MMOL/L (ref 133–144)

## 2022-02-21 ENCOUNTER — OFFICE VISIT (OUTPATIENT)
Dept: FAMILY MEDICINE | Facility: CLINIC | Age: 72
End: 2022-02-21
Payer: COMMERCIAL

## 2022-02-21 VITALS
HEART RATE: 74 BPM | BODY MASS INDEX: 30.9 KG/M2 | RESPIRATION RATE: 18 BRPM | HEIGHT: 60 IN | OXYGEN SATURATION: 97 % | SYSTOLIC BLOOD PRESSURE: 134 MMHG | WEIGHT: 157.4 LBS | DIASTOLIC BLOOD PRESSURE: 62 MMHG | TEMPERATURE: 98.9 F

## 2022-02-21 DIAGNOSIS — T75.3XXA MOTION SICKNESS, INITIAL ENCOUNTER: ICD-10-CM

## 2022-02-21 DIAGNOSIS — R09.81 NASAL CONGESTION: Primary | ICD-10-CM

## 2022-02-21 DIAGNOSIS — J45.41 MODERATE PERSISTENT ASTHMA WITH EXACERBATION: ICD-10-CM

## 2022-02-21 PROCEDURE — U0005 INFEC AGEN DETEC AMPLI PROBE: HCPCS | Performed by: FAMILY MEDICINE

## 2022-02-21 PROCEDURE — 99214 OFFICE O/P EST MOD 30 MIN: CPT | Performed by: FAMILY MEDICINE

## 2022-02-21 PROCEDURE — U0003 INFECTIOUS AGENT DETECTION BY NUCLEIC ACID (DNA OR RNA); SEVERE ACUTE RESPIRATORY SYNDROME CORONAVIRUS 2 (SARS-COV-2) (CORONAVIRUS DISEASE [COVID-19]), AMPLIFIED PROBE TECHNIQUE, MAKING USE OF HIGH THROUGHPUT TECHNOLOGIES AS DESCRIBED BY CMS-2020-01-R: HCPCS | Performed by: FAMILY MEDICINE

## 2022-02-21 RX ORDER — PREDNISONE 20 MG/1
20 TABLET ORAL DAILY
Qty: 5 TABLET | Refills: 0 | Status: SHIPPED | OUTPATIENT
Start: 2022-02-21 | End: 2022-05-04

## 2022-02-21 RX ORDER — SCOLOPAMINE TRANSDERMAL SYSTEM 1 MG/1
1 PATCH, EXTENDED RELEASE TRANSDERMAL
Qty: 12 PATCH | Refills: 1 | Status: SHIPPED | OUTPATIENT
Start: 2022-02-21 | End: 2023-08-21

## 2022-02-21 ASSESSMENT — PAIN SCALES - GENERAL: PAINLEVEL: NO PAIN (0)

## 2022-02-21 NOTE — PROGRESS NOTES
Assessment & Plan     Nasal congestion  Allergies versus URI.  Her  has cold symptoms.  We will check for Covid  - Symptomatic; Unknown COVID-19 Virus (Coronavirus) by PCR; Future  - Symptomatic; Unknown COVID-19 Virus (Coronavirus) by PCR Nose    Moderate persistent asthma with exacerbation  The patient should continue her Symbicort and albuterol as needed.  We will do a low dose prednisone 20 mg for 5 days  - predniSONE (DELTASONE) 20 MG tablet; Take 1 tablet (20 mg) by mouth daily    Motion sickness, initial encounter  The patient is a  notes on multiple cruises a year.  She has 2 34-day cruise this coming up  - scopolamine (TRANSDERM) 1 MG/3DAYS 72 hr patch; Place 1 patch onto the skin every 72 hours      30 minutes spent on the date of the encounter doing chart review, history and exam, documentation and further activities per the note        Return in about 5 months (around 7/21/2022) for diabetes.    Iza Cage DO  [facility]    Mary Boyce is a 71 year old who presents for the following health issues:    History of Present Illness     Reason for visit:  Follow up  Symptom intensity:  Moderate  Symptom progression:  Staying the same  Had these symptoms before:  Yes  Has tried/received treatment for these symptoms:  Yes  Previous treatment was successful:  Yes  Prior treatment description:  Don t remember  What makes it worse:  Don t know  What makes it better:  Don t know    She eats 0-1 servings of fruits and vegetables daily.She consumes 0 sweetened beverage(s) daily.She exercises with enough effort to increase her heart rate 9 or less minutes per day.  She exercises with enough effort to increase her heart rate 3 or less days per week. She is missing 1 dose(s) of medications per week.  She is not taking prescribed medications regularly due to remembering to take.     She was put on prednisone for asthma on 1/4/2022 which helped a lot.  When she  "stopped it the breathing was okay until last week.  She restarted symbiort at that time.  She thinks the weather may have made it worse.  It has been cold.  Her  has a cold and she had a mild runny nose.  She has allergies in the spring and fall.  Which could be flaring up.  She has mild sob, very slight cough and some chest tightness.      She quit smoking 40 years ago.  She smoked about 15 years up to 2 ppd as she was smoking at work.      She had bronchitis a lot as a child.        Review of Systems   Constitutional, HEENT, cardiovascular, pulmonary, gi and gu systems are negative, except as otherwise noted.      Objective    /62 (BP Location: Right arm, Patient Position: Chair, Cuff Size: Adult Regular)   Pulse 74   Temp 98.9  F (37.2  C) (Oral)   Resp 18   Ht 1.518 m (4' 11.75\")   Wt 71.4 kg (157 lb 6.4 oz)   SpO2 97%   BMI 31.00 kg/m    Body mass index is 31 kg/m .  Physical Exam   GENERAL: healthy, alert and no distress  NECK: no adenopathy, no asymmetry, masses, or scars and thyroid normal to palpation  RESP: lungs clear to auscultation - no rales, rhonchi or wheezes  CV: regular rate and rhythm, normal S1 S2, no S3 or S4, no murmur, click or rub, no peripheral edema and peripheral pulses strong  MS: no gross musculoskeletal defects noted, no edema  PSYCH: mentation appears normal, affect normal/bright    No results found for this or any previous visit (from the past 24 hour(s)).        "

## 2022-02-22 LAB — SARS-COV-2 RNA RESP QL NAA+PROBE: NEGATIVE

## 2022-03-02 ENCOUNTER — MYC MEDICAL ADVICE (OUTPATIENT)
Dept: FAMILY MEDICINE | Facility: CLINIC | Age: 72
End: 2022-03-02
Payer: COMMERCIAL

## 2022-04-10 ENCOUNTER — HEALTH MAINTENANCE LETTER (OUTPATIENT)
Age: 72
End: 2022-04-10

## 2022-04-18 ENCOUNTER — ANCILLARY PROCEDURE (OUTPATIENT)
Dept: GENERAL RADIOLOGY | Facility: CLINIC | Age: 72
End: 2022-04-18
Attending: PEDIATRICS
Payer: COMMERCIAL

## 2022-04-18 ENCOUNTER — OFFICE VISIT (OUTPATIENT)
Dept: ORTHOPEDICS | Facility: CLINIC | Age: 72
End: 2022-04-18

## 2022-04-18 VITALS — BODY MASS INDEX: 31 KG/M2 | DIASTOLIC BLOOD PRESSURE: 80 MMHG | WEIGHT: 157.4 LBS | SYSTOLIC BLOOD PRESSURE: 132 MMHG

## 2022-04-18 DIAGNOSIS — M07.60 ARTHROPATHY IN CROHN'S DISEASE (H): ICD-10-CM

## 2022-04-18 DIAGNOSIS — M25.531 RIGHT WRIST PAIN: ICD-10-CM

## 2022-04-18 DIAGNOSIS — M19.031 ARTHRITIS OF SCAPHOID-TRAPEZIUM-TRAPEZOID JOINT OF RIGHT HAND: Primary | ICD-10-CM

## 2022-04-18 DIAGNOSIS — K50.90 ARTHROPATHY IN CROHN'S DISEASE (H): ICD-10-CM

## 2022-04-18 PROCEDURE — 99214 OFFICE O/P EST MOD 30 MIN: CPT | Performed by: PEDIATRICS

## 2022-04-18 PROCEDURE — 73110 X-RAY EXAM OF WRIST: CPT | Mod: RT | Performed by: RADIOLOGY

## 2022-04-18 RX ORDER — METHYLPREDNISOLONE 4 MG
TABLET, DOSE PACK ORAL
Qty: 21 TABLET | Refills: 0 | Status: SHIPPED | OUTPATIENT
Start: 2022-04-18 | End: 2022-05-04

## 2022-04-18 ASSESSMENT — PAIN SCALES - GENERAL: PAINLEVEL: WORST PAIN (10)

## 2022-04-18 NOTE — LETTER
4/18/2022         RE: Carli Monreal  [address]  [place] [address]        Dear Colleague,    Thank you for referring your patient, Carli Monreal, to the [facility]. Please see a copy of my visit note below.    ASSESSMENT & PLAN    Carli was seen today for edema.    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    We discussed these other possible diagnosis: Likely flare of underlying STT arthritis.  We discussed the following treatment options: symptom treatment, activity modification/rest, injection, rehab and referral. Following discussion, plan: discussed supportive care including bracing, oral steroid burst, close follow up with Rheumatology.    Plan:  - Today's Plan of Care:  Prescription Medication as directed: Medrol Dose Pack (I reviewed the mechanism of action as well as risk/benefit profile of medications. Questions answered.)  Bracing options - thumb spica v. Orthoglass    Discussed activity considerations and other supportive care including Ice/Heat, OTC and other topical medications as needed.    -We also discussed other future treatment options:  Follow up with Dr. Vergara to consider US guided injection as discussed previously  Referral to Hand Therapy    Follow Up: as needed    Concerning signs and symptoms were reviewed.  The patient expressed understanding of this management plan and all questions were answered at this  time.    Izzy Pennington MD [facility]  Sports Medicine Physician  [facility]      -----  Chief Complaint   Patient presents with     Right Wrist - Edema       SUBJECTIVE  Carli Monreal is a/an 71 year old female who is seen as an AIC for evaluation of right wrist pain.    The patient is seen by themselves.  The patient is right handed    Onset: Patient reports acute on chronic wrist pain. Has gotten worse over the past 1-2 days, no injury.  - Was previously treated by Dr. Vergara, 9/2021 for wrist pain after an injury. X-rays did not show a fracture. Reports constant pain started last night from the wrist into her right hand. No recent falls.  - Has been seen by Rheumatology, was on methotrexate in the past, no currently. Not on anything for inflammatory arthritis currently. They recommend evaluation in orthopedic clinic.    Location of Pain: Right wrist into right hand  Worsened by: constant pain  Better with: nothing  Treatments tried: ice, gabapentin, cbd oil, wrist brace, aspirin  Associated symptoms: swelling and burning hand    Orthopedic/Surgical history: Yes. Fall late August 2021 and managed with Dr. Vergara.  - Sees Rheumatology on 5/17/2022.  Social History/Occupation: retired    No family history pertinent to patient's problem today.    REVIEW OF SYSTEMS:  Review of Systems  Skin: no bruising, yes swelling  Musculoskeletal: as above  Neurologic: no numbness, paresthesias  Remainder of review of systems is negative including constitutional, CV, pulmonary, GI, except as noted in HPI or medical history.    OBJECTIVE:  /80   Wt 71.4 kg (157 lb 6.4 oz)   BMI 31.00 kg/m     General: healthy, alert and in no distress  HEENT: no scleral icterus or conjunctival erythema  Skin: no suspicious lesions or rash. No jaundice.  CV: distal perfusion intact  Resp: normal respiratory effort without conversational dyspnea   Psych: normal mood and affect  Gait: normal steady gait with appropriate  coordination and balance  Neuro: Normal light sensory exam of upper extremity    Bilateral Wrist and Hand exam    Inspection:       Swelling: dorsal wrist, base of thumb    Tender:       Throughout dorsal wrist, base of thumb, carpals    ROM:       Limited ROM right wrist in flexion and extension    Strength:       Decrease strength due to pain    Neurovascular:       2+ radial pulses bilaterally with brisk capillary refill and      normal sensation to light touch in the radial, median and ulnar nerve distributions    RADIOLOGY:  I independently ordered, visualized and reviewed these images with the patient  3 XR views of left wrist reviewed: severe arthritis STT joint, CMC arthritis, no fracture  - will follow official read    Hand XRays 9/16/2021 - severe STT arthritis, CMC arthritis, no fracture      Review of prior external note(s) from - Rheumatology  Review of the result(s) of each unique test - Xrays               Again, thank you for allowing me to participate in the care of your patient.        Sincerely,        Izzy Pennington MD

## 2022-04-18 NOTE — PATIENT INSTRUCTIONS
We discussed these other possible diagnosis: Likely flare of underlying STT arthritis.  We discussed the following treatment options: symptom treatment, activity modification/rest, injection, rehab and referral. Following discussion, plan: discussed supportive care including bracing, oral steroid burst, close follow up with Rheumatology    Plan:  - Today's Plan of Care:  Prescription Medication as directed: Medrol Dose Pack (I reviewed the mechanism of action as well as risk/benefit profile of medications. Questions answered.)  Bracing options - thumb spica v. Orthoglass    Discussed activity considerations and other supportive care including Ice/Heat, OTC and other topical medications as needed.    -We also discussed other future treatment options:  Follow up with Dr. Vergara to consider US guided injection as discussed  Referral to Hand Therapy    Follow Up: as needed    If you have any further questions for your physician or physician s care team you can call [phone] and use option 3 to leave a voice message. Calls received during business hours will be returned same day.

## 2022-04-25 DIAGNOSIS — E11.9 TYPE 2 DIABETES MELLITUS WITHOUT COMPLICATION, WITHOUT LONG-TERM CURRENT USE OF INSULIN (H): ICD-10-CM

## 2022-04-25 DIAGNOSIS — E87.6 HYPOKALEMIA: ICD-10-CM

## 2022-04-25 DIAGNOSIS — M54.50 CHRONIC BILATERAL LOW BACK PAIN, UNSPECIFIED WHETHER SCIATICA PRESENT: ICD-10-CM

## 2022-04-25 DIAGNOSIS — G89.29 CHRONIC BILATERAL LOW BACK PAIN, UNSPECIFIED WHETHER SCIATICA PRESENT: ICD-10-CM

## 2022-04-25 RX ORDER — POTASSIUM CHLORIDE 1500 MG/1
TABLET, EXTENDED RELEASE ORAL
Qty: 90 TABLET | Refills: 0 | Status: SHIPPED | OUTPATIENT
Start: 2022-04-25 | End: 2022-08-01

## 2022-04-25 NOTE — TELEPHONE ENCOUNTER
Prescription approved per [facility] Refill Protocol.    Nataly Small, RN, BSN, PHN  [facility]: [place]

## 2022-04-25 NOTE — TELEPHONE ENCOUNTER
Routing refill request to provider for review/approval because:  Drug not on the [facility] refill protocol     Nataly Small RN, BSN, PHN  [facility]: [place]

## 2022-04-26 RX ORDER — GABAPENTIN 100 MG/1
100 CAPSULE ORAL AT BEDTIME
Qty: 30 CAPSULE | Refills: 0 | Status: SHIPPED | OUTPATIENT
Start: 2022-04-26 | End: 2022-10-11

## 2022-04-26 RX ORDER — LANCETS 33 GAUGE
EACH MISCELLANEOUS
Qty: 100 EACH | Refills: 0 | Status: SHIPPED | OUTPATIENT
Start: 2022-04-26 | End: 2022-08-03

## 2022-05-04 ENCOUNTER — OFFICE VISIT (OUTPATIENT)
Dept: PULMONOLOGY | Facility: CLINIC | Age: 72
End: 2022-05-04
Attending: STUDENT IN AN ORGANIZED HEALTH CARE EDUCATION/TRAINING PROGRAM
Payer: COMMERCIAL

## 2022-05-04 VITALS
OXYGEN SATURATION: 98 % | WEIGHT: 157.6 LBS | HEIGHT: 60 IN | SYSTOLIC BLOOD PRESSURE: 114 MMHG | HEART RATE: 75 BPM | BODY MASS INDEX: 30.94 KG/M2 | RESPIRATION RATE: 17 BRPM | DIASTOLIC BLOOD PRESSURE: 62 MMHG

## 2022-05-04 DIAGNOSIS — J45.909 UNCOMPLICATED ASTHMA, UNSPECIFIED ASTHMA SEVERITY, UNSPECIFIED WHETHER PERSISTENT: ICD-10-CM

## 2022-05-04 DIAGNOSIS — R05.9 COUGH: Primary | ICD-10-CM

## 2022-05-04 DIAGNOSIS — J45.909 UNCOMPLICATED ASTHMA, UNSPECIFIED ASTHMA SEVERITY, UNSPECIFIED WHETHER PERSISTENT: Primary | ICD-10-CM

## 2022-05-04 PROCEDURE — G0463 HOSPITAL OUTPT CLINIC VISIT: HCPCS | Mod: 25

## 2022-05-04 PROCEDURE — 99213 OFFICE O/P EST LOW 20 MIN: CPT | Mod: 25 | Performed by: STUDENT IN AN ORGANIZED HEALTH CARE EDUCATION/TRAINING PROGRAM

## 2022-05-04 PROCEDURE — 94060 EVALUATION OF WHEEZING: CPT | Performed by: INTERNAL MEDICINE

## 2022-05-04 ASSESSMENT — PAIN SCALES - GENERAL: PAINLEVEL: MILD PAIN (2)

## 2022-05-04 NOTE — PATIENT INSTRUCTIONS
Use your albuterol as needed for shortness of breath, can also use Symbicort as needed. Make sure to take these inhalers when you travel.    Will message your PCP doctor about your weight loss.    Let the Pulmonary clinic know if you have any increases in your cough, or more trouble with shortness of breath.    Follow up in one year, can come back sooner if needed

## 2022-05-04 NOTE — LETTER
5/4/2022         RE: Carli Monreal  [address]  [place] [address]        Dear Colleague,    Thank you for referring your patient, Carli Monreal, to the [facility] [place]. Please see a copy of my visit note below.    [facility]    Pulmonary, Allergy, Critical Care and Sleep Medicine    Clinic Progress Note  05/04/2022    Carli Monreal MRN# [id number]   Age: 70 year old YOB: 1950      Assessment and Recommendations:    Carli Monreal is a 71 year old female with a history of reported asthma, Crohn's disease s/p partial small bowel resection and inflammatory arthritis previously on immunosuppression, DMII, and HTN who presents for follow up of chronic dyspnea and cough.    Dyspnea on Exertion  Cough  Continued dyspnea on exertion and cough that seems to be stable and does not prevent regular activity. Spirometry 5/2021 non-specific and PFTs today normal. Previously prescribed Symbicort then Breo but has not used maintenance inhalers for months and rarely needs albuterol. Discussed that normal PFTs can still be consistent with airways disease such asthma. Chest imaging 12/2021 performed to rule out Crohn's related airways disease showed bronchial wall thickening with mucoid secretions, though imaging performed around the time of reported bronchitis episode after recent travel. Encouraged use of albuterol and Symbicort if needed for respiratory symptoms. If were to start having more significant symptoms then would restart daily maintenance therapy. 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   - PRN use of albuterol and Symbicort for dyspnea and cough, discussed carrying inhalers with her when she travels  - Has received COVID series and two boosters     Unintentional Weight  Loss  Discussed that ongoing weight loss can be a concerning sign and that we will need to monitor weight closely. No other systemic symptoms. No recent flares of Crohn's, recent colonoscopy. Does have a remote smoking history that does not qualify for lung cancer screening. CT chest 12/2021 with right sided nodularity in upper and lower lobes thought to be infectious, no other concerning nodules. Communicated with patient's PCP to ensure other cancer screenings up to date. Would plan further workup and likely chest imaging if weight loss continued.     Follow up in one year, earlier if needed    Seen and discussed with Dr. Perlman Christine Lambert MD PhD  Pulmonary and Critical Care Fellow   Pager [phone]    Interval History:    First Pulmonary visit May 2021 after moving from [place] to [place]. Previous treatment for Crohn's (with suspected chronic inflammatory polyarthritis associated with Crohn's) with remicade and humira and more recently 15 mg methotrexate weekly and Cimzia. Off all immunosuppression since 2020. At Pulmonary visit reported onset of dyspnea Feb 2020 and evaluated for cardiac issues in the hospital. Recurrent symptoms summer 2020 and hospitalized. Ultimately in Oct 2020 told symptoms due to mild emphysema and asthma and started on Symbicort and albuterol. Primary problem of MUSE and dry cough. Transitioned to [facility] and planned for future Pulm Rehab.     Since last visit has received GI evaluation included colonoscopy 9/2021 which showed mucosal inflammatory changes 2/2 Crohn's in remission.  Chest pain rule out admission 11/2021 with negative stress echo, suspected MSK issue in the setting of arthritis. Diagnosed with bronchitis 12/2021 and given Augmentin, asthma exacerbation in Jan 2022 with a couple short bursts of prednisone in Jan and Feb 2022.     Today reports feeling back to baseline after prior episode of bronchitis/pneumonia earlier this year. 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 Does get short of breath with activity such as when having to walk fast or far distances. Did get so short of breath walking up the airplane ramp on her recent trip that she had to stop and take a break but that hasn't happened before. Denies exertional chest pain but did have some pain while doing PFTs today. Reports that not feeling limited by breathing and taking part in her trips and volunteer activities. Has an occasional cough that is dry. Never able to bring up sputum which she states is a lifelong problem. Remains off immunosuppression for her Crohn's. No using any inhalers, rarely feels a need for albuterol.     Does have GERD symptoms that can tie to when she drinks coffee and using Nexium to control. Has not had any recent changes in her chronic diarrhea, no blood in stool, no abdominal pain. No fevers, chills, or night sweats. Has lost about 15 lbs in last year, not intentional and states that her appetite has been less which she attributes to getting older.     Exposure history  Former smoker, quit in 1983 after 1-1.5 ppd for 15-20 years. Worked as a  and . 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 Two cats at home, no hot tub use, no mold or water damage in home.     Family: Both father and brother with asbestosis, father's exposure when worked on railroad. He changed his clothes at work or immediately went to basement to change when he came home.    Review of Systems:  Complete 12 point ROS negative unless mentioned in HPI    History   Past Medical History:  Asthma  Crohn's disease, dx ~ 2000   Remicade with infusion reaction and transitioned to Humira that  stopped due to expense. Cimzia/MTX stopped summer 2020  Type II Diabetes   Hypertension  Rheumatoid arthritis?  Osteoarthritis     Past Surgical History:  L4-L5 lumbar fusion  Partial small bowel resection 20212  Appendectomy  Cholecystectomy  Hernia repair  Bilateral  "carpal tunnel release     Medications:  Current Outpatient Medications   Medication     albuterol (PROAIR HFA/PROVENTIL HFA/VENTOLIN HFA) 108 (90 Base) MCG/ACT inhaler     alcohol swab prep pads     amLODIPine (NORVASC) 2.5 MG tablet     atorvastatin (LIPITOR) 10 MG tablet     blood glucose (NO BRAND SPECIFIED) lancets standard     blood glucose (NO BRAND SPECIFIED) test strip     budesonide-formoterol (SYMBICORT) 160-4.5 MCG/ACT Inhaler     conjugated estrogens (PREMARIN) 0.625 MG/GM vaginal cream     Cyanocobalamin 3000 MCG SUBL     esomeprazole (NEXIUM) 40 MG DR capsule     folic acid (FOLVITE) 1 MG tablet     gabapentin (NEURONTIN) 100 MG capsule     insulin pen needle (ULTICARE MICRO) 32G X 4 MM miscellaneous     KLOR-CON 20 MEQ CR tablet     Lancets (ONETOUCH DELICA PLUS [id number]) MISC     losartan (COZAAR) 100 MG tablet     metFORMIN (GLUCOPHAGE) 1000 MG tablet     methylPREDNISolone (MEDROL DOSEPAK) 4 MG tablet therapy pack     multivitamin w/minerals (MULTI-VITAMIN) tablet     predniSONE (DELTASONE) 20 MG tablet     predniSONE (DELTASONE) 20 MG tablet     scopolamine (TRANSDERM) 1 MG/3DAYS 72 hr patch     Vitamin D, Cholecalciferol, 25 MCG (1000 UT) TABS     Current Facility-Administered Medications   Medication     aspirin (ASA) tablet 325 mg     Allergies:  Allergies   Allergen Reactions     Seasonal Allergies      Physical Exam:       /62   Pulse 75   Resp 17   Ht 1.518 m (4' 11.75\")   Wt 71.5 kg (157 lb 9.6 oz)   SpO2 98%   BMI 31.04 kg/m      General: Sitting up, NAD  HEENT: anicteric  Chest: CTAB, no wheezing  Cardiac: RRR no murmurs  Abdomen: Soft, obese, bowel sounds present  Extremities: No LE Edema  Neuro: A&Ox3, no focal deficits   Skin: no rash noted  Psych: Appropriate    Laboratory, imaging, and microbiologic data:    All laboratory and imaging data reviewed, pertinent results discussed above.    Most Recent Breeze Pulmonary Function Testing    FVC-Pred   Date Value Ref Range " Status   05/04/2022 2.37 L      FVC-Pre   Date Value Ref Range Status   05/04/2022 1.89 L      FVC-%Pred-Pre   Date Value Ref Range Status   05/04/2022 79 %      FEV1-Pre   Date Value Ref Range Status   05/04/2022 1.39 L      FEV1-%Pred-Pre   Date Value Ref Range Status   05/04/2022 74 %      FEV1FVC-Pred   Date Value Ref Range Status   05/04/2022 79 %      FEV1FVC-Pre   Date Value Ref Range Status   05/04/2022 73 %      No results found for: 20029  FEFMax-Pred   Date Value Ref Range Status   05/04/2022 4.91 L/sec      FEFMax-Pre   Date Value Ref Range Status   05/04/2022 3.75 L/sec      FEFMax-%Pred-Pre   Date Value Ref Range Status   05/04/2022 76 %      ExpTime-Pre   Date Value Ref Range Status   05/04/2022 7.07 sec      FIFMax-Pre   Date Value Ref Range Status   05/04/2022 3.53 L/sec      FEV1FEV6-Pred   Date Value Ref Range Status   05/04/2022 79 %      FEV1FEV6-Pre   Date Value Ref Range Status   05/04/2022 74 %      No results found for: 20055          Attestation signed by Perlman, David Morris, MD at 5/19/2022 11:21 PM:  Attending Note:    The patient was seen examined by me with the pulmonary fellow, I have personally reviewed the imaging studies, lab and culture results.  The note reflects our joint findings, assessment and plan.      David Perlman, M.D.  Pulmonary and Critical Care.

## 2022-05-04 NOTE — PROGRESS NOTES
[facility]    Pulmonary, Allergy, Critical Care and Sleep Medicine    Clinic Progress Note  05/04/2022    Carli Monreal MRN# [id number]   Age: 70 year old YOB: 1950      Assessment and Recommendations:    Carli Monreal is a 71 year old female with a history of reported asthma, Crohn's disease s/p partial small bowel resection and inflammatory arthritis previously on immunosuppression, DMII, and HTN who presents for follow up of chronic dyspnea and cough.    Dyspnea on Exertion  Cough  Continued dyspnea on exertion and cough that seems to be stable and does not prevent regular activity. Spirometry 5/2021 non-specific and PFTs today normal. Previously prescribed Symbicort then Breo but has not used maintenance inhalers for months and rarely needs albuterol. Discussed that normal PFTs can still be consistent with airways disease such asthma. Chest imaging 12/2021 performed to rule out Crohn's related airways disease showed bronchial wall thickening with mucoid secretions, though imaging performed around the time of reported bronchitis episode after recent travel. Encouraged use of albuterol and Symbicort if needed for respiratory symptoms. If were to start having more significant symptoms then would restart daily maintenance therapy. 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   - PRN use of albuterol and Symbicort for dyspnea and cough, discussed carrying inhalers with her when she travels  - Has received COVID series and two boosters     Unintentional Weight Loss  Discussed that ongoing weight loss can be a concerning sign and that we will need to monitor weight closely. No other systemic symptoms. No recent flares of Crohn's, recent colonoscopy. Does have a remote smoking history that does not qualify for lung cancer screening. CT chest 12/2021 with right  sided nodularity in upper and lower lobes thought to be infectious, no other concerning nodules. Communicated with patient's PCP to ensure other cancer screenings up to date. Would plan further workup and likely chest imaging if weight loss continued.     Follow up in one year, earlier if needed    Seen and discussed with Dr. Perlman Christine Lambert MD PhD  Pulmonary and Critical Care Fellow   Pager [phone]    Interval History:    First Pulmonary visit May 2021 after moving from [place] to [place]. Previous treatment for Crohn's (with suspected chronic inflammatory polyarthritis associated with Crohn's) with remicade and humira and more recently 15 mg methotrexate weekly and Cimzia. Off all immunosuppression since 2020. At Pulmonary visit reported onset of dyspnea Feb 2020 and evaluated for cardiac issues in the hospital. Recurrent symptoms summer 2020 and hospitalized. Ultimately in Oct 2020 told symptoms due to mild emphysema and asthma and started on Symbicort and albuterol. Primary problem of MUSE and dry cough. Transitioned to [facility] and planned for future Pulm Rehab.     Since last visit has received GI evaluation included colonoscopy 9/2021 which showed mucosal inflammatory changes 2/2 Crohn's in remission.  Chest pain rule out admission 11/2021 with negative stress echo, suspected MSK issue in the setting of arthritis. Diagnosed with bronchitis 12/2021 and given Augmentin, asthma exacerbation in Jan 2022 with a couple short bursts of prednisone in Jan and Feb 2022.     Today reports feeling back to baseline after prior episode of bronchitis/pneumonia earlier this year. 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 Does get short of breath with activity such as when having to walk fast or far distances. Did get so short of breath walking up the airplane ramp on her recent trip that she had to stop and take a break but that hasn't happened before. Denies exertional chest pain but did have some  pain while doing PFTs today. Reports that not feeling limited by breathing and taking part in her trips and volunteer activities. Has an occasional cough that is dry. Never able to bring up sputum which she states is a lifelong problem. Remains off immunosuppression for her Crohn's. No using any inhalers, rarely feels a need for albuterol.     Does have GERD symptoms that can tie to when she drinks coffee and using Nexium to control. Has not had any recent changes in her chronic diarrhea, no blood in stool, no abdominal pain. No fevers, chills, or night sweats. Has lost about 15 lbs in last year, not intentional and states that her appetite has been less which she attributes to getting older.     Exposure history  Former smoker, quit in 1983 after 1-1.5 ppd for 15-20 years. Worked as a  and . 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 Two cats at home, no hot tub use, no mold or water damage in home.     Family: Both father and brother with asbestosis, father's exposure when worked on railroad. He changed his clothes at work or immediately went to basement to change when he came home.    Review of Systems:  Complete 12 point ROS negative unless mentioned in HPI    History   Past Medical History:  Asthma  Crohn's disease, dx ~ 2000   Remicade with infusion reaction and transitioned to Humira that  stopped due to expense. Cimzia/MTX stopped summer 2020  Type II Diabetes   Hypertension  Rheumatoid arthritis?  Osteoarthritis     Past Surgical History:  L4-L5 lumbar fusion  Partial small bowel resection 20212  Appendectomy  Cholecystectomy  Hernia repair  Bilateral carpal tunnel release     Medications:  Current Outpatient Medications   Medication     albuterol (PROAIR HFA/PROVENTIL HFA/VENTOLIN HFA) 108 (90 Base) MCG/ACT inhaler     alcohol swab prep pads     amLODIPine (NORVASC) 2.5 MG tablet     atorvastatin (LIPITOR) 10 MG tablet     blood glucose (NO BRAND  "SPECIFIED) lancets standard     blood glucose (NO BRAND SPECIFIED) test strip     budesonide-formoterol (SYMBICORT) 160-4.5 MCG/ACT Inhaler     conjugated estrogens (PREMARIN) 0.625 MG/GM vaginal cream     Cyanocobalamin 3000 MCG SUBL     esomeprazole (NEXIUM) 40 MG DR capsule     folic acid (FOLVITE) 1 MG tablet     gabapentin (NEURONTIN) 100 MG capsule     insulin pen needle (ULTICARE MICRO) 32G X 4 MM miscellaneous     KLOR-CON 20 MEQ CR tablet     Lancets (ONETOUCH DELICA PLUS [id number]) MISC     losartan (COZAAR) 100 MG tablet     metFORMIN (GLUCOPHAGE) 1000 MG tablet     methylPREDNISolone (MEDROL DOSEPAK) 4 MG tablet therapy pack     multivitamin w/minerals (MULTI-VITAMIN) tablet     predniSONE (DELTASONE) 20 MG tablet     predniSONE (DELTASONE) 20 MG tablet     scopolamine (TRANSDERM) 1 MG/3DAYS 72 hr patch     Vitamin D, Cholecalciferol, 25 MCG (1000 UT) TABS     Current Facility-Administered Medications   Medication     aspirin (ASA) tablet 325 mg     Allergies:  Allergies   Allergen Reactions     Seasonal Allergies      Physical Exam:       /62   Pulse 75   Resp 17   Ht 1.518 m (4' 11.75\")   Wt 71.5 kg (157 lb 9.6 oz)   SpO2 98%   BMI 31.04 kg/m      General: Sitting up, NAD  HEENT: anicteric  Chest: CTAB, no wheezing  Cardiac: RRR no murmurs  Abdomen: Soft, obese, bowel sounds present  Extremities: No LE Edema  Neuro: A&Ox3, no focal deficits   Skin: no rash noted  Psych: Appropriate    Laboratory, imaging, and microbiologic data:    All laboratory and imaging data reviewed, pertinent results discussed above.    Most Recent Breeze Pulmonary Function Testing    FVC-Pred   Date Value Ref Range Status   05/04/2022 2.37 L      FVC-Pre   Date Value Ref Range Status   05/04/2022 1.89 L      FVC-%Pred-Pre   Date Value Ref Range Status   05/04/2022 79 %      FEV1-Pre   Date Value Ref Range Status   05/04/2022 1.39 L      FEV1-%Pred-Pre   Date Value Ref Range Status   05/04/2022 74 %      FEV1FVC-Pred "   Date Value Ref Range Status   05/04/2022 79 %      FEV1FVC-Pre   Date Value Ref Range Status   05/04/2022 73 %      No results found for: 20029  FEFMax-Pred   Date Value Ref Range Status   05/04/2022 4.91 L/sec      FEFMax-Pre   Date Value Ref Range Status   05/04/2022 3.75 L/sec      FEFMax-%Pred-Pre   Date Value Ref Range Status   05/04/2022 76 %      ExpTime-Pre   Date Value Ref Range Status   05/04/2022 7.07 sec      FIFMax-Pre   Date Value Ref Range Status   05/04/2022 3.53 L/sec      FEV1FEV6-Pred   Date Value Ref Range Status   05/04/2022 79 %      FEV1FEV6-Pre   Date Value Ref Range Status   05/04/2022 74 %      No results found for: 20055

## 2022-05-04 NOTE — NURSING NOTE
Chief Complaint   Patient presents with     RECHECK     Return follow up     Medications reviewed and vital signs taken.   Tisha Desir, RAUL

## 2022-05-05 LAB
EXPTIME-PRE: 7.07 SEC
FEF2575-%PRED-POST: 79 %
FEF2575-%PRED-PRE: 58 %
FEF2575-POST: 1.33 L/SEC
FEF2575-PRE: 0.98 L/SEC
FEF2575-PRED: 1.67 L/SEC
FEFMAX-%PRED-PRE: 76 %
FEFMAX-PRE: 3.75 L/SEC
FEFMAX-PRED: 4.91 L/SEC
FEV1-%PRED-PRE: 74 %
FEV1-PRE: 1.39 L
FEV1FEV6-PRE: 74 %
FEV1FEV6-PRED: 79 %
FEV1FVC-PRE: 73 %
FEV1FVC-PRED: 79 %
FIFMAX-PRE: 3.53 L/SEC
FVC-%PRED-PRE: 79 %
FVC-PRE: 1.89 L
FVC-PRED: 2.37 L

## 2022-05-06 ENCOUNTER — OFFICE VISIT (OUTPATIENT)
Dept: FAMILY MEDICINE | Facility: CLINIC | Age: 72
End: 2022-05-06
Payer: COMMERCIAL

## 2022-05-06 VITALS
SYSTOLIC BLOOD PRESSURE: 110 MMHG | RESPIRATION RATE: 16 BRPM | DIASTOLIC BLOOD PRESSURE: 68 MMHG | WEIGHT: 155.8 LBS | HEIGHT: 60 IN | BODY MASS INDEX: 30.59 KG/M2 | TEMPERATURE: 98.4 F | OXYGEN SATURATION: 98 % | HEART RATE: 72 BPM

## 2022-05-06 DIAGNOSIS — R20.9 COLD FEET: ICD-10-CM

## 2022-05-06 DIAGNOSIS — M06.9 RHEUMATOID ARTHRITIS INVOLVING BOTH HANDS, UNSPECIFIED WHETHER RHEUMATOID FACTOR PRESENT (H): ICD-10-CM

## 2022-05-06 DIAGNOSIS — M81.0 AGE RELATED OSTEOPOROSIS, UNSPECIFIED PATHOLOGICAL FRACTURE PRESENCE: ICD-10-CM

## 2022-05-06 DIAGNOSIS — I10 ESSENTIAL HYPERTENSION: ICD-10-CM

## 2022-05-06 DIAGNOSIS — K21.00 GASTROESOPHAGEAL REFLUX DISEASE WITH ESOPHAGITIS WITHOUT HEMORRHAGE: ICD-10-CM

## 2022-05-06 DIAGNOSIS — E55.9 VITAMIN D DEFICIENCY: ICD-10-CM

## 2022-05-06 DIAGNOSIS — E87.6 HYPOKALEMIA: ICD-10-CM

## 2022-05-06 DIAGNOSIS — Z78.0 ENCOUNTER FOR OSTEOPOROSIS SCREENING IN ASYMPTOMATIC POSTMENOPAUSAL PATIENT: ICD-10-CM

## 2022-05-06 DIAGNOSIS — Z13.820 ENCOUNTER FOR OSTEOPOROSIS SCREENING IN ASYMPTOMATIC POSTMENOPAUSAL PATIENT: ICD-10-CM

## 2022-05-06 DIAGNOSIS — Z00.00 ENCOUNTER FOR MEDICARE ANNUAL WELLNESS EXAM: Primary | ICD-10-CM

## 2022-05-06 DIAGNOSIS — J45.40 MODERATE PERSISTENT ASTHMA, UNSPECIFIED WHETHER COMPLICATED: ICD-10-CM

## 2022-05-06 DIAGNOSIS — E11.9 TYPE 2 DIABETES MELLITUS WITHOUT COMPLICATION, WITHOUT LONG-TERM CURRENT USE OF INSULIN (H): ICD-10-CM

## 2022-05-06 PROBLEM — R07.9 CHEST PAIN, UNSPECIFIED TYPE: Status: RESOLVED | Noted: 2021-11-12 | Resolved: 2022-05-06

## 2022-05-06 PROBLEM — K50.919 CROHN'S DISEASE WITH COMPLICATION, UNSPECIFIED GASTROINTESTINAL TRACT LOCATION (H): Status: RESOLVED | Noted: 2021-04-13 | Resolved: 2022-05-06

## 2022-05-06 LAB
HBA1C MFR BLD: 6.6 % (ref 0–5.6)
HOLD SPECIMEN: NORMAL

## 2022-05-06 PROCEDURE — 36415 COLL VENOUS BLD VENIPUNCTURE: CPT | Performed by: FAMILY MEDICINE

## 2022-05-06 PROCEDURE — 83036 HEMOGLOBIN GLYCOSYLATED A1C: CPT | Performed by: FAMILY MEDICINE

## 2022-05-06 PROCEDURE — 99397 PER PM REEVAL EST PAT 65+ YR: CPT | Performed by: FAMILY MEDICINE

## 2022-05-06 PROCEDURE — 80048 BASIC METABOLIC PNL TOTAL CA: CPT | Performed by: FAMILY MEDICINE

## 2022-05-06 PROCEDURE — 82043 UR ALBUMIN QUANTITATIVE: CPT | Performed by: FAMILY MEDICINE

## 2022-05-06 PROCEDURE — 84443 ASSAY THYROID STIM HORMONE: CPT | Performed by: FAMILY MEDICINE

## 2022-05-06 PROCEDURE — 99214 OFFICE O/P EST MOD 30 MIN: CPT | Mod: 25 | Performed by: FAMILY MEDICINE

## 2022-05-06 PROCEDURE — 82306 VITAMIN D 25 HYDROXY: CPT | Performed by: FAMILY MEDICINE

## 2022-05-06 RX ORDER — ESOMEPRAZOLE MAGNESIUM 40 MG/1
40 CAPSULE, DELAYED RELEASE ORAL
Qty: 90 CAPSULE | Refills: 2 | Status: SHIPPED | OUTPATIENT
Start: 2022-05-06 | End: 2023-05-08

## 2022-05-06 RX ORDER — ATORVASTATIN CALCIUM 10 MG/1
10 TABLET, FILM COATED ORAL DAILY
Qty: 90 TABLET | Refills: 3 | Status: SHIPPED | OUTPATIENT
Start: 2022-05-06 | End: 2023-05-08

## 2022-05-06 RX ORDER — AMLODIPINE BESYLATE 2.5 MG/1
2.5 TABLET ORAL DAILY
Qty: 90 TABLET | Refills: 1 | Status: SHIPPED | OUTPATIENT
Start: 2022-05-06 | End: 2022-11-07

## 2022-05-06 RX ORDER — BUDESONIDE AND FORMOTEROL FUMARATE DIHYDRATE 160; 4.5 UG/1; UG/1
2 AEROSOL RESPIRATORY (INHALATION) 2 TIMES DAILY
Qty: 10.2 G | Refills: 4 | Status: CANCELLED | OUTPATIENT
Start: 2022-05-06

## 2022-05-06 RX ORDER — LOSARTAN POTASSIUM 100 MG/1
100 TABLET ORAL DAILY
Qty: 90 TABLET | Refills: 1 | Status: SHIPPED | OUTPATIENT
Start: 2022-05-06 | End: 2022-11-07

## 2022-05-06 ASSESSMENT — ASTHMA QUESTIONNAIRES
QUESTION_3 LAST FOUR WEEKS HOW OFTEN DID YOUR ASTHMA SYMPTOMS (WHEEZING, COUGHING, SHORTNESS OF BREATH, CHEST TIGHTNESS OR PAIN) WAKE YOU UP AT NIGHT OR EARLIER THAN USUAL IN THE MORNING: ONCE OR TWICE
QUESTION_2 LAST FOUR WEEKS HOW OFTEN HAVE YOU HAD SHORTNESS OF BREATH: ONCE OR TWICE A WEEK
ACT_TOTALSCORE: 21
QUESTION_1 LAST FOUR WEEKS HOW MUCH OF THE TIME DID YOUR ASTHMA KEEP YOU FROM GETTING AS MUCH DONE AT WORK, SCHOOL OR AT HOME: NONE OF THE TIME
QUESTION_4 LAST FOUR WEEKS HOW OFTEN HAVE YOU USED YOUR RESCUE INHALER OR NEBULIZER MEDICATION (SUCH AS ALBUTEROL): ONCE A WEEK OR LESS
ACT_TOTALSCORE: 21
QUESTION_5 LAST FOUR WEEKS HOW WOULD YOU RATE YOUR ASTHMA CONTROL: WELL CONTROLLED

## 2022-05-06 ASSESSMENT — PAIN SCALES - GENERAL: PAINLEVEL: MILD PAIN (3)

## 2022-05-06 NOTE — PATIENT INSTRUCTIONS
Calcium 600 mg twice a day     Get your bone density           Patient Education   Personalized Prevention Plan  You are due for the preventive services outlined below.  Your care team is available to assist you in scheduling these services.  If you have already completed any of these items, please share that information with your care team to update in your medical record.  Health Maintenance Due   Topic Date Due    Osteoporosis Screening  Never done    URINE DRUG SCREEN  Never done    Asthma Action Plan - yearly  Never done    Discuss Advance Care Planning  Never done    Eye Exam  Never done    Zoster (Shingles) Vaccine (1 of 2) Never done    Annual Wellness Visit  Never done    Diptheria Tetanus Pertussis (DTAP/TDAP/TD) Vaccine (1 - Tdap) 08/20/2021    Kidney Microalbumin Urine Test  02/09/2022    Asthma Control Test  02/20/2022    A1C Lab  04/04/2022    FALL RISK ASSESSMENT  04/13/2022    ANNUAL REVIEW OF HM ORDERS  05/11/2022     Preventive Health Recommendations    See your health care provider every year to  Review health changes.   Discuss preventive care.    Review your medicines if your doctor has prescribed any.  You no longer need a yearly Pap test unless you've had an abnormal Pap test in the past 10 years. If you have vaginal symptoms, such as bleeding or discharge, be sure to talk with your provider about a Pap test.  Every 1 to 2 years, have a mammogram.  If you are over 69, talk with your health care provider about whether or not you want to continue having screening mammograms.  Every 10 years, have a colonoscopy. Or, have a yearly FIT test (stool test). These exams will check for colon cancer.   Have a cholesterol test every 5 years, or more often if your doctor advises it.   Have a diabetes test (fasting glucose) every three years. If you are at risk for diabetes, you should have this test more often.   At age 65, have a bone density scan (DEXA) to check for osteoporosis (brittle bone  disease).    Shots:  Get a flu shot each year.  Get a tetanus shot every 10 years.  Talk to your doctor about your pneumonia vaccines. There are now two you should receive - Pneumovax (PPSV 23) and Prevnar (PCV 13).  Talk to your pharmacist about the shingles vaccine.  Talk to your doctor about the hepatitis B vaccine.    Nutrition:   Eat at least 5 servings of fruits and vegetables each day.  Eat whole-grain bread, whole-wheat pasta and brown rice instead of white grains and rice.  Get adequate Calcium and Vitamin D.     Lifestyle  Exercise at least 150 minutes a week (30 minutes a day, 5 days a week). This will help you control your weight and prevent disease.  Limit alcohol to one drink per day.  No smoking.   Wear sunscreen to prevent skin cancer.   See your dentist twice a year for an exam and cleaning.  See your eye doctor every 1 to 2 years to screen for conditions such as glaucoma, macular degeneration and cataracts.    Personalized Prevention Plan  You are due for the preventive services outlined below.  Your care team is available to assist you in scheduling these services.  If you have already completed any of these items, please share that information with your care team to update in your medical record.  Health Maintenance   Topic Date Due    DEXA  Never done    URINE DRUG SCREEN  Never done    ASTHMA ACTION PLAN  Never done    ADVANCE CARE PLANNING  Never done    EYE EXAM  Never done    ZOSTER IMMUNIZATION (1 of 2) Never done    MEDICARE ANNUAL WELLNESS VISIT  Never done    DTAP/TDAP/TD IMMUNIZATION (1 - Tdap) 08/20/2021    MICROALBUMIN  02/09/2022    ASTHMA CONTROL TEST  02/20/2022    A1C  04/04/2022    FALL RISK ASSESSMENT  04/13/2022    ANNUAL REVIEW OF HM ORDERS  05/11/2022    DIABETIC FOOT EXAM  08/20/2022    INFLUENZA VACCINE (Season Ended) 09/01/2022    LIPID  11/13/2022    BMP  01/04/2023    MAMMO SCREENING  06/09/2023    COLORECTAL CANCER SCREENING  09/20/2031    HEPATITIS C SCREENING   Completed    PHQ-2 (once per calendar year)  Completed    Pneumococcal Vaccine: 65+ Years  Completed    COVID-19 Vaccine  Completed    IPV IMMUNIZATION  Aged Out    MENINGITIS IMMUNIZATION  Aged Out

## 2022-05-06 NOTE — PROGRESS NOTES
"  SUBJECTIVE:   Carli Monreal is a 71 year old female who presents for Preventive Visit.      Patient has been advised of split billing requirements and indicates understanding: Yes  Are you in the first 12 months of your Medicare Part B coverage?  No    Physical Health:    In general, how would you rate your overall physical health? good    Outside of work, how many days during the week do you exercise? 2-3 days/week    Outside of work, approximately how many minutes a day do you exercise?15-30 minutes    If you drink alcohol do you typically have >3 drinks per day or >7 drinks per week? No    Do you usually eat at least 4 servings of fruit and vegetables a day, include whole grains & fiber and avoid regularly eating high fat or \"junk\" foods? Yes    Do you have any problems taking medications regularly?  No    Do you have any side effects from medications? none    Needs assistance for the following daily activities: no assistance needed    Which of the following safety concerns are present in your home?  none identified     Hearing impairment: No    In the past 6 months, have you been bothered by leaking of urine? no    Mental Health:    In general, how would you rate your overall mental or emotional health? excellent  PHQ-2 Score:      Do you feel safe in your environment? Yes    Have you ever done Advance Care Planning? (For example, a Health Directive, POLST, or a discussion with a medical provider or your loved ones about your wishes): Yes, patient states has an Advance Care Planning document and will bring a copy to the clinic.    Additional concerns to address?  No    Fall risk:  Fallen 2 or more times in the past year?: No  Any fall with injury in the past year?: No    Cognitive Screenin) Repeat 3 items (Leader, Season, Table)    2) Clock draw: NORMAL  3) 3 item recall: Recalls 3 objects  Results: 3 items recalled: COGNITIVE IMPAIRMENT LESS LIKELY    Mini-CogTM Copyright S Madelyn. Licensed by the " author for use in [facility]; reprinted with permission (nely@[email or web address]). All rights reserved.      Do you have sleep apnea, excessive snoring or daytime drowsiness?: no    Diabetes Follow-up    How often are you checking your blood sugar? One time daily  What time of day are you checking your blood sugars (select all that apply)?  Before meals  Have you had any blood sugars above 200?  No- 124 today  Have you had any blood sugars below 70?  No    What symptoms do you notice when your blood sugar is low?  Shaky, Dizzy and Weak    What concerns do you have today about your diabetes? None     Do you have any of these symptoms? (Select all that apply)  Numbness in feet    Have you had a diabetic eye exam in the last 12 months? No     Metformin 1000 mg twice daily  Lab Results   Component Value Date    A1C 6.6 05/06/2022    A1C 6.8 01/04/2022    A1C 5.6 08/20/2021    A1C 7.0 04/13/2021    A1C 8.4 02/09/2021       Gabapentin 100 mg at at bedtime for nerve pain and hip pain.  She has SI joint pain.      Hyperlipidemia Follow-Up      Are you regularly taking any medication or supplement to lower your cholesterol?   Yes- atorvastatin    Are you having muscle aches or other side effects that you think could be caused by your cholesterol lowering medication?  No   LDL Cholesterol Calculated   Date Value Ref Range Status   11/13/2021 19 <=100 mg/dL Final   02/09/2021 63.7 0.0 - 130.0 mg/dL Final           Hypertension Follow-up      Do you check your blood pressure regularly outside of the clinic? No     Are you following a low salt diet? Yes    Are your blood pressures ever more than 140 on the top number (systolic) OR more   than 90 on the bottom number (diastolic), for example 140/90? No   Cozaar 100 mg daily and Norvasc 2.5 mg daily    BP Readings from Last 2 Encounters:   05/06/22 110/68   05/04/22 114/62     Hemoglobin A1C POCT (%)   Date Value   04/13/2021 7.0 (H)   02/09/2021 8.4 (A)     Hemoglobin A1C (%)    Date Value   2022 6.6 (H)   2022 6.8 (H)     LDL Cholesterol Calculated (mg/dL)   Date Value   2021 19   2021 63.7       Asthma Follow-Up    Was ACT completed today?  Symbicort and albuterol as needed  Yes    ACT Total Scores 2022   ACT TOTAL SCORE (Goal Greater than or Equal to 20) 21   In the past 12 months, how many times did you visit the emergency room for your asthma without being admitted to the hospital? 0   In the past 12 months, how many times were you hospitalized overnight because of your asthma? 0          How many days per week do you miss taking your asthma controller medication?  0    Please describe any recent triggers for your asthma: cold air    Have you had any Emergency Room Visits, Urgent Care Visits, or Hospital Admissions since your last office visit?  No    She has a history of hypokalemia likely related to her Crohn's disease.  She takes potasium dailiy.      She has rheumatoid arthritis and has not on a DMARD.  She is getting steroid shot at the base of her right thumb.  The pain is in the TIT joint not the first CMC joint.  She was on cimzia injections in [place] a dmard.  She has been on remicade and humira in the past.  She has had 4 covid shots.      She has a history of osteoporosis and was being treated for this with infusions in [place].  She is no longer taking calcium because she did not like it and she is not eating dairy because of GI discomfort.  She has not gotten a bone density in [place].    She complains of cold feet especially at night.    Reviewed and updated as needed this visit by clinical staff   Tobacco  Allergies  Meds   Med Hx  Surg Hx  Fam Hx  Soc Hx          Reviewed and updated as needed this visit by Provider                   Social History     Tobacco Use     Smoking status: Former Smoker     Types: Cigarettes     Quit date: 1991     Years since quittin.0     Smokeless tobacco: Never Used   Substance Use Topics      Alcohol use: Yes     Comment: Wine- couple x/ week                           Current providers sharing in care for this patient include:   Patient Care Team:  Iza Cage DO as PCP - General (Family Medicine)  Kj Rasheed MD as MD (Pulmonary Disease)  Iza Cage DO as Assigned PCP  Keesha Mancuso MD as MD (Gastroenterology)  Mc Martinez MD as Assigned Rheumatology Provider  Artur Vergara MD as Assigned Musculoskeletal Provider  Keesha Mancuso MD as Assigned Gastroenterology Provider    The following health maintenance items are reviewed in Epic and correct as of today:  Health Maintenance   Topic Date Due     DEXA  Never done     ASTHMA ACTION PLAN  Never done     EYE EXAM  Never done     ZOSTER IMMUNIZATION (1 of 2) Never done     DTAP/TDAP/TD IMMUNIZATION (1 - Tdap) 08/20/2021     MICROALBUMIN  02/09/2022     FALL RISK ASSESSMENT  04/13/2022     A1C  08/06/2022     DIABETIC FOOT EXAM  08/20/2022     INFLUENZA VACCINE (Season Ended) 09/01/2022     ASTHMA CONTROL TEST  11/06/2022     LIPID  11/13/2022     BMP  01/04/2023     MEDICARE ANNUAL WELLNESS VISIT  05/06/2023     ANNUAL REVIEW OF HM ORDERS  05/06/2023     MAMMO SCREENING  06/09/2023     ADVANCE CARE PLANNING  05/06/2027     COLORECTAL CANCER SCREENING  09/20/2031     HEPATITIS C SCREENING  Completed     PHQ-2 (once per calendar year)  Completed     Pneumococcal Vaccine: 65+ Years  Completed     COVID-19 Vaccine  Completed     IPV IMMUNIZATION  Aged Out     MENINGITIS IMMUNIZATION  Aged Out     URINE DRUG SCREEN  Discontinued     BP Readings from Last 3 Encounters:   05/06/22 110/68   05/04/22 114/62   04/18/22 132/80    Wt Readings from Last 3 Encounters:   05/06/22 70.7 kg (155 lb 12.8 oz)   05/04/22 71.5 kg (157 lb 9.6 oz)   04/18/22 71.4 kg (157 lb 6.4 oz)                  Pneumonia Vaccine:For adults 65 years or older who do not have an immunocompromising condition, cerebrospinal fluid leak, or cochlear implant and want  "to receive PPSV23 ONLY: Administer 1 dose of PPSV23. Anyone who received any doses of PPSV23 before age 65 should receive 1 final dose of the vaccine at age 65 or older. Administer this last dose at least 5 years after the prior PPSV23 dose.    ROS:  Constitutional, HEENT, cardiovascular, pulmonary, GI, , musculoskeletal, neuro, skin, endocrine and psych systems are negative, except as otherwise noted.    OBJECTIVE:   /68 (BP Location: Right arm, Patient Position: Sitting, Cuff Size: Adult Regular)   Pulse 72   Temp 98.4  F (36.9  C) (Oral)   Resp 16   Ht 1.518 m (4' 11.75\")   Wt 70.7 kg (155 lb 12.8 oz)   SpO2 98%   BMI 30.68 kg/m   Estimated body mass index is 30.68 kg/m  as calculated from the following:    Height as of this encounter: 1.518 m (4' 11.75\").    Weight as of this encounter: 70.7 kg (155 lb 12.8 oz).  EXAM:   GENERAL: healthy, alert and no distress  EYES: Eyes grossly normal to inspection, PERRL and conjunctivae and sclerae normal  HENT: ear canals and TM's normal, nose and mouth without ulcers or lesions  NECK: no adenopathy, no asymmetry, masses, or scars and thyroid normal to palpation  RESP: lungs clear to auscultation - no rales, rhonchi or wheezes  BREAST: normal without masses, tenderness or nipple discharge and no palpable axillary masses or adenopathy  CV: regular rate and rhythm, normal S1 S2, no S3 or S4, no murmur, click or rub, no peripheral edema and peripheral pulses strong  ABDOMEN: soft, nontender, no hepatosplenomegaly, no masses and bowel sounds normal  MS: arthritis of the right PIP joints and the wrist  SKIN: no suspicious lesions or rashes  NEURO: Normal strength and tone, mentation intact and speech normal  PSYCH: mentation appears normal, affect normal/bright    Diagnostic Test Results:  Labs reviewed in Epic    ASSESSMENT / PLAN:   (Z00.00) Encounter for Medicare annual wellness exam  (primary encounter diagnosis)  Comment:   Plan: I counseled the patient on " the importance of the flu shot in the fall.  We also discussed that shingles shot    (E11.9) Type 2 diabetes mellitus without complication, without long-term current use of insulin (H)  Comment: The current medical regimen is effective;  continue present plan and medications.    Plan: OPTOMETRY REFERRAL, Albumin Random Urine         Quantitative with Creat Ratio, HEMOGLOBIN A1C,         atorvastatin (LIPITOR) 10 MG tablet, metFORMIN         (GLUCOPHAGE) 1000 MG tablet, Basic metabolic         panel  (Ca, Cl, CO2, Creat, Gluc, K, Na, BUN),         OFFICE/OUTPT VISIT,EST,LEVL IV            (I10) Essential hypertension  Comment: The current medical regimen is effective;  continue present plan and medications.    Plan: amLODIPine (NORVASC) 2.5 MG tablet, losartan         (COZAAR) 100 MG tablet, Basic metabolic panel          (Ca, Cl, CO2, Creat, Gluc, K, Na, BUN),         OFFICE/OUTPT VISIT,EST,LEVL IV            (J45.40) Moderate persistent asthma, unspecified whether complicated  Comment: The current medical regimen is effective;  continue present plan and medications.  She just saw pulmonology and they told her to take her Symbicort and albuterol as needed  Plan: OFFICE/OUTPT VISIT,EST,LEVL IV            (M06.9) Rheumatoid arthritis involving both hands, unspecified whether rheumatoid factor present (H)  Comment: The patient was being treated for rheumatoid arthritis in [place] and needs to establish care with a new otologist in [place].  She is not on a DMARD currently past but in the past.  Plan: Adult Rheumatology  Referral,         OFFICE/OUTPT VISIT,EST,LEVL IV            (E87.6) Hypokalemia  Comment: Potassium daily.  She likely has hypokalemia due to her Crohn's disease  Plan: OFFICE/OUTPT VISIT,EST,LEVL IV            (K21.00) Gastroesophageal reflux disease with esophagitis without hemorrhage  Comment: The current medical regimen is effective;  continue present plan and medications.  Plan:  "esomeprazole (NEXIUM) 40 MG DR capsule,         OFFICE/OUTPT VISIT,EST,LEVL IV            (M81.0) Age related osteoporosis, unspecified pathological fracture presence  Comment: I counseled the patient to restart calcium 600 mg twice a day and to get a bone density scan  Plan: OFFICE/OUTPT VISIT,EST,LEVL IV            (Z13.820,  Z78.0) Encounter for osteoporosis screening in asymptomatic postmenopausal patient  Comment:   Plan: DX Hip/Pelvis/Spine            (E55.9) Vitamin D deficiency  Comment: Patient states her vitamin D has been low in the past despite taking supplementation.  She is currently taking vitamin D 2000 mg daily  Plan: Vitamin D Deficiency            (R20.9) Cold feet  Comment: We will check thyroid likely just poor circulation plan: TSH with free T4 reflex, OFFICE/OUTPT         VISIT,EST,LEVL IV              COUNSELING:  Reviewed preventive health counseling, as reflected in patient instructions       Regular exercise       Healthy diet/nutrition       Immunizations    Declined: Influenza and Zoster due to Other uncertain               Osteoporosis prevention/bone health       Colon cancer screening    Estimated body mass index is 30.68 kg/m  as calculated from the following:    Height as of this encounter: 1.518 m (4' 11.75\").    Weight as of this encounter: 70.7 kg (155 lb 12.8 oz).    Weight management plan: Discussed healthy diet and exercise guidelines    She reports that she quit smoking about 31 years ago. Her smoking use included cigarettes. She has never used smokeless tobacco.    Appropriate preventive services were discussed with this patient, including applicable screening as appropriate for cardiovascular disease, diabetes, osteopenia/osteoporosis, and glaucoma.  As appropriate for age/gender, discussed screening for colorectal cancer, prostate cancer, breast cancer, and cervical cancer. Checklist reviewing preventive services available has been given to the patient.    Reviewed " patients plan of care and provided an AVS. The Basic Care Plan (routine screening as documented in Health Maintenance) for Carli meets the Care Plan requirement. This Care Plan has been established and reviewed with the Patient.    Counseling Resources:  ATP IV Guidelines  Pooled Cohorts Equation Calculator  Breast Cancer Risk Calculator  BRCA-Related Cancer Risk Assessment: FHS-7 Tool  FRAX Risk Assessment  ICSI Preventive Guidelines  Dietary Guidelines for Americans, 2010  USDA's MyPlate  ASA Prophylaxis  Lung CA Screening    DO JJ Pereira [facility]

## 2022-05-07 LAB
ANION GAP SERPL CALCULATED.3IONS-SCNC: 9 MMOL/L (ref 3–14)
BUN SERPL-MCNC: 7 MG/DL (ref 7–30)
CALCIUM SERPL-MCNC: 8.7 MG/DL (ref 8.5–10.1)
CHLORIDE BLD-SCNC: 109 MMOL/L (ref 94–109)
CO2 SERPL-SCNC: 21 MMOL/L (ref 20–32)
CREAT SERPL-MCNC: 0.71 MG/DL (ref 0.52–1.04)
CREAT UR-MCNC: 98 MG/DL
GFR SERPL CREATININE-BSD FRML MDRD: >90 ML/MIN/1.73M2
GLUCOSE BLD-MCNC: 136 MG/DL (ref 70–99)
MICROALBUMIN UR-MCNC: 220 MG/L
MICROALBUMIN/CREAT UR: 224.49 MG/G CR (ref 0–25)
POTASSIUM BLD-SCNC: 4.4 MMOL/L (ref 3.4–5.3)
SODIUM SERPL-SCNC: 139 MMOL/L (ref 133–144)
TSH SERPL DL<=0.005 MIU/L-ACNC: 1.56 MU/L (ref 0.4–4)

## 2022-05-09 ENCOUNTER — TELEPHONE (OUTPATIENT)
Dept: FAMILY MEDICINE | Facility: CLINIC | Age: 72
End: 2022-05-09
Payer: COMMERCIAL

## 2022-05-09 ENCOUNTER — MYC MEDICAL ADVICE (OUTPATIENT)
Dept: FAMILY MEDICINE | Facility: CLINIC | Age: 72
End: 2022-05-09
Payer: COMMERCIAL

## 2022-05-09 ENCOUNTER — TELEPHONE (OUTPATIENT)
Dept: MULTI SPECIALTY CLINIC | Facility: CLINIC | Age: 72
End: 2022-05-09

## 2022-05-09 LAB — DEPRECATED CALCIDIOL+CALCIFEROL SERPL-MC: 27 UG/L (ref 20–75)

## 2022-05-09 NOTE — TELEPHONE ENCOUNTER
Called pt regarding new rheumatology referral, requesting clarification because she was actually already seen by one of our rheumatologists back in May of 2021 (Dr. Martinez). She reports that she left that visit feeling more confused than ever: said she had been treated for rheumatoid arthritis for years while in [place], but then when she met with Dr. Martinez, pt reports he told her she didn't have rheumatoid arthritis. She also reports that he really didn't do anything or recommend any treatment options for her.     Per Dr. Martinez's notes, it appears one of the things he had recommended was for her to meet with a gastroenterologist to discuss treatment options for her uncontrolled Crohn's disease. Since many of the medications used to treat Crohn's also help with autoimmune arthritis conditions, Dr. Martinez wanted her to first be evaluated by GI (since her GI issues are more of a problem than her arthritis) to see what medications/treatment options they would recommend. He noted that if her arthritis pain was still not well-controlled after initiating treatment for Crohn's, then he would consider adding other medications.     Pt did have a virtual visit with a gastroenterologist 6/16/21, but she reports that doctor (Dr. Keesha Bess)  also did not do much or recommend any medications/treatment options. Per GI doctor's notes, it looks like several tests were ordered (including colonoscopy and stool studies), and pt was to come back for a follow-up appointment in two months (which she did not come for). Informed pt that any treatment options would likely have been discussed at her follow-up appointment, but pt questions if that is true or not.     As of right now, pt is still experiencing symptoms of both Crohn's (diarrhea) and Rheumatoid Arthritis (joint pain), but she would like to wait for now to see if these things will improve on their own. This decision is based partly on the fact that she will be leaving  the country on May 26th, 2022 and will be gone through the end of June, and does not think she would be able to get any appointments before then.     If she is still having symptoms when she returns, she is requesting clarification on what she should do--schedule an appt with a rheumatologist or with her gastroenterologist?     Pt would prefer any responses to this be sent directly to her via [employer].     Encounter routed to Dr. Mc Martinez (rheumatology) and Dr. Keesha Mancuso (gastroenterology).

## 2022-05-09 NOTE — TELEPHONE ENCOUNTER
Routing My Chart message to PCP    Responding to your message regarding weight loss    Didier Kim, RN, BSN, PHN  [facility]

## 2022-05-09 NOTE — TELEPHONE ENCOUNTER
----- Message from Marina Recinos MD sent at 5/6/2022 12:36 PM CDT -----  Regarding: wieght loss  Hi Dr. Cage,    My name is Marina Recinos and I am one of the Pulmonary fellows who has seen Ms. Monreal in clinic for management of her dyspnea and cough. At her recent visit with me on Wednesday I noted that she had lost about 15 lbs in the last year and she said she has not been trying to lose weight but has had less of an appetite. No fevers, chills, night sweats, or immediately concerning symptoms. I did discuss that unintentional weight loss can sometimes be a sign of illness and that we will need to watch to make sure she does not continue to lose weight. She is a former smoker but does not have a smoking history that would qualify for lung cancer screening. I know she relatively recently got a colonoscopy based on her Crohn's history. Not sure what the status is of some of her other cancer screenings.     Wanted to make you aware so that you can also keep an eye on her weight. Sorry as I meant to get this message to you prior to your visit with her today. Please let me know if you have any other thoughts or questions.    Thanks a lot,  Marina

## 2022-05-09 NOTE — TELEPHONE ENCOUNTER
Wt Readings from Last 4 Encounters:   05/06/22 70.7 kg (155 lb 12.8 oz)   05/04/22 71.5 kg (157 lb 9.6 oz)   04/18/22 71.4 kg (157 lb 6.4 oz)   02/21/22 71.4 kg (157 lb 6.4 oz)     Patient's weight a year ago was 171 pounds.    I sent the patient a [employer] message regarding this.    Iza Cage DO

## 2022-05-10 NOTE — TELEPHONE ENCOUNTER
"Per Dr. Martinez:     \"Have her make a follow up appointment with me now next available.     She was to follow up with the GI clinic for consideration of treatment of her Crohns disease. She needs to understand that Crohns needs to be well controlled in order for her joints to improve.\"       Pt did not want any calls back about this because she already feels overwhelmed with everything she needs to do to leave the country, and doesn't plan on addressing these issues until after she returns.   Will send her a message on [employer] informing her of recommendation to schedule next available appointment with Dr. Martinez (first available is not until November 2022).     Detailed message sent to pt via [employer].   The main thing she did not seem to understand is that she needed to follow her gastroenterologist's recommendations and schedule an appointment with them for AFTER she had all of her testing done.   Tried explaining to patient that they would not have been able to prescribe medications without doing a complete evaluation, and those tests she had were a part of that evaluation-which is why she needed to meet with them again after they were all done. Will reiterate this to her.   "

## 2022-06-17 ENCOUNTER — MYC MEDICAL ADVICE (OUTPATIENT)
Dept: FAMILY MEDICINE | Facility: CLINIC | Age: 72
End: 2022-06-17
Payer: COMMERCIAL

## 2022-08-03 DIAGNOSIS — E11.9 TYPE 2 DIABETES MELLITUS WITHOUT COMPLICATION, WITHOUT LONG-TERM CURRENT USE OF INSULIN (H): ICD-10-CM

## 2022-08-03 RX ORDER — LANCETS 33 GAUGE
EACH MISCELLANEOUS
Qty: 100 EACH | Refills: 1 | Status: SHIPPED | OUTPATIENT
Start: 2022-08-03 | End: 2023-03-13

## 2022-08-03 NOTE — TELEPHONE ENCOUNTER
Prescription approved per [facility] Refill Protocol.    Liliam Medina RN BSN  [facility]

## 2022-08-05 DIAGNOSIS — E11.9 TYPE 2 DIABETES MELLITUS WITHOUT COMPLICATION, WITHOUT LONG-TERM CURRENT USE OF INSULIN (H): ICD-10-CM

## 2022-08-05 RX ORDER — BLOOD SUGAR DIAGNOSTIC
STRIP MISCELLANEOUS
Qty: 100 STRIP | Refills: 0 | Status: SHIPPED | OUTPATIENT
Start: 2022-08-05 | End: 2022-11-28

## 2022-08-05 NOTE — TELEPHONE ENCOUNTER
"Requested Prescriptions   Signed Prescriptions Disp Refills    blood glucose (ONETOUCH VERIO IQ) test strip 100 strip 0     Sig: USE TO TEST BLOOD GLUCOSE ONCE DAILY       Diabetic Supplies Protocol Passed - 8/5/2022  1:31 PM        Passed - Medication is active on med list        Passed - Patient is 18 years of age or older        Passed - Recent (6 mo) or future (30 days) visit within the authorizing provider's specialty     Patient had office visit in the last 6 months or has a visit in the next 30 days with authorizing provider.  See \"Patient Info\" tab in inbasket, or \"Choose Columns\" in Meds & Orders section of the refill encounter.               Thanks,  CHARLES Tran  [facility]     "

## 2022-10-10 ENCOUNTER — TELEPHONE (OUTPATIENT)
Dept: FAMILY MEDICINE | Facility: CLINIC | Age: 72
End: 2022-10-10

## 2022-10-10 NOTE — TELEPHONE ENCOUNTER
Spoke with patient. Patient calling regarding swelling on right hand. Patient states she fell on her hand a year ago, and was diagnosed with arthritis. Patient would like to be seen by PCP regarding her swelling to discuss medication options to decrease swelling. Writer assisted with scheduling soonest virtual appointment on 10/11. Patient verbalized understanding and has no further questions at this time.    Juan Wheeler RN  [facility]

## 2022-10-11 ENCOUNTER — VIRTUAL VISIT (OUTPATIENT)
Dept: FAMILY MEDICINE | Facility: CLINIC | Age: 72
End: 2022-10-11
Payer: COMMERCIAL

## 2022-10-11 DIAGNOSIS — E11.9 TYPE 2 DIABETES MELLITUS WITHOUT COMPLICATION, WITHOUT LONG-TERM CURRENT USE OF INSULIN (H): ICD-10-CM

## 2022-10-11 DIAGNOSIS — M54.50 CHRONIC BILATERAL LOW BACK PAIN, UNSPECIFIED WHETHER SCIATICA PRESENT: ICD-10-CM

## 2022-10-11 DIAGNOSIS — K50.90 ARTHROPATHY IN CROHN'S DISEASE (H): Primary | ICD-10-CM

## 2022-10-11 DIAGNOSIS — M07.60 ARTHROPATHY IN CROHN'S DISEASE (H): Primary | ICD-10-CM

## 2022-10-11 DIAGNOSIS — G89.29 CHRONIC BILATERAL LOW BACK PAIN, UNSPECIFIED WHETHER SCIATICA PRESENT: ICD-10-CM

## 2022-10-11 DIAGNOSIS — E87.6 HYPOKALEMIA: ICD-10-CM

## 2022-10-11 PROCEDURE — 99214 OFFICE O/P EST MOD 30 MIN: CPT | Mod: 95 | Performed by: FAMILY MEDICINE

## 2022-10-11 RX ORDER — TRAMADOL HYDROCHLORIDE 50 MG/1
50 TABLET ORAL EVERY 6 HOURS PRN
Qty: 10 TABLET | Refills: 0 | Status: SHIPPED | OUTPATIENT
Start: 2022-10-11 | End: 2022-10-14

## 2022-10-11 RX ORDER — POTASSIUM CHLORIDE 1500 MG/1
20 TABLET, EXTENDED RELEASE ORAL DAILY
Qty: 90 TABLET | Refills: 1 | Status: SHIPPED | OUTPATIENT
Start: 2022-10-11 | End: 2023-05-08

## 2022-10-11 RX ORDER — PREDNISONE 10 MG/1
TABLET ORAL
Qty: 20 TABLET | Refills: 0 | Status: SHIPPED | OUTPATIENT
Start: 2022-10-11 | End: 2022-11-07

## 2022-10-11 RX ORDER — GABAPENTIN 100 MG/1
100 CAPSULE ORAL AT BEDTIME
Qty: 30 CAPSULE | Refills: 0 | Status: SHIPPED | OUTPATIENT
Start: 2022-10-11 | End: 2023-02-20

## 2022-10-11 NOTE — PROGRESS NOTES
Carli is a 72 year old who is being evaluated via a billable video visit.      How would you like to obtain your AVS? MyChart  If the video visit is dropped, the invitation should be resent by: Send to e-mail at: [email or web address]  Will anyone else be joining your video visit? No        Assessment & Plan     Arthropathy in Crohn's disease (H)    Is unclear whether this patient has rheumatoid arthritis or inflammatory arthritis associated with Crohn's disease.  She has an appoint with a rheumatologist in a few months.  She has been treated for inflammatory arthritis with prednisone in the past with good results.  I have prescribed a prednisone taper.  The patient was requesting tramadol for the pain that she has had this in the past.  I I prescribed a small amount    - predniSONE (DELTASONE) 10 MG tablet; Take 3 tabs by mouth daily x 3 days, then 2 tabs daily x 3 days, then 1 tab daily x 3 days, then 1/2 tab daily x 3 days.  - traMADol (ULTRAM) 50 MG tablet; Take 1 tablet (50 mg) by mouth every 6 hours as needed for severe pain    Type 2 diabetes mellitus without complication, without long-term current use of insulin (H)  Let the patient know that steroids would likely increase her blood sugars    Hypokalemia  The patient has follow-up with labs next month.  She needs refill to get to that appointment   - potassium chloride ER (KLOR-CON) 20 MEQ CR tablet; Take 1 tablet (20 mEq) by mouth daily    Chronic bilateral low back pain, unspecified whether sciatica present  The patient needs a refill to get to her appointment next month.  This helps minimally  - gabapentin (NEURONTIN) 100 MG capsule; Take 1 capsule (100 mg) by mouth At Bedtime      30 minutes spent on the date of the encounter doing chart review, history and exam, documentation and further activities per the note        No follow-ups on file.    Iza Cage DO  [facility]    Subjective   Carli is a 72 year old, presenting for the  following health issues:  Hand Problem and Swelling      HPI     Appt scheduled for 11/7/22 for patient to come in for Diabetes check    Concern - Right hand swelling  Onset: 4 weeks  Description: Patient has had ongoing pain with right hand- was on a cruise and just came back on Saturday- denies any injuries. Right hand swelling, wrist- hard to open/close hand  Intensity: 6-7/10  Progression of Symptoms:  Slightly better  Accompanying Signs & Symptoms: swelling, pain  Previous history of similar problem: a year ago- fell in the tub and was told it was arthritis  Precipitating factors:        Worsened by: any movement and using hand  Alleviating factors:        Improved by: None  Therapies tried and outcome: Acetaminophen. Patient says she has appt to f/u with Ortho on Tues 10/18- but wasn't sure if she needed Prednisone or something until she sees them next week    She has a history of rheumatoid arthritis and is not on a DMARD.          Review of Systems   Constitutional, HEENT, cardiovascular, pulmonary, gi and gu systems are negative, except as otherwise noted.      Objective           Vitals:  No vitals were obtained today due to virtual visit.    Physical Exam   GENERAL: Healthy, alert and no distress  EYES: Eyes grossly normal to inspection.  No discharge or erythema, or obvious scleral/conjunctival abnormalities.  RESP: No audible wheeze, cough, or visible cyanosis.  No visible retractions or increased work of breathing.    SKIN: Visible skin clear. No significant rash, abnormal pigmentation or lesions.  NEURO: Cranial nerves grossly intact.  Mentation and speech appropriate for age.  PSYCH: Mentation appears normal, affect normal/bright, judgement and insight intact, normal speech and appearance well-groomed.              Video-Visit Details    Video Start Time: 1:27 PM    Type of service:  Video Visit    Video End Time:1:43 PM    Originating Location (pt. Location): Home    Distant Location (provider  location):  [facility]     Platform used for Video Visit: Pa

## 2022-10-15 ENCOUNTER — HEALTH MAINTENANCE LETTER (OUTPATIENT)
Age: 72
End: 2022-10-15

## 2022-10-18 ENCOUNTER — OFFICE VISIT (OUTPATIENT)
Dept: ORTHOPEDICS | Facility: CLINIC | Age: 72
End: 2022-10-18
Payer: COMMERCIAL

## 2022-10-18 VITALS
DIASTOLIC BLOOD PRESSURE: 82 MMHG | SYSTOLIC BLOOD PRESSURE: 165 MMHG | HEIGHT: 59 IN | HEART RATE: 78 BPM | BODY MASS INDEX: 31.47 KG/M2

## 2022-10-18 DIAGNOSIS — M18.11 ARTHRITIS OF CARPOMETACARPAL (CMC) JOINT OF RIGHT THUMB: ICD-10-CM

## 2022-10-18 DIAGNOSIS — M19.031 ARTHRITIS OF SCAPHOID-TRAPEZIUM-TRAPEZOID JOINT OF RIGHT HAND: Primary | ICD-10-CM

## 2022-10-18 PROCEDURE — 20604 DRAIN/INJ JOINT/BURSA W/US: CPT | Mod: RT | Performed by: FAMILY MEDICINE

## 2022-10-18 PROCEDURE — 99213 OFFICE O/P EST LOW 20 MIN: CPT | Mod: 25 | Performed by: FAMILY MEDICINE

## 2022-10-18 RX ADMIN — ROPIVACAINE HYDROCHLORIDE 0.5 ML: 5 INJECTION, SOLUTION EPIDURAL; INFILTRATION; PERINEURAL at 16:23

## 2022-10-18 RX ADMIN — BETAMETHASONE SODIUM PHOSPHATE AND BETAMETHASONE ACETATE 6 MG: 3; 3 INJECTION, SUSPENSION INTRA-ARTICULAR; INTRALESIONAL; INTRAMUSCULAR; SOFT TISSUE at 16:23

## 2022-10-18 ASSESSMENT — PAIN SCALES - GENERAL: PAINLEVEL: WORST PAIN (10)

## 2022-10-18 NOTE — PATIENT INSTRUCTIONS
# Right Hand Arthritis: Long-standing condition for patient over the past couple years worsening over the past few months with pain diffusely over the right hand/wrist but worse over the base of the thumb. She does have tenderness to palpation diffusely around the hand/wrists but again worse over the CMC joint. Reviewed previous x-rays showing significant arthritis noticeably at the STT and CMC joints. She has tried oral steroid times two without significant improvement of her pain. Given this will try steroid injection and follow-up with orthopedic hand surgery if not improving.  Image Findings: reviewed previous right wrist x-rays showing arthritis.   Treatment: Activities as tolerated, can brace as needed, referral to hand surgery placed  Medications/Injections: Limited tylenol/ibuprofen for pain for 1-2 weeks, right STT and CMC joint steroid injections  Follow-up: hand surgery of pain not improved with steroid injection, can follow-up with me 3+ months for repeat steroid injections as needed    Please call [phone]   Ask for my team if you have any questions or concerns    If you have not yet received the influenza vaccine but would like to get one, please call  [phone] or you can schedule via [employer]    It was great seeing you again today!    Artur Vergara MD, [facility]     FS Injection Discharge Instructions    Procedure: right CMC and STT joint steroid injections    You may shower, however avoid swimming, tub baths or hot tubs for 24 hours following your procedure  You may have a mild to moderate increase in pain for several days following the injection.  It may take up to 14 days for the steroid medication to start working although you may feel the effect as early as a few days after the procedure.  You may use ice packs for 10-15 minutes, 3 to 4 times a day at the injection site for comfort  You may use anti-inflammatory medications (such as Ibuprofen or Aleve or Advil) or Tylenol for pain  control if necessary  If you were fasting, you may resume your normal diet and medications after the procedure  If you have diabetes, check your blood sugar more frequently than usual as your blood sugar may be higher than normal for 10-14 days following a steroid injection. Contact your doctor who manages your diabetes if your blood sugar is higher than usual    If you experience any of the following, call [facility] [place] [phone] or [phone]  -Fever over 100 degree F  -Swelling, bleeding, redness, drainage, warmth at the injection site  - New or worsening pain

## 2022-10-18 NOTE — LETTER
10/18/2022         RE: Carli Monreal  [address]  [place] [address]        Dear Colleague,    Thank you for referring your patient, Carli Monreal, to the [facility] [place]. Please see a copy of my visit note below.    ASSESSMENT & PLAN    Carli was seen today for pain.    Diagnoses and all orders for this visit:    Arthritis of bkzxbzqd-rxjkwizex-eotwcmkwz joint of right hand  -     Orthopedic  Referral; Future    Arthritis of carpometacarpal (CMC) joint of right thumb  -     Orthopedic  Referral; Future    Other orders  -     Cancel: Hand / Upper Extremity Injection/Arthrocentesis  -     Small Joint Injection/Arthrocentesis: R thumb CMC  -     Small Joint Injection/Arthrocentesis        # Right Hand Arthritis: Long-standing condition for patient over the past couple years worsening over the past few months with pain diffusely over the right hand/wrist but worse over the base of the thumb. She does have tenderness to palpation diffusely around the hand/wrists but again worse over the CMC joint. Reviewed previous x-rays showing significant arthritis noticeably at the STT and CMC joints. She has tried oral steroid times two without significant improvement of her pain. Given this will try steroid injection and follow-up with orthopedic hand surgery if not improving.  Image Findings: reviewed previous right wrist x-rays showing arthritis.   Treatment: Activities as tolerated, can brace as needed, referral to hand surgery placed  Medications/Injections: Limited tylenol/ibuprofen for pain for 1-2 weeks, right STT and CMC joint steroid injections  Follow-up: hand surgery of pain not improved with steroid injection, can follow-up with me 3+ months for repeat steroid injections as needed    -----    SUBJECTIVE:  Carli Monreal is a 72 year old female who is seen in follow-up for right hand. They were last seen 9/16/21.  Recently saw Dr. Pennington 4/18/22  "and was prescribed a medrol dosepak. The patient is seen by themselves.    Since their last visit reports returning right hand pain and swelling 5 weeks ago.  They indicate that their current pain level is 10/10. They have tried Prednisone and gabapentin, icy hot, brace, heat. Wear brace when severe.      Patient's past medical, surgical, social, and family histories were reviewed today and no changes are noted.    REVIEW OF SYSTEMS:  Constitutional: NEGATIVE for fever, chills, change in weight  Skin: NEGATIVE for worrisome rashes, moles or lesions  GI/: NEGATIVE for bowel or bladder changes  Neuro: NEGATIVE for weakness, dizziness or paresthesias    OBJECTIVE:  BP (!) 165/82   Pulse 78   Ht 1.499 m (4' 11\")   BMI 31.47 kg/m     General: healthy, alert and in no distress  HEENT: no scleral icterus or conjunctival erythema  Skin: no suspicious lesions or rash. No jaundice.  CV: regular rhythm by palpation, no pedal edema  Resp: normal respiratory effort without conversational dyspnea   Psych: normal mood and affect  Gait: normal steady gait with appropriate coordination and balance  Neuro: normal light touch sensory exam of the extremities.    MSK:    RIGHT HAND  Inspection:    No swelling, bruising, discoloration, or obvious deformity or asymmetry  Palpation:  TTP diffusely around her hand/wrist worse over CMC/STT joint  Range of Motion:    Full active flexion and extension at MCP, PIP, and DIP joints; normal finger cascade without malrotation.  Wrist pronation, supination, and ulnar/radial deviation normal.  Strength:     limited substantially by pain, extension limited substantially by pain, flexion limited substantially by pain, abduction limited substantially by pain, adduction limited substantially by pain, opposition limited substantially by pain  Special Tests:    Positive: none    Negative: flexor digitorum superficialis testing, flexor digitorum profundus testing      Independent visualization of " the below image:    Right wrist x-rays reviewed    IMPRESSION: Mild osteoarthrosis of the distal radioulnar joint. Severe  osteoarthrosis of the STT joint. Mild osteoarthrosis of the first CMC  joint and first MCP joint. No evidence of fracture or inflammatory  arthropathy.     MD Artur DORAN MD, [facility]    Disclaimer: This note consists of symbols derived from keyboarding, dictation and/or voice recognition software. As a result, there may be errors in the script that have gone undetected. Please consider this when interpreting information found in this chart.    Small Joint Injection/Arthrocentesis: R thumb CMC    Date/Time: 10/18/2022 4:23 PM  Performed by: Artur Vergara MD  Authorized by: Artur Vergara MD   Indications:  Pain  Needle Size:  25 G  Guidance: ultrasound     Approach:  Radial  Location:  Thumb    Site:  R thumb CMC                    Medications:  6 mg betamethasone acet & sod phos 6 (3-3) MG/ML; 0.5 mL ropivacaine 5 MG/ML   Medications comment:  Actual amount of celestone used 0.5 mL          Outcome:  Tolerated well, no immediate complications  Procedure discussed: discussed risks, benefits, and alternatives    Consent Given by:  Patient  Timeout: timeout called immediately prior to procedure    Prep: patient was prepped and draped in usual sterile fashion       Ultrasound images of procedure were permanently stored.       Small Joint Injection/Arthrocentesis    Date/Time: 10/18/2022 4:23 PM  Performed by: Artur Vergara MD  Authorized by: Artur Vergara MD   Indications:  Pain  Needle Size:  25 G  Guidance: ultrasound     Approach:  Radial     Location comment:  Right STT                       Medications:  0.5 mL ropivacaine 5 MG/ML   Medications comment:  Actual amount of celestone used 0.5 mL        Outcome:  Tolerated well, no immediate complications  Procedure discussed: discussed risks, benefits, and  alternatives    Consent Given by:  Patient  Timeout: timeout called immediately prior to procedure    Prep: patient was prepped and draped in usual sterile fashion       Ultrasound images of procedure were permanently stored.     Patient reported no improvement of pain after the numbing portion CMC/STT joint steroid injections.  Ultrasound guided images were permanently stored.   Aftercare instructions given to patient.  Plan to follow-up as discussed above.     Artur Vergara MD [facility]                  Again, thank you for allowing me to participate in the care of your patient.        Sincerely,        Artur Vergara MD

## 2022-10-18 NOTE — PROGRESS NOTES
ASSESSMENT & PLAN    Carli was seen today for pain.    Diagnoses and all orders for this visit:    Arthritis of bsrsmbvx-mueuaeyjw-tagxtzdfl joint of right hand  -     Orthopedic  Referral; Future    Arthritis of carpometacarpal (CMC) joint of right thumb  -     Orthopedic  Referral; Future    Other orders  -     Cancel: Hand / Upper Extremity Injection/Arthrocentesis  -     Small Joint Injection/Arthrocentesis: R thumb CMC  -     Small Joint Injection/Arthrocentesis        # Right Hand Arthritis: Long-standing condition for patient over the past couple years worsening over the past few months with pain diffusely over the right hand/wrist but worse over the base of the thumb. She does have tenderness to palpation diffusely around the hand/wrists but again worse over the CMC joint. Reviewed previous x-rays showing significant arthritis noticeably at the STT and CMC joints. She has tried oral steroid times two without significant improvement of her pain. Given this will try steroid injection and follow-up with orthopedic hand surgery if not improving.  Image Findings: reviewed previous right wrist x-rays showing arthritis.   Treatment: Activities as tolerated, can brace as needed, referral to hand surgery placed  Medications/Injections: Limited tylenol/ibuprofen for pain for 1-2 weeks, right STT and CMC joint steroid injections  Follow-up: hand surgery of pain not improved with steroid injection, can follow-up with me 3+ months for repeat steroid injections as needed    -----    SUBJECTIVE:  Carli Monreal is a 72 year old female who is seen in follow-up for right hand. They were last seen 9/16/21.  Recently saw Dr. Pennington 4/18/22 and was prescribed a medrol dosepak. The patient is seen by themselves.    Since their last visit reports returning right hand pain and swelling 5 weeks ago.  They indicate that their current pain level is 10/10. They have tried Prednisone and gabapentin, icy hot, brace,  "heat. Wear brace when severe.      Patient's past medical, surgical, social, and family histories were reviewed today and no changes are noted.    REVIEW OF SYSTEMS:  Constitutional: NEGATIVE for fever, chills, change in weight  Skin: NEGATIVE for worrisome rashes, moles or lesions  GI/: NEGATIVE for bowel or bladder changes  Neuro: NEGATIVE for weakness, dizziness or paresthesias    OBJECTIVE:  BP (!) 165/82   Pulse 78   Ht 1.499 m (4' 11\")   BMI 31.47 kg/m     General: healthy, alert and in no distress  HEENT: no scleral icterus or conjunctival erythema  Skin: no suspicious lesions or rash. No jaundice.  CV: regular rhythm by palpation, no pedal edema  Resp: normal respiratory effort without conversational dyspnea   Psych: normal mood and affect  Gait: normal steady gait with appropriate coordination and balance  Neuro: normal light touch sensory exam of the extremities.    MSK:    RIGHT HAND  Inspection:    No swelling, bruising, discoloration, or obvious deformity or asymmetry  Palpation:  TTP diffusely around her hand/wrist worse over CMC/STT joint  Range of Motion:    Full active flexion and extension at MCP, PIP, and DIP joints; normal finger cascade without malrotation.  Wrist pronation, supination, and ulnar/radial deviation normal.  Strength:     limited substantially by pain, extension limited substantially by pain, flexion limited substantially by pain, abduction limited substantially by pain, adduction limited substantially by pain, opposition limited substantially by pain  Special Tests:    Positive: none    Negative: flexor digitorum superficialis testing, flexor digitorum profundus testing      Independent visualization of the below image:    Right wrist x-rays reviewed    IMPRESSION: Mild osteoarthrosis of the distal radioulnar joint. Severe  osteoarthrosis of the STT joint. Mild osteoarthrosis of the first CMC  joint and first MCP joint. No evidence of fracture or " inflammatory  arthropathy.     MD Artur DORAN MD, [facility]    Disclaimer: This note consists of symbols derived from keyboarding, dictation and/or voice recognition software. As a result, there may be errors in the script that have gone undetected. Please consider this when interpreting information found in this chart.    Small Joint Injection/Arthrocentesis: R thumb CMC    Date/Time: 10/18/2022 4:23 PM  Performed by: Artur Vergara MD  Authorized by: Artur Vergara MD   Indications:  Pain  Needle Size:  25 G  Guidance: ultrasound     Approach:  Radial  Location:  Thumb    Site:  R thumb CMC                    Medications:  6 mg betamethasone acet & sod phos 6 (3-3) MG/ML; 0.5 mL ropivacaine 5 MG/ML   Medications comment:  Actual amount of celestone used 0.5 mL          Outcome:  Tolerated well, no immediate complications  Procedure discussed: discussed risks, benefits, and alternatives    Consent Given by:  Patient  Timeout: timeout called immediately prior to procedure    Prep: patient was prepped and draped in usual sterile fashion       Ultrasound images of procedure were permanently stored.       Small Joint Injection/Arthrocentesis    Date/Time: 10/18/2022 4:23 PM  Performed by: Artur Vergara MD  Authorized by: Artur Vergara MD   Indications:  Pain  Needle Size:  25 G  Guidance: ultrasound     Approach:  Radial     Location comment:  Right STT                       Medications:  0.5 mL ropivacaine 5 MG/ML   Medications comment:  Actual amount of celestone used 0.5 mL        Outcome:  Tolerated well, no immediate complications  Procedure discussed: discussed risks, benefits, and alternatives    Consent Given by:  Patient  Timeout: timeout called immediately prior to procedure    Prep: patient was prepped and draped in usual sterile fashion       Ultrasound images of procedure were permanently stored.     Patient reported no  improvement of pain after the numbing portion CMC/STT joint steroid injections.  Ultrasound guided images were permanently stored.   Aftercare instructions given to patient.  Plan to follow-up as discussed above.     Artur Vergara MD [facility]

## 2022-10-21 RX ORDER — ROPIVACAINE HYDROCHLORIDE 5 MG/ML
0.5 INJECTION, SOLUTION EPIDURAL; INFILTRATION; PERINEURAL
Status: DISCONTINUED | OUTPATIENT
Start: 2022-10-18 | End: 2022-11-07

## 2022-10-21 RX ORDER — BETAMETHASONE SODIUM PHOSPHATE AND BETAMETHASONE ACETATE 3; 3 MG/ML; MG/ML
6 INJECTION, SUSPENSION INTRA-ARTICULAR; INTRALESIONAL; INTRAMUSCULAR; SOFT TISSUE
Status: DISCONTINUED | OUTPATIENT
Start: 2022-10-18 | End: 2022-11-07

## 2022-11-02 NOTE — PROGRESS NOTES
ASSESSMENT & PLAN    Carli was seen today for edema.    Diagnoses and all orders for this visit:    Arthritis of dnsvdzba-jsfeqyybe-fyjnuoqrt joint of right hand  -     methylPREDNISolone (MEDROL DOSEPAK) 4 MG tablet therapy pack; Follow Package Directions  -     Wrist/Arm/Hand Supplies Order for DME - ONLY FOR DME    Right wrist pain  -     XR Wrist Right G/E 3 Views; Future  -     methylPREDNISolone (MEDROL DOSEPAK) 4 MG tablet therapy pack; Follow Package Directions  -     Wrist/Arm/Hand Supplies Order for DME - ONLY FOR DME    Arthropathy in Crohn's disease (H)      This issue is acute on chronic and Unchanged.    We discussed these other possible diagnosis: Likely flare of underlying STT arthritis.  We discussed the following treatment options: symptom treatment, activity modification/rest, injection, rehab and referral. Following discussion, plan: discussed supportive care including bracing, oral steroid burst, close follow up with Rheumatology.    Plan:  - Today's Plan of Care:  Prescription Medication as directed: Medrol Dose Pack (I reviewed the mechanism of action as well as risk/benefit profile of medications. Questions answered.)  Bracing options - thumb spica v. Orthoglass    Discussed activity considerations and other supportive care including Ice/Heat, OTC and other topical medications as needed.    -We also discussed other future treatment options:  Follow up with Dr. Vergara to consider US guided injection as discussed previously  Referral to Hand Therapy    Follow Up: as needed    Concerning signs and symptoms were reviewed.  The patient expressed understanding of this management plan and all questions were answered at this time.    Izzy Pennington MD [facility]  Sports Medicine Physician  [facility]      -----  Chief Complaint   Patient presents with     Right Wrist - Edema       SUBJECTIVE  Carli Monreal is a/an 71 year old female who is seen as an AIC for evaluation of right wrist  pain.    The patient is seen by themselves.  The patient is right handed    Onset: Patient reports acute on chronic wrist pain. Has gotten worse over the past 1-2 days, no injury.  - Was previously treated by Dr. Vergara, 9/2021 for wrist pain after an injury. X-rays did not show a fracture. Reports constant pain started last night from the wrist into her right hand. No recent falls.  - Has been seen by Rheumatology, was on methotrexate in the past, no currently. Not on anything for inflammatory arthritis currently. They recommend evaluation in orthopedic clinic.    Location of Pain: Right wrist into right hand  Worsened by: constant pain  Better with: nothing  Treatments tried: ice, gabapentin, cbd oil, wrist brace, aspirin  Associated symptoms: swelling and burning hand    Orthopedic/Surgical history: Yes. Fall late August 2021 and managed with Dr. Vergara.  - Sees Rheumatology on 5/17/2022.  Social History/Occupation: retired    No family history pertinent to patient's problem today.    REVIEW OF SYSTEMS:  Review of Systems  Skin: no bruising, yes swelling  Musculoskeletal: as above  Neurologic: no numbness, paresthesias  Remainder of review of systems is negative including constitutional, CV, pulmonary, GI, except as noted in HPI or medical history.    OBJECTIVE:  /80   Wt 71.4 kg (157 lb 6.4 oz)   BMI 31.00 kg/m     General: healthy, alert and in no distress  HEENT: no scleral icterus or conjunctival erythema  Skin: no suspicious lesions or rash. No jaundice.  CV: distal perfusion intact  Resp: normal respiratory effort without conversational dyspnea   Psych: normal mood and affect  Gait: normal steady gait with appropriate coordination and balance  Neuro: Normal light sensory exam of upper extremity    Bilateral Wrist and Hand exam    Inspection:       Swelling: dorsal wrist, base of thumb    Tender:       Throughout dorsal wrist, base of thumb, carpals    ROM:       Limited ROM right wrist in  flexion and extension    Strength:       Decrease strength due to pain    Neurovascular:       2+ radial pulses bilaterally with brisk capillary refill and      normal sensation to light touch in the radial, median and ulnar nerve distributions    RADIOLOGY:  I independently ordered, visualized and reviewed these images with the patient  3 XR views of left wrist reviewed: severe arthritis STT joint, CMC arthritis, no fracture  - will follow official read    Hand XRays 9/16/2021 - severe STT arthritis, CMC arthritis, no fracture      Review of prior external note(s) from - Rheumatology  Review of the result(s) of each unique test - Xrays            (4) no impairment

## 2022-11-02 NOTE — TELEPHONE ENCOUNTER
DIAGNOSIS: Arthritis of nrczdpgr-vqgjkdxrx-kfbvnnqtp joint of right hand [M19.031]  Arthritis of carpometacarpal (CMC) joint of right thumb \lizbeth   APPOINTMENT DATE: 11.14.22   NOTES STATUS DETAILS   OFFICE NOTE from referring provider Internal 10.18.22 Dr Artur Vergara, [facility]  9.16.21  9.2.21 8.26.21   OFFICE NOTE from other specialist Internal 4.18.22 Dr Izzy Pennington, [facility]  OT in Epic   MEDICATION LIST Internal    LABS     CBC/DIFF Internal    XRAYS (IMAGES & REPORTS) Internal 4.18.22 R wrist  9.16.21 R hand  9.2.21 R hand  8.26.21 R hand

## 2022-11-07 ENCOUNTER — OFFICE VISIT (OUTPATIENT)
Dept: FAMILY MEDICINE | Facility: CLINIC | Age: 72
End: 2022-11-07
Payer: COMMERCIAL

## 2022-11-07 VITALS
BODY MASS INDEX: 30.78 KG/M2 | SYSTOLIC BLOOD PRESSURE: 128 MMHG | HEART RATE: 82 BPM | HEIGHT: 60 IN | RESPIRATION RATE: 20 BRPM | DIASTOLIC BLOOD PRESSURE: 74 MMHG | WEIGHT: 156.8 LBS | OXYGEN SATURATION: 98 % | TEMPERATURE: 98.3 F

## 2022-11-07 DIAGNOSIS — J45.40 MODERATE PERSISTENT ASTHMA, UNSPECIFIED WHETHER COMPLICATED: Primary | ICD-10-CM

## 2022-11-07 DIAGNOSIS — E11.9 TYPE 2 DIABETES MELLITUS WITHOUT COMPLICATION, WITHOUT LONG-TERM CURRENT USE OF INSULIN (H): ICD-10-CM

## 2022-11-07 DIAGNOSIS — I10 ESSENTIAL HYPERTENSION: ICD-10-CM

## 2022-11-07 DIAGNOSIS — E78.5 DYSLIPIDEMIA: ICD-10-CM

## 2022-11-07 LAB
CREAT UR-MCNC: 36 MG/DL
HBA1C MFR BLD: 6.3 % (ref 0–5.6)
HOLD SPECIMEN: NORMAL
MICROALBUMIN UR-MCNC: 80 MG/L
MICROALBUMIN/CREAT UR: 222.22 MG/G CR (ref 0–25)

## 2022-11-07 PROCEDURE — 80048 BASIC METABOLIC PNL TOTAL CA: CPT | Performed by: FAMILY MEDICINE

## 2022-11-07 PROCEDURE — 80061 LIPID PANEL: CPT | Performed by: FAMILY MEDICINE

## 2022-11-07 PROCEDURE — 83036 HEMOGLOBIN GLYCOSYLATED A1C: CPT | Performed by: FAMILY MEDICINE

## 2022-11-07 PROCEDURE — 99207 PR FOOT EXAM NO CHARGE: CPT | Performed by: FAMILY MEDICINE

## 2022-11-07 PROCEDURE — 82043 UR ALBUMIN QUANTITATIVE: CPT | Performed by: FAMILY MEDICINE

## 2022-11-07 PROCEDURE — 36415 COLL VENOUS BLD VENIPUNCTURE: CPT | Performed by: FAMILY MEDICINE

## 2022-11-07 PROCEDURE — 99214 OFFICE O/P EST MOD 30 MIN: CPT | Performed by: FAMILY MEDICINE

## 2022-11-07 RX ORDER — AMLODIPINE BESYLATE 2.5 MG/1
2.5 TABLET ORAL DAILY
Qty: 90 TABLET | Refills: 1 | Status: SHIPPED | OUTPATIENT
Start: 2022-11-07 | End: 2023-05-08

## 2022-11-07 RX ORDER — LOSARTAN POTASSIUM 100 MG/1
100 TABLET ORAL DAILY
Qty: 90 TABLET | Refills: 1 | Status: SHIPPED | OUTPATIENT
Start: 2022-11-07 | End: 2023-05-08

## 2022-11-07 ASSESSMENT — ASTHMA QUESTIONNAIRES: ACT_TOTALSCORE: 21

## 2022-11-07 ASSESSMENT — PAIN SCALES - GENERAL: PAINLEVEL: WORST PAIN (10)

## 2022-11-07 NOTE — PROGRESS NOTES
"  Assessment & Plan     Type 2 diabetes mellitus without complication, without long-term current use of insulin (H)    The current medical regimen is effective;  continue present plan and medications.      - Adult Eye  Referral; Future  - HEMOGLOBIN A1C; Future  - Lipid panel reflex to direct LDL Fasting; Future  - Basic metabolic panel  (Ca, Cl, CO2, Creat, Gluc, K, Na, BUN); Future  - HEMOGLOBIN A1C  - Basic metabolic panel  (Ca, Cl, CO2, Creat, Gluc, K, Na, BUN)  - FOOT EXAM  - Albumin Random Urine Quantitative with Creat Ratio; Future  - metFORMIN (GLUCOPHAGE) 1000 MG tablet; Take 1 tablet (1,000 mg) by mouth 2 times daily (with meals) Do not fill until contacted by patient    Essential hypertension  The current medical regimen is effective;  continue present plan and medications.    - amLODIPine (NORVASC) 2.5 MG tablet; Take 1 tablet (2.5 mg) by mouth daily Do not fill until contacted by patient  - losartan (COZAAR) 100 MG tablet; Take 1 tablet (100 mg) by mouth daily Do not fill until contacted by patient    Moderate persistent asthma, unspecified whether complicated  The current medical regimen is effective;  continue present plan and medications.      Dyslipidemia  Labs today. The current medical regimen is effective;  continue present plan and medications.      30 minutes spent on the date of the encounter doing chart review, history and exam, documentation and further activities per the note       BMI:   Estimated body mass index is 31.14 kg/m  as calculated from the following:    Height as of this encounter: 1.511 m (4' 11.5\").    Weight as of this encounter: 71.1 kg (156 lb 12.8 oz).   Weight management plan: Discussed healthy diet and exercise guidelines      Return in about 6 months (around 5/7/2023) for diabetes.    DO JJ [facility]    Mary Boyce is a 72 year old, presenting for the following health issues:  Chronic Disease Management      HPI "     Diabetes Follow-up    How often are you checking your blood sugar? One time daily  What time of day are you checking your blood sugars (select all that apply)?  Before meals  Have you had any blood sugars above 200?  No  Have you had any blood sugars below 70?  Yes 50-60's     What symptoms do you notice when your blood sugar is low?  Shaky, Dizzy and Weak    What concerns do you have today about your diabetes? None     Do you have any of these symptoms? (Select all that apply)  Burning in feet- cold/freezing    Have you had a diabetic eye exam in the last 12 months? No   Lab Results   Component Value Date    A1C 6.3 11/07/2022    A1C 6.6 05/06/2022    A1C 6.8 01/04/2022    A1C 5.6 08/20/2021    A1C 7.0 04/13/2021    A1C 8.4 02/09/2021       Metformin 1000 mg twice daily          Hyperlipidemia Follow-Up      Are you regularly taking any medication or supplement to lower your cholesterol?   Yes- atorvastatin    Are you having muscle aches or other side effects that you think could be caused by your cholesterol lowering medication?  No   LDL Cholesterol Calculated   Date Value Ref Range Status   11/13/2021 19 <=100 mg/dL Final   02/09/2021 63.7 0.0 - 130.0 mg/dL Final             Hypertension Follow-up      Do you check your blood pressure regularly outside of the clinic? No     Are you following a low salt diet? Yes    Are your blood pressures ever more than 140 on the top number (systolic) OR more   than 90 on the bottom number (diastolic), for example 140/90? No   Norvasc 2.5 mg daily and losartan 100 mg daily    BP Readings from Last 2 Encounters:   11/07/22 128/74   10/18/22 (!) 165/82     Hemoglobin A1C (%)   Date Value   11/07/2022 6.3 (H)   05/06/2022 6.6 (H)   04/13/2021 7.0 (H)   02/09/2021 8.4 (A)     LDL Cholesterol Calculated (mg/dL)   Date Value   11/13/2021 19   02/09/2021 63.7       Asthma Follow-Up    Was ACT completed today?    Yes    ACT Total Scores 11/7/2022   ACT TOTAL SCORE (Goal Greater  "than or Equal to 20) 21   In the past 12 months, how many times did you visit the emergency room for your asthma without being admitted to the hospital? 0   In the past 12 months, how many times were you hospitalized overnight because of your asthma? 0     Symbicort and albuterol as needed         How many days per week do you miss taking your asthma controller medication?  I do not have an asthma controller medication- only takes as needed    Please describe any recent triggers for your asthma: cold air    Have you had any Emergency Room Visits, Urgent Care Visits, or Hospital Admissions since your last office visit?  No    She has been taking gabapentin at night which helps with pain.  If she takes it during the day she has fatigue.        Review of Systems   Constitutional, HEENT, cardiovascular, pulmonary, gi and gu systems are negative, except as otherwise noted.      Objective    /74 (BP Location: Right arm, Patient Position: Sitting, Cuff Size: Adult Regular)   Pulse 82   Temp 98.3  F (36.8  C) (Oral)   Resp 20   Ht 1.511 m (4' 11.5\")   Wt 71.1 kg (156 lb 12.8 oz)   SpO2 98%   BMI 31.14 kg/m    Body mass index is 31.14 kg/m .  Physical Exam   GENERAL: healthy, alert and no distress  NECK: no adenopathy, no asymmetry, masses, or scars and thyroid normal to palpation  RESP: lungs clear to auscultation - no rales, rhonchi or wheezes  CV: regular rate and rhythm, normal S1 S2, no S3 or S4, no murmur, click or rub, no peripheral edema and peripheral pulses strong  MS: no gross musculoskeletal defects noted, no edema  PSYCH: mentation appears normal, affect normal/bright  Diabetic foot exam: normal DP and PT pulses, no trophic changes or ulcerative lesions, normal sensory exam and normal monofilament exam    Results for orders placed or performed in visit on 11/07/22 (from the past 24 hour(s))   Extra Tube    Narrative    The following orders were created for panel order Extra Tube.  Procedure          "                      Abnormality         Status                     ---------                               -----------         ------                     Extra Red Top Tube[[id number]]                               In process                 Extra Green Top (Lithium...[[id number]]                      In process                 Extra Purple Top Tube[[id number]]                            In process                 Extra Urine Collection[[id number]]                           In process                   Please view results for these tests on the individual orders.   HEMOGLOBIN A1C   Result Value Ref Range    Hemoglobin A1C 6.3 (H) 0.0 - 5.6 %

## 2022-11-08 ENCOUNTER — OFFICE VISIT (OUTPATIENT)
Dept: OPHTHALMOLOGY | Facility: CLINIC | Age: 72
End: 2022-11-08
Attending: FAMILY MEDICINE
Payer: COMMERCIAL

## 2022-11-08 DIAGNOSIS — Z01.01 ENCOUNTER FOR EXAMINATION OF EYES AND VISION WITH ABNORMAL FINDINGS: Primary | ICD-10-CM

## 2022-11-08 DIAGNOSIS — E11.9 TYPE 2 DIABETES MELLITUS WITHOUT COMPLICATION, WITHOUT LONG-TERM CURRENT USE OF INSULIN (H): ICD-10-CM

## 2022-11-08 DIAGNOSIS — H52.4 PRESBYOPIA: ICD-10-CM

## 2022-11-08 DIAGNOSIS — Z96.1 PSEUDOPHAKIA, BOTH EYES: ICD-10-CM

## 2022-11-08 DIAGNOSIS — H43.813 POSTERIOR VITREOUS DETACHMENT OF BOTH EYES: ICD-10-CM

## 2022-11-08 DIAGNOSIS — M79.641 HAND PAIN, RIGHT: Primary | ICD-10-CM

## 2022-11-08 LAB
ANION GAP SERPL CALCULATED.3IONS-SCNC: 9 MMOL/L (ref 3–14)
BUN SERPL-MCNC: 8 MG/DL (ref 7–30)
CALCIUM SERPL-MCNC: 9.6 MG/DL (ref 8.5–10.1)
CHLORIDE BLD-SCNC: 109 MMOL/L (ref 94–109)
CHOLEST SERPL-MCNC: 130 MG/DL
CO2 SERPL-SCNC: 22 MMOL/L (ref 20–32)
CREAT SERPL-MCNC: 0.71 MG/DL (ref 0.52–1.04)
GFR SERPL CREATININE-BSD FRML MDRD: 90 ML/MIN/1.73M2
GLUCOSE BLD-MCNC: 100 MG/DL (ref 70–99)
HDLC SERPL-MCNC: 76 MG/DL
LDLC SERPL CALC-MCNC: 26 MG/DL
NONHDLC SERPL-MCNC: 54 MG/DL
POTASSIUM BLD-SCNC: 3.8 MMOL/L (ref 3.4–5.3)
SODIUM SERPL-SCNC: 140 MMOL/L (ref 133–144)
TRIGL SERPL-MCNC: 139 MG/DL

## 2022-11-08 PROCEDURE — 92004 COMPRE OPH EXAM NEW PT 1/>: CPT | Performed by: OPHTHALMOLOGY

## 2022-11-08 PROCEDURE — 92015 DETERMINE REFRACTIVE STATE: CPT | Performed by: OPHTHALMOLOGY

## 2022-11-08 ASSESSMENT — VISUAL ACUITY
METHOD: SNELLEN - LINEAR
OS_SC: 20/25
OD_SC+: -2
OD_SC: J5-
OS_SC: J5
OD_PH_SC: 20/25
OD_SC: 20/40

## 2022-11-08 ASSESSMENT — CONF VISUAL FIELD
OS_NORMAL: 1
OD_NORMAL: 1
OS_INFERIOR_TEMPORAL_RESTRICTION: 0
OD_INFERIOR_NASAL_RESTRICTION: 0
OD_SUPERIOR_TEMPORAL_RESTRICTION: 0
METHOD: COUNTING FINGERS
OS_SUPERIOR_TEMPORAL_RESTRICTION: 0
OD_INFERIOR_TEMPORAL_RESTRICTION: 0
OD_SUPERIOR_NASAL_RESTRICTION: 0
OS_SUPERIOR_NASAL_RESTRICTION: 0
OS_INFERIOR_NASAL_RESTRICTION: 0

## 2022-11-08 ASSESSMENT — REFRACTION_MANIFEST
OD_ADD: +2.50
OD_CYLINDER: +0.75
OS_SPHERE: -0.25
OS_CYLINDER: +0.50
OS_ADD: +2.50
OD_SPHERE: -1.00
OS_AXIS: 100
OD_AXIS: 135

## 2022-11-08 ASSESSMENT — TONOMETRY
OD_IOP_MMHG: 14
OS_IOP_MMHG: 15
IOP_METHOD: APPLANATION

## 2022-11-08 ASSESSMENT — EXTERNAL EXAM - RIGHT EYE: OD_EXAM: 2+ BROW PTOSIS

## 2022-11-08 ASSESSMENT — SLIT LAMP EXAM - LIDS
COMMENTS: 1+ DERMATOCHALASIS
COMMENTS: 1+ DERMATOCHALASIS

## 2022-11-08 ASSESSMENT — CUP TO DISC RATIO
OD_RATIO: 0.3
OS_RATIO: 0.2

## 2022-11-08 NOTE — LETTER
"    11/8/2022         RE: Carli Monreal  [address]  [place] [address]        Dear Colleague,    Thank you for referring your patient, Carli Monreal, to the [facility]. Please see a copy of my visit note below.     Current Eye Medications:  Blink - both eyes PRN      Subjective:  Diabetic, dilated exam - last eye exam in [place] a few years ago. Pt is pseudophakic both eyes. Only wears OTC readers as needed for up close work. Notices distance vision not as sharp as it was after surgery.    Type II DM - oral med controlled.   Last blood sugar - 130 yesterday AM    Lab Results   Component Value Date    A1C 6.3 11/07/2022    A1C 6.6 05/06/2022    A1C 6.8 01/04/2022    A1C 5.6 08/20/2021    A1C 7.0 04/13/2021    A1C 8.4 02/09/2021        Objective:  See Ophthalmology Exam.       Assessment:  Baseline eye exam in patient with diabetes.  No diabetic retinopathy.      ICD-10-CM    1. Encounter for examination of eyes and vision with abnormal findings  Z01.01       2. Presbyopia  H52.4       3. Type 2 diabetes mellitus without complication, without long-term current use of insulin (H)  E11.9 Adult Eye  Referral      4. Pseudophakia, both eyes  Z96.1       5. Posterior vitreous detachment of both eyes  H43.813            Plan:  Glasses prescription given - optional  Okay to continue with OTC readers.  May use artificial tears up to four times a day (like Refresh Optive, Systane Balance, TheraTears, or generic artificial tears are ok. Avoid \"get the red out\" drops).   Possible clouding of posterior capsule both eyes discussed.    Call in July 2023 for an appointment in November 2023 for Complete Exam    Dr. Roth [phone]               Again, thank you for allowing me to participate in the care of your patient.        Sincerely,        Gordon Roth MD    "

## 2022-11-08 NOTE — PATIENT INSTRUCTIONS
"Glasses prescription given - optional  Okay to continue with OTC readers.  May use artificial tears up to four times a day (like Refresh Optive, Systane Balance, TheraTears, or generic artificial tears are ok. Avoid \"get the red out\" drops).   Possible clouding of posterior capsule both eyes discussed.    Call in July 2023 for an appointment in November 2023 for Complete Exam    Dr. Roth [phone]      Patient Education   Diabetes weakens the blood vessels all over the body, including the eyes. Damage to the blood vessels in the eyes can cause swelling or bleeding into part of the eye (called the retina). This is called diabetic retinopathy (GELY-tin-AH-puh-thee). If not treated, this disease can cause vision loss or blindness.   Symptoms may include blurred or distorted vision, but many people have no symptoms. It's important to see your eye doctor regularly to check for problems.   Early treatment and good control can help protect your vision. Here are the things you can do to help prevent vision loss:      1. Keep your blood sugar levels under tight control.      2. Bring high blood pressure under control.      3. No smoking.      4. Have yearly dilated eye exams.       "

## 2022-11-08 NOTE — PROGRESS NOTES
" Current Eye Medications:  Blink - both eyes PRN      Subjective:  Diabetic, dilated exam - last eye exam in [place] a few years ago. Pt is pseudophakic both eyes. Only wears OTC readers as needed for up close work. Notices distance vision not as sharp as it was after surgery.    Type II DM - oral med controlled.   Last blood sugar - 130 yesterday AM    Lab Results   Component Value Date    A1C 6.3 11/07/2022    A1C 6.6 05/06/2022    A1C 6.8 01/04/2022    A1C 5.6 08/20/2021    A1C 7.0 04/13/2021    A1C 8.4 02/09/2021        Objective:  See Ophthalmology Exam.       Assessment:  Baseline eye exam in patient with diabetes.  No diabetic retinopathy.      ICD-10-CM    1. Encounter for examination of eyes and vision with abnormal findings  Z01.01       2. Presbyopia  H52.4       3. Type 2 diabetes mellitus without complication, without long-term current use of insulin (H)  E11.9 Adult Eye  Referral      4. Pseudophakia, both eyes  Z96.1       5. Posterior vitreous detachment of both eyes  H43.813            Plan:  Glasses prescription given - optional  Okay to continue with OTC readers.  May use artificial tears up to four times a day (like Refresh Optive, Systane Balance, TheraTears, or generic artificial tears are ok. Avoid \"get the red out\" drops).   Possible clouding of posterior capsule both eyes discussed.    Call in July 2023 for an appointment in November 2023 for Complete Exam    Dr. Roth [phone]           "

## 2022-11-11 NOTE — TELEPHONE ENCOUNTER
DIAGNOSIS: Arthritis of cfoinont-rjuuvewtz-tsjgqnhtw joint of right hand [M19.031]  Arthritis of carpometacarpal (CMC) joint of right thumb \lizbeth   APPOINTMENT DATE: 11.14.22   NOTES STATUS DETAILS   OFFICE NOTE from referring provider Internal 10.18.22 Dr Artur Vergara, [facility]  9.16.21  9.2.21 8.26.21   OFFICE NOTE from other specialist Internal 4.18.22 Dr Izzy Pennington, [facility]  OT in Epic   MEDICATION LIST Internal    LABS     CBC/DIFF Internal    XRAYS (IMAGES & REPORTS) Internal 4.18.22 R wrist  9.16.21 R hand  9.2.21 R hand  8.26.21 R hand

## 2022-11-14 ENCOUNTER — ANCILLARY PROCEDURE (OUTPATIENT)
Dept: CT IMAGING | Facility: CLINIC | Age: 72
End: 2022-11-14
Attending: PHYSICIAN ASSISTANT
Payer: COMMERCIAL

## 2022-11-14 ENCOUNTER — PRE VISIT (OUTPATIENT)
Dept: ORTHOPEDICS | Facility: CLINIC | Age: 72
End: 2022-11-14

## 2022-11-14 ENCOUNTER — OFFICE VISIT (OUTPATIENT)
Dept: ORTHOPEDICS | Facility: CLINIC | Age: 72
End: 2022-11-14
Attending: FAMILY MEDICINE
Payer: COMMERCIAL

## 2022-11-14 ENCOUNTER — ANCILLARY PROCEDURE (OUTPATIENT)
Dept: GENERAL RADIOLOGY | Facility: CLINIC | Age: 72
End: 2022-11-14
Attending: STUDENT IN AN ORGANIZED HEALTH CARE EDUCATION/TRAINING PROGRAM
Payer: COMMERCIAL

## 2022-11-14 DIAGNOSIS — M18.11 ARTHRITIS OF CARPOMETACARPAL (CMC) JOINT OF RIGHT THUMB: ICD-10-CM

## 2022-11-14 DIAGNOSIS — E11.9 TYPE 2 DIABETES MELLITUS WITHOUT COMPLICATION, WITHOUT LONG-TERM CURRENT USE OF INSULIN (H): Primary | ICD-10-CM

## 2022-11-14 DIAGNOSIS — M06.9 RHEUMATOID ARTHRITIS INVOLVING BOTH HANDS, UNSPECIFIED WHETHER RHEUMATOID FACTOR PRESENT (H): ICD-10-CM

## 2022-11-14 DIAGNOSIS — M79.641 HAND PAIN, RIGHT: ICD-10-CM

## 2022-11-14 DIAGNOSIS — M19.031 ARTHRITIS OF SCAPHOID-TRAPEZIUM-TRAPEZOID JOINT OF RIGHT HAND: ICD-10-CM

## 2022-11-14 PROCEDURE — 73200 CT UPPER EXTREMITY W/O DYE: CPT | Mod: RT | Performed by: RADIOLOGY

## 2022-11-14 PROCEDURE — 73130 X-RAY EXAM OF HAND: CPT | Mod: RT | Performed by: RADIOLOGY

## 2022-11-14 PROCEDURE — 99205 OFFICE O/P NEW HI 60 MIN: CPT | Performed by: PHYSICIAN ASSISTANT

## 2022-11-14 ASSESSMENT — ENCOUNTER SYMPTOMS
LOSS OF CONSCIOUSNESS: 0
SNORES LOUDLY: 0
WEAKNESS: 0
SEIZURES: 0
DYSPNEA ON EXERTION: 1
JAUNDICE: 0
HEARTBURN: 0
SPUTUM PRODUCTION: 0
VOMITING: 0
BLOATING: 0
STIFFNESS: 1
MYALGIAS: 0
DIZZINESS: 0
ABDOMINAL PAIN: 0
NECK PAIN: 1
NAUSEA: 0
BOWEL INCONTINENCE: 0
POSTURAL DYSPNEA: 0
DIARRHEA: 1
MUSCLE CRAMPS: 1
DISTURBANCES IN COORDINATION: 0
HEADACHES: 1
JOINT SWELLING: 1
CONSTIPATION: 0
WHEEZING: 0
BLOOD IN STOOL: 0
MEMORY LOSS: 1
TINGLING: 0
ARTHRALGIAS: 1
MUSCLE WEAKNESS: 0
SPEECH CHANGE: 0
RECTAL PAIN: 0
PARALYSIS: 0
NUMBNESS: 0
SHORTNESS OF BREATH: 1
HEMOPTYSIS: 0
COUGH: 1
TREMORS: 0
BACK PAIN: 0
COUGH DISTURBING SLEEP: 1

## 2022-11-14 NOTE — NURSING NOTE
Reason For Visit:   Chief Complaint   Patient presents with     Consult     Right hand arthritis       Primary MD: Iza Cage  Ref. MD: Dr. Vergara    Age: 72 year old    ?  No      There were no vitals taken for this visit.      Pain Assessment  Patient Currently in Pain: Yes  0-10 Pain Scale: 6 (Up to 10/10 with lifting)  Primary Pain Location: Hand (Right hand and wrist, mostly right thumb)    Hand Dominance Evaluation  Hand Dominance: Right          QuickDASH Assessment  QuickDASH Main 11/14/2022   1. Open a tight or new jar. Moderate difficulty   2. Do heavy household chores (e.g., wash walls, floors) Mild difficulty   3. Carry a shopping bag or briefcase. No difficulty   4. Wash your back. No difficulty   5. Use a knife to cut food. Mild difficulty   6. Recreational activities in which you take some force or impact through your arm, shoulder or hand (e.g., golf, hammering, tennis, etc.). Unable to answer   7. During the past week, to what extent has your arm, shoulder or hand problem interfered with your normal social activities with family, friends, neighbours or groups? Quite a bit   8. During the past week, were you limited in your work or other regular daily activities as a result of your arm, shoulder or hand problem? Moderately limited   9. Arm, shoulder or hand pain. Severe   10.Tingling (pins and needles) in your arm,shoulder or hand. None   11. During the past week, how much difficulty have you had sleeping because of the pain in your arm, shoulder or hand? Mild difficulty   Quickdash Ability Score 27.27          Current Outpatient Medications   Medication Sig Dispense Refill     albuterol (PROAIR HFA/PROVENTIL HFA/VENTOLIN HFA) 108 (90 Base) MCG/ACT inhaler Inhale 2 puffs into the lungs 4 times daily as needed for shortness of breath / dyspnea        alcohol swab prep pads Use to swab area of injection/venkata as directed. 100 each 3     amLODIPine (NORVASC) 2.5 MG tablet Take 1 tablet  (2.5 mg) by mouth daily Do not fill until contacted by patient 90 tablet 1     atorvastatin (LIPITOR) 10 MG tablet Take 1 tablet (10 mg) by mouth daily Do not fill until contacted by patient 90 tablet 3     blood glucose (NO BRAND SPECIFIED) lancets standard Use to test blood sugar 1 times daily or as directed. 100 each 11     blood glucose (ONETOUCH VERIO IQ) test strip USE TO TEST BLOOD GLUCOSE ONCE DAILY 100 strip 0     budesonide-formoterol (SYMBICORT) 160-4.5 MCG/ACT Inhaler Inhale 2 puffs into the lungs 2 times daily 10.2 g 4     Cyanocobalamin 3000 MCG SUBL Place 1,500 mg under the tongue daily        esomeprazole (NEXIUM) 40 MG DR capsule Take 1 capsule (40 mg) by mouth every morning (before breakfast) Do not fill before contacted by patient take 30-60 minutes before eating. 90 capsule 2     folic acid (FOLVITE) 1 MG tablet TAKE ONE TABLET BY MOUTH ONE TIME DAILY 90 tablet 0     gabapentin (NEURONTIN) 100 MG capsule Take 1 capsule (100 mg) by mouth At Bedtime 30 capsule 0     Lancets (ONETOUCH DELICA PLUS [id number]) MISC USE TO TEST BLOOD GLUCOSE ONCE DAILY 100 each 1     losartan (COZAAR) 100 MG tablet Take 1 tablet (100 mg) by mouth daily Do not fill until contacted by patient 90 tablet 1     metFORMIN (GLUCOPHAGE) 1000 MG tablet Take 1 tablet (1,000 mg) by mouth 2 times daily (with meals) Do not fill until contacted by patient 180 tablet 1     multivitamin w/minerals (THERA-VIT-M) tablet Take 1 tablet by mouth daily       potassium chloride ER (KLOR-CON) 20 MEQ CR tablet Take 1 tablet (20 mEq) by mouth daily 90 tablet 1     scopolamine (TRANSDERM) 1 MG/3DAYS 72 hr patch Place 1 patch onto the skin every 72 hours 12 patch 1     Vitamin D, Cholecalciferol, 25 MCG (1000 UT) TABS Take 2 tablets by mouth daily         Allergies   Allergen Reactions     Seasonal Allergies        Maddy Renteria, AEMT

## 2022-11-14 NOTE — LETTER
11/14/2022         RE: Carli Monreal  [address]  [place] [address]        Dear Colleague,    Thank you for referring your patient, Carli Monreal, to the [facility] [place]. Please see a copy of my visit note below.    Hand Surgery History & Physical    REFERRING PHYSICIAN: Artur Vergara   PRIMARY CARE PHYSICIAN: Iza Cage     Chief Complaint:   Consult (Right hand arthritis)    History of Present Illness:   Carli Monreal is a 72 year old right-hand dominant female who presents for evaluation of right hand and thumb pain. The patient states that about a year ago she slipped while cleaning her bathroom, landing on her right palm. She developed right hand and wrist pain after that. She was seen in an orthopedics clinic and XR's were obtained that showed no evidence of fracture, but degenerative changes at the CMC and STT. She has been seen by Dr. Piña a few times over the last year, and underwent a corticosteroid injection in the right 1st CMC and STT on 10/18/22. Patient states that she had minimal pain relief from the injections. She states that she did some hand therapy and had an orthosis last year shortly after her fall but did not feel like it helped. She states that she has minimal  or pinch strength in the right hand due to the pain in the thumb and wrist. She has pain with wrist flexion as well. She has tried oral and topical antiinflammatories with minimal relief.     The patient's  is disabled and she is the primary caregiver. The pain in her thumb and wrist is limiting her from doing activities. The patient states that she is leaving for a 2 month international trip next June and would like her thumb pain treated before then.      The patient reports being followed by Rheumatology in the past in [place] for inflammatory bowel disease as well as rheumatoid arthritis. The patient states that she is not on any current medication for  rheumatoid arthritis and reports that she was recently told she did not have RA. She plans to establish with a new rheumatologist on 2022.     Past Medical History:     Past Medical History:   Diagnosis Date     Diabetes mellitus (H)      Right bundle branch block      Uncomplicated asthma      Past Surgical History:     Past Surgical History:   Procedure Laterality Date     CATARACT IOL, RT/LT Bilateral     Valeriano Javier MD ([place], [place]).     COLONOSCOPY N/A 2021    Procedure: COLONOSCOPY;  Surgeon: Keesha Mancuso MD;  Location: [facility] OR     SMALL BOWEL RESECTION         Social History:     Social History     Tobacco Use     Smoking status: Former     Types: Cigarettes     Quit date: 1991     Years since quittin.6     Smokeless tobacco: Never   Substance Use Topics     Alcohol use: Yes     Comment: Wine- couple x/ week       Family History:     Family History   Problem Relation Age of Onset     Rheumatoid Arthritis Mother      Eye Surgery Father      Glaucoma Father      Amblyopia Cousin      Inflammatory Bowel Disease No family hx of      Colon Cancer No family hx of      Macular Degeneration No family hx of        Allergies:     Allergies   Allergen Reactions     Seasonal Allergies        Medications:     Current Outpatient Medications   Medication     albuterol (PROAIR HFA/PROVENTIL HFA/VENTOLIN HFA) 108 (90 Base) MCG/ACT inhaler     alcohol swab prep pads     amLODIPine (NORVASC) 2.5 MG tablet     atorvastatin (LIPITOR) 10 MG tablet     blood glucose (NO BRAND SPECIFIED) lancets standard     blood glucose (ONETOUCH VERIO IQ) test strip     budesonide-formoterol (SYMBICORT) 160-4.5 MCG/ACT Inhaler     Cyanocobalamin 3000 MCG SUBL     esomeprazole (NEXIUM) 40 MG DR capsule     folic acid (FOLVITE) 1 MG tablet     gabapentin (NEURONTIN) 100 MG capsule     Lancets (ONETOUCH DELICA PLUS [id number]) MISC     losartan (COZAAR) 100 MG tablet     metFORMIN (GLUCOPHAGE) 1000 MG tablet      multivitamin w/minerals (THERA-VIT-M) tablet     potassium chloride ER (KLOR-CON) 20 MEQ CR tablet     scopolamine (TRANSDERM) 1 MG/3DAYS 72 hr patch     Vitamin D, Cholecalciferol, 25 MCG (1000 UT) TABS     No current facility-administered medications for this visit.        Review of Systems:     A 10 point ROS was performed and reviewed. Specific responses to these questions are noted at the end of the document.    Physical Exam:   Physical Exam Adult:  General: Well-nourished, alert, cooperative with exam and in no acute distress  HEENT: Atraumatic, normocephalic, extraocular movements intact, moist mucous membranes, trachea midline  Pulmonary: Unlabored work of breathing, on room air  Cardiovascular: Warm and well-perfused extremities. 2+ radial pulses  Skin: Warm, intact without rashes to the upper extremities, head or neck   Gait: Normal  Neuro/psych: Oriented to time, place and person. Affect is appropriate    Musculoskeletal: A focused physical examination of the right hand reveals:   Inspection - Skin is clean dry and intact.   Palpation - Mild edema over the 1st CMC and radial wrist. Severe tenderness to palpation over the thumb CMC and STT joint. Severe pain with CMC grind. Nontender to palpation over the first dorsal compartment. Nontender to palpation through remainder of wrist.  Negative finkelstein.    Neurovascular- intact light touch sensation and motor to distribution of the median, ulnar and radial nerves. Brisk capillary refill to the distal fingers. 2+ radial pulse.   Range of Motion: Decreased wrist flexion secondary to pain. Full pronation and supination.   Muscle strength and tone: 5/5 strength EPL, FPL, APB, first dorsal interosseous.         Pinch strength 2 lbs (9lb on left)      Imaging:   3 view X-ray of the right hand  was independently interpreted by me. The results were discussed with the patient.  Findings include: Severe degenerative changes to the STT joint, mild to moderated  degenerative changes to the thumb CMC. No significant change compared to radiographs from .     Assessment and Plan:   Assessment:  72 year old female with the followin.  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 Failed prior conservative treatment with splinting, antiinflammatories, and corticosteroid injections. Last injection on 10/18/2022.   2. Question hx of rheumatoid arthritis.  Has follow-up established with rheumatology next month.  3. Diabetes mellitis type 2, well controlled. Last A1C 6.3 2022.      Plan:   Discussed treatment options with the patient including continued conservative management with hand therapy, splinting, anti-inflammatories.  We did discuss that she has not done any consistent hand therapy for more than a year.  Recommended referral to hand therapy for strengthening and splinting.  Patient declined.  Patient had recent corticosteroid injection 1 month ago.  He could continue to monitor her symptoms and repeat injections with image guidance after 12 weeks.  We also discussed surgical intervention in the form of a thumb CMC arthroplasty with trapeziectomy.  I briefly discussed the procedure and postoperative course.  We discussed that she would likely be in a thumb spica cast for approximately 6 weeks postoperatively, and then will transition to a removable brace with therapies.  We discussed that while a thumb CMC arthroplasty is typically an great pain reliever, she likely would see decreased  strength. We discussed the risks of surgery which include, but are not limited to: Bleeding, infection, damage to surrounding structures (such as nerve, vessel or tendon), need for additional procedures, pain, stiffness, need for therapy, and anesthetic complications. The patient expressed understanding and agreement and wished to proceed.  We discussed recommendation to wait at least 12 weeks from corticosteroid injection prior to proceeding with surgery.   Therefore she can schedule after January 18th 2023.  We also discussed recommendation to obtain a CT scan of the right wrist to further evaluate osseous structures and for surgical planning.  This will be obtained today.    Radiographs and plan discussed with Dr. Jasmeet Haque who is in agreement with the plan.     Total time spent on date of encounter: 60 minutes.   Activities performed: Chart review: Past Orthopedics surgery notes, PCP notes, and Prior imaging, Face to face counseling and Coordination of care, and documentation      Izzy Fleming PA-C   Orthopedic Surgery  11/13/2022 8:17 PM

## 2022-11-14 NOTE — NURSING NOTE
Teaching Flowsheet   Relevant Diagnosis: Arthritis of CMC joint Rt thumb and arthritis of htgssmic-xjfuhbbfz-iaodbchuw joint of Rt hand.  Teaching Topic: CMC joint arthoplasty of Rt thumb    [facility] under [facility] with Block with Dr Jasmeet Haque    DM type 2, last HgbA1c on 11-7-22 @ 6.3%  BMI 31.14  HTN, Hypokalemia,Asthma-(Moderate) Crohn's     Person(s) involved in teaching:   Patient     Motivation Level:  Asks Questions: Yes  Eager to Learn: Yes  Cooperative: Yes  Receptive (willing/able to accept information): Yes  Any cultural factors/Tenriism beliefs that may influence understanding or compliance? No    Patient demonstrates understanding of the following:  Reason for the appointment, diagnosis and treatment plan: Yes  Knowledge of proper use of medications and conditions for which they are ordered (with special attention to potential side effects or drug interactions): Yes  Which situations necessitate calling provider and whom to contact: Yes    Teaching Concerns Addressed:   Proper use and care of  (medical equip, care aids, etc.): Yes  Nutritional needs and diet plan: Yes  Pain management techniques: Yes  Wound Care: Yes  How and/when to access community resources: Yes     Instructional Materials Used/Given:   Preoperative surgery packet, antibacterial Chlorhexidine soap. Stop Light Tool reviewed, patient verbalized understanding, had no immediate questions. Marianna Gagnon RN

## 2022-11-14 NOTE — PROGRESS NOTES
Hand Surgery History & Physical    REFERRING PHYSICIAN: Artur Vergara   PRIMARY CARE PHYSICIAN: Iza Cage     Chief Complaint:   Consult (Right hand arthritis)    History of Present Illness:   Carli Monreal is a 72 year old right-hand dominant female who presents for evaluation of right hand and thumb pain. The patient states that about a year ago she slipped while cleaning her bathroom, landing on her right palm. She developed right hand and wrist pain after that. She was seen in an orthopedics clinic and XR's were obtained that showed no evidence of fracture, but degenerative changes at the CMC and STT. She has been seen by Dr. Piña a few times over the last year, and underwent a corticosteroid injection in the right 1st CMC and STT on 10/18/22. Patient states that she had minimal pain relief from the injections. She states that she did some hand therapy and had an orthosis last year shortly after her fall but did not feel like it helped. She states that she has minimal  or pinch strength in the right hand due to the pain in the thumb and wrist. She has pain with wrist flexion as well. She has tried oral and topical antiinflammatories with minimal relief.     The patient's  is disabled and she is the primary caregiver. The pain in her thumb and wrist is limiting her from doing activities. The patient states that she is leaving for a 2 month international trip next June and would like her thumb pain treated before then.      The patient reports being followed by Rheumatology in the past in [place] for inflammatory bowel disease as well as rheumatoid arthritis. The patient states that she is not on any current medication for rheumatoid arthritis and reports that she was recently told she did not have RA. She plans to establish with a new rheumatologist on 12/13/2022.     Past Medical History:     Past Medical History:   Diagnosis Date     Diabetes mellitus (H)      Right bundle branch block       Uncomplicated asthma      Past Surgical History:     Past Surgical History:   Procedure Laterality Date     CATARACT IOL, RT/LT Bilateral 2017    Valeriano Javier MD ([place], [place]).     COLONOSCOPY N/A 2021    Procedure: COLONOSCOPY;  Surgeon: Keesha Mancuso MD;  Location: [facility] OR     SMALL BOWEL RESECTION         Social History:     Social History     Tobacco Use     Smoking status: Former     Types: Cigarettes     Quit date: 1991     Years since quittin.6     Smokeless tobacco: Never   Substance Use Topics     Alcohol use: Yes     Comment: Wine- couple x/ week       Family History:     Family History   Problem Relation Age of Onset     Rheumatoid Arthritis Mother      Eye Surgery Father      Glaucoma Father      Amblyopia Cousin      Inflammatory Bowel Disease No family hx of      Colon Cancer No family hx of      Macular Degeneration No family hx of        Allergies:     Allergies   Allergen Reactions     Seasonal Allergies        Medications:     Current Outpatient Medications   Medication     albuterol (PROAIR HFA/PROVENTIL HFA/VENTOLIN HFA) 108 (90 Base) MCG/ACT inhaler     alcohol swab prep pads     amLODIPine (NORVASC) 2.5 MG tablet     atorvastatin (LIPITOR) 10 MG tablet     blood glucose (NO BRAND SPECIFIED) lancets standard     blood glucose (ONETOUCH VERIO IQ) test strip     budesonide-formoterol (SYMBICORT) 160-4.5 MCG/ACT Inhaler     Cyanocobalamin 3000 MCG SUBL     esomeprazole (NEXIUM) 40 MG DR capsule     folic acid (FOLVITE) 1 MG tablet     gabapentin (NEURONTIN) 100 MG capsule     Lancets (ONETOUCH DELICA PLUS [id number]) MISC     losartan (COZAAR) 100 MG tablet     metFORMIN (GLUCOPHAGE) 1000 MG tablet     multivitamin w/minerals (THERA-VIT-M) tablet     potassium chloride ER (KLOR-CON) 20 MEQ CR tablet     scopolamine (TRANSDERM) 1 MG/3DAYS 72 hr patch     Vitamin D, Cholecalciferol, 25 MCG (1000 UT) TABS     No current facility-administered medications for this  visit.        Review of Systems:     A 10 point ROS was performed and reviewed. Specific responses to these questions are noted at the end of the document.    Physical Exam:   Physical Exam Adult:  General: Well-nourished, alert, cooperative with exam and in no acute distress  HEENT: Atraumatic, normocephalic, extraocular movements intact, moist mucous membranes, trachea midline  Pulmonary: Unlabored work of breathing, on room air  Cardiovascular: Warm and well-perfused extremities. 2+ radial pulses  Skin: Warm, intact without rashes to the upper extremities, head or neck   Gait: Normal  Neuro/psych: Oriented to time, place and person. Affect is appropriate    Musculoskeletal: A focused physical examination of the right hand reveals:   Inspection - Skin is clean dry and intact.   Palpation - Mild edema over the 1st CMC and radial wrist. Severe tenderness to palpation over the thumb CMC and STT joint. Severe pain with CMC grind. Nontender to palpation over the first dorsal compartment. Nontender to palpation through remainder of wrist.  Negative finkelstein.    Neurovascular- intact light touch sensation and motor to distribution of the median, ulnar and radial nerves. Brisk capillary refill to the distal fingers. 2+ radial pulse.   Range of Motion: Decreased wrist flexion secondary to pain. Full pronation and supination.   Muscle strength and tone: 5/5 strength EPL, FPL, APB, first dorsal interosseous.         Pinch strength 2 lbs (9lb on left)      Imaging:   3 view X-ray of the right hand  was independently interpreted by me. The results were discussed with the patient.  Findings include: Severe degenerative changes to the STT joint, mild to moderated degenerative changes to the thumb CMC. No significant change compared to radiographs from .     Assessment and Plan:   Assessment:  72 year old female with the followin.  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 Failed prior  conservative treatment with splinting, antiinflammatories, and corticosteroid injections. Last injection on 10/18/2022.   2. Question hx of rheumatoid arthritis.  Has follow-up established with rheumatology next month.  3. Diabetes mellitis type 2, well controlled. Last A1C 6.3 11/2022.      Plan:   Discussed treatment options with the patient including continued conservative management with hand therapy, splinting, anti-inflammatories.  We did discuss that she has not done any consistent hand therapy for more than a year.  Recommended referral to hand therapy for strengthening and splinting.  Patient declined.  Patient had recent corticosteroid injection 1 month ago.  He could continue to monitor her symptoms and repeat injections with image guidance after 12 weeks.  We also discussed surgical intervention in the form of a thumb CMC arthroplasty with trapeziectomy.  I briefly discussed the procedure and postoperative course.  We discussed that she would likely be in a thumb spica cast for approximately 6 weeks postoperatively, and then will transition to a removable brace with therapies.  We discussed that while a thumb CMC arthroplasty is typically an great pain reliever, she likely would see decreased  strength. We discussed the risks of surgery which include, but are not limited to: Bleeding, infection, damage to surrounding structures (such as nerve, vessel or tendon), need for additional procedures, pain, stiffness, need for therapy, and anesthetic complications. The patient expressed understanding and agreement and wished to proceed.  We discussed recommendation to wait at least 12 weeks from corticosteroid injection prior to proceeding with surgery.  Therefore she can schedule after January 18th 2023.  We also discussed recommendation to obtain a CT scan of the right wrist to further evaluate osseous structures and for surgical planning.  This will be obtained today.    Radiographs and plan discussed  with Dr. Jasmeet Haque who is in agreement with the plan.     Total time spent on date of encounter: 60 minutes.   Activities performed: Chart review: Past Orthopedics surgery notes, PCP notes, and Prior imaging, Face to face counseling and Coordination of care, and documentation      Izzy Fleming PA-C   Orthopedic Surgery  11/13/2022 8:17 PM

## 2022-11-17 DIAGNOSIS — K50.919 CROHN'S DISEASE WITH COMPLICATION, UNSPECIFIED GASTROINTESTINAL TRACT LOCATION (H): ICD-10-CM

## 2022-11-17 RX ORDER — FOLIC ACID 1 MG/1
TABLET ORAL
Qty: 90 TABLET | Refills: 0 | Status: SHIPPED | OUTPATIENT
Start: 2022-11-17 | End: 2023-04-04

## 2022-11-27 DIAGNOSIS — E11.9 TYPE 2 DIABETES MELLITUS WITHOUT COMPLICATION, WITHOUT LONG-TERM CURRENT USE OF INSULIN (H): ICD-10-CM

## 2022-11-28 RX ORDER — BLOOD SUGAR DIAGNOSTIC
STRIP MISCELLANEOUS
Qty: 200 STRIP | Refills: 0 | Status: SHIPPED | OUTPATIENT
Start: 2022-11-28 | End: 2023-07-31

## 2022-11-29 ENCOUNTER — TELEPHONE (OUTPATIENT)
Dept: ORTHOPEDICS | Facility: CLINIC | Age: 72
End: 2022-11-29

## 2022-11-29 PROBLEM — M18.11 ARTHRITIS OF CARPOMETACARPAL (CMC) JOINT OF RIGHT THUMB: Status: ACTIVE | Noted: 2022-11-29

## 2022-11-29 PROBLEM — M19.031 ARTHRITIS OF SCAPHOID-TRAPEZIUM-TRAPEZOID JOINT OF RIGHT HAND: Status: ACTIVE | Noted: 2022-11-29

## 2022-11-29 NOTE — TELEPHONE ENCOUNTER
Called pt to schedule surgery. Spoke with pt who stated she is on another line and requested a call back.    Andria Clark on 11/29/2022 at 1:09 PM

## 2022-11-29 NOTE — TELEPHONE ENCOUNTER
Called patient to schedule surgery with Dr. Haque    Date of Surgery: 01/20/23    Location of surgery: [facility]    Pre-Op H&P: PCP    Pre/Post Imaging:  No    Discussed COVID-19 Testing: Yes    Post-Op Appt Date: 01/30/23    Surgery Packet Mailed: Given in clinic      Additional comments: Called pt and offered 01/20/23 surgery date and pt agreed. Pt requested a later start as her  is disabled and will be driving her, but can't drive too early. Pt will see her PCP for Pre op. Scheduled POP        Andria Clark on 11/29/2022 at 2:41 PM

## 2022-11-29 NOTE — TELEPHONE ENCOUNTER
Patient called RN back.  Will send message to surgery scheduler to please contact patient.  She is at her home number. Marianna Gagnon RN

## 2022-12-06 ENCOUNTER — TELEPHONE (OUTPATIENT)
Dept: PULMONOLOGY | Facility: CLINIC | Age: 72
End: 2022-12-06

## 2022-12-13 ENCOUNTER — LAB (OUTPATIENT)
Dept: LAB | Facility: CLINIC | Age: 72
End: 2022-12-13
Attending: INTERNAL MEDICINE
Payer: COMMERCIAL

## 2022-12-13 ENCOUNTER — OFFICE VISIT (OUTPATIENT)
Dept: RHEUMATOLOGY | Facility: CLINIC | Age: 72
End: 2022-12-13
Attending: INTERNAL MEDICINE
Payer: COMMERCIAL

## 2022-12-13 ENCOUNTER — ANCILLARY PROCEDURE (OUTPATIENT)
Dept: GENERAL RADIOLOGY | Facility: CLINIC | Age: 72
End: 2022-12-13
Attending: INTERNAL MEDICINE
Payer: COMMERCIAL

## 2022-12-13 VITALS
DIASTOLIC BLOOD PRESSURE: 69 MMHG | OXYGEN SATURATION: 99 % | HEART RATE: 74 BPM | HEIGHT: 59 IN | BODY MASS INDEX: 31.85 KG/M2 | WEIGHT: 158 LBS | SYSTOLIC BLOOD PRESSURE: 153 MMHG

## 2022-12-13 DIAGNOSIS — K50.019 CROHN'S DISEASE OF SMALL INTESTINE WITH COMPLICATION (H): Primary | ICD-10-CM

## 2022-12-13 DIAGNOSIS — K50.90 ARTHROPATHY IN CROHN'S DISEASE (H): ICD-10-CM

## 2022-12-13 DIAGNOSIS — M07.60 ARTHROPATHY IN CROHN'S DISEASE (H): ICD-10-CM

## 2022-12-13 DIAGNOSIS — M19.041 PRIMARY OSTEOARTHRITIS OF BOTH HANDS: ICD-10-CM

## 2022-12-13 DIAGNOSIS — M19.042 PRIMARY OSTEOARTHRITIS OF BOTH HANDS: ICD-10-CM

## 2022-12-13 DIAGNOSIS — K50.019 CROHN'S DISEASE OF SMALL INTESTINE WITH COMPLICATION (H): ICD-10-CM

## 2022-12-13 LAB
CRP SERPL-MCNC: <3 MG/L
ERYTHROCYTE [SEDIMENTATION RATE] IN BLOOD BY WESTERGREN METHOD: 29 MM/HR (ref 0–30)

## 2022-12-13 PROCEDURE — 72200 X-RAY EXAM SI JOINTS: CPT | Performed by: RADIOLOGY

## 2022-12-13 PROCEDURE — 85652 RBC SED RATE AUTOMATED: CPT | Performed by: PATHOLOGY

## 2022-12-13 PROCEDURE — 86140 C-REACTIVE PROTEIN: CPT | Performed by: PATHOLOGY

## 2022-12-13 PROCEDURE — G0463 HOSPITAL OUTPT CLINIC VISIT: HCPCS

## 2022-12-13 PROCEDURE — 99215 OFFICE O/P EST HI 40 MIN: CPT | Performed by: INTERNAL MEDICINE

## 2022-12-13 PROCEDURE — 36415 COLL VENOUS BLD VENIPUNCTURE: CPT | Performed by: PATHOLOGY

## 2022-12-13 ASSESSMENT — PAIN SCALES - GENERAL: PAINLEVEL: WORST PAIN (10)

## 2022-12-13 NOTE — LETTER
12/13/2022       RE: Carli Monreal  [address]  [place] [address]     Dear Colleague,    Thank you for referring your patient, Carli Monreal, to the [facility] RHEUMATOLOG[facility] [place] [facility]. Please see a copy of my visit note below.    Ms. Carli Monreal returns for evaluation and management of Crohn's arthropathy. Joint disease is complicated by osteoarthritis. Failed sulfasalazine due to intolerance. No history of treatment with azathioprine. Previously treated with remicade and humira. No history of simponi use. Most recent treatment with methotrexate 15 mg weekly and Cimzia.     She complains of chronic back pains both in the lumbar region and between the shoulder blades. Gabapentin can be helpful but she doesn't use this regularly.    She has R>L hand pains focused on the thumbs. She can have finger triggering as well. She anticipates hand surgery this winter to alleviate some of this problem. She has pain at rest that worsens with use. Weather changes increase the pain. She feels that her wrist is swollen on the right but no redness or warmth.    She also describes R>L knee pain laterally but this is chronic. Finally, she has a history of SI joint disease.    Her Crohn's is currently pretty quiet but she requires chronic imodium treatment. She has not used immunosuppression for the past 2 years for her GI track. She fell a year ago but no recent fractures.    Past Medical Illness:  Medical-osteoarthritis, osteoporosis, Crohn's, rheumatoid arthritis, RBBB, emphysema, Graham's neuroma, vitamin D insuficfiency, type II diabetes, HTN, , chronic pain syndrome, lumbar DDD/DJD with chronic low back pain  Surgical-partial small bowel resection, lumbar spine fusion L4-5, surgery for tennis elbow, appendectomy, cholecystectomy, hernia repair, bilateral carpal tunnel release  Injuries-bilateral foot fractures, left ankle fracture,  left patella fracture, whiplash injury    Active Problems:    Patient Active Problem List   Diagnosis     Moderate persistent asthma, unspecified whether complicated     Essential hypertension     Type 2 diabetes mellitus without complication, without long-term current use of insulin (H)     Rheumatoid arthritis involving both hands, unspecified whether rheumatoid factor present (H)     Chronic bilateral low back pain, unspecified whether sciatica present     Arthropathy in Crohn's disease (H)     Crohn's disease of small intestine with complication (H)     Hypokalemia     Insomnia, unspecified type     Arthritis of bctsumnl-htnxmdzct-svnccwcwb joint of right hand     Arthritis of carpometacarpal (CMC) joint of right thumb     Allergies   Allergen Reactions     Seasonal Allergies      Social History:   with two children, former smoker, EtOH is 3 per week    Social History     Socioeconomic History     Marital status:      Spouse name: Not on file     Number of children: Not on file     Years of education: Not on file     Highest education level: Not on file   Occupational History     Not on file   Tobacco Use     Smoking status: Former     Types: Cigarettes     Quit date: 1991     Years since quittin.6     Smokeless tobacco: Never   Vaping Use     Vaping Use: Never used   Substance and Sexual Activity     Alcohol use: Yes     Comment: Wine- couple x/ week     Drug use: Never     Sexual activity: Not Currently     Partners: Male   Other Topics Concern     Parent/sibling w/ CABG, MI or angioplasty before 65F 55M? Not Asked   Social History Narrative     Not on file     Social Determinants of Health     Financial Resource Strain: Not on file   Food Insecurity: Not on file   Transportation Needs: Not on file   Physical Activity: Not on file   Stress: Not on file   Social Connections: Not on file   Intimate Partner Violence: Not on file   Housing Stability: Not on file     Family History:  Aunt  with RA  Mother dead with RA, CAD, MI, CHF, CKD  Father dead with A.fib, dementia  Brother dead with Agent [place]  Daughter with MS  Son with autism    PMSF history personally obtained and updated by me this visit.    Review Of Systems:  +diarrhea  +asthma  +seasonal allergies  Remainder of the 14 point ROS obtained and found negative.    Physical Exam:  Constitutional: WD-WN-WG cooperative  Eyes: nl EOM, sclera  ENT: nl external ears, nose, hearing, lips  Neck: no visible thyroid enlargement  Resp:  Cardiac:  MS: +reduced neck rotation and flexion to 30 degrees, no extension; severe R>L CMC squaring with right sided tenderness and weakened ; bony swelling right wrist. Normal prayer but poor tuck right. Modest pain with full right knee ROM. No important SI joint tenderness. All other shoulder, elbow, wrist, hand joints were examined and otherwise found normal.  No active synovitis.  Skin: no alopecia, rash  Neuro: nl cranial nerves  Psych: nl judgement, affect.    Laboratory:    Component      Latest Ref Rng & Units 5/26/2021 6/10/2021   WBC      4.0 - 11.0 10e9/L 6.2    RBC Count      3.8 - 5.2 10e12/L 4.24    Hemoglobin      11.7 - 15.7 g/dL 11.7    Hematocrit      35.0 - 47.0 % 35.8    MCV      78 - 100 fl 84    MCH      26.5 - 33.0 pg 27.6    MCHC      31.5 - 36.5 g/dL 32.7    RDW      10.0 - 15.0 % 13.2    Platelet Count      150 - 450 10e9/L 191    Diff Method       Automated Method    % Neutrophils      % 60.2    % Lymphocytes      % 28.8    % Monocytes      % 8.9    % Eosinophils      % 1.3    % Basophils      % 0.5    % Immature Granulocytes      % 0.3    Nucleated RBCs      0 /100 0    Absolute Neutrophil      1.6 - 8.3 10e9/L 3.7    Absolute Lymphocytes      0.8 - 5.3 10e9/L 1.8    Absolute Monocytes      0.0 - 1.3 10e9/L 0.6    Absolute Eosinophils      0.0 - 0.7 10e9/L 0.1    Absolute Basophils      0.0 - 0.2 10e9/L 0.0    Abs Immature Granulocytes      0 - 0.4 10e9/L 0.0    Absolute Nucleated RBC        0.0    Color Urine       Yellow    Appearance Urine       Clear    Glucose Urine      NEG:Negative mg/dL Negative    Bilirubin Urine      NEG:Negative Negative    Ketones Urine      NEG:Negative mg/dL Negative    Specific Gravity Urine      1.003 - 1.035 1.010    Blood Urine      NEG:Negative Small (A)    pH Urine      5.0 - 7.0 pH 5.0    Protein Albumin Urine      NEG:Negative mg/dL Negative    Urobilinogen mg/dL      0.0 - 2.0 mg/dL 0.0    Nitrite Urine      NEG:Negative Negative    Leukocyte Esterase Urine      NEG:Negative Negative    Source       Midstream Urine    WBC Urine      0 - 5 /HPF 2    RBC Urine      0 - 2 /HPF 1    Squamous Epithelial /HPF Urine      0 - 1 /HPF <1    Mucous Urine      NEG:Negative /LPF Present (A)    Sodium      133 - 144 mmol/L 142    Potassium      3.4 - 5.3 mmol/L 3.7    Chloride      94 - 109 mmol/L 110 (H)    Carbon Dioxide      20 - 32 mmol/L 28    Anion Gap      3 - 14 mmol/L 4    Glucose      70 - 99 mg/dL 91    Urea Nitrogen      7 - 30 mg/dL 10    Creatinine      0.52 - 1.04 mg/dL 0.65    GFR Estimate      >60 mL/min/1.73:m2 90    GFR Estimate If Black      >60 mL/min/1.73:m2 >90    Calcium      8.5 - 10.1 mg/dL 9.0    Bilirubin Total      0.2 - 1.3 mg/dL 0.3    Albumin      3.4 - 5.0 g/dL 3.6    Protein Total      6.8 - 8.8 g/dL 7.9    Alkaline Phosphatase      40 - 150 U/L 98    ALT      0 - 50 U/L 38    AST      0 - 45 U/L 35    LEIGH interpretation      NEG:Negative Positive (A)    LEIGH pattern 1       HOMOGENEOUS    LEIGH titer 1       1:160    B58Gerh Method       SBT    B locus       B27 Neg    Rheumatoid Factor      <12 IU/mL <7    Sed Rate      0 - 30 mm/h 60 (H)    Cyclic Citrullinated Peptide Antibody, IgG      <7 U/mL 2    CRP Inflammation      0.0 - 8.0 mg/L <2.9    Calprotectin Feces      0.0 - 49.9 mg/kg  50.2 (H)   Study Result    Narrative & Impression   Exam: CT WRIST RIGHT W/O CONTRAST 11/14/2022 3:45 PM     History: Arthritis of  rjyzdupm-sbtlvpzwo-wsxdgnnog joint of right  hand; Arthritis of carpometacarpal (CMC) joint of right thumb     Techniques: Multislice CT examination was performed of the right wrist  with multiplanar reconstructions displayed in multiple window  settings. Imaging was performed without IV contrast material.      DLP: 334 mGy-cm     Comparison: Same date radiograph, 4/18/2022 radiograph     Findings:     Bones:     Redemonstration severe triscaphe joint osteoarthrosis with complete  joint space loss.     Mild degenerative change first carpal metacarpal joint. Cystlike  change third metacarpal head and volar capitate.     Soft tissues:   Evaluation of the soft tissue, particularly internal derangement of  joint assessment is limited with CT technique.      Small mineralization along the expected course of the dorsal  scapholunate interosseous ligament.     Moderate radiocarpal joint effusion.                                                                      Impression:  1. Severe triscaphe joint osteoarthrosis.  2. Moderate radiocarpal joint effusion.     ANTOINETTE GALLEGOS    Study Result    Narrative & Impression   3 views right hand radiographs 11/14/2022 1:54 PM     History: Hand pain, right     Comparison: 9/16/2021     Findings:     PA, oblique and lateral view(s) of the right hand were obtained.      No acute osseous abnormality.  No erosion.     Moderate first carpometacarpal and severe triscaphe joint degenerative  changes.  Additional scattered degenerative change in the  interphalangeal joints, particularly distally.     Bones appear osteopenic. Unchanged ossicle dorsal to the index finger  distal interphalangeal joint, likely from remote trauma.                                                                      Impression:  1. No acute osseous abnormality.  2. Severe triscaphe joint degenerative changes, similar to prior.     ANTOINETTE GALLEGOS          Impression:    History of chronic inflammatory  polyarthritis-associated with Crohn's disease and involving spine, hands, knees, and feet. Today I observe no clear inflammatory arthropathy or active synovitis. Nonetheless, she is not currently treated with immunosuppression and systemic inflammation cannot be excluded. Plan to get inflammatory markers today as well as SI joint Xrays. I will write with results and schedule earlier follow up if it appears that immunosuppression could be beneficial.    Osteoarthritis-severe in the hands/wrists. Management in hand surgery clinic is anticipated.    Crohn's-I have recommended follow up in the GI clinic for diarrhea.    Chronic pain syndrome-with incomplete control on intermittent gabapentin. I have recommened that she use the gabapentin nightly and consider dose escalation to improve pain control.    Bone health-with frequent fractures and reportedly osteoporosis, s/p a single reclast treatment that she failed to tolerate well. Plan is to re-evaluate her bone care by DEXA and then consider an alternative such as Prolia in the primary care clinic.    Plan follow up with me in 12 months.      A total of 43 minutes was spent in face to face patient interaction and chart review on the day of service.    Sincerely,    Mc Martinez MD

## 2022-12-13 NOTE — NURSING NOTE
"Chief Complaint   Patient presents with     RECHECK     Follow-up      BP (!) 153/69 (BP Location: Right arm, Patient Position: Sitting, Cuff Size: Adult Regular)   Pulse 74   Ht 1.511 m (4' 11.49\")   Wt 71.7 kg (158 lb)   SpO2 99%   BMI 31.39 kg/m      Kerry Mancera on 12/13/2022 at 12:18 PM    "

## 2022-12-13 NOTE — PROGRESS NOTES
Ms. Carli Monreal returns for evaluation and management of Crohn's arthropathy. Joint disease is complicated by osteoarthritis. Failed sulfasalazine due to intolerance. No history of treatment with azathioprine. Previously treated with remicade and humira. No history of simponi use. Most recent treatment with methotrexate 15 mg weekly and Cimzia.     She complains of chronic back pains both in the lumbar region and between the shoulder blades. Gabapentin can be helpful but she doesn't use this regularly.    She has R>L hand pains focused on the thumbs. She can have finger triggering as well. She anticipates hand surgery this winter to alleviate some of this problem. She has pain at rest that worsens with use. Weather changes increase the pain. She feels that her wrist is swollen on the right but no redness or warmth.    She also describes R>L knee pain laterally but this is chronic. Finally, she has a history of SI joint disease.    Her Crohn's is currently pretty quiet but she requires chronic imodium treatment. She has not used immunosuppression for the past 2 years for her GI track. She fell a year ago but no recent fractures.    Past Medical Illness:  Medical-osteoarthritis, osteoporosis, Crohn's, rheumatoid arthritis, RBBB, emphysema, Graham's neuroma, vitamin D insuficfiency, type II diabetes, HTN, , chronic pain syndrome, lumbar DDD/DJD with chronic low back pain  Surgical-partial small bowel resection, lumbar spine fusion L4-5, surgery for tennis elbow, appendectomy, cholecystectomy, hernia repair, bilateral carpal tunnel release  Injuries-bilateral foot fractures, left ankle fracture, left patella fracture, whiplash injury    Active Problems:    Patient Active Problem List   Diagnosis     Moderate persistent asthma, unspecified whether complicated     Essential hypertension     Type 2 diabetes mellitus without complication, without long-term current use of insulin (H)     Rheumatoid arthritis involving both  hands, unspecified whether rheumatoid factor present (H)     Chronic bilateral low back pain, unspecified whether sciatica present     Arthropathy in Crohn's disease (H)     Crohn's disease of small intestine with complication (H)     Hypokalemia     Insomnia, unspecified type     Arthritis of hozyusfs-ybrxrgxqs-peprrztwq joint of right hand     Arthritis of carpometacarpal (CMC) joint of right thumb     Allergies   Allergen Reactions     Seasonal Allergies      Social History:   with two children, former smoker, EtOH is 3 per week    Social History     Socioeconomic History     Marital status:      Spouse name: Not on file     Number of children: Not on file     Years of education: Not on file     Highest education level: Not on file   Occupational History     Not on file   Tobacco Use     Smoking status: Former     Types: Cigarettes     Quit date: 1991     Years since quittin.6     Smokeless tobacco: Never   Vaping Use     Vaping Use: Never used   Substance and Sexual Activity     Alcohol use: Yes     Comment: Wine- couple x/ week     Drug use: Never     Sexual activity: Not Currently     Partners: Male   Other Topics Concern     Parent/sibling w/ CABG, MI or angioplasty before 65F 55M? Not Asked   Social History Narrative     Not on file     Social Determinants of Health     Financial Resource Strain: Not on file   Food Insecurity: Not on file   Transportation Needs: Not on file   Physical Activity: Not on file   Stress: Not on file   Social Connections: Not on file   Intimate Partner Violence: Not on file   Housing Stability: Not on file     Family History:  Aunt with RA  Mother dead with RA, CAD, MI, CHF, CKD  Father dead with A.fib, dementia  Brother dead with Agent [place]  Daughter with MS  Son with autism    PMSF history personally obtained and updated by me this visit.    Review Of Systems:  +diarrhea  +asthma  +seasonal allergies  Remainder of the 14 point ROS obtained and found  negative.    Physical Exam:  Constitutional: WD-WN-WG cooperative  Eyes: nl EOM, sclera  ENT: nl external ears, nose, hearing, lips  Neck: no visible thyroid enlargement  Resp:  Cardiac:  MS: +reduced neck rotation and flexion to 30 degrees, no extension; severe R>L CMC squaring with right sided tenderness and weakened ; bony swelling right wrist. Normal prayer but poor tuck right. Modest pain with full right knee ROM. No important SI joint tenderness. All other shoulder, elbow, wrist, hand joints were examined and otherwise found normal.  No active synovitis.  Skin: no alopecia, rash  Neuro: nl cranial nerves  Psych: nl judgement, affect.    Laboratory:    Component      Latest Ref Rng & Units 5/26/2021 6/10/2021   WBC      4.0 - 11.0 10e9/L 6.2    RBC Count      3.8 - 5.2 10e12/L 4.24    Hemoglobin      11.7 - 15.7 g/dL 11.7    Hematocrit      35.0 - 47.0 % 35.8    MCV      78 - 100 fl 84    MCH      26.5 - 33.0 pg 27.6    MCHC      31.5 - 36.5 g/dL 32.7    RDW      10.0 - 15.0 % 13.2    Platelet Count      150 - 450 10e9/L 191    Diff Method       Automated Method    % Neutrophils      % 60.2    % Lymphocytes      % 28.8    % Monocytes      % 8.9    % Eosinophils      % 1.3    % Basophils      % 0.5    % Immature Granulocytes      % 0.3    Nucleated RBCs      0 /100 0    Absolute Neutrophil      1.6 - 8.3 10e9/L 3.7    Absolute Lymphocytes      0.8 - 5.3 10e9/L 1.8    Absolute Monocytes      0.0 - 1.3 10e9/L 0.6    Absolute Eosinophils      0.0 - 0.7 10e9/L 0.1    Absolute Basophils      0.0 - 0.2 10e9/L 0.0    Abs Immature Granulocytes      0 - 0.4 10e9/L 0.0    Absolute Nucleated RBC       0.0    Color Urine       Yellow    Appearance Urine       Clear    Glucose Urine      NEG:Negative mg/dL Negative    Bilirubin Urine      NEG:Negative Negative    Ketones Urine      NEG:Negative mg/dL Negative    Specific Gravity Urine      1.003 - 1.035 1.010    Blood Urine      NEG:Negative Small (A)    pH Urine       5.0 - 7.0 pH 5.0    Protein Albumin Urine      NEG:Negative mg/dL Negative    Urobilinogen mg/dL      0.0 - 2.0 mg/dL 0.0    Nitrite Urine      NEG:Negative Negative    Leukocyte Esterase Urine      NEG:Negative Negative    Source       Midstream Urine    WBC Urine      0 - 5 /HPF 2    RBC Urine      0 - 2 /HPF 1    Squamous Epithelial /HPF Urine      0 - 1 /HPF <1    Mucous Urine      NEG:Negative /LPF Present (A)    Sodium      133 - 144 mmol/L 142    Potassium      3.4 - 5.3 mmol/L 3.7    Chloride      94 - 109 mmol/L 110 (H)    Carbon Dioxide      20 - 32 mmol/L 28    Anion Gap      3 - 14 mmol/L 4    Glucose      70 - 99 mg/dL 91    Urea Nitrogen      7 - 30 mg/dL 10    Creatinine      0.52 - 1.04 mg/dL 0.65    GFR Estimate      >60 mL/min/1.73:m2 90    GFR Estimate If Black      >60 mL/min/1.73:m2 >90    Calcium      8.5 - 10.1 mg/dL 9.0    Bilirubin Total      0.2 - 1.3 mg/dL 0.3    Albumin      3.4 - 5.0 g/dL 3.6    Protein Total      6.8 - 8.8 g/dL 7.9    Alkaline Phosphatase      40 - 150 U/L 98    ALT      0 - 50 U/L 38    AST      0 - 45 U/L 35    LEIGH interpretation      NEG:Negative Positive (A)    LEIGH pattern 1       HOMOGENEOUS    LEIGH titer 1       1:160    [id number] Method       SBT    B locus       B27 Neg    Rheumatoid Factor      <12 IU/mL <7    Sed Rate      0 - 30 mm/h 60 (H)    Cyclic Citrullinated Peptide Antibody, IgG      <7 U/mL 2    CRP Inflammation      0.0 - 8.0 mg/L <2.9    Calprotectin Feces      0.0 - 49.9 mg/kg  50.2 (H)   Study Result    Narrative & Impression   Exam: CT WRIST RIGHT W/O CONTRAST 11/14/2022 3:45 PM     History: Arthritis of ebnjnrzb-utcqlhwdt-btozxtjvb joint of right  hand; Arthritis of carpometacarpal (CMC) joint of right thumb     Techniques: Multislice CT examination was performed of the right wrist  with multiplanar reconstructions displayed in multiple window  settings. Imaging was performed without IV contrast material.      DLP: 334 mGy-cm     Comparison: Same  date radiograph, 4/18/2022 radiograph     Findings:     Bones:     Redemonstration severe triscaphe joint osteoarthrosis with complete  joint space loss.     Mild degenerative change first carpal metacarpal joint. Cystlike  change third metacarpal head and volar capitate.     Soft tissues:   Evaluation of the soft tissue, particularly internal derangement of  joint assessment is limited with CT technique.      Small mineralization along the expected course of the dorsal  scapholunate interosseous ligament.     Moderate radiocarpal joint effusion.                                                                      Impression:  1. Severe triscaphe joint osteoarthrosis.  2. Moderate radiocarpal joint effusion.     ANTOINETTE GALLEGOS    Study Result    Narrative & Impression   3 views right hand radiographs 11/14/2022 1:54 PM     History: Hand pain, right     Comparison: 9/16/2021     Findings:     PA, oblique and lateral view(s) of the right hand were obtained.      No acute osseous abnormality.  No erosion.     Moderate first carpometacarpal and severe triscaphe joint degenerative  changes.  Additional scattered degenerative change in the  interphalangeal joints, particularly distally.     Bones appear osteopenic. Unchanged ossicle dorsal to the index finger  distal interphalangeal joint, likely from remote trauma.                                                                      Impression:  1. No acute osseous abnormality.  2. Severe triscaphe joint degenerative changes, similar to prior.     ANTOINETTE GALLEGOS          Impression:    History of chronic inflammatory polyarthritis-associated with Crohn's disease and involving spine, hands, knees, and feet. Today I observe no clear inflammatory arthropathy or active synovitis. Nonetheless, she is not currently treated with immunosuppression and systemic inflammation cannot be excluded. Plan to get inflammatory markers today as well as SI joint Xrays. I will write  with results and schedule earlier follow up if it appears that immunosuppression could be beneficial.    Osteoarthritis-severe in the hands/wrists. Management in hand surgery clinic is anticipated.    Crohn's-I have recommended follow up in the GI clinic for diarrhea.    Chronic pain syndrome-with incomplete control on intermittent gabapentin. I have recommened that she use the gabapentin nightly and consider dose escalation to improve pain control.    Bone health-with frequent fractures and reportedly osteoporosis, s/p a single reclast treatment that she failed to tolerate well. Plan is to re-evaluate her bone care by DEXA and then consider an alternative such as Prolia in the primary care clinic.    Plan follow up with me in 12 months.      A total of 43 minutes was spent in face to face patient interaction and chart review on the day of service.

## 2022-12-26 ENCOUNTER — ANCILLARY PROCEDURE (OUTPATIENT)
Dept: BONE DENSITY | Facility: CLINIC | Age: 72
End: 2022-12-26
Attending: FAMILY MEDICINE
Payer: COMMERCIAL

## 2022-12-26 DIAGNOSIS — Z78.0 ENCOUNTER FOR OSTEOPOROSIS SCREENING IN ASYMPTOMATIC POSTMENOPAUSAL PATIENT: ICD-10-CM

## 2022-12-26 DIAGNOSIS — Z13.820 ENCOUNTER FOR OSTEOPOROSIS SCREENING IN ASYMPTOMATIC POSTMENOPAUSAL PATIENT: ICD-10-CM

## 2022-12-26 PROCEDURE — 77081 DXA BONE DENSITY APPENDICULR: CPT | Mod: XU | Performed by: INTERNAL MEDICINE

## 2022-12-26 PROCEDURE — 77080 DXA BONE DENSITY AXIAL: CPT | Performed by: INTERNAL MEDICINE

## 2023-01-03 ENCOUNTER — OFFICE VISIT (OUTPATIENT)
Dept: FAMILY MEDICINE | Facility: CLINIC | Age: 73
End: 2023-01-03
Payer: COMMERCIAL

## 2023-01-03 VITALS
OXYGEN SATURATION: 98 % | BODY MASS INDEX: 31.02 KG/M2 | HEART RATE: 79 BPM | SYSTOLIC BLOOD PRESSURE: 138 MMHG | TEMPERATURE: 98.4 F | DIASTOLIC BLOOD PRESSURE: 80 MMHG | WEIGHT: 158 LBS | HEIGHT: 60 IN | RESPIRATION RATE: 16 BRPM

## 2023-01-03 DIAGNOSIS — Z01.818 PREOP GENERAL PHYSICAL EXAM: Primary | ICD-10-CM

## 2023-01-03 DIAGNOSIS — M06.9 RHEUMATOID ARTHRITIS INVOLVING BOTH HANDS, UNSPECIFIED WHETHER RHEUMATOID FACTOR PRESENT (H): ICD-10-CM

## 2023-01-03 DIAGNOSIS — I10 ESSENTIAL HYPERTENSION: ICD-10-CM

## 2023-01-03 DIAGNOSIS — Z11.59 ENCOUNTER FOR SCREENING FOR OTHER VIRAL DISEASES: ICD-10-CM

## 2023-01-03 DIAGNOSIS — K50.019 CROHN'S DISEASE OF SMALL INTESTINE WITH COMPLICATION (H): ICD-10-CM

## 2023-01-03 DIAGNOSIS — J45.40 MODERATE PERSISTENT ASTHMA, UNSPECIFIED WHETHER COMPLICATED: ICD-10-CM

## 2023-01-03 DIAGNOSIS — D64.9 ANEMIA, UNSPECIFIED TYPE: ICD-10-CM

## 2023-01-03 DIAGNOSIS — E11.9 TYPE 2 DIABETES MELLITUS WITHOUT COMPLICATION, WITHOUT LONG-TERM CURRENT USE OF INSULIN (H): ICD-10-CM

## 2023-01-03 LAB
ANION GAP SERPL CALCULATED.3IONS-SCNC: 14 MMOL/L (ref 7–15)
BUN SERPL-MCNC: 10.7 MG/DL (ref 8–23)
CALCIUM SERPL-MCNC: 9.4 MG/DL (ref 8.8–10.2)
CHLORIDE SERPL-SCNC: 107 MMOL/L (ref 98–107)
CREAT SERPL-MCNC: 0.72 MG/DL (ref 0.51–0.95)
DEPRECATED HCO3 PLAS-SCNC: 21 MMOL/L (ref 22–29)
ERYTHROCYTE [DISTWIDTH] IN BLOOD BY AUTOMATED COUNT: 12.9 % (ref 10–15)
FERRITIN SERPL-MCNC: 75 NG/ML (ref 11–328)
FOLATE SERPL-MCNC: >40 NG/ML (ref 4.6–34.8)
GFR SERPL CREATININE-BSD FRML MDRD: 89 ML/MIN/1.73M2
GLUCOSE SERPL-MCNC: 140 MG/DL (ref 70–99)
HBA1C MFR BLD: 6.1 % (ref 0–5.6)
HCT VFR BLD AUTO: 34 % (ref 35–47)
HGB BLD-MCNC: 10.8 G/DL (ref 11.7–15.7)
MCH RBC QN AUTO: 27.8 PG (ref 26.5–33)
MCHC RBC AUTO-ENTMCNC: 31.8 G/DL (ref 31.5–36.5)
MCV RBC AUTO: 87 FL (ref 78–100)
PLATELET # BLD AUTO: 181 10E3/UL (ref 150–450)
POTASSIUM SERPL-SCNC: 4.3 MMOL/L (ref 3.4–5.3)
RBC # BLD AUTO: 3.89 10E6/UL (ref 3.8–5.2)
SODIUM SERPL-SCNC: 142 MMOL/L (ref 136–145)
VIT B12 SERPL-MCNC: 788 PG/ML (ref 232–1245)
WBC # BLD AUTO: 5.7 10E3/UL (ref 4–11)

## 2023-01-03 PROCEDURE — 86706 HEP B SURFACE ANTIBODY: CPT | Performed by: FAMILY MEDICINE

## 2023-01-03 PROCEDURE — 99214 OFFICE O/P EST MOD 30 MIN: CPT | Performed by: FAMILY MEDICINE

## 2023-01-03 PROCEDURE — 82607 VITAMIN B-12: CPT | Performed by: FAMILY MEDICINE

## 2023-01-03 PROCEDURE — 85027 COMPLETE CBC AUTOMATED: CPT | Performed by: FAMILY MEDICINE

## 2023-01-03 PROCEDURE — 82728 ASSAY OF FERRITIN: CPT | Performed by: FAMILY MEDICINE

## 2023-01-03 PROCEDURE — 36415 COLL VENOUS BLD VENIPUNCTURE: CPT | Performed by: FAMILY MEDICINE

## 2023-01-03 PROCEDURE — 80048 BASIC METABOLIC PNL TOTAL CA: CPT | Performed by: FAMILY MEDICINE

## 2023-01-03 PROCEDURE — 82746 ASSAY OF FOLIC ACID SERUM: CPT | Performed by: FAMILY MEDICINE

## 2023-01-03 PROCEDURE — 83036 HEMOGLOBIN GLYCOSYLATED A1C: CPT | Performed by: FAMILY MEDICINE

## 2023-01-03 ASSESSMENT — PAIN SCALES - GENERAL: PAINLEVEL: MODERATE PAIN (5)

## 2023-01-03 NOTE — PROGRESS NOTES
[facility]  [address] [place] [id number]  Phone: [phone]  Primary Provider: Juan Caldwell  Pre-op Performing Provider: JUAN CALDWELL      PREOPERATIVE EVALUATION:  Today's date: 1/3/2023    Carli Monreal is a 72 year old female who presents for a preoperative evaluation.    Surgical Information:  Surgery/Procedure: ARTHROPLASTY, CARPOMETACARPAL JOINT,RIGHT  THUMB  Surgery Location: [facility]  Surgeon: Dr. Jasmeet Haque  Surgery Date: 1/20/23  Time of Surgery: 815am  Where patient plans to recover: At home with family  Fax number for surgical facility: Note does not need to be faxed, will be available electronically in Epic.    Type of Anesthesia Anticipated: MAC with Block    Assessment & Plan     The proposed surgical procedure is considered INTERMEDIATE risk.    Preop general physical exam    - Hepatitis B Surface Antibody; Future  - Hepatitis B Surface Antibody    Type 2 diabetes mellitus without complication, without long-term current use of insulin (H)  The current medical regimen is effective;  continue present plan and medications.    - Hemoglobin A1c; Future  - Basic metabolic panel  (Ca, Cl, CO2, Creat, Gluc, K, Na, BUN); Future  - Basic metabolic panel  (Ca, Cl, CO2, Creat, Gluc, K, Na, BUN)  - Hemoglobin A1c    Moderate persistent asthma, unspecified whether complicated  The current medical regimen is effective;  continue present plan and medications.      Crohn's disease of small intestine with complication (H)  Currently asymptomatic may be contributing to her chronic anemia    Essential hypertension  The current medical regimen is effective;  continue present plan and medications.      Rheumatoid arthritis involving both hands, unspecified whether rheumatoid factor present (H)  Followed by rheumatology    Anemia, unspecified type  We will check below labs today possibly related to her inflammatory bowel disease as  - CBC with  platelets; Future  - Ferritin; Future  - Vitamin B12  - Folate  - Ferritin  - CBC with platelets    Encounter for screening for other viral diseases    - Hepatitis B Surface Antibody; Future  - Hepatitis B Surface Antibody           Risks and Recommendations:  The patient has the following additional risks and recommendations for perioperative complications:    Diabetes:  - Patient is not on insulin therapy: regular NPO guidelines can be followed.   Pulmonary:    - Incentive spirometry post-op    Medication Instructions:   - ACE/ARB: May be continued on the day of surgery.    - Statins: Continue taking on the day of surgery.    - metformin: HOLD day of surgery.   - pregabalin, gabapentin: Continue without modification.   - LABA, inhaled corticosteroid, long-acting anticholinergics: Continue without modification.   - rescue Inhaler: Continue PRN. Bring to hospital on the day of surgery.    RECOMMENDATION:  APPROVAL GIVEN to proceed with proposed procedure, without further diagnostic evaluation.      30 minutes spent on the date of the encounter doing chart review, history and exam, documentation and further activities per the note      Subjective     HPI related to upcoming procedure: The patient has had pain in her right wrist and thumb for about a year.  This started after a fall.  X-ray showed degenerative changes at the CMC joint.  Steroid injection which not very helpful.  She is also done hand therapy and inflammatories.  She has a history of inflammatory bowel disease and possibly rheumatoid arthritis.  Surgical correction of the CMC joints was recommended by Ortho.    Preop Questions 1/3/2023   1. Have you ever had a heart attack or stroke? No   2. Have you ever had surgery on your heart or blood vessels, such as a stent placement, a coronary artery bypass, or surgery on an artery in your head, neck, heart, or legs? No   3. Do you have chest pain with activity? No   4. Do you have a history of  heart failure?  No   5. Do you currently have a cold, bronchitis or symptoms of other infection? No   6. Do you have a cough, shortness of breath, or wheezing? No   7. Do you or anyone in your family have previous history of blood clots? No   8. Do you or does anyone in your family have a serious bleeding problem such as prolonged bleeding following surgeries or cuts? YES - Brother   9. Have you ever had problems with anemia or been told to take iron pills? YES - in the past   10. Have you had any abnormal blood loss such as black, tarry or bloody stools, or abnormal vaginal bleeding? No   11. Have you ever had a blood transfusion? YES - no reaction   11a. Have you ever had a transfusion reaction? No   12. Are you willing to have a blood transfusion if it is medically needed before, during, or after your surgery? Yes   13. Have you or any of your relatives ever had problems with anesthesia? YES - hard to wake up   14. Do you have sleep apnea, excessive snoring or daytime drowsiness? No   15. Do you have any artifical heart valves or other implanted medical devices like a pacemaker, defibrillator, or continuous glucose monitor? No   16. Do you have artificial joints? No   17. Are you allergic to latex? No       Health Care Directive:  Patient does not have a Health Care Directive or Living Will: Discussed advance care planning with patient; information given to patient to review.    Preoperative Review of :   reviewed - no record of controlled substances prescribed.      Status of Chronic Conditions:  ASTHMA - Patient has a longstanding history of moderate-severe Asthma . Patient has been doing well overall noting NO SYMPTOMS and continues on medication regimen consisting of Symbicort and albuterol without adverse reactions or side effects.     DIABETES - Patient has a longstanding history of DiabetesType Type II . Patient is being treated with oral agents and denies significant side effects. Control has been good.  Complicating factors include but are not limited to: hypertension and hyperlipidemia.  Patient is on metformin 1000 mg twice daily  Lab Results   Component Value Date    A1C 6.3 11/07/2022    A1C 6.6 05/06/2022    A1C 6.8 01/04/2022    A1C 5.6 08/20/2021    A1C 7.0 04/13/2021    A1C 8.4 02/09/2021         HYPERLIPIDEMIA - Patient has a long history of significant Hyperlipidemia requiring medication for treatment with recent good control. Patient reports no problems or side effects with the medication.  Lipitor 10 mg daily    HYPERTENSION - Patient has longstanding history of HTN , currently denies any symptoms referable to elevated blood pressure. Specifically denies chest pain, palpitations, dyspnea, orthopnea, PND or peripheral edema. Blood pressure readings have been in normal range. Current medication regimen is as listed below. Patient denies any side effects of medication.  Norvasc grams daily and losartan 100 mg daily      Review of Systems  Constitutional, neuro, ENT, endocrine, pulmonary, cardiac, gastrointestinal, genitourinary, musculoskeletal, integument and psychiatric systems are negative, except as otherwise noted.    Patient Active Problem List    Diagnosis Date Noted     Primary osteoarthritis of both hands 12/13/2022     Priority: Medium     Arthritis of [id number]-[id number]-[id number] joint of right hand 11/29/2022     Priority: Medium     Added automatically from request for surgery [id number]       Arthritis of carpometacarpal (CMC) joint of right thumb 11/29/2022     Priority: Medium     Added automatically from request for surgery [id number]       Hypokalemia 08/05/2021     Priority: Medium     Insomnia, unspecified type 08/05/2021     Priority: Medium     Arthropathy in Crohn's disease (H) 05/18/2021     Priority: Medium     Crohn's disease of small intestine with complication (H) 05/18/2021     Priority: Medium     Moderate persistent asthma, unspecified whether complicated 04/13/2021     Priority:  Medium     Essential hypertension 04/13/2021     Priority: Medium     Type 2 diabetes mellitus without complication, without long-term current use of insulin (H) 04/13/2021     Priority: Medium     Rheumatoid arthritis involving both hands, unspecified whether rheumatoid factor present (H) 04/13/2021     Priority: Medium     Chronic bilateral low back pain, unspecified whether sciatica present 04/13/2021     Priority: Medium      Past Medical History:   Diagnosis Date     Diabetes mellitus (H)      Right bundle branch block      Uncomplicated asthma      Past Surgical History:   Procedure Laterality Date     CATARACT IOL, RT/LT Bilateral 2017    Valeriano Javier MD ([place], [place]).     COLONOSCOPY N/A 09/20/2021    Procedure: COLONOSCOPY;  Surgeon: Keesha Mancuso MD;  Location: [facility] OR     SMALL BOWEL RESECTION  2012     Current Outpatient Medications   Medication Sig Dispense Refill     albuterol (PROAIR HFA/PROVENTIL HFA/VENTOLIN HFA) 108 (90 Base) MCG/ACT inhaler Inhale 2 puffs into the lungs 4 times daily as needed for shortness of breath / dyspnea        alcohol swab prep pads Use to swab area of injection/venkata as directed. 100 each 3     amLODIPine (NORVASC) 2.5 MG tablet Take 1 tablet (2.5 mg) by mouth daily Do not fill until contacted by patient 90 tablet 1     atorvastatin (LIPITOR) 10 MG tablet Take 1 tablet (10 mg) by mouth daily Do not fill until contacted by patient 90 tablet 3     blood glucose (NO BRAND SPECIFIED) lancets standard Use to test blood sugar 1 times daily or as directed. 100 each 11     budesonide-formoterol (SYMBICORT) 160-4.5 MCG/ACT Inhaler Inhale 2 puffs into the lungs 2 times daily 10.2 g 4     Cyanocobalamin 3000 MCG SUBL Place 1,500 mg under the tongue daily        esomeprazole (NEXIUM) 40 MG DR capsule Take 1 capsule (40 mg) by mouth every morning (before breakfast) Do not fill before contacted by patient take 30-60 minutes before eating. 90 capsule 2     folic acid (FOLVITE)  "1 MG tablet TAKE ONE TABLET BY MOUTH ONE TIME DAILY 90 tablet 0     gabapentin (NEURONTIN) 100 MG capsule Take 1 capsule (100 mg) by mouth At Bedtime 30 capsule 0     Lancets (ONETOUCH DELICA PLUS [id number]) MISC USE TO TEST BLOOD GLUCOSE ONCE DAILY 100 each 1     losartan (COZAAR) 100 MG tablet Take 1 tablet (100 mg) by mouth daily Do not fill until contacted by patient 90 tablet 1     metFORMIN (GLUCOPHAGE) 1000 MG tablet Take 1 tablet (1,000 mg) by mouth 2 times daily (with meals) Do not fill until contacted by patient 180 tablet 1     multivitamin w/minerals (THERA-VIT-M) tablet Take 1 tablet by mouth daily       ONETOUCH VERIO IQ test strip USE TO TEST BLOOD GLUCOSE ONCE DAILY 200 strip 0     potassium chloride ER (KLOR-CON) 20 MEQ CR tablet Take 1 tablet (20 mEq) by mouth daily 90 tablet 1     scopolamine (TRANSDERM) 1 MG/3DAYS 72 hr patch Place 1 patch onto the skin every 72 hours 12 patch 1     Vitamin D, Cholecalciferol, 25 MCG (1000 UT) TABS Take 2 tablets by mouth daily         Allergies   Allergen Reactions     Seasonal Allergies         Social History     Tobacco Use     Smoking status: Former     Types: Cigarettes     Quit date: 1991     Years since quittin.7     Smokeless tobacco: Never   Substance Use Topics     Alcohol use: Yes     Comment: Wine- couple x/ week       History   Drug Use Unknown         Objective     /80 (BP Location: Right arm, Patient Position: Sitting, Cuff Size: Adult Regular)   Pulse 79   Temp 98.4  F (36.9  C) (Oral)   Resp 16   Ht 1.511 m (4' 11.5\")   Wt 71.7 kg (158 lb)   SpO2 98%   BMI 31.38 kg/m      Physical Exam    GENERAL APPEARANCE: healthy, alert and no distress     EYES: EOMI, PERRL     HENT: ear canals and TM's normal and nose and mouth without ulcers or lesions     NECK: no adenopathy, no asymmetry, masses, or scars and thyroid normal to palpation     RESP: lungs clear to auscultation - no rales, rhonchi or wheezes     CV: regular rates and " rhythm, normal S1 S2, no S3 or S4 and no murmur, click or rub     ABDOMEN:  soft, nontender, no HSM or masses and bowel sounds normal     MS: extremities normal- no gross deformities noted, no evidence of inflammation in joints, FROM in all extremities.     SKIN: no suspicious lesions or rashes     NEURO: Normal strength and tone, sensory exam grossly normal, mentation intact and speech normal     PSYCH: mentation appears normal. and affect normal/bright     LYMPHATICS: No cervical adenopathy    Recent Labs   Lab Test 11/07/22  1259 05/06/22  1013 01/04/22  1457 11/12/21  1725 08/20/21  1334 05/26/21  1650   HGB  --   --   --  10.7*  --  11.7   PLT  --   --   --  155  --  191    139   < > 140   < > 142   POTASSIUM 3.8 4.4   < > 3.7   < > 3.7   CR 0.71 0.71   < > 0.77   < > 0.65   A1C 6.3* 6.6*   < >  --    < >  --     < > = values in this interval not displayed.        Diagnostics:  Labs pending at this time.  Results will be reviewed when available.   No EKG required, no history of coronary heart disease, significant arrhythmia, peripheral arterial disease or other structural heart disease.    Revised Cardiac Risk Index (RCRI):  The patient has the following serious cardiovascular risks for perioperative complications:   - No serious cardiac risks = 0 points     RCRI Interpretation: 0 points: Class I (very low risk - 0.4% complication rate)           Signed Electronically by: Iza Cage DO  Copy of this evaluation report is provided to requesting physician.

## 2023-01-03 NOTE — H&P (VIEW-ONLY)
[facility]  [address] [place] [id number]  Phone: [phone]  Primary Provider: Juan Caldwell  Pre-op Performing Provider: JUAN CALDWELL      PREOPERATIVE EVALUATION:  Today's date: 1/3/2023    Carli Monreal is a 72 year old female who presents for a preoperative evaluation.    Surgical Information:  Surgery/Procedure: ARTHROPLASTY, CARPOMETACARPAL JOINT,RIGHT  THUMB  Surgery Location: [facility]  Surgeon: Dr. Jasmeet Haque  Surgery Date: 1/20/23  Time of Surgery: 815am  Where patient plans to recover: At home with family  Fax number for surgical facility: Note does not need to be faxed, will be available electronically in Epic.    Type of Anesthesia Anticipated: MAC with Block    Assessment & Plan     The proposed surgical procedure is considered INTERMEDIATE risk.    Preop general physical exam    - Hepatitis B Surface Antibody; Future  - Hepatitis B Surface Antibody    Type 2 diabetes mellitus without complication, without long-term current use of insulin (H)  The current medical regimen is effective;  continue present plan and medications.    - Hemoglobin A1c; Future  - Basic metabolic panel  (Ca, Cl, CO2, Creat, Gluc, K, Na, BUN); Future  - Basic metabolic panel  (Ca, Cl, CO2, Creat, Gluc, K, Na, BUN)  - Hemoglobin A1c    Moderate persistent asthma, unspecified whether complicated  The current medical regimen is effective;  continue present plan and medications.      Crohn's disease of small intestine with complication (H)  Currently asymptomatic may be contributing to her chronic anemia    Essential hypertension  The current medical regimen is effective;  continue present plan and medications.      Rheumatoid arthritis involving both hands, unspecified whether rheumatoid factor present (H)  Followed by rheumatology    Anemia, unspecified type  We will check below labs today possibly related to her inflammatory bowel disease as  - CBC with  platelets; Future  - Ferritin; Future  - Vitamin B12  - Folate  - Ferritin  - CBC with platelets    Encounter for screening for other viral diseases    - Hepatitis B Surface Antibody; Future  - Hepatitis B Surface Antibody           Risks and Recommendations:  The patient has the following additional risks and recommendations for perioperative complications:    Diabetes:  - Patient is not on insulin therapy: regular NPO guidelines can be followed.   Pulmonary:    - Incentive spirometry post-op    Medication Instructions:   - ACE/ARB: May be continued on the day of surgery.    - Statins: Continue taking on the day of surgery.    - metformin: HOLD day of surgery.   - pregabalin, gabapentin: Continue without modification.   - LABA, inhaled corticosteroid, long-acting anticholinergics: Continue without modification.   - rescue Inhaler: Continue PRN. Bring to hospital on the day of surgery.    RECOMMENDATION:  APPROVAL GIVEN to proceed with proposed procedure, without further diagnostic evaluation.      30 minutes spent on the date of the encounter doing chart review, history and exam, documentation and further activities per the note      Subjective     HPI related to upcoming procedure: The patient has had pain in her right wrist and thumb for about a year.  This started after a fall.  X-ray showed degenerative changes at the CMC joint.  Steroid injection which not very helpful.  She is also done hand therapy and inflammatories.  She has a history of inflammatory bowel disease and possibly rheumatoid arthritis.  Surgical correction of the CMC joints was recommended by Ortho.    Preop Questions 1/3/2023   1. Have you ever had a heart attack or stroke? No   2. Have you ever had surgery on your heart or blood vessels, such as a stent placement, a coronary artery bypass, or surgery on an artery in your head, neck, heart, or legs? No   3. Do you have chest pain with activity? No   4. Do you have a history of  heart failure?  No   5. Do you currently have a cold, bronchitis or symptoms of other infection? No   6. Do you have a cough, shortness of breath, or wheezing? No   7. Do you or anyone in your family have previous history of blood clots? No   8. Do you or does anyone in your family have a serious bleeding problem such as prolonged bleeding following surgeries or cuts? YES - Brother   9. Have you ever had problems with anemia or been told to take iron pills? YES - in the past   10. Have you had any abnormal blood loss such as black, tarry or bloody stools, or abnormal vaginal bleeding? No   11. Have you ever had a blood transfusion? YES - no reaction   11a. Have you ever had a transfusion reaction? No   12. Are you willing to have a blood transfusion if it is medically needed before, during, or after your surgery? Yes   13. Have you or any of your relatives ever had problems with anesthesia? YES - hard to wake up   14. Do you have sleep apnea, excessive snoring or daytime drowsiness? No   15. Do you have any artifical heart valves or other implanted medical devices like a pacemaker, defibrillator, or continuous glucose monitor? No   16. Do you have artificial joints? No   17. Are you allergic to latex? No       Health Care Directive:  Patient does not have a Health Care Directive or Living Will: Discussed advance care planning with patient; information given to patient to review.    Preoperative Review of :   reviewed - no record of controlled substances prescribed.      Status of Chronic Conditions:  ASTHMA - Patient has a longstanding history of moderate-severe Asthma . Patient has been doing well overall noting NO SYMPTOMS and continues on medication regimen consisting of Symbicort and albuterol without adverse reactions or side effects.     DIABETES - Patient has a longstanding history of DiabetesType Type II . Patient is being treated with oral agents and denies significant side effects. Control has been good.  Complicating factors include but are not limited to: hypertension and hyperlipidemia.  Patient is on metformin 1000 mg twice daily  Lab Results   Component Value Date    A1C 6.3 11/07/2022    A1C 6.6 05/06/2022    A1C 6.8 01/04/2022    A1C 5.6 08/20/2021    A1C 7.0 04/13/2021    A1C 8.4 02/09/2021         HYPERLIPIDEMIA - Patient has a long history of significant Hyperlipidemia requiring medication for treatment with recent good control. Patient reports no problems or side effects with the medication.  Lipitor 10 mg daily    HYPERTENSION - Patient has longstanding history of HTN , currently denies any symptoms referable to elevated blood pressure. Specifically denies chest pain, palpitations, dyspnea, orthopnea, PND or peripheral edema. Blood pressure readings have been in normal range. Current medication regimen is as listed below. Patient denies any side effects of medication.  Norvasc grams daily and losartan 100 mg daily      Review of Systems  Constitutional, neuro, ENT, endocrine, pulmonary, cardiac, gastrointestinal, genitourinary, musculoskeletal, integument and psychiatric systems are negative, except as otherwise noted.    Patient Active Problem List    Diagnosis Date Noted     Primary osteoarthritis of both hands 12/13/2022     Priority: Medium     Arthritis of zxjhksju-gusnuwwhy-twnuvobdz joint of right hand 11/29/2022     Priority: Medium     Added automatically from request for surgery [id number]       Arthritis of carpometacarpal (CMC) joint of right thumb 11/29/2022     Priority: Medium     Added automatically from request for surgery [id number]       Hypokalemia 08/05/2021     Priority: Medium     Insomnia, unspecified type 08/05/2021     Priority: Medium     Arthropathy in Crohn's disease (H) 05/18/2021     Priority: Medium     Crohn's disease of small intestine with complication (H) 05/18/2021     Priority: Medium     Moderate persistent asthma, unspecified whether complicated 04/13/2021     Priority:  Medium     Essential hypertension 04/13/2021     Priority: Medium     Type 2 diabetes mellitus without complication, without long-term current use of insulin (H) 04/13/2021     Priority: Medium     Rheumatoid arthritis involving both hands, unspecified whether rheumatoid factor present (H) 04/13/2021     Priority: Medium     Chronic bilateral low back pain, unspecified whether sciatica present 04/13/2021     Priority: Medium      Past Medical History:   Diagnosis Date     Diabetes mellitus (H)      Right bundle branch block      Uncomplicated asthma      Past Surgical History:   Procedure Laterality Date     CATARACT IOL, RT/LT Bilateral 2017    Valeriano Javier MD ([place], [place]).     COLONOSCOPY N/A 09/20/2021    Procedure: COLONOSCOPY;  Surgeon: Keesha Mancuso MD;  Location: [facility] OR     SMALL BOWEL RESECTION  2012     Current Outpatient Medications   Medication Sig Dispense Refill     albuterol (PROAIR HFA/PROVENTIL HFA/VENTOLIN HFA) 108 (90 Base) MCG/ACT inhaler Inhale 2 puffs into the lungs 4 times daily as needed for shortness of breath / dyspnea        alcohol swab prep pads Use to swab area of injection/venkata as directed. 100 each 3     amLODIPine (NORVASC) 2.5 MG tablet Take 1 tablet (2.5 mg) by mouth daily Do not fill until contacted by patient 90 tablet 1     atorvastatin (LIPITOR) 10 MG tablet Take 1 tablet (10 mg) by mouth daily Do not fill until contacted by patient 90 tablet 3     blood glucose (NO BRAND SPECIFIED) lancets standard Use to test blood sugar 1 times daily or as directed. 100 each 11     budesonide-formoterol (SYMBICORT) 160-4.5 MCG/ACT Inhaler Inhale 2 puffs into the lungs 2 times daily 10.2 g 4     Cyanocobalamin 3000 MCG SUBL Place 1,500 mg under the tongue daily        esomeprazole (NEXIUM) 40 MG DR capsule Take 1 capsule (40 mg) by mouth every morning (before breakfast) Do not fill before contacted by patient take 30-60 minutes before eating. 90 capsule 2     folic acid (FOLVITE)  "1 MG tablet TAKE ONE TABLET BY MOUTH ONE TIME DAILY 90 tablet 0     gabapentin (NEURONTIN) 100 MG capsule Take 1 capsule (100 mg) by mouth At Bedtime 30 capsule 0     Lancets (ONETOUCH DELICA PLUS [id number]) MISC USE TO TEST BLOOD GLUCOSE ONCE DAILY 100 each 1     losartan (COZAAR) 100 MG tablet Take 1 tablet (100 mg) by mouth daily Do not fill until contacted by patient 90 tablet 1     metFORMIN (GLUCOPHAGE) 1000 MG tablet Take 1 tablet (1,000 mg) by mouth 2 times daily (with meals) Do not fill until contacted by patient 180 tablet 1     multivitamin w/minerals (THERA-VIT-M) tablet Take 1 tablet by mouth daily       ONETOUCH VERIO IQ test strip USE TO TEST BLOOD GLUCOSE ONCE DAILY 200 strip 0     potassium chloride ER (KLOR-CON) 20 MEQ CR tablet Take 1 tablet (20 mEq) by mouth daily 90 tablet 1     scopolamine (TRANSDERM) 1 MG/3DAYS 72 hr patch Place 1 patch onto the skin every 72 hours 12 patch 1     Vitamin D, Cholecalciferol, 25 MCG (1000 UT) TABS Take 2 tablets by mouth daily         Allergies   Allergen Reactions     Seasonal Allergies         Social History     Tobacco Use     Smoking status: Former     Types: Cigarettes     Quit date: 1991     Years since quittin.7     Smokeless tobacco: Never   Substance Use Topics     Alcohol use: Yes     Comment: Wine- couple x/ week       History   Drug Use Unknown         Objective     /80 (BP Location: Right arm, Patient Position: Sitting, Cuff Size: Adult Regular)   Pulse 79   Temp 98.4  F (36.9  C) (Oral)   Resp 16   Ht 1.511 m (4' 11.5\")   Wt 71.7 kg (158 lb)   SpO2 98%   BMI 31.38 kg/m      Physical Exam    GENERAL APPEARANCE: healthy, alert and no distress     EYES: EOMI, PERRL     HENT: ear canals and TM's normal and nose and mouth without ulcers or lesions     NECK: no adenopathy, no asymmetry, masses, or scars and thyroid normal to palpation     RESP: lungs clear to auscultation - no rales, rhonchi or wheezes     CV: regular rates and " rhythm, normal S1 S2, no S3 or S4 and no murmur, click or rub     ABDOMEN:  soft, nontender, no HSM or masses and bowel sounds normal     MS: extremities normal- no gross deformities noted, no evidence of inflammation in joints, FROM in all extremities.     SKIN: no suspicious lesions or rashes     NEURO: Normal strength and tone, sensory exam grossly normal, mentation intact and speech normal     PSYCH: mentation appears normal. and affect normal/bright     LYMPHATICS: No cervical adenopathy    Recent Labs   Lab Test 11/07/22  1259 05/06/22  1013 01/04/22  1457 11/12/21  1725 08/20/21  1334 05/26/21  1650   HGB  --   --   --  10.7*  --  11.7   PLT  --   --   --  155  --  191    139   < > 140   < > 142   POTASSIUM 3.8 4.4   < > 3.7   < > 3.7   CR 0.71 0.71   < > 0.77   < > 0.65   A1C 6.3* 6.6*   < >  --    < >  --     < > = values in this interval not displayed.        Diagnostics:  Labs pending at this time.  Results will be reviewed when available.   No EKG required, no history of coronary heart disease, significant arrhythmia, peripheral arterial disease or other structural heart disease.    Revised Cardiac Risk Index (RCRI):  The patient has the following serious cardiovascular risks for perioperative complications:   - No serious cardiac risks = 0 points     RCRI Interpretation: 0 points: Class I (very low risk - 0.4% complication rate)           Signed Electronically by: Iza Cage DO  Copy of this evaluation report is provided to requesting physician.

## 2023-01-03 NOTE — PATIENT INSTRUCTIONS
For informational purposes only. Not to replace the advice of your health care provider. Copyright   2003,  [facility] [email or web address] [facility]. All rights reserved. Clinically reviewed by Lani Rubio MD. [employer] [place] [id number] - REV .  Preparing for Your Surgery  Getting started  A nurse will call you to review your health history and instructions. They will give you an arrival time based on your scheduled surgery time. Please be ready to share:    Your doctor's clinic name and phone number    Your medical, surgical, and anesthesia history    A list of allergies and sensitivities    A list of medicines, including herbal treatments and over-the-counter drugs    Whether the patient has a legal guardian (ask how to send us the papers in advance)  Please tell us if you're pregnant--or if there's any chance you might be pregnant. Some surgeries may injure a fetus (unborn baby), so they require a pregnancy test. Surgeries that are safe for a fetus don't always need a test, and you can choose whether to have one.   If you have a child who's having surgery, please ask for a copy of Preparing for Your Child's Surgery.    Preparing for surgery    Within 10 to 30 days of surgery: Have a pre-op exam (sometimes called an H&P, or History and Physical). This can be done at a clinic or pre-operative center.  ? If you're having a , you may not need this exam. Talk to your care team.    At your pre-op exam, talk to your care team about all medicines you take. If you need to stop any medicines before surgery, ask when to start taking them again.  ? We do this for your safety. Many medicines can make you bleed too much during surgery. Some change how well surgery (anesthesia) drugs work.    Call your insurance company to let them know you're having surgery. (If you don't have insurance, call [phone].)    Call your clinic if there's any change in your health. This includes signs of a cold or flu (sore throat, runny nose,  cough, rash, fever). It also includes a scrape or scratch near the surgery site.    If you have questions on the day of surgery, call your hospital or surgery center.  Eating and drinking guidelines  For your safety: Unless your surgeon tells you otherwise, follow the guidelines below.    Eat and drink as usual until 8 hours before you arrive for surgery. After that, no food or milk.    Drink clear liquids until 2 hours before you arrive. These are liquids you can see through, like water, Gatorade, and Propel Water. They also include plain black coffee and tea (no cream or milk), candy, and breath mints. You can spit out gum when you arrive.    If you drink alcohol: Stop drinking it the night before surgery.    If your care team tells you to take medicine on the morning of surgery, it's okay to take it with a sip of water.  Preventing infection    Shower or bathe the night before and morning of your surgery. Follow the instructions your clinic gave you. (If no instructions, use regular soap.)    Don't shave or clip hair near your surgery site. We'll remove the hair if needed.    Don't smoke or vape the morning of surgery. You may chew nicotine gum up to 2 hours before surgery. A nicotine patch is okay.  ? Note: Some surgeries require you to completely quit smoking and nicotine. Check with your surgeon.    Your care team will make every effort to keep you safe from infection. We will:  ? Clean our hands often with soap and water (or an alcohol-based hand rub).  ? Clean the skin at your surgery site with a special soap that kills germs.  ? Give you a special gown to keep you warm. (Cold raises the risk of infection.)  ? Wear special hair covers, masks, gowns and gloves during surgery.  ? Give antibiotic medicine, if prescribed. Not all surgeries need antibiotics.  What to bring on the day of surgery    Photo ID and insurance card    Copy of your health care directive, if you have one    Glasses and hearing aids (bring  cases)  ? You can't wear contacts during surgery    Inhaler and eye drops, if you use them (tell us about these when you arrive)    CPAP machine or breathing device, if you use them    A few personal items, if spending the night    If you have . . .  ? A pacemaker, ICD (cardiac defibrillator) or other implant: Bring the ID card.  ? An implanted stimulator: Bring the remote control.  ? A legal guardian: Bring a copy of the certified (court-stamped) guardianship papers.  Please remove any jewelry, including body piercings. Leave jewelry and other valuables at home.  If you're going home the day of surgery    You must have a responsible adult drive you home. They should stay with you overnight as well.    If you don't have someone to stay with you, and you aren't safe to go home alone, we may keep you overnight. Insurance often won't pay for this.  After surgery  If it's hard to control your pain or you need more pain medicine, please call your surgeon's office.  Questions?   If you have any questions for your care team, list them here: _________________________________________________________________________________________________________________________________________________________________________ ____________________________________ ____________________________________ ____________________________________

## 2023-01-04 LAB
HBV SURFACE AB SERPL IA-ACNC: 0 M[IU]/ML
HBV SURFACE AB SERPL IA-ACNC: NONREACTIVE M[IU]/ML

## 2023-01-19 ENCOUNTER — ANCILLARY PROCEDURE (OUTPATIENT)
Dept: ULTRASOUND IMAGING | Facility: CLINIC | Age: 73
End: 2023-01-19
Payer: COMMERCIAL

## 2023-01-19 ENCOUNTER — ANESTHESIA EVENT (OUTPATIENT)
Dept: SURGERY | Facility: AMBULATORY SURGERY CENTER | Age: 73
End: 2023-01-19
Payer: COMMERCIAL

## 2023-01-19 RX ORDER — NALOXONE HYDROCHLORIDE 0.4 MG/ML
0.2 INJECTION, SOLUTION INTRAMUSCULAR; INTRAVENOUS; SUBCUTANEOUS
Status: DISCONTINUED | OUTPATIENT
Start: 2023-01-19 | End: 2023-01-21 | Stop reason: HOSPADM

## 2023-01-19 RX ORDER — NALOXONE HYDROCHLORIDE 0.4 MG/ML
0.4 INJECTION, SOLUTION INTRAMUSCULAR; INTRAVENOUS; SUBCUTANEOUS
Status: DISCONTINUED | OUTPATIENT
Start: 2023-01-19 | End: 2023-01-21 | Stop reason: HOSPADM

## 2023-01-20 ENCOUNTER — ANESTHESIA (OUTPATIENT)
Dept: SURGERY | Facility: AMBULATORY SURGERY CENTER | Age: 73
End: 2023-01-20
Payer: COMMERCIAL

## 2023-01-20 ENCOUNTER — ANCILLARY PROCEDURE (OUTPATIENT)
Dept: RADIOLOGY | Facility: AMBULATORY SURGERY CENTER | Age: 73
End: 2023-01-20
Attending: STUDENT IN AN ORGANIZED HEALTH CARE EDUCATION/TRAINING PROGRAM
Payer: COMMERCIAL

## 2023-01-20 ENCOUNTER — HOSPITAL ENCOUNTER (OUTPATIENT)
Facility: AMBULATORY SURGERY CENTER | Age: 73
Discharge: HOME | End: 2023-01-20
Attending: STUDENT IN AN ORGANIZED HEALTH CARE EDUCATION/TRAINING PROGRAM | Admitting: STUDENT IN AN ORGANIZED HEALTH CARE EDUCATION/TRAINING PROGRAM
Payer: COMMERCIAL

## 2023-01-20 VITALS
OXYGEN SATURATION: 95 % | SYSTOLIC BLOOD PRESSURE: 132 MMHG | DIASTOLIC BLOOD PRESSURE: 69 MMHG | TEMPERATURE: 97.7 F | BODY MASS INDEX: 31.02 KG/M2 | HEIGHT: 60 IN | RESPIRATION RATE: 14 BRPM | HEART RATE: 73 BPM | WEIGHT: 158 LBS

## 2023-01-20 DIAGNOSIS — M19.031 ARTHRITIS OF SCAPHOID-TRAPEZIUM-TRAPEZOID JOINT OF RIGHT HAND: ICD-10-CM

## 2023-01-20 DIAGNOSIS — M18.11 ARTHRITIS OF CARPOMETACARPAL (CMC) JOINT OF RIGHT THUMB: ICD-10-CM

## 2023-01-20 LAB
GLUCOSE BLDC GLUCOMTR-MCNC: 100 MG/DL (ref 70–99)
GLUCOSE BLDC GLUCOMTR-MCNC: 112 MG/DL (ref 70–99)

## 2023-01-20 PROCEDURE — 26483 TRANSPLANT/GRAFT HAND TENDON: CPT | Mod: RT | Performed by: STUDENT IN AN ORGANIZED HEALTH CARE EDUCATION/TRAINING PROGRAM

## 2023-01-20 PROCEDURE — 26483 TRANSPLANT/GRAFT HAND TENDON: CPT | Mod: RT

## 2023-01-20 PROCEDURE — 25447 ARTHRP NTRCRP/CRP/MTCR NTRPS: CPT | Mod: RT

## 2023-01-20 PROCEDURE — 25447 ARTHRP NTRCRP/CRP/MTCR NTRPS: CPT | Mod: RT | Performed by: STUDENT IN AN ORGANIZED HEALTH CARE EDUCATION/TRAINING PROGRAM

## 2023-01-20 PROCEDURE — 82962 GLUCOSE BLOOD TEST: CPT | Performed by: PATHOLOGY

## 2023-01-20 RX ORDER — DOCUSATE SODIUM 100 MG/1
100 CAPSULE, LIQUID FILLED ORAL 2 TIMES DAILY
Qty: 30 CAPSULE | Refills: 0 | Status: SHIPPED | OUTPATIENT
Start: 2023-01-20 | End: 2023-08-21

## 2023-01-20 RX ORDER — EPHEDRINE SULFATE 50 MG/ML
INJECTION, SOLUTION INTRAMUSCULAR; INTRAVENOUS; SUBCUTANEOUS PRN
Status: DISCONTINUED | OUTPATIENT
Start: 2023-01-20 | End: 2023-01-20

## 2023-01-20 RX ORDER — PROPOFOL 10 MG/ML
INJECTION, EMULSION INTRAVENOUS PRN
Status: DISCONTINUED | OUTPATIENT
Start: 2023-01-20 | End: 2023-01-20

## 2023-01-20 RX ORDER — ACETAMINOPHEN 325 MG/1
975 TABLET ORAL ONCE
Status: COMPLETED | OUTPATIENT
Start: 2023-01-20 | End: 2023-01-20

## 2023-01-20 RX ORDER — CEFAZOLIN SODIUM 2 G/50ML
2 SOLUTION INTRAVENOUS SEE ADMIN INSTRUCTIONS
Status: DISCONTINUED | OUTPATIENT
Start: 2023-01-20 | End: 2023-01-21 | Stop reason: HOSPADM

## 2023-01-20 RX ORDER — FENTANYL CITRATE 50 UG/ML
INJECTION, SOLUTION INTRAMUSCULAR; INTRAVENOUS PRN
Status: DISCONTINUED | OUTPATIENT
Start: 2023-01-20 | End: 2023-01-20

## 2023-01-20 RX ORDER — SODIUM CHLORIDE, SODIUM LACTATE, POTASSIUM CHLORIDE, CALCIUM CHLORIDE 600; 310; 30; 20 MG/100ML; MG/100ML; MG/100ML; MG/100ML
INJECTION, SOLUTION INTRAVENOUS CONTINUOUS
Status: DISCONTINUED | OUTPATIENT
Start: 2023-01-20 | End: 2023-01-21 | Stop reason: HOSPADM

## 2023-01-20 RX ORDER — LIDOCAINE 40 MG/G
CREAM TOPICAL
Status: DISCONTINUED | OUTPATIENT
Start: 2023-01-20 | End: 2023-01-21 | Stop reason: HOSPADM

## 2023-01-20 RX ORDER — HYDROMORPHONE HYDROCHLORIDE 1 MG/ML
0.2 INJECTION, SOLUTION INTRAMUSCULAR; INTRAVENOUS; SUBCUTANEOUS EVERY 5 MIN PRN
Status: DISCONTINUED | OUTPATIENT
Start: 2023-01-20 | End: 2023-01-21 | Stop reason: HOSPADM

## 2023-01-20 RX ORDER — ONDANSETRON 4 MG/1
4 TABLET, ORALLY DISINTEGRATING ORAL EVERY 30 MIN PRN
Status: DISCONTINUED | OUTPATIENT
Start: 2023-01-20 | End: 2023-01-21 | Stop reason: HOSPADM

## 2023-01-20 RX ORDER — FENTANYL CITRATE 50 UG/ML
25 INJECTION, SOLUTION INTRAMUSCULAR; INTRAVENOUS EVERY 5 MIN PRN
Status: DISCONTINUED | OUTPATIENT
Start: 2023-01-20 | End: 2023-01-21 | Stop reason: HOSPADM

## 2023-01-20 RX ORDER — FENTANYL CITRATE 50 UG/ML
50 INJECTION, SOLUTION INTRAMUSCULAR; INTRAVENOUS EVERY 5 MIN PRN
Status: DISCONTINUED | OUTPATIENT
Start: 2023-01-20 | End: 2023-01-21 | Stop reason: HOSPADM

## 2023-01-20 RX ORDER — DEXAMETHASONE SODIUM PHOSPHATE 4 MG/ML
INJECTION, SOLUTION INTRA-ARTICULAR; INTRALESIONAL; INTRAMUSCULAR; INTRAVENOUS; SOFT TISSUE PRN
Status: DISCONTINUED | OUTPATIENT
Start: 2023-01-20 | End: 2023-01-20

## 2023-01-20 RX ORDER — MAGNESIUM HYDROXIDE 1200 MG/15ML
LIQUID ORAL PRN
Status: DISCONTINUED | OUTPATIENT
Start: 2023-01-20 | End: 2023-01-20 | Stop reason: HOSPADM

## 2023-01-20 RX ORDER — FENTANYL CITRATE 50 UG/ML
25-50 INJECTION, SOLUTION INTRAMUSCULAR; INTRAVENOUS
Status: DISCONTINUED | OUTPATIENT
Start: 2023-01-20 | End: 2023-01-21 | Stop reason: HOSPADM

## 2023-01-20 RX ORDER — PROPOFOL 10 MG/ML
INJECTION, EMULSION INTRAVENOUS CONTINUOUS PRN
Status: DISCONTINUED | OUTPATIENT
Start: 2023-01-20 | End: 2023-01-20

## 2023-01-20 RX ORDER — FLUMAZENIL 0.1 MG/ML
0.2 INJECTION, SOLUTION INTRAVENOUS
Status: DISCONTINUED | OUTPATIENT
Start: 2023-01-20 | End: 2023-01-21 | Stop reason: HOSPADM

## 2023-01-20 RX ORDER — OXYCODONE HYDROCHLORIDE 5 MG/1
5 TABLET ORAL EVERY 6 HOURS PRN
Qty: 15 TABLET | Refills: 0 | Status: SHIPPED | OUTPATIENT
Start: 2023-01-20 | End: 2023-01-24

## 2023-01-20 RX ORDER — HYDROMORPHONE HYDROCHLORIDE 1 MG/ML
0.4 INJECTION, SOLUTION INTRAMUSCULAR; INTRAVENOUS; SUBCUTANEOUS EVERY 5 MIN PRN
Status: DISCONTINUED | OUTPATIENT
Start: 2023-01-20 | End: 2023-01-21 | Stop reason: HOSPADM

## 2023-01-20 RX ORDER — LABETALOL HYDROCHLORIDE 5 MG/ML
10 INJECTION, SOLUTION INTRAVENOUS
Status: DISCONTINUED | OUTPATIENT
Start: 2023-01-20 | End: 2023-01-21 | Stop reason: HOSPADM

## 2023-01-20 RX ORDER — ONDANSETRON 2 MG/ML
4 INJECTION INTRAMUSCULAR; INTRAVENOUS EVERY 30 MIN PRN
Status: DISCONTINUED | OUTPATIENT
Start: 2023-01-20 | End: 2023-01-21 | Stop reason: HOSPADM

## 2023-01-20 RX ORDER — CEFAZOLIN SODIUM 2 G/50ML
2 SOLUTION INTRAVENOUS
Status: DISCONTINUED | OUTPATIENT
Start: 2023-01-20 | End: 2023-01-21 | Stop reason: HOSPADM

## 2023-01-20 RX ORDER — BUPIVACAINE HYDROCHLORIDE 5 MG/ML
INJECTION, SOLUTION EPIDURAL; INTRACAUDAL
Status: COMPLETED | OUTPATIENT
Start: 2023-01-20 | End: 2023-01-20

## 2023-01-20 RX ADMIN — PROPOFOL 50 MCG/KG/MIN: 10 INJECTION, EMULSION INTRAVENOUS at 07:40

## 2023-01-20 RX ADMIN — FENTANYL CITRATE 25 MCG: 50 INJECTION, SOLUTION INTRAMUSCULAR; INTRAVENOUS at 07:35

## 2023-01-20 RX ADMIN — EPHEDRINE SULFATE 5 MG: 50 INJECTION, SOLUTION INTRAMUSCULAR; INTRAVENOUS; SUBCUTANEOUS at 08:08

## 2023-01-20 RX ADMIN — BUPIVACAINE HYDROCHLORIDE 20 ML: 5 INJECTION, SOLUTION EPIDURAL; INTRACAUDAL at 07:10

## 2023-01-20 RX ADMIN — Medication 50 MCG: at 08:25

## 2023-01-20 RX ADMIN — FENTANYL CITRATE 25 MCG: 50 INJECTION, SOLUTION INTRAMUSCULAR; INTRAVENOUS at 07:52

## 2023-01-20 RX ADMIN — Medication 50 MCG: at 08:36

## 2023-01-20 RX ADMIN — SODIUM CHLORIDE, SODIUM LACTATE, POTASSIUM CHLORIDE, CALCIUM CHLORIDE: 600; 310; 30; 20 INJECTION, SOLUTION INTRAVENOUS at 07:09

## 2023-01-20 RX ADMIN — PROPOFOL 75 MCG/KG/MIN: 10 INJECTION, EMULSION INTRAVENOUS at 09:01

## 2023-01-20 RX ADMIN — FENTANYL CITRATE 50 MCG: 50 INJECTION, SOLUTION INTRAMUSCULAR; INTRAVENOUS at 07:20

## 2023-01-20 RX ADMIN — SODIUM CHLORIDE, SODIUM LACTATE, POTASSIUM CHLORIDE, CALCIUM CHLORIDE: 600; 310; 30; 20 INJECTION, SOLUTION INTRAVENOUS at 09:21

## 2023-01-20 RX ADMIN — CEFAZOLIN SODIUM 2 G: 2 SOLUTION INTRAVENOUS at 07:33

## 2023-01-20 RX ADMIN — DEXAMETHASONE SODIUM PHOSPHATE 4 MG: 4 INJECTION, SOLUTION INTRA-ARTICULAR; INTRALESIONAL; INTRAMUSCULAR; INTRAVENOUS; SOFT TISSUE at 09:41

## 2023-01-20 RX ADMIN — Medication 50 MCG: at 08:47

## 2023-01-20 RX ADMIN — Medication 50 MCG: at 08:07

## 2023-01-20 RX ADMIN — ACETAMINOPHEN 975 MG: 325 TABLET ORAL at 06:30

## 2023-01-20 RX ADMIN — PROPOFOL 30 MG: 10 INJECTION, EMULSION INTRAVENOUS at 07:37

## 2023-01-20 RX ADMIN — Medication 50 MCG: at 08:58

## 2023-01-20 RX ADMIN — FENTANYL CITRATE 25 MCG: 50 INJECTION, SOLUTION INTRAMUSCULAR; INTRAVENOUS at 09:10

## 2023-01-20 ASSESSMENT — LIFESTYLE VARIABLES: TOBACCO_USE: 1

## 2023-01-20 NOTE — OP NOTE
Patient: Carli Monreal  : 1950  MRN: [id number]    DATE OF OPERATION: 2023      OPERATIVE REPORT       PREOPERATIVE DIAGNOSIS:  Right thumb carpometacarpal osteoarthritis     POSTOPERATIVE DIAGNOSIS:  Right thumb carpometacarpal osteoarthritis     PROCEDURE:  1) Right trapeziectomy with FCR ligament reconstruction tendon interposition (CPT 08748, 35021, 91762)  2) Right partial trapezoid excision CPT 72303    SURGEON:  Jasmeet Haque MD     ASSISTANT(S):  MERCEDEZ Granger's assistance was required as there were no qualified residents available.    ANESTHESIA:  Regional + MAC     IMPLANTS:   none    ESTIMATED BLOOD LOSS:  5cc    DVT PROPHYLAXIS:  SCDs    TOURNIQUET TIME:  75 minutes    SPECIMENS REMOVED:  None    INTRAOPERATIVE FINDINGS:  Severely arthritic trapezium with cartilage loss and osteophytes. Osteophyte formation on the distal pole of the scaphoid. Scaphotrapezoid arthritis.    COMPLICATIONS:  None    DISPOSITION:  Stable to PACU.     INDICATIONS:  The patient is a 72 year old female with a longstanding history of right basilar thumb pain affecting daily activities refractory to conservative measures. She was evaluated in the clinic where radiographs demonstrated severe right thumb carpometacarpal joint osteoarthritis and associated scaphotrapezoid arthritis. Nonoperative and operative treatment options were discussed. She wished to proceed with surgery.    Indications for surgery were discussed with the patient in detail. After discussing risks, benefits and alternatives to procedure, the patient elected to proceed with surgical intervention.     The patient understood that risks include, but are not limited to: Bleeding, infection, damage to surrounding structures (such as nerve, vessel or tendon), need for additional procedures, pain, stiffness, need for therapy, and anesthetic complications. The patient expressed understanding and agreement and wished to proceed.      Consent was obtained for the procedure.     DESCRIPTION OF PROCEDURE IN DETAIL:  The patient was seen in the preoperative care unit. Patient identity, consent, procedure to be performed, and operative site were verified with the patient. The right upper extremity was marked. The patient received a preoperative regional block.     The patient was brought to the operating room and placed supine on the operating room table. The right upper extremity was placed on an armboard. SCDs were placed on the bilateral lower extremities. A well-padded tourniquet was placed on the upper arm.     Sedation was administered by anesthesia.     The right upper extremity were prepped and draped in the usual sterile fashion. A timeout was performed confirming the correct patient, procedure, operative site, and antibiotics. All were in agreement.    The right upper extremity was exsanguinated and the tourniquet was inflated. A Burch-type incision was made over the glabrous/ nonglabrous skin border of the  thumb over the thumb CMC joint and extending 1 cm proximally over the flexor carpi radialis tendon. Careful hemostasis was obtained with bipolar cautery. Blunt dissection was taken down through the subcutaneous tissue with tenotomy scissors, identifying and meticulously protecting the dorsal sensory radial nerve branches. The superficial radial artery branch was identified proximally. Due to the severe collapse of the trapezium and proximal migration of the metacarpal, it was noted directly superficial to the trapezium and inadvertently injured and had to be cauterized later in the case. The FCR was identified at the wrist and tagged with an umbilical tape.    The first dorsal compartment tendons were identified and the APL was  from the EPB. The interval between the tendons was developed and incised sharply down to bone and through the CMC joint capsule, which was elevated radially and ulnarly. The thenar musculature was  sharply elevated ulnarly off the metacarpal base. There was significant osteophyte formation and flattening of the trapezium. The trapezium was carefully removed en bloc protecting the flexor carpi radialis tendon ulnarly. We also noted severe eburnation of the distal pole of the scaphoid and scaphotrapezoid arthritis was appreciated as seen on radiographs. The osteophytes were removed with a rongeur. The proximal articular surface of the trapezoid was transversely osteotomized with an osteotome. Fluoroscopic evaluation demonstrated correct trajectory of the osteotomy. The proximal articular surface at the scaphotrapezoid joint was resected.     The flexor carpi radialis tendon was then again identified proximally, and tension was placed on the tendon to ensure the the accuracy of the marked proximal incision. Two step cut transverse proximal incisions were then made with a 15-blade scalpel to appropriately identify the FCR tendon. Careful hemostasis was obtained with electrocautery. Blunt dissection was taken down to the tendon sheath. The tendon was freed from its tendon sheath proximally and tagged with an umbilical tape. It was rather diminutive so decision was made to harvest the entire FCR tendon for the ligament reconstruction and interposition. It was transected proximally and a tendon passer was passed through the FCR tendon sheath distal to proximal, retrieving this umbilical tape, which was brought distally under the skin, thereby bringing the entire tendon through the tendon sheath to the distal incision. It was then retrieved from under the 1st dorsal compartment into the void left by the trapeziectomy. A tunnel through the base of the thumb metacarpal was then made from the dorsal surface obliquely to the ulnar base, first using a 2.5mm drill which was then over-drilled with a 3.5mm drill.  The tendon was then passed through the bone tunnel in the thumb metacarpal base, tied around itself in a knot,  and sutured in place with a running locking 3-0 ethibond suture. This provided excellent suspension of the thumb metacarpal.    The remaining FCR tendon was then rolled into an anchovy and sutured together with 3-0 ethibond. This suture was then passed through the deep capsule, dunking the remaining FCR into the trapezial space for interposition.    The wound was copiously irrigated. The tourniquet was deflated and hemostasis assured. Perfusion to the hand was excellent with capillary refill less than 2 seconds to all fingers. The periosteal interval between the EPB and APL and the dorsal capsule was then closed with a running locking 3-0 ethibond suture. The dorsal sensory nerve branches were noted to be intact. The distal incision was then closed with interrupted horizontal mattress 4-0 nylon sutures. Proximally the incisions were closed with 3-0 deep dermal Vicryl sutures and running 4-0 Monocryl subcuticular suture.    A sterile surgical dressing was placed and the patient was immobilized in a well-padded thumb spica splint.    All needle and sponge counts were correct at the end of the procedure. There were no complications.     The patient was awoken from anesthesia and taken to the postoperative recovery unit in stable condition.     I was present and scrubbed for the entire procedure.     POSTOPERATIVE PLAN:  The patient will be discharged home. The patient will be non-weight bearing. The patient will return in 2 weeks at which point x-rays of the hand will be obtained. The patient will then be transitioned to a short arm thumb spica cast for additional 4 weeks for a total of 6 weeks of postoperative immobilization.    Jasmeet Haque MD     Hand, Upper Extremity & Microvascular Surgery  Department of Orthopedic Surgery  [facility]

## 2023-01-20 NOTE — ANESTHESIA PREPROCEDURE EVALUATION
Anesthesia Pre-Procedure Evaluation    Patient: Carli Monreal   MRN: [id number] : 1950        Procedure : Procedure(s):  ARTHROPLASTY, CARPOMETACARPAL JOINT,RIGHT  THUMB          Past Medical History:   Diagnosis Date     Diabetes mellitus (H)      Right bundle branch block      Uncomplicated asthma       Past Surgical History:   Procedure Laterality Date     CATARACT IOL, RT/LT Bilateral     Valeriano Javier MD ([place], [place]).     COLONOSCOPY N/A 2021    Procedure: COLONOSCOPY;  Surgeon: Keesha Mancuso MD;  Location: [facility] OR     SMALL BOWEL RESECTION        Allergies   Allergen Reactions     Seasonal Allergies       Social History     Tobacco Use     Smoking status: Former     Types: Cigarettes     Quit date: 1991     Years since quittin.7     Smokeless tobacco: Never   Substance Use Topics     Alcohol use: Yes     Comment: Wine- couple x/ week      Wt Readings from Last 1 Encounters:   23 71.7 kg (158 lb)        Anesthesia Evaluation            ROS/MED HX  ENT/Pulmonary:     (+) tobacco use, Past use, asthma     Neurologic:       Cardiovascular:     (+) hypertension-----Previous cardiac testing   Echo: Date: Results:    Stress Test: Date: 21 Results:  Normal, low-risk dobutamine echocardiogram without evidence of ischemia. The target heart rate was achieved.  Normal biventricular size, thickness, and global systolic function at baseline, LVEF=55-60%. With low-dose dobutamine, LVEF augmented and LV cavity size decreased appropriately. With peak dobutamine, LVEF increased further to >65% and LV cavity size decreased appropriately. No regional wall motion abnormalities at rest or with dobutamine. No angina was elicited.  No ECG evidence of ischemia. Normal heart rate and blood pressure response to dobutamine. No significant valvular abnormalities are noted on screening Doppler exam. The aortic root and visualized ascending aorta are normal.  ECG Reviewed: Date:  Results:    Cath: Date: Results:      METS/Exercise Tolerance:     Hematologic:       Musculoskeletal: Comment: RA      GI/Hepatic: Comment: Crohn's    (+) Inflammatory bowel disease,     Renal/Genitourinary:       Endo:     (+) type II DM,     Psychiatric/Substance Use:       Infectious Disease:       Malignancy:       Other:            Physical Exam    Airway  airway exam normal           Respiratory Devices and Support         Dental       (+) Removable bridges or other hardware      Cardiovascular   cardiovascular exam normal          Pulmonary   pulmonary exam normal                OUTSIDE LABS:  CBC:   Lab Results   Component Value Date    WBC 5.7 01/03/2023    WBC 8.9 11/12/2021    HGB 10.8 (L) 01/03/2023    HGB 10.7 (L) 11/12/2021    HCT 34.0 (L) 01/03/2023    HCT 32.9 (L) 11/12/2021     01/03/2023     11/12/2021     BMP:   Lab Results   Component Value Date     01/03/2023     11/07/2022    POTASSIUM 4.3 01/03/2023    POTASSIUM 3.8 11/07/2022    CHLORIDE 107 01/03/2023    CHLORIDE 109 11/07/2022    CO2 21 (L) 01/03/2023    CO2 22 11/07/2022    BUN 10.7 01/03/2023    BUN 8 11/07/2022    CR 0.72 01/03/2023    CR 0.71 11/07/2022     (H) 01/20/2023     (H) 01/03/2023     COAGS: No results found for: PTT, INR, FIBR  POC: No results found for: BGM, HCG, HCGS  HEPATIC:   Lab Results   Component Value Date    ALBUMIN 3.6 05/26/2021    PROTTOTAL 7.9 05/26/2021    ALT 38 05/26/2021    AST 35 05/26/2021    ALKPHOS 98 05/26/2021    BILITOTAL 0.3 05/26/2021     OTHER:   Lab Results   Component Value Date    A1C 6.1 (H) 01/03/2023    BANDAR 9.4 01/03/2023    TSH 1.56 05/06/2022    CRP <3.00 12/13/2022    SED 29 12/13/2022       Anesthesia Plan    ASA Status:  3   NPO Status:  NPO Appropriate    Anesthesia Type: MAC.     - Reason for MAC: straight local not clinically adequate   Induction: Intravenous.   Maintenance: TIVA.        Consents    Anesthesia Plan(s) and associated risks,  benefits, and realistic alternatives discussed. Questions answered and patient/representative(s) expressed understanding.    - Discussed:     - Discussed with:  Patient      - Extended Intubation/Ventilatory Support Discussed: No.      - Patient is DNR/DNI Status: No    Use of blood products discussed: No .     Postoperative Care    Pain management: IV analgesics, Peripheral nerve block (Single Shot), Multi-modal analgesia.   PONV prophylaxis: Background Propofol Infusion, Ondansetron (or other 5HT-3), Dexamethasone or Solumedrol     Comments:    Other Comments: Right supraclavicular block            Beltran Carver MD

## 2023-01-20 NOTE — INTERVAL H&P NOTE
"I have reviewed the surgical (or preoperative) H&P that is linked to this encounter, and examined the patient. There are no significant changes    Clinical Conditions Present on Arrival:  Clinically Significant Risk Factors Present on Admission                    # Obesity: Estimated body mass index is 31.38 kg/m  as calculated from the following:    Height as of this encounter: 1.511 m (4' 11.5\").    Weight as of this encounter: 71.7 kg (158 lb).       "

## 2023-01-20 NOTE — DISCHARGE INSTRUCTIONS
Procedure Performed: Right thumb CMC arthroplasty  Attending Surgeon: Jasmeet Haque MD  Date: 1/20/2023    DIAGNOSIS  1. Arthritis of qzwlfrfa-oskawautq-zyiiakump joint of right hand    2. Arthritis of carpometacarpal (CMC) joint of right thumb        MEDICATIONS   Resume all home medications as directed unless otherwise instructed during this hospitalization. If there is any question, double check with your primary care provider.  Start new discharge medications as directed.    Take 1-2 tablets of extra strength Tylenol 500 mg every 6 hours. You may also take ibuprofen 400-600 mg every 4-6 hours with food.    For breakthrough pain use narcotic pain medication as prescribed.    Do not drive or operate machinery while taking narcotic pain medications.   If you are taking other Tylenol containing medicines at home, be sure NOT to exceed 4 gram's (4000 milligrams) of Tylenol per day.   Do not take more than 2400 mg of ibuprofen in 24 hours.  If you are taking pain medications, be sure to take Colace (docusate sodium) as well to prevent constipation. If constipated, try adding another cathartic or enema.  If nausea and vomiting, call the hospital or seek medical attention.    ACTIVITY   Weight bearing: Non-weight bearing to arm.    DIET  Resume same diet prior to your hospital admission.    WOUND   Leave dressing on until you are seen in clinic for your follow up visit.   Watch for signs and symptoms of infection of your wounds including; pain, redness, swelling, drainage or fever.  If you notice any of these symptoms please call or seek medical attention.    Keep wound clean, dry, and intact.  Do not submerge wounds in water until they are healed. No baths, soaking, swimming, or prolonged water exposure for 4 weeks after surgery.    RETURN   Follow-up with Orthopedic Clinic as directed.     Future Appointments   Date Time Provider Department Center   1/30/2023  2:10 PM [facility]   5/8/2023 12:00 PM  Iza Cage DO [facility]   12/12/2023 10:30 AM Mc Martinez MD [facility]       Call the [facility] at [phone] during business hours for any symptoms such as:    * Fevers with Temperature greater than 101.5 degrees.   * Pus drainage from wound site.   * Severe pain, not controlled by medication.   * Persistent nausea, vomiting and inablility to tolerate fluids.    If you are receiving care in [place], you may call the Orthopedic clinic at [phone] Option #2.    FOR URGENT PROBLEMS ONLY, after hours or on weekends call the hospital  at [phone] and ask to speak with the orthopedic resident on call.    You may also be seen at our Orthopedic Walk-In clinic that runs 7 days per week from 7a-5p at [address] [place] [address]. You can call the Walk-In Clinic at [phone].        [facility]  Home Care Following Anesthesia  For 24 hours after surgery:  Get plenty of rest.  A responsible adult must stay with you for at least 24 hours after you leave the surgery center.  Do not drive or use heavy equipment.  If you have weakness or tingling, don't drive or use heavy equipment until this feeling goes away.   Do not drink alcohol.   Avoid strenuous or risky activities.  Ask for help when climbing stairs.  You may feel lightheaded.  IF so, sit for a few minutes before standing.  Have someone help you get up.   If you have nausea (feel sick to your stomach): Drink only clear liquids such as apple juice, ginger ale, broth or 7-Up.  Rest may also help.  Be sure to drink enough fluids.  Move to a regular diet as you feel able.   You may have a slight fever.  Call the doctor if your fever is over 100 F (37.7 C) (taken under the tongue) or lasts longer than 24 hours.  You may have a dry mouth, a sore throat, muscle aches or trouble sleeping. These should go away after 24 hours.  Do not make important or legal decisions.   It is  "recommended to avoid smoking.        Today you received a Marcaine or bupivacaine block to numb the nerves near your surgery site.  This is a block using local anesthetic or \"numbing\" medication injected around the nerves to anesthetize or \"numb\" the area supplied by those nerves.  This block is injected into the muscle layer near your surgical site.  The medication may numb the location where you had surgery for 6-18 hours, but may last up to 24 hours.  If your surgical site is an arm or leg you should be careful with your affected limb, since it is possible to injure your limb without being aware of it due to the numbing.  Until full feeling returns, you should guard against bumping or hitting your limb, and avoid extreme hot or cold temperatures on the skin.  As the block wears off, the feeling will return as a tingling or prickly sensation near your surgical site.  You will experience more discomfort from your incision as the feeling returns.  You may want to take a pain pill (a narcotic or Tylenol if this was prescribed by your surgeon) when you start to experience mild pain before the pain beccomes more severe.  If your pain medications do not control your pain you should notifiy your surgeon.    Tips for taking pain medications  To get the best pain relief possible, remember these points:  Take pain medications as directed, before pain becomes severe.  Pain medication can upset your stomach: taking it with food may help.  Constipation is a common side effect of pain medication. Drink plenty of  fluids.  Eat foods high in fiber. Take a stool softener if recommended by your doctor or pharmacist.  Do not drink alcohol, drive or operate machinery while taking pain medications.  Ask about other ways to control pain, such as with heat, ice or relaxation.    Tylenol/Acetaminophen Consumption  To help encourage the safe use of acetaminophen, the makers of TYLENOL  have lowered the maximum daily dose for " single-ingredient Extra Strength TYLENOL  (acetaminophen) products sold in the [place] from 8 pills per day (4,000 mg) to 6 pills per day (3,000 mg). The dosing interval has also changed from 2 pills every 4-6 hours to 2 pills every 6 hours.  If you feel your pain relief is insufficient, you may take Tylenol/Acetaminophen in addition to your narcotic pain medication.   Be careful not to exceed 3,000 mg of Tylenol/Acetaminophen in a 24 hour period from all sources.  If you are taking extra strength Tylenol/acetaminophen (500 mg), the maximum dose is 6 tablets in 24 hours.  If you are taking regular strength acetaminophen (325 mg), the maximum dose is 9 tablets in 24 hours.  You took 975 mg of tylenol at 6:30 am. You can take additional tylenol after 12:30 pm.    Call a doctor for any of the following:  Signs of infection (fever, growing tenderness at the surgery site, a large amount of drainage or bleeding, severe pain, foul-smelling drainage, redness, swelling).  It has been over 8 to 10 hours since surgery and you are still not able to urinate (pass water).  Headache for over 24 hours.  Signs of Covid-19 infection (temperature over 100 degrees, shortness of breath, cough, loss of taste/smell, generalized body aches, persistent headache, chills, sore throat, nausea/vomiting/diarrhea)  Your doctor is:       Dr. Jasmeet Haque, Orthopaedics: [phone]               Or dial [phone] and ask for the resident on call for:  Orthopaedics  For emergency care, call the:  [facility] Emergency Department: [phone] (TTY for hearing impaired: [phone])

## 2023-01-20 NOTE — ANESTHESIA CARE TRANSFER NOTE
Patient: Carli Monreal    Procedure: Procedure(s):  ARTHROPLASTY, CARPOMETACARPAL JOINT,RIGHT  THUMB       Diagnosis: Arthritis of ogmwmuur-opmgvmgmv-vnhieawmu joint of right hand [M19.031]  Arthritis of carpometacarpal (CMC) joint of right thumb [M18.11]  Diagnosis Additional Information: No value filed.    Anesthesia Type:   No value filed.     Note:    Oropharynx: spontaneously breathing  Level of Consciousness: awake  Oxygen Supplementation: room air    Independent Airway: airway patency satisfactory and stable  Dentition: dentition unchanged  Vital Signs Stable: post-procedure vital signs reviewed and stable  Report to RN Given: handoff report given  Patient transferred to: Phase II    Handoff Report: Identifed the Patient, Identified the Reponsible Provider, Reviewed the pertinent medical history, Discussed the surgical course, Reviewed Intra-OP anesthesia mangement and issues during anesthesia, Set expectations for post-procedure period and Allowed opportunity for questions and acknowledgement of understanding      Vitals:  Vitals Value Taken Time   BP     Temp     Pulse     Resp     SpO2         Electronically Signed By: CATRACHITA Napier CRNA  January 20, 2023  9:42 AM

## 2023-01-20 NOTE — PROGRESS NOTES
Patient received right side Brachial Plexus nerve block  without Exparel.  Fentanyl 50mcg and Versed 0.5mg given. Tolerated procedure well.

## 2023-01-20 NOTE — ANESTHESIA PROCEDURE NOTES
Brachial plexus Procedure Note    Pre-Procedure   Staff -        Anesthesiologist:  Beltran Carver MD       Performed By: anesthesiologist       Location: pre-op       Procedure Start/Stop Times: 1/20/2023 7:10 AM and 1/20/2023 7:15 AM       Pre-Anesthestic Checklist: patient identified, IV checked, site marked, risks and benefits discussed, informed consent, monitors and equipment checked, pre-op evaluation, at physician/surgeon's request and post-op pain management  Timeout:       Correct Patient: Yes        Correct Procedure: Yes        Correct Site: Yes        Correct Position: Yes        Correct Laterality: Yes        Site Marked: Yes  Procedure Documentation  Procedure: Brachial plexus       Laterality: right       Patient Position: supine       Skin prep: Chloraprep (supraclavicular approach).       Needle Type: insulated       Needle Gauge: 21.        Needle Length (millimeters): 50        Ultrasound guided       1. Ultrasound was used to identify targeted nerve, plexus, vascular marker, or fascial plane and place a needle adjacent to it in real-time.       2. Ultrasound was used to visualize the spread of anesthetic in close proximity to the above referenced structure.       3. A permanent image is entered into the patient's record.       4. The visualized anatomic structures appeared normal.       5. There were no apparent abnormal pathologic findings.    Assessment/Narrative         The placement was negative for: blood aspirated, painful injection and site bleeding       Paresthesias: No.       Bolus given via needle..        Secured via.        Insertion/Infusion Method: Single Shot       Complications: none    Medication(s) Administered   Bupivacaine 0.5% PF (Infiltration) - Infiltration   20 mL - 1/20/2023 7:10:00 AM  Mepivacaine 2% PF (Perineural) - Perineural   10 mL - 1/20/2023 7:10:00 AM  Medication Administration Time: 1/20/2023 7:10 AM     Comments:  R/b/a to block discussed and pt agrees to  "proceed.  US guided, anatomical structures WNL, local infiltration visualized, aspirations negative throughout.  Pt tolerated procedure without incident.       FOR [facility]) ONLY:   Pain Team Contact information: please page the Pain Team Via [employer]. Search \"Pain\". During daytime hours, please page the attending first. At night please page the resident first.    "

## 2023-01-20 NOTE — ANESTHESIA POSTPROCEDURE EVALUATION
Patient: Carli Monreal    Procedure: Procedure(s):  ARTHROPLASTY, CARPOMETACARPAL JOINT,RIGHT  THUMB       Anesthesia Type:  MAC, Peripheral Nerve Block    Note:  Disposition: Outpatient   Postop Pain Control: Uneventful            Sign Out: Well controlled pain   PONV: No   Neuro/Psych: Uneventful            Sign Out: Acceptable/Baseline neuro status   Airway/Respiratory: Uneventful            Sign Out: Acceptable/Baseline resp. status   CV/Hemodynamics: Uneventful            Sign Out: Acceptable CV status; No obvious hypovolemia; No obvious fluid overload   Other NRE: NONE   DID A NON-ROUTINE EVENT OCCUR? No           Last vitals:  Vitals Value Taken Time   /69 01/20/23 1015   Temp 36.5  C (97.7  F) 01/20/23 1000   Pulse     Resp 14 01/20/23 1015   SpO2 95 % 01/20/23 1015       Electronically Signed By: Beltran Carver MD  January 20, 2023  12:36 PM

## 2023-01-26 ENCOUNTER — TELEPHONE (OUTPATIENT)
Dept: ORTHOPEDICS | Facility: CLINIC | Age: 73
End: 2023-01-26
Payer: COMMERCIAL

## 2023-01-26 NOTE — TELEPHONE ENCOUNTER
[facility] Call Center    Phone Message    May a detailed message be left on voicemail: yes     Reason for Call: Other: Patient called stated that she last had her surgery on 01/20/23 and that she suddenly felt pain like flex horse. She stated the medication doesn't do very much being that it is only 5 mg and only taking them once daily. Please call and advise.     Action Taken: Other: [facility]    Travel Screening: Not Applicable

## 2023-01-26 NOTE — TELEPHONE ENCOUNTER
Spoke to the patient patient reports that about 3 PM today she went to reach for something and had a sharp cramping pain over the volar forearm.  She states that it resolved after a few minutes but when she went to move again she had another cramping feeling.  States she states it feels like a charley horse.  She has been taking Tylenol in the morning and at night.  He is taking 1 tablet of 5 mg oxycodone once during the day.  Also taking gabapentin at night.    Discussed with the patient that this sounds like a muscular spasm.  Recommended heat, massage to the forearm or ice if it helps.  We also discussed trial of a oral muscle relaxer.  Patient would like to try conservative measures tonight.  She will contact the clinic tomorrow if not improving.  At that time could consider trial of a muscle relaxer.    All questions were answered and patient was agreed with the plan.    DAGOBERTO FARIA PA-C  1/26/2023 4:36 PM   Orthopaedic Surgery

## 2023-01-27 DIAGNOSIS — G89.18 ACUTE POST-OPERATIVE PAIN: Primary | ICD-10-CM

## 2023-01-27 DIAGNOSIS — M19.031 ARTHRITIS OF SCAPHOID-TRAPEZIUM-TRAPEZOID JOINT OF RIGHT HAND: Primary | ICD-10-CM

## 2023-01-27 RX ORDER — TIZANIDINE 2 MG/1
2-4 TABLET ORAL 3 TIMES DAILY PRN
Qty: 30 TABLET | Refills: 0 | Status: SHIPPED | OUTPATIENT
Start: 2023-01-27 | End: 2023-08-21

## 2023-01-27 NOTE — TELEPHONE ENCOUNTER
[facility] Call Center    Phone Message    May a detailed message be left on voicemail: yes     Reason for Call: Medication Refill Request    Has the patient contacted the pharmacy for the refill? Yes   Name of medication being requested: Muscle Relaxer for sharp cramping pain   Provider who prescribed the medication: Izzy Fleming    Pharmacy: [facility]  - [phone]    Date medication is needed: 01/27/2023    Pt spoke to Izzy re: pain she is having in hand and she advised she would call in med.          Action Taken: Message routed to:  [facility]:  Dr. Jasmeet Haque     Travel Screening: Not Applicable

## 2023-01-27 NOTE — TELEPHONE ENCOUNTER
"Phoned patient back regarding request of muscle relaxer that she discussed with JOEY Smith yesterday.    Per JOEY Smith \"Tizanidine 2-4mg as long as renal and liver fx normal.\"    Patient reports normal kidney and liver function.    Medication refill routed to JOEY Magana for signature.    Bernadette Rhoades RN on 1/27/2023 at 1:43 PM        "

## 2023-01-30 ENCOUNTER — ANCILLARY PROCEDURE (OUTPATIENT)
Dept: GENERAL RADIOLOGY | Facility: CLINIC | Age: 73
End: 2023-01-30
Attending: STUDENT IN AN ORGANIZED HEALTH CARE EDUCATION/TRAINING PROGRAM
Payer: COMMERCIAL

## 2023-01-30 ENCOUNTER — OFFICE VISIT (OUTPATIENT)
Dept: ORTHOPEDICS | Facility: CLINIC | Age: 73
End: 2023-01-30
Payer: COMMERCIAL

## 2023-01-30 DIAGNOSIS — M19.031 ARTHRITIS OF SCAPHOID-TRAPEZIUM-TRAPEZOID JOINT OF RIGHT HAND: ICD-10-CM

## 2023-01-30 DIAGNOSIS — Z98.890 POST-OPERATIVE STATE: Primary | ICD-10-CM

## 2023-01-30 PROCEDURE — 99024 POSTOP FOLLOW-UP VISIT: CPT | Performed by: PHYSICIAN ASSISTANT

## 2023-01-30 PROCEDURE — 29075 APPL CST ELBW FNGR SHORT ARM: CPT | Mod: 58 | Performed by: STUDENT IN AN ORGANIZED HEALTH CARE EDUCATION/TRAINING PROGRAM

## 2023-01-30 PROCEDURE — 73130 X-RAY EXAM OF HAND: CPT | Mod: RT | Performed by: RADIOLOGY

## 2023-01-30 NOTE — NURSING NOTE
Reason For Visit:   Chief Complaint   Patient presents with     Surgical Followup     10 day post op Right trapeziectomy with FCR ligament reconstruction tendon interposition,  Right partial trapezoid excision DOS: 1/20/23       Primary MD: Iza Cage  Ref. MD: Marilyn    Age: 72 year old    ?  No      There were no vitals taken for this visit.      Pain Assessment  Patient Currently in Pain: Yes  0-10 Pain Scale: 3  Primary Pain Location: Wrist (Right)  Pain Descriptors: Sore  Alleviating Factors: Pain medication    Hand Dominance Evaluation  Hand Dominance: Right          QuickDASH Assessment  QuickDASH Main 11/14/2022   1. Open a tight or new jar. Moderate difficulty   2. Do heavy household chores (e.g., wash walls, floors) Mild difficulty   3. Carry a shopping bag or briefcase. No difficulty   4. Wash your back. No difficulty   5. Use a knife to cut food. Mild difficulty   6. Recreational activities in which you take some force or impact through your arm, shoulder or hand (e.g., golf, hammering, tennis, etc.). Unable to answer   7. During the past week, to what extent has your arm, shoulder or hand problem interfered with your normal social activities with family, friends, neighbours or groups? Quite a bit   8. During the past week, were you limited in your work or other regular daily activities as a result of your arm, shoulder or hand problem? Moderately limited   9. Arm, shoulder or hand pain. Severe   10.Tingling (pins and needles) in your arm,shoulder or hand. None   11. During the past week, how much difficulty have you had sleeping because of the pain in your arm, shoulder or hand? Mild difficulty   Quickdash Ability Score 27.27          Current Outpatient Medications   Medication Sig Dispense Refill     albuterol (PROAIR HFA/PROVENTIL HFA/VENTOLIN HFA) 108 (90 Base) MCG/ACT inhaler Inhale 2 puffs into the lungs 4 times daily as needed for shortness of breath / dyspnea        alcohol swab prep  pads Use to swab area of injection/venkata as directed. 100 each 3     amLODIPine (NORVASC) 2.5 MG tablet Take 1 tablet (2.5 mg) by mouth daily Do not fill until contacted by patient 90 tablet 1     atorvastatin (LIPITOR) 10 MG tablet Take 1 tablet (10 mg) by mouth daily Do not fill until contacted by patient 90 tablet 3     blood glucose (NO BRAND SPECIFIED) lancets standard Use to test blood sugar 1 times daily or as directed. 100 each 11     budesonide-formoterol (SYMBICORT) 160-4.5 MCG/ACT Inhaler Inhale 2 puffs into the lungs 2 times daily 10.2 g 4     Cyanocobalamin 3000 MCG SUBL Place 1,500 mg under the tongue daily        docusate sodium (COLACE) 100 MG capsule Take 1 capsule (100 mg) by mouth 2 times daily 30 capsule 0     esomeprazole (NEXIUM) 40 MG DR capsule Take 1 capsule (40 mg) by mouth every morning (before breakfast) Do not fill before contacted by patient take 30-60 minutes before eating. 90 capsule 2     folic acid (FOLVITE) 1 MG tablet TAKE ONE TABLET BY MOUTH ONE TIME DAILY 90 tablet 0     gabapentin (NEURONTIN) 100 MG capsule Take 1 capsule (100 mg) by mouth At Bedtime 30 capsule 0     Lancets (ONETOUCH DELICA PLUS BLXWQZ96I) MISC USE TO TEST BLOOD GLUCOSE ONCE DAILY 100 each 1     losartan (COZAAR) 100 MG tablet Take 1 tablet (100 mg) by mouth daily Do not fill until contacted by patient 90 tablet 1     metFORMIN (GLUCOPHAGE) 1000 MG tablet Take 1 tablet (1,000 mg) by mouth 2 times daily (with meals) Do not fill until contacted by patient 180 tablet 1     multivitamin w/minerals (THERA-VIT-M) tablet Take 1 tablet by mouth daily       ONETOUCH VERIO IQ test strip USE TO TEST BLOOD GLUCOSE ONCE DAILY 200 strip 0     potassium chloride ER (KLOR-CON) 20 MEQ CR tablet Take 1 tablet (20 mEq) by mouth daily 90 tablet 1     scopolamine (TRANSDERM) 1 MG/3DAYS 72 hr patch Place 1 patch onto the skin every 72 hours 12 patch 1     tiZANidine (ZANAFLEX) 2 MG tablet Take 1-2 tablets (2-4 mg) by mouth 3 times  daily as needed for muscle spasms 30 tablet 0     Vitamin D, Cholecalciferol, 25 MCG (1000 UT) TABS Take 2 tablets by mouth daily         Allergies   Allergen Reactions     Seasonal Allergies        FE LAMAS, ATC

## 2023-01-30 NOTE — LETTER
1/30/2023         RE: Carli Monreal  [address]  [place] [address]        Dear Colleague,    Thank you for referring your patient, Carli Monreal, to the [facility] [place]. Please see a copy of my visit note below.    Cast/splint application    Date/Time: 1/30/2023 3:04 PM  Performed by: Akil Peter ATC  Authorized by: Jasmeet Haque MD     Consent:     Consent obtained:  Verbal    Consent given by:  Patient    Risks discussed:  Discoloration, numbness, pain and swelling  Pre-procedure details:     Sensation:  Normal  Procedure details:     Laterality:  Right    Location:  Wrist    Wrist:  R wrist    Strapping: no      Cast type:  Short arm and thumb spica    Supplies:  Fiberglass  Post-procedure details:     Pain:  Unchanged    Pain level:  0/10    Sensation:  Normal    Patient tolerance of procedure:  Tolerated well, no immediate complications    Patient provided with cast or splint care instructions: Yes            Date of Service: Jan 30, 2023    Chief Complaint: Post operative follow up.     Date of Surgery: 1/20/23    Procedure Performed:   1) Right trapeziectomy with FCR ligament reconstruction tendon interposition (CPT 82558, 26691, 67102)  2) Right partial trapezoid excision CPT 94788     Interval events: Carli Monreal is a 72 year old female who presents today for a postoperative follow up. The patient reports that she is doing better. She has been taking tylenol regularly. She has reported cramping to the forearm, this is improved with 4 mg tizanidine. She notes sharp pain in the proximal forearm intermittently with extension of the fingers or hand    The past medical history was reviewed updated in the EMR. This includes medications, surgeries, social history, and review of systems.    Physical examination:  Well-developed, well-nourished and in no acute distress.  Alert and oriented to surroundings.  On examination of the right upper extremity,  incision is well-healed with sutures in place. There is no erythema, drainage, or dehiscence. Swelling is Mild. Sensation is intact in median, radial and ulnar nerve distributions. Patient can actively flex and extend all digits. Fingers are warm and well-perfused.     Radiographs: Three views of the right hand were obtained and reviewed. These demonstrate post surgical change of trapeziectomy.     Assessment: 72 year old female s/p the above procedures, progressing appropriately.     Plan:  Sutures removed. Patient will be placed in a thumb spica cast with IP free. She will follow up at 6 weeks post op with hand therapy to follow. No lifting more than the weight of a cell phone. Continue with tylenol and tizanidine prn. Knows to contact us in the interim with questions or concerns.     DAGOBERTO FARIA PA-C  January 30, 2023 4:15 PM   Orthopaedic Surgery

## 2023-01-30 NOTE — PROGRESS NOTES
Date of Service: Jan 30, 2023    Chief Complaint: Post operative follow up.     Date of Surgery: 1/20/23    Procedure Performed:   1) Right trapeziectomy with FCR ligament reconstruction tendon interposition (CPT 14021, 29185, 18209)  2) Right partial trapezoid excision CPT 42255     Interval events: Carli Monreal is a 72 year old female who presents today for a postoperative follow up. The patient reports that she is doing better. She has been taking tylenol regularly. She has reported cramping to the forearm, this is improved with 4 mg tizanidine. She notes sharp pain in the proximal forearm intermittently with extension of the fingers or hand    The past medical history was reviewed updated in the EMR. This includes medications, surgeries, social history, and review of systems.    Physical examination:  Well-developed, well-nourished and in no acute distress.  Alert and oriented to surroundings.  On examination of the right upper extremity, incision is well-healed with sutures in place. There is no erythema, drainage, or dehiscence. Swelling is Mild. Sensation is intact in median, radial and ulnar nerve distributions. Patient can actively flex and extend all digits. Fingers are warm and well-perfused.     Radiographs: Three views of the right hand were obtained and reviewed. These demonstrate post surgical change of trapeziectomy.     Assessment: 72 year old female s/p the above procedures, progressing appropriately.     Plan:  Sutures removed. Patient will be placed in a thumb spica cast with IP free. She will follow up at 6 weeks post op with hand therapy to follow. No lifting more than the weight of a cell phone. Continue with tylenol and tizanidine prn. Knows to contact us in the interim with questions or concerns.     DAGOBERTO FARIA PA-C  January 30, 2023 4:15 PM   Orthopaedic Surgery

## 2023-01-30 NOTE — PROGRESS NOTES
Cast/splint application    Date/Time: 1/30/2023 3:04 PM  Performed by: Akil Peter ATC  Authorized by: Jasmeet Haque MD     Consent:     Consent obtained:  Verbal    Consent given by:  Patient    Risks discussed:  Discoloration, numbness, pain and swelling  Pre-procedure details:     Sensation:  Normal  Procedure details:     Laterality:  Right    Location:  Wrist    Wrist:  R wrist    Strapping: no      Cast type:  Short arm and thumb spica    Supplies:  Fiberglass  Post-procedure details:     Pain:  Unchanged    Pain level:  0/10    Sensation:  Normal    Patient tolerance of procedure:  Tolerated well, no immediate complications    Patient provided with cast or splint care instructions: Yes

## 2023-02-18 DIAGNOSIS — M54.50 CHRONIC BILATERAL LOW BACK PAIN, UNSPECIFIED WHETHER SCIATICA PRESENT: ICD-10-CM

## 2023-02-18 DIAGNOSIS — G89.29 CHRONIC BILATERAL LOW BACK PAIN, UNSPECIFIED WHETHER SCIATICA PRESENT: ICD-10-CM

## 2023-02-20 RX ORDER — GABAPENTIN 100 MG/1
CAPSULE ORAL
Qty: 90 CAPSULE | Refills: 3 | Status: SHIPPED | OUTPATIENT
Start: 2023-02-20 | End: 2023-08-21

## 2023-03-10 DIAGNOSIS — E11.9 TYPE 2 DIABETES MELLITUS WITHOUT COMPLICATION, WITHOUT LONG-TERM CURRENT USE OF INSULIN (H): ICD-10-CM

## 2023-03-13 ENCOUNTER — OFFICE VISIT (OUTPATIENT)
Dept: ORTHOPEDICS | Facility: CLINIC | Age: 73
End: 2023-03-13
Payer: COMMERCIAL

## 2023-03-13 ENCOUNTER — THERAPY VISIT (OUTPATIENT)
Dept: OCCUPATIONAL THERAPY | Facility: CLINIC | Age: 73
End: 2023-03-13
Payer: COMMERCIAL

## 2023-03-13 DIAGNOSIS — M79.642 PAIN IN BOTH HANDS: Primary | ICD-10-CM

## 2023-03-13 DIAGNOSIS — G89.29 CHRONIC SACROILIAC JOINT PAIN: ICD-10-CM

## 2023-03-13 DIAGNOSIS — Z48.89 AFTERCARE FOLLOWING SURGERY FOR INJURY AND TRAUMA: ICD-10-CM

## 2023-03-13 DIAGNOSIS — M66.249: Primary | ICD-10-CM

## 2023-03-13 DIAGNOSIS — M79.641 PAIN IN BOTH HANDS: Primary | ICD-10-CM

## 2023-03-13 DIAGNOSIS — M18.11 ARTHRITIS OF CARPOMETACARPAL (CMC) JOINT OF RIGHT THUMB: ICD-10-CM

## 2023-03-13 DIAGNOSIS — M53.3 CHRONIC SACROILIAC JOINT PAIN: ICD-10-CM

## 2023-03-13 DIAGNOSIS — M79.644 THUMB PAIN, RIGHT: ICD-10-CM

## 2023-03-13 DIAGNOSIS — M54.50 CHRONIC MIDLINE LOW BACK PAIN WITHOUT SCIATICA: ICD-10-CM

## 2023-03-13 DIAGNOSIS — G89.29 CHRONIC MIDLINE LOW BACK PAIN WITHOUT SCIATICA: ICD-10-CM

## 2023-03-13 PROCEDURE — 97760 ORTHOTIC MGMT&TRAING 1ST ENC: CPT | Mod: GO | Performed by: OCCUPATIONAL THERAPIST

## 2023-03-13 PROCEDURE — 97165 OT EVAL LOW COMPLEX 30 MIN: CPT | Mod: GO | Performed by: OCCUPATIONAL THERAPIST

## 2023-03-13 PROCEDURE — 99024 POSTOP FOLLOW-UP VISIT: CPT | Performed by: PHYSICIAN ASSISTANT

## 2023-03-13 PROCEDURE — 99212 OFFICE O/P EST SF 10 MIN: CPT | Mod: 24 | Performed by: PHYSICIAN ASSISTANT

## 2023-03-13 RX ORDER — LANCETS 33 GAUGE
EACH MISCELLANEOUS
Qty: 100 EACH | Refills: 0 | Status: SHIPPED | OUTPATIENT
Start: 2023-03-13 | End: 2023-07-31

## 2023-03-13 NOTE — LETTER
3/13/2023         RE: Carli Monreal  [address]  [place] [address]        Dear Colleague,    Thank you for referring your patient, Carli Monreal, to the [facility] [place]. Please see a copy of my visit note below.    Date of Service: Mar 13, 2023    Chief Complaint: Post operative follow up.  Bilateral ring finger clicking and catching    Date of Surgery: 1/20/23    Procedure Performed: Right thumb CMC arthroplasty    Interval events: Carli Monreal is a 72 year old female who presents today for a postoperative follow up.  Patient reports that she has continued to struggle with pain, particularly at night.  Pain is worse since coming out of cast today.  She does take Tylenol, ibuprofen, gabapentin at bedtime.  Forearms muscle spasms have intermittently continued, tizanidine was no help.      Patient also reports clicking and catching of the bilateral ring fingers, this has been happening for quite some time but is worsened while in the cast. Patient reports prior history of rheumatoid arthritis diagnosis when she lived in [place].  She reports she was on a biologic at 1 point for both treatment of Crohn's as well as rheumatoid arthritis.  When she moved to [place] she was told she did not have rheumatoid arthritis, not currently on any medication.    Patient also reports history of low back pain, prior lumbar fusion as well as history of bilateral SI arthritis.  Patient was followed by a pain clinic in [place] and has had previous corticosteroid injections.  Patient would like referral for establishing with our spine team here.    The past medical history was reviewed updated in the EMR. This includes medications, surgeries, social history, and review of systems.    Physical examination:  Well-developed, well-nourished and in no acute distress.  Alert and oriented to surroundings.  On examination of the  right upper extremity, incision is well-healed. There is  no erythema, drainage, or dehiscence. Swelling is Mild. Sensation is intact in median, radial and ulnar nerve distributions. Patient can actively flex and extend all digits and thumb.  Palpable and visible radial subluxation of the extensor tendon of the ring finger.  This requires manual straightening and associated pain.  Expected stiffness of the thumb.   Fingers are warm and well-perfused.     On examination of the left upper extremity, palpable and visible ulnar subluxation of the extensor tendon of the ring finger, this requires manually straightening and associated pain.  Possible mild palpable tender nodule over the volar aspect of the ring finger at the level of the A1 pulley.  No palpable clicking of the flexor tendon.    Assessment: 72 year old female with the followin.  S/p right thumb CMC arthroplasty,progressing appropriately.  2.  Bilateral ring finger sagittal band injury.  Possible history of rheumatoid arthritis.  3. Chronic low back pain/SI pain    Plan:    1.  Patient will see hand therapy today for fabrication of a thermoplastic thumb spica splint and initiation of range of motion and strengthening, CMC arthroplasty protocol.  2.  Recommended full-time relative motion splint to the bilateral ring fingers for 8 weeks for treatment of sagittal band injuries.  We discussed that often these can be treated nonoperatively with consistent splinting.  Would not recommend surgical intervention at this time.  We discussed that spontaneous sagittal band injuries are often related to rheumatoid arthritis, recommended follow-up with rheumatologist.  3.  Referral to orthopedic spine to establish with one of the [facility] for further evaluation and management of chronic low back and SI pain.    Patient will follow-up in 8 weeks with Dr. Haque for repeat evaluation.    DAGOBERTO FARIA PA-C  2023 2:24 PM   Orthopaedic Surgery

## 2023-03-13 NOTE — PROGRESS NOTES
Date of Service: Mar 13, 2023    Chief Complaint: Post operative follow up.     Date of Surgery: ***    Procedure Performed: ***     Interval events: Carli Monreal is a 72 year old female who presents today for a postoperative follow up.         The past medical history was reviewed updated in the EMR. This includes medications, surgeries, social history, and review of systems.    Physical examination:  Well-developed, well-nourished and in no acute distress.  Alert and oriented to surroundings.  On examination of the  {RIGHT /LEFT:107857} ***, incision is well-healed. There is no erythema, drainage, or dehiscence. Swelling is {awswellin}. Sensation is intact in median, radial and ulnar nerve distributions. Patient can actively flex and extend all digits and thumb. The patient {IS NOT/IS:502085} able to make a full composite fist with tip to palm distance of {number NA :292487} finger-widths. Fingers are warm and well-perfused. Radial pulse is palpable. AROM of the *** is as follows: ***  extension, ***  flexion, ***  supination, ***  pronation.    Radiographs: Three views of the {RIGHT /LEFT:813241} {FINGERS:604778} were obtained and reviewed. These demonstrate ***.     Assessment: 72 year old female s/p {RIGHT /LEFT:967942} ***,progressing appropriately.     Plan:  ***    I would like to see the patient back in *** {WEEKS OR MONTHS:115493} for re-evaluation.      DAGOBERTO FARIA PA-C  2023 2:08 PM   Orthopaedic Surgery

## 2023-03-13 NOTE — NURSING NOTE
Reason For Visit:   Chief Complaint   Patient presents with     RECHECK     6 week post op Right trapeziectomy with FCR ligament reconstruction tendon interposition //  Right partial trapezoid excision  DOS: 1/20/23       Primary MD: Iza Cage  Ref. MD: Marilyn    Age: 72 year old    ?  No      There were no vitals taken for this visit.      Pain Assessment  Patient Currently in Pain: Yes  0-10 Pain Scale: 2  Primary Pain Location: Wrist (Right hand and fingers)  Pain Descriptors: Intermittent    Hand Dominance Evaluation  Hand Dominance: Right          QuickDASH Assessment  QuickDASH Main 3/13/2023   1. Open a tight or new jar. Unable   2. Do heavy household chores (e.g., wash walls, floors) Severe difficulty   3. Carry a shopping bag or briefcase. Mild difficulty   4. Wash your back. Mild difficulty   5. Use a knife to cut food. Mild difficulty   6. Recreational activities in which you take some force or impact through your arm, shoulder or hand (e.g., golf, hammering, tennis, etc.). Unable to answer   7. During the past week, to what extent has your arm, shoulder or hand problem interfered with your normal social activities with family, friends, neighbours or groups? Slightly   8. During the past week, were you limited in your work or other regular daily activities as a result of your arm, shoulder or hand problem? Slightly limited   9. Arm, shoulder or hand pain. Mild   10.Tingling (pins and needles) in your arm,shoulder or hand. None   11. During the past week, how much difficulty have you had sleeping because of the pain in your arm, shoulder or hand? Mild difficulty   Quickdash Ability Score 29.55          Current Outpatient Medications   Medication Sig Dispense Refill     albuterol (PROAIR HFA/PROVENTIL HFA/VENTOLIN HFA) 108 (90 Base) MCG/ACT inhaler Inhale 2 puffs into the lungs 4 times daily as needed for shortness of breath / dyspnea        alcohol swab prep pads Use to swab area of  injection/venkata as directed. 100 each 3     amLODIPine (NORVASC) 2.5 MG tablet Take 1 tablet (2.5 mg) by mouth daily Do not fill until contacted by patient 90 tablet 1     atorvastatin (LIPITOR) 10 MG tablet Take 1 tablet (10 mg) by mouth daily Do not fill until contacted by patient 90 tablet 3     blood glucose (NO BRAND SPECIFIED) lancets standard Use to test blood sugar 1 times daily or as directed. 100 each 11     budesonide-formoterol (SYMBICORT) 160-4.5 MCG/ACT Inhaler Inhale 2 puffs into the lungs 2 times daily 10.2 g 4     Cyanocobalamin 3000 MCG SUBL Place 1,500 mg under the tongue daily        docusate sodium (COLACE) 100 MG capsule Take 1 capsule (100 mg) by mouth 2 times daily 30 capsule 0     esomeprazole (NEXIUM) 40 MG DR capsule Take 1 capsule (40 mg) by mouth every morning (before breakfast) Do not fill before contacted by patient take 30-60 minutes before eating. 90 capsule 2     folic acid (FOLVITE) 1 MG tablet TAKE ONE TABLET BY MOUTH ONE TIME DAILY 90 tablet 0     gabapentin (NEURONTIN) 100 MG capsule TAKE ONE CAPSULE BY MOUTH EVERY NIGHT AT BEDTIME 90 capsule 3     Lancets (ONETOUCH DELICA PLUS GZICEF31H) MISC USE TO TEST BLOOD GLUCOSE ONCE DAILY 100 each 0     losartan (COZAAR) 100 MG tablet Take 1 tablet (100 mg) by mouth daily Do not fill until contacted by patient 90 tablet 1     metFORMIN (GLUCOPHAGE) 1000 MG tablet Take 1 tablet (1,000 mg) by mouth 2 times daily (with meals) Do not fill until contacted by patient 180 tablet 1     multivitamin w/minerals (THERA-VIT-M) tablet Take 1 tablet by mouth daily       ONETOUCH VERIO IQ test strip USE TO TEST BLOOD GLUCOSE ONCE DAILY 200 strip 0     potassium chloride ER (KLOR-CON) 20 MEQ CR tablet Take 1 tablet (20 mEq) by mouth daily 90 tablet 1     scopolamine (TRANSDERM) 1 MG/3DAYS 72 hr patch Place 1 patch onto the skin every 72 hours 12 patch 1     tiZANidine (ZANAFLEX) 2 MG tablet Take 1-2 tablets (2-4 mg) by mouth 3 times daily as needed for  muscle spasms 30 tablet 0     Vitamin D, Cholecalciferol, 25 MCG (1000 UT) TABS Take 2 tablets by mouth daily         Allergies   Allergen Reactions     Seasonal Allergies        FE LAMAS, ATC

## 2023-03-13 NOTE — PROGRESS NOTES
Date of Service: Mar 13, 2023    Chief Complaint: Post operative follow up.  Bilateral ring finger clicking and catching    Date of Surgery: 1/20/23    Procedure Performed: Right thumb CMC arthroplasty    Interval events: Carli Monreal is a 72 year old female who presents today for a postoperative follow up.  Patient reports that she has continued to struggle with pain, particularly at night.  Pain is worse since coming out of cast today.  She does take Tylenol, ibuprofen, gabapentin at bedtime.  Forearms muscle spasms have intermittently continued, tizanidine was no help.      Patient also reports clicking and catching of the bilateral ring fingers, this has been happening for quite some time but is worsened while in the cast. Patient reports prior history of rheumatoid arthritis diagnosis when she lived in [place].  She reports she was on a biologic at 1 point for both treatment of Crohn's as well as rheumatoid arthritis.  When she moved to [place] she was told she did not have rheumatoid arthritis, not currently on any medication.    Patient also reports history of low back pain, prior lumbar fusion as well as history of bilateral SI arthritis.  Patient was followed by a pain clinic in [place] and has had previous corticosteroid injections.  Patient would like referral for establishing with our spine team here.    The past medical history was reviewed updated in the EMR. This includes medications, surgeries, social history, and review of systems.    Physical examination:  Well-developed, well-nourished and in no acute distress.  Alert and oriented to surroundings.  On examination of the  right upper extremity, incision is well-healed. There is no erythema, drainage, or dehiscence. Swelling is Mild. Sensation is intact in median, radial and ulnar nerve distributions. Patient can actively flex and extend all digits and thumb.  Palpable and visible radial subluxation of the extensor tendon of the ring finger.   This requires manual straightening and associated pain.  Expected stiffness of the thumb.   Fingers are warm and well-perfused.     On examination of the left upper extremity, palpable and visible ulnar subluxation of the extensor tendon of the ring finger, this requires manually straightening and associated pain.  Possible mild palpable tender nodule over the volar aspect of the ring finger at the level of the A1 pulley.  No palpable clicking of the flexor tendon.    Assessment: 72 year old female with the followin.  S/p right thumb CMC arthroplasty,progressing appropriately.  2.  Bilateral ring finger sagittal band injury.  Possible history of rheumatoid arthritis.  3. Chronic low back pain/SI pain    Plan:    1.  Patient will see hand therapy today for fabrication of a thermoplastic thumb spica splint and initiation of range of motion and strengthening, CMC arthroplasty protocol.  2.  Recommended full-time relative motion splint to the bilateral ring fingers for 8 weeks for treatment of sagittal band injuries.  We discussed that often these can be treated nonoperatively with consistent splinting.  Would not recommend surgical intervention at this time.  We discussed that spontaneous sagittal band injuries are often related to rheumatoid arthritis, recommended follow-up with rheumatologist.  3.  Referral to orthopedic spine to establish with one of the [facility] for further evaluation and management of chronic low back and SI pain.    Patient will follow-up in 8 weeks with Dr. Haque for repeat evaluation.    DAGOBERTO FARIA PA-C  2023 2:24 PM   Orthopaedic Surgery

## 2023-03-14 NOTE — PROGRESS NOTES
[facility]    OUTPATIENT Occupational Therapy ORTHOPEDIC EVALUATION  PLAN OF TREATMENT FOR OUTPATIENT REHABILITATION  (COMPLETE FOR INITIAL CLAIMS ONLY)  Patient's Last Name, First Name, M.I.  YOB: 1950  Carli Monreal    Provider s Name:  [facility]   Medical Record No.  [id number]   Start of Care Date:  03/13/23   Onset Date:  01/20/23 (orders 3/13/23)    Treatment Diagnosis:  Right thumb cmc arthroplasty Medical Diagnosis:     Aftercare following surgery for injury and trauma  Pain in both hands  Thumb pain, right       Goals:     03/13/23 0500   Goal #1   Goal #1 household chores   Previous Performance Level Independent   Current Functional Task    Current Performance Level unable to fully grasp items due to R thumb and B RF limitations   STG [employer] Other - on additional line   Other Household Chores  and hold items of 2 pounds or less with the R or L hand   STG Target Perform Level with no pain or difficulty   Due Date 05/14/23   LTG Target Task/Performance Other - on additional line   Other Household Chores  and hold items of 4 pounds, for home chores,  with the R or L hand with no pain or difficulty   Due Date 05/14/23         Therapy Frequency:  2 X a month, once daily  Predicted Duration of Therapy Intervention:  for 3 months    Katharine Najera, MADELEINE                 I CERTIFY THE NEED FOR THESE SERVICES FURNISHED UNDER        THIS PLAN OF TREATMENT AND WHILE UNDER MY CARE     (Physician attestation of this document indicates review and certification of the therapy plan).                     Certification Date From:  03/13/23   Certification Date To:  06/09/23    Referring Provider:  No ref. provider found    Initial Assessment        See Epic Evaluation SOC Date: 03/13/23

## 2023-03-27 ENCOUNTER — THERAPY VISIT (OUTPATIENT)
Dept: OCCUPATIONAL THERAPY | Facility: CLINIC | Age: 73
End: 2023-03-27
Attending: PHYSICIAN ASSISTANT
Payer: COMMERCIAL

## 2023-03-27 DIAGNOSIS — M79.644 THUMB PAIN, RIGHT: ICD-10-CM

## 2023-03-27 DIAGNOSIS — M18.11 ARTHRITIS OF CARPOMETACARPAL (CMC) JOINT OF RIGHT THUMB: ICD-10-CM

## 2023-03-27 DIAGNOSIS — Z48.89 AFTERCARE FOLLOWING SURGERY FOR INJURY AND TRAUMA: Primary | ICD-10-CM

## 2023-03-27 DIAGNOSIS — M66.249: ICD-10-CM

## 2023-03-27 DIAGNOSIS — M79.641 PAIN IN BOTH HANDS: ICD-10-CM

## 2023-03-27 DIAGNOSIS — M79.642 PAIN IN BOTH HANDS: ICD-10-CM

## 2023-03-27 PROCEDURE — 97110 THERAPEUTIC EXERCISES: CPT | Mod: GO | Performed by: OCCUPATIONAL THERAPIST

## 2023-03-27 NOTE — PROGRESS NOTES
SOAP note objective information for 3/27/2023.    Diagnoses:   1) Right thumb carpometacarpal osteoarthritis s/p arthroplasty  2) Bilateral ring finger extensor saggittal band injury/subluxation, non op     DOS: 1/20/23     Procedure:  Right trapeziectomy with FCR ligament reconstruction tendon interposition; Right partial trapezoid excision   Post:  9w 3d     Precautions: post op; diabetic  Per orders 3/13/2023   S/p thumb CMC arthroplasty. Splint for support and start ROM and strengthening. Bilateral ring finger extensor saggittal band injury - relative motion splints to bilateral ring fingers.     Subjective:  Carli Monreal is a 72 year old female.  Pt is going to [employer] at the end of May.     Occupational Profile Information:  Right hand dominant  Prior functional level:  no limitations  Patient reports symptoms of pain  Special tests:  x-ray.    Previous treatment: surgery  Barriers include:none  Mobility: No difficulty  Transportation: drives  Leisure activities/hobbies: traveling  Other: Does work in [employer]; reading    Objective:  Pain Level (Scale 0-10)    3/13/2023   At Rest 2   With Use 2. Does have some brief stronger pain of the thumb with motion at times  B RF are very sore when they get stuck and snap      Pain Description  Date 3/13/2023   Location thumb and ring finger   Pain Quality Sharp to the B RF when snapping.   R thumb - sore   Frequency intermittent     Pain is worst  daytime   Exacerbated by  finger flexion, full fist triggers the sagittal band issues   Relieved by rest   Progression thumb - improving  RF have not improved      Edema  3/13/23  Minimal edema to surgical site. No edema in the fingers     Sensation   WNL throughout all nerve distributions; per patient report     VISUAL INSPECTION:        DATE 3/14/2023 3/14/2023      Right Left    General posture of the upper extremity: [x]?   Normal   []?  Comments: [x]?   Normal   []?  Comments:    Joint alignment: []?   Normal  "  [x]?  Comments: mild ulnar drift of all the finger MPj []?   Normal   [x]?  Comments:   mild ulnar drift of all the finger MPj   Color:  [x]?   Normal   []?  Comments: [x]?   Normal   []?  Comments:    Joint enlargements:  []?   Normal   [x]?  Comments: diffuse joint enlargements due to OA []?   Normal   [x]?  Comments:  diffuse joint enlargements due to OA    Skin Appearance: []?   Normal   [x]?  Comments: incision is closed, healing, flattened scar [x]?   Normal   []?  Comments:    Other observations:  RF sagittal band visibly subluxes to the ulnar side with full finger flexion. Finger gets stuck in full flexion, locked with active/passive ext. No tenderness at A1 pulley. RF sagittal band visibly subluxes to the ulnar side with full finger flexion. Finger gets stuck in full flexion, locked with active/passive ext. No tenderness at A1 pulley.       ROM  Thumb 3/13/2023 3/13/2023 3/27/23   AROM  (PROM) R L R   MP /45 /60 /51   IP /0 /47 /11   RABD 35 30    PABD 39 30    Retropulsion        Kapandji Opposition Scale (0-10/10) 3 Over 10 6-7      ROM  Pain Report: - none  + mild    ++ moderate    +++ severe   Wrist 3/13/2023 3/13/2023   AROM (PROM) R L   Extension 45 60   Flexion 30 72   RD 0 18   UD 30 50   Supination WFL WFL   Pronation \" \"      Strength  Testing deferred      Please refer to the daily flowsheet for treatment today, total treatment time and time spent performing 1:1 timed codes.     Home Exercise Program:  3/13  Gentle thumb and wrist motion  Wear RMO to B hands as much as possible. Marked the top of each orthosis with a dot (right) and an L (left)  Wear R HBTSO when outside, and to rest the thumb, or as needed  3/27/23  Thumb PROM MP flexion, IP blocking  Webspace release  First dorsal interosseous strengthening     Next Visit:  Check R thumb ROM, function  Check fit and effectiveness of the RMO, modify or remake as needed     At 3 months post can work on strengthening if needed, otherwise just " functional use and light functional strengthening  Pt lives in [facility]

## 2023-04-04 DIAGNOSIS — K50.919 CROHN'S DISEASE WITH COMPLICATION, UNSPECIFIED GASTROINTESTINAL TRACT LOCATION (H): ICD-10-CM

## 2023-04-04 RX ORDER — FOLIC ACID 1 MG/1
TABLET ORAL
Qty: 90 TABLET | Refills: 0 | Status: SHIPPED | OUTPATIENT
Start: 2023-04-04 | End: 2023-07-05

## 2023-04-28 ENCOUNTER — DOCUMENTATION ONLY (OUTPATIENT)
Dept: NEUROSURGERY | Facility: CLINIC | Age: 73
End: 2023-04-28
Payer: COMMERCIAL

## 2023-04-28 ENCOUNTER — PRE VISIT (OUTPATIENT)
Dept: NEUROSURGERY | Facility: CLINIC | Age: 73
End: 2023-04-28
Payer: COMMERCIAL

## 2023-04-28 NOTE — TELEPHONE ENCOUNTER
NEUROSURGERY- NEW PREVISIT PLANNING       Record Status/Location     Referring Provider Referral Izzy Fleming PA-C   Diagnosis Referral Chronic midline low back pain without sciatica   MRI (HEAD, NECK, SPINE) Na    CT Na    X-ray Na    INJECTION Yes [facility] 6-8 years ago   PHYSICAL THERAPY Na    SURGERY Phone encounter 2001 posterior L4-5 [facility]

## 2023-05-04 ENCOUNTER — OFFICE VISIT (OUTPATIENT)
Dept: NEUROSURGERY | Facility: CLINIC | Age: 73
End: 2023-05-04
Payer: COMMERCIAL

## 2023-05-04 VITALS — DIASTOLIC BLOOD PRESSURE: 67 MMHG | SYSTOLIC BLOOD PRESSURE: 116 MMHG | HEART RATE: 70 BPM

## 2023-05-04 DIAGNOSIS — G89.29 CHRONIC SACROILIAC JOINT PAIN: ICD-10-CM

## 2023-05-04 DIAGNOSIS — M53.3 CHRONIC SACROILIAC JOINT PAIN: ICD-10-CM

## 2023-05-04 DIAGNOSIS — G89.29 CHRONIC MIDLINE LOW BACK PAIN WITHOUT SCIATICA: ICD-10-CM

## 2023-05-04 DIAGNOSIS — M54.50 CHRONIC MIDLINE LOW BACK PAIN WITHOUT SCIATICA: ICD-10-CM

## 2023-05-04 DIAGNOSIS — Z98.1 HISTORY OF LUMBAR FUSION: Primary | ICD-10-CM

## 2023-05-04 PROCEDURE — 99204 OFFICE O/P NEW MOD 45 MIN: CPT | Performed by: NURSE PRACTITIONER

## 2023-05-04 ASSESSMENT — PAIN SCALES - GENERAL: PAINLEVEL: EXTREME PAIN (8)

## 2023-05-04 NOTE — LETTER
5/4/2023         RE: Carli Monreal  [address]  [place] [address]        Dear Colleague,    Thank you for referring your patient, Carli Monreal, to the [facility]. Please see a copy of my visit note below.    [facility]surgery [facility] Visit      CC: low back pain     Primary Care Provider: Iza Cage    Reason For Visit:   I was asked by Izzy Fleming PA-C to consult on the patient for:   M54.50, G89.29 (ICD-10-CM) - Chronic midline low back pain without sciatica   M53.3, G89.29 (ICD-10-CM) - Chronic sacroiliac joint pain       HPI: Carli Monreal is a 72 year old female with history of osteoporosis and L4-5 fusion in ~2003 with Dr. Jose Pelletier in [place], [place] who presents for evaluation of chronic low back pain and chronic SI joint pain. Patient reports symptoms have been present for years. She denies any new symptoms. She previously followed with [facility] in [place] and received injections with their team. Most recent injection was a few years ago. The injections typically provided relief for a few weeks at at time. She moved back to [place] about 3 years ago and she would like to establish with [facility]. Today, patient reports aching bilateral low back pain. She has difficulty walking more than 2 blocks due to the pain. Patient uses heat packs and Gabapentin. Denies any leg pain, numbness, weakness, falls, foot drop, saddle anesthesia, or bladder/bowel incontinence.     Current pain: 5/10     Past Medical History:   Diagnosis Date     Diabetes mellitus (H)      Right bundle branch block      Uncomplicated asthma        Past Medical History reviewed with patient during visit.    Past Surgical History:   Procedure Laterality Date     ARTHROPLASTY CARPOMETACARPAL (THUMB JOINT) Right 1/20/2023    Procedure: ARTHROPLASTY, CARPOMETACARPAL JOINT,RIGHT  THUMB;  Surgeon: Jasmeet Haque MD;  Location[facility]      CATARACT IOL, RT/LT Bilateral 2017    Valeriano Javier MD ([place], [place]).     COLONOSCOPY N/A 2021    Procedure: COLONOSCOPY;  Surgeon: Keesha Mancuso MD;  Location: [facility] OR     SMALL BOWEL RESECTION       Past Surgical History reviewed with patient during visit.    Current Outpatient Medications   Medication     albuterol (PROAIR HFA/PROVENTIL HFA/VENTOLIN HFA) 108 (90 Base) MCG/ACT inhaler     alcohol swab prep pads     amLODIPine (NORVASC) 2.5 MG tablet     atorvastatin (LIPITOR) 10 MG tablet     blood glucose (NO BRAND SPECIFIED) lancets standard     budesonide-formoterol (SYMBICORT) 160-4.5 MCG/ACT Inhaler     Cyanocobalamin 3000 MCG SUBL     docusate sodium (COLACE) 100 MG capsule     esomeprazole (NEXIUM) 40 MG DR capsule     folic acid (FOLVITE) 1 MG tablet     gabapentin (NEURONTIN) 100 MG capsule     Lancets (ONETOUCH DELICA PLUS [id number]) MISC     losartan (COZAAR) 100 MG tablet     metFORMIN (GLUCOPHAGE) 1000 MG tablet     multivitamin w/minerals (THERA-VIT-M) tablet     ONETOUCH VERIO IQ test strip     potassium chloride ER (KLOR-CON) 20 MEQ CR tablet     scopolamine (TRANSDERM) 1 MG/3DAYS 72 hr patch     tiZANidine (ZANAFLEX) 2 MG tablet     Vitamin D, Cholecalciferol, 25 MCG (1000 UT) TABS     No current facility-administered medications for this visit.       Allergies   Allergen Reactions     Seasonal Allergies        Social History     Socioeconomic History     Marital status:    Tobacco Use     Smoking status: Former     Types: Cigarettes     Quit date: 1991     Years since quittin.0     Smokeless tobacco: Never   Vaping Use     Vaping status: Never Used   Substance and Sexual Activity     Alcohol use: Yes     Comment: Wine- couple x/ week     Drug use: Never     Sexual activity: Not Currently     Partners: Male       Family History   Problem Relation Age of Onset     Rheumatoid Arthritis Mother      Eye Surgery Father      Glaucoma Father      Amblyopia Cousin       Inflammatory Bowel Disease No family hx of      Colon Cancer No family hx of      Macular Degeneration No family hx of        ROS: 10 point ROS neg other than the symptoms noted above in the HPI.    Vital Signs: /67   Pulse 70     Neurological Examination:  Awake  Alert  Oriented x 3  Speech clear    Motor exam:  Iliopsoas  (hip flexion)               Right: 5/5  Left:  5/5  Quadriceps  (knee extension)       Right:  5/5  Left:  5/5  Hamstrings  (knee flexion)            Right:  5/5  Left:  5/5  Gastroc Soleus  (PF)                          Right:  5/5  Left:  5/5  Tibialis Ant  (DF)                          Right:  5/5  Left:  5/5  EHL                          Right:  5/5  Left:  5/5         Sensation normal to BLE    Reflexes are 2+ in the patellar and Achilles. There is no clonus.     Musculoskeletal:  Gait: Able to stand from a seated position. Normal non-antalgic, non-myelopathic gait.   No tenderness of the spine or paraspinous muscles.  Hip height is symmetrical.  Tenderness to palpation of right SI joint.   Negative straight leg raise bilaterally.      Imaging:   No recent imaging has been completed of the lumbar spine    DEXA 2022: Osteoporosis     Assessment/Plan:   72 year old female with history of osteoporosis and L4-5 fusion in ~2003 with Dr. Jose Pelletier in [place], [place]. Presents for evaluation of chronic aching low back pain and chronic SI joint pain. Patient reports symptoms have been present for years. She denies any leg pain, numbness, weakness, or new symptoms. No recent imaging has been completed of the lumbar spine.     - Lumbar spine MRI and XR ordered for further evaluation  - Referral to [facility] for comprehensive pain evaluation   - Will call patient with imaging results and next steps     Advised patient to call our clinic with any questions or concerns. Discussed red flag symptoms and advised to seek medical attention if these develop.  Patient voiced understanding and agreement.      Britany Rosales, CNP  [facility]  [address] [address]  Tel [phone]  Pager [phone]          Again, thank you for allowing me to participate in the care of your patient.        Sincerely,        Britany Rosales, NP

## 2023-05-04 NOTE — PATIENT INSTRUCTIONS
Order for lumbar spine MRI and XR. You can call to schedule at [phone]. I will call with the results and next steps.     Referral to [facility]. They will call you to schedule.     Please call our clinic if symptoms persist, change, or worsen at any time.    [facility]  [address] [address]  Tel [phone]

## 2023-05-04 NOTE — NURSING NOTE
"Carli Monreal is a 72 year old female who presents for:  Chief Complaint   Patient presents with     Consult     Bi lateral back pain that comes and goes from both sides, pain stops at hips, no numbness or tingling          Initial Vitals:  /67   Pulse 70  Estimated body mass index is 31.38 kg/m  as calculated from the following:    Height as of 1/20/23: 4' 11.5\" (1.511 m).    Weight as of 1/20/23: 158 lb (71.7 kg).. There is no height or weight on file to calculate BSA. BP completed using cuff size: regular  Extreme Pain (8)    Nursing Comments:       Joyce Velasquez    "

## 2023-05-04 NOTE — PROGRESS NOTES
[facility]surgery Clinic Visit      CC: low back pain     Primary Care Provider: Iza Cage    Reason For Visit:   I was asked by Izzy Fleming PA-C to consult on the patient for:   M54.50, G89.29 (ICD-10-CM) - Chronic midline low back pain without sciatica   M53.3, G89.29 (ICD-10-CM) - Chronic sacroiliac joint pain       HPI: Carli Monreal is a 72 year old female with history of osteoporosis and L4-5 fusion in ~2003 with Dr. Jose Pelletier in [place], [place] who presents for evaluation of chronic low back pain and chronic SI joint pain. Patient reports symptoms have been present for years. She denies any new symptoms. She previously followed with [facility] in [place] and received injections with their team. Most recent injection was a few years ago. The injections typically provided relief for a few weeks at at time. She moved back to [place] about 3 years ago and she would like to establish with [facility]. Today, patient reports aching bilateral low back pain. She has difficulty walking more than 2 blocks due to the pain. Patient uses heat packs and Gabapentin. Denies any leg pain, numbness, weakness, falls, foot drop, saddle anesthesia, or bladder/bowel incontinence.     Current pain: 5/10     Past Medical History:   Diagnosis Date     Diabetes mellitus (H)      Right bundle branch block      Uncomplicated asthma        Past Medical History reviewed with patient during visit.    Past Surgical History:   Procedure Laterality Date     ARTHROPLASTY CARPOMETACARPAL (THUMB JOINT) Right 1/20/2023    Procedure: ARTHROPLASTY, CARPOMETACARPAL JOINT,RIGHT  THUMB;  Surgeon: Jasmeet Haque MD;  Location: [facility] OR     CATARACT IOL, RT/LT Bilateral 2017    Valeriano Javier MD ([place], [place]).     COLONOSCOPY N/A 09/20/2021    Procedure: COLONOSCOPY;  Surgeon: Keesha Mancuso MD;  Location: [facility] OR     SMALL BOWEL RESECTION  2012     Past Surgical History reviewed with patient during  visit.    Current Outpatient Medications   Medication     albuterol (PROAIR HFA/PROVENTIL HFA/VENTOLIN HFA) 108 (90 Base) MCG/ACT inhaler     alcohol swab prep pads     amLODIPine (NORVASC) 2.5 MG tablet     atorvastatin (LIPITOR) 10 MG tablet     blood glucose (NO BRAND SPECIFIED) lancets standard     budesonide-formoterol (SYMBICORT) 160-4.5 MCG/ACT Inhaler     Cyanocobalamin 3000 MCG SUBL     docusate sodium (COLACE) 100 MG capsule     esomeprazole (NEXIUM) 40 MG DR capsule     folic acid (FOLVITE) 1 MG tablet     gabapentin (NEURONTIN) 100 MG capsule     Lancets (ONETOUCH DELICA PLUS MAMHAG32C) MISC     losartan (COZAAR) 100 MG tablet     metFORMIN (GLUCOPHAGE) 1000 MG tablet     multivitamin w/minerals (THERA-VIT-M) tablet     ONETOUCH VERIO IQ test strip     potassium chloride ER (KLOR-CON) 20 MEQ CR tablet     scopolamine (TRANSDERM) 1 MG/3DAYS 72 hr patch     tiZANidine (ZANAFLEX) 2 MG tablet     Vitamin D, Cholecalciferol, 25 MCG (1000 UT) TABS     No current facility-administered medications for this visit.       Allergies   Allergen Reactions     Seasonal Allergies        Social History     Socioeconomic History     Marital status:    Tobacco Use     Smoking status: Former     Types: Cigarettes     Quit date: 1991     Years since quittin.0     Smokeless tobacco: Never   Vaping Use     Vaping status: Never Used   Substance and Sexual Activity     Alcohol use: Yes     Comment: Wine- couple x/ week     Drug use: Never     Sexual activity: Not Currently     Partners: Male       Family History   Problem Relation Age of Onset     Rheumatoid Arthritis Mother      Eye Surgery Father      Glaucoma Father      Amblyopia Cousin      Inflammatory Bowel Disease No family hx of      Colon Cancer No family hx of      Macular Degeneration No family hx of        ROS: 10 point ROS neg other than the symptoms noted above in the HPI.    Vital Signs: /67   Pulse 70     Neurological  Examination:  Awake  Alert  Oriented x 3  Speech clear    Motor exam:  Iliopsoas  (hip flexion)               Right: 5/5  Left:  5/5  Quadriceps  (knee extension)       Right:  5/5  Left:  5/5  Hamstrings  (knee flexion)            Right:  5/5  Left:  5/5  Gastroc Soleus  (PF)                          Right:  5/5  Left:  5/5  Tibialis Ant  (DF)                          Right:  5/5  Left:  5/5  EHL                          Right:  5/5  Left:  5/5         Sensation normal to BLE    Reflexes are 2+ in the patellar and Achilles. There is no clonus.     Musculoskeletal:  Gait: Able to stand from a seated position. Normal non-antalgic, non-myelopathic gait.   No tenderness of the spine or paraspinous muscles.  Hip height is symmetrical.  Tenderness to palpation of right SI joint.   Negative straight leg raise bilaterally.      Imaging:   No recent imaging has been completed of the lumbar spine    DEXA 2022: Osteoporosis     Assessment/Plan:   72 year old female with history of osteoporosis and L4-5 fusion in ~2003 with Dr. Jose Pelletier in [place], [place]. Presents for evaluation of chronic aching low back pain and chronic SI joint pain. Patient reports symptoms have been present for years. She denies any leg pain, numbness, weakness, or new symptoms. No recent imaging has been completed of the lumbar spine.     - Lumbar spine MRI and XR ordered for further evaluation  - Referral to [facility] for comprehensive pain evaluation   - Will call patient with imaging results and next steps     Advised patient to call our clinic with any questions or concerns. Discussed red flag symptoms and advised to seek medical attention if these develop. Patient voiced understanding and agreement.      Britany Rosales, CNP  [facility]  [address] [address]  Tel [phone]  Pager [phone]

## 2023-05-08 ENCOUNTER — ANCILLARY PROCEDURE (OUTPATIENT)
Dept: GENERAL RADIOLOGY | Facility: CLINIC | Age: 73
End: 2023-05-08
Attending: FAMILY MEDICINE
Payer: COMMERCIAL

## 2023-05-08 ENCOUNTER — OFFICE VISIT (OUTPATIENT)
Dept: FAMILY MEDICINE | Facility: CLINIC | Age: 73
End: 2023-05-08
Payer: COMMERCIAL

## 2023-05-08 VITALS
HEIGHT: 60 IN | WEIGHT: 154 LBS | HEART RATE: 84 BPM | RESPIRATION RATE: 20 BRPM | TEMPERATURE: 98.1 F | DIASTOLIC BLOOD PRESSURE: 68 MMHG | SYSTOLIC BLOOD PRESSURE: 120 MMHG | OXYGEN SATURATION: 97 % | BODY MASS INDEX: 30.23 KG/M2

## 2023-05-08 DIAGNOSIS — I10 ESSENTIAL HYPERTENSION: ICD-10-CM

## 2023-05-08 DIAGNOSIS — Z23 NEED FOR SHINGLES VACCINE: ICD-10-CM

## 2023-05-08 DIAGNOSIS — R50.9 FEVER, UNSPECIFIED FEVER CAUSE: ICD-10-CM

## 2023-05-08 DIAGNOSIS — K21.00 GASTROESOPHAGEAL REFLUX DISEASE WITH ESOPHAGITIS WITHOUT HEMORRHAGE: ICD-10-CM

## 2023-05-08 DIAGNOSIS — J18.9 COMMUNITY ACQUIRED PNEUMONIA OF RIGHT LOWER LOBE OF LUNG: ICD-10-CM

## 2023-05-08 DIAGNOSIS — Z00.00 ENCOUNTER FOR MEDICARE ANNUAL WELLNESS EXAM: Primary | ICD-10-CM

## 2023-05-08 DIAGNOSIS — E11.9 TYPE 2 DIABETES MELLITUS WITHOUT COMPLICATION, WITHOUT LONG-TERM CURRENT USE OF INSULIN (H): ICD-10-CM

## 2023-05-08 DIAGNOSIS — J45.40 MODERATE PERSISTENT ASTHMA, UNSPECIFIED WHETHER COMPLICATED: ICD-10-CM

## 2023-05-08 DIAGNOSIS — E87.6 HYPOKALEMIA: ICD-10-CM

## 2023-05-08 LAB
FLUAV AG SPEC QL IA: NEGATIVE
FLUBV AG SPEC QL IA: NEGATIVE
HBA1C MFR BLD: 6.2 % (ref 0–5.6)

## 2023-05-08 PROCEDURE — 71046 X-RAY EXAM CHEST 2 VIEWS: CPT | Mod: TC | Performed by: RADIOLOGY

## 2023-05-08 PROCEDURE — U0005 INFEC AGEN DETEC AMPLI PROBE: HCPCS | Performed by: FAMILY MEDICINE

## 2023-05-08 PROCEDURE — U0003 INFECTIOUS AGENT DETECTION BY NUCLEIC ACID (DNA OR RNA); SEVERE ACUTE RESPIRATORY SYNDROME CORONAVIRUS 2 (SARS-COV-2) (CORONAVIRUS DISEASE [COVID-19]), AMPLIFIED PROBE TECHNIQUE, MAKING USE OF HIGH THROUGHPUT TECHNOLOGIES AS DESCRIBED BY CMS-2020-01-R: HCPCS | Performed by: FAMILY MEDICINE

## 2023-05-08 PROCEDURE — 83036 HEMOGLOBIN GLYCOSYLATED A1C: CPT | Performed by: FAMILY MEDICINE

## 2023-05-08 PROCEDURE — 80048 BASIC METABOLIC PNL TOTAL CA: CPT | Performed by: FAMILY MEDICINE

## 2023-05-08 PROCEDURE — G0438 PPPS, INITIAL VISIT: HCPCS | Performed by: FAMILY MEDICINE

## 2023-05-08 PROCEDURE — 87804 INFLUENZA ASSAY W/OPTIC: CPT | Performed by: FAMILY MEDICINE

## 2023-05-08 PROCEDURE — 36415 COLL VENOUS BLD VENIPUNCTURE: CPT | Performed by: FAMILY MEDICINE

## 2023-05-08 PROCEDURE — 99214 OFFICE O/P EST MOD 30 MIN: CPT | Mod: 25 | Performed by: FAMILY MEDICINE

## 2023-05-08 RX ORDER — AZITHROMYCIN 250 MG/1
TABLET, FILM COATED ORAL
Qty: 6 TABLET | Refills: 0 | Status: SHIPPED | OUTPATIENT
Start: 2023-05-08 | End: 2023-05-13

## 2023-05-08 RX ORDER — FAMOTIDINE 40 MG/1
40 TABLET, FILM COATED ORAL 2 TIMES DAILY
Qty: 60 TABLET | Refills: 4 | Status: SHIPPED | OUTPATIENT
Start: 2023-05-08 | End: 2023-08-21

## 2023-05-08 RX ORDER — BUDESONIDE AND FORMOTEROL FUMARATE DIHYDRATE 160; 4.5 UG/1; UG/1
2 AEROSOL RESPIRATORY (INHALATION) 2 TIMES DAILY
Qty: 10.2 G | Refills: 4 | Status: SHIPPED | OUTPATIENT
Start: 2023-05-08 | End: 2024-05-31

## 2023-05-08 RX ORDER — POTASSIUM CHLORIDE 1500 MG/1
20 TABLET, EXTENDED RELEASE ORAL DAILY
Qty: 90 TABLET | Refills: 1 | Status: SHIPPED | OUTPATIENT
Start: 2023-05-08 | End: 2023-10-23

## 2023-05-08 RX ORDER — AMLODIPINE BESYLATE 2.5 MG/1
2.5 TABLET ORAL DAILY
Qty: 90 TABLET | Refills: 1 | Status: SHIPPED | OUTPATIENT
Start: 2023-05-08 | End: 2023-10-25

## 2023-05-08 RX ORDER — ATORVASTATIN CALCIUM 10 MG/1
10 TABLET, FILM COATED ORAL DAILY
Qty: 90 TABLET | Refills: 3 | Status: SHIPPED | OUTPATIENT
Start: 2023-05-08 | End: 2024-05-31

## 2023-05-08 RX ORDER — LOSARTAN POTASSIUM 100 MG/1
100 TABLET ORAL DAILY
Qty: 90 TABLET | Refills: 1 | Status: SHIPPED | OUTPATIENT
Start: 2023-05-08 | End: 2023-10-25

## 2023-05-08 ASSESSMENT — ASTHMA QUESTIONNAIRES
QUESTION_5 LAST FOUR WEEKS HOW WOULD YOU RATE YOUR ASTHMA CONTROL: WELL CONTROLLED
ACT_TOTALSCORE: 19
ACT_TOTALSCORE: 19
QUESTION_2 LAST FOUR WEEKS HOW OFTEN HAVE YOU HAD SHORTNESS OF BREATH: MORE THAN ONCE A DAY
QUESTION_1 LAST FOUR WEEKS HOW MUCH OF THE TIME DID YOUR ASTHMA KEEP YOU FROM GETTING AS MUCH DONE AT WORK, SCHOOL OR AT HOME: A LITTLE OF THE TIME
QUESTION_4 LAST FOUR WEEKS HOW OFTEN HAVE YOU USED YOUR RESCUE INHALER OR NEBULIZER MEDICATION (SUCH AS ALBUTEROL): NOT AT ALL
QUESTION_3 LAST FOUR WEEKS HOW OFTEN DID YOUR ASTHMA SYMPTOMS (WHEEZING, COUGHING, SHORTNESS OF BREATH, CHEST TIGHTNESS OR PAIN) WAKE YOU UP AT NIGHT OR EARLIER THAN USUAL IN THE MORNING: NOT AT ALL

## 2023-05-08 ASSESSMENT — PAIN SCALES - GENERAL: PAINLEVEL: MODERATE PAIN (4)

## 2023-05-08 ASSESSMENT — ACTIVITIES OF DAILY LIVING (ADL): CURRENT_FUNCTION: NO ASSISTANCE NEEDED

## 2023-05-08 NOTE — PROGRESS NOTES
"  SUBJECTIVE:   Carli is a 72 year old who presents for Preventive Visit.      5/8/2023    11:38 AM   Additional Questions   Roomed by Shantal         5/8/2023    11:38 AM   Patient Reported Additional Medications   Patient reports taking the following new medications None     Patient has been advised of split billing requirements and indicates understanding: Yes     Are you in the first 12 months of your Medicare coverage?  No    Healthy Habits:    In general, how would you rate your overall health?  Good    Frequency of exercise:  None    Duration of exercise:  Less than 15 minutes    Do you usually eat at least 4 servings of fruit and vegetables a day, include whole grains    & fiber and avoid regularly eating high fat or \"junk\" foods?  No    Taking medications regularly:  Yes    Barriers to taking medications:  None    Medication side effects:  None    Ability to successfully perform activities of daily living:  No assistance needed    Home Safety:  No safety concerns identified    Hearing Impairment:  No hearing concerns    In the past 6 months, have you been bothered by leaking of urine?  No    In general, how would you rate your overall mental or emotional health?  Excellent      PHQ-2 Total Score:    Additional concerns today:  Yes     PHQ-2 Score:         5/4/2023    12:55 PM 1/3/2023     9:16 AM   PHQ-2 ( 1999 [employer])   Q1: Little interest or pleasure in doing things 0 0   Q2: Feeling down, depressed or hopeless 0 0   PHQ-2 Score 0 0       --Sick x 1.5 weeks- fever, cough, diarrhea- home covid tests has been negative.  Her highest temp; was 101.8 last night.  She is really fatigued.  It started with lung symptoms of cough, sob and wheeze a week ago.  The diarrhea started a few days after the cold symptoms.  She is not eating much.  Sat she had two slices of toast 2 days ago.  She denies vomiting.  She has had a lot of mucous.      --Formulary list will no longer cover Nexium-- preferred is Famotidine, will " need new Rx      Have you ever done Advance Care Planning? (For example, a Health Directive, POLST, or a discussion with a medical provider or your loved ones about your wishes): Yes, patient states has an Advance Care Planning document and will bring a copy to the clinic.      Fall risk  Fallen 2 or more times in the past year?: No  Any fall with injury in the past year?: No    Cognitive Screening   1) Repeat 3 items (Leader, Season, Table)    2) Clock draw: NORMAL  3) 3 item recall: Recalls 3 objects  Results: 3 items recalled: COGNITIVE IMPAIRMENT LESS LIKELY    Mini-CogTM Copyright S Madelyn. Licensed by the author for use in [facility]; reprinted with permission (nely@[facility]). All rights reserved.      Do you have sleep apnea, excessive snoring or daytime drowsiness?: no    Reviewed and updated as needed this visit by clinical staff   Tobacco  Allergies  Meds              Reviewed and updated as needed this visit by Provider                 Social History     Tobacco Use     Smoking status: Former     Types: Cigarettes     Quit date: 1991     Years since quittin.1     Passive exposure: Never     Smokeless tobacco: Never   Vaping Use     Vaping status: Never Used   Substance Use Topics     Alcohol use: Yes     Comment: Wine- couple x/ week              View : No data to display.                   View : No data to display.              Do you have a current opioid prescription? No  Do you use any other controlled substances or medications that are not prescribed by a provider? None        Diabetes Follow-up    How often are you checking your blood sugar? One time daily  What time of day are you checking your blood sugars (select all that apply)?  Before meals  Have you had any blood sugars above 200?  No- this   Have you had any blood sugars below 70?  No    What symptoms do you notice when your blood sugar is low?  None    What concerns do you have today about your diabetes?  None     Do you have any of these symptoms? (Select all that apply)  Burning in feet     Metformin 1000 mg twice daily        Hyperlipidemia Follow-Up      Are you regularly taking any medication or supplement to lower your cholesterol?   Yes- atorvastatin    Are you having muscle aches or other side effects that you think could be caused by your cholesterol lowering medication?  No   LDL Cholesterol Calculated   Date Value Ref Range Status   11/07/2022 26 <=100 mg/dL Final   02/09/2021 63.7 0.0 - 130.0 mg/dL Final           Hypertension Follow-up      Do you check your blood pressure regularly outside of the clinic? No     Are you following a low salt diet? Yes    Are your blood pressures ever more than 140 on the top number (systolic) OR more   than 90 on the bottom number (diastolic), for example 140/90? No   Losartan 100 mg daily Norvasc 2.5 mg daily    BP Readings from Last 2 Encounters:   05/08/23 120/68   05/04/23 116/67     Hemoglobin A1C (%)   Date Value   05/08/2023 6.2 (H)   01/03/2023 6.1 (H)   04/13/2021 7.0 (H)   02/09/2021 8.4 (A)     LDL Cholesterol Calculated (mg/dL)   Date Value   11/07/2022 26   11/13/2021 19   02/09/2021 63.7       Asthma Follow-Up    Was ACT completed today?  Yes        5/8/2023    12:00 PM   ACT Total Scores   ACT TOTAL SCORE (Goal Greater than or Equal to 20) 19   In the past 12 months, how many times did you visit the emergency room for your asthma without being admitted to the hospital? 0   In the past 12 months, how many times were you hospitalized overnight because of your asthma? 0       How many days per week do you miss taking your asthma controller medication?  I do not have an asthma controller medication    Please describe any recent triggers for your asthma: upper respiratory infections, humidity and cold air    Have you had any Emergency Room Visits, Urgent Care Visits, or Hospital Admissions since your last office visit?  No        Current providers sharing in  "care for this patient include:   Patient Care Team:  Iza Cage DO as PCP - General (Family Medicine)  Kj Rasheed MD as MD (Pulmonary Disease)  Iza Cage DO as Assigned PCP  Keesha Mancuso MD as MD (Gastroenterology)  Gordon Roth MD as MD (Ophthalmology)  Gordon Roth MD as Assigned Surgical Provider  Mc Martinez MD as Assigned Rheumatology Provider  Jasmeet Haque MD as Assigned Musculoskeletal Provider    The following health maintenance items are reviewed in Epic and correct as of today:  Health Maintenance   Topic Date Due     ASTHMA ACTION PLAN  Never done     ZOSTER IMMUNIZATION (1 of 2) Never done     HEPATITIS B IMMUNIZATION (1 of 3 - Risk 3-dose series) Never done     INFLUENZA VACCINE (1) Never done     MAMMO SCREENING  06/09/2023     A1C  08/08/2023     LIPID  11/07/2023     MICROALBUMIN  11/07/2023     DIABETIC FOOT EXAM  11/07/2023     ASTHMA CONTROL TEST  11/08/2023     EYE EXAM  11/08/2023     MEDICARE ANNUAL WELLNESS VISIT  05/08/2024     BMP  05/08/2024     ANNUAL REVIEW OF HM ORDERS  05/08/2024     FALL RISK ASSESSMENT  05/08/2024     ADVANCE CARE PLANNING  05/19/2028     DTAP/TDAP/TD IMMUNIZATION (2 - Td or Tdap) 08/20/2031     COLORECTAL CANCER SCREENING  09/20/2031     DEXA  12/26/2037     HEPATITIS C SCREENING  Completed     PHQ-2 (once per calendar year)  Completed     Pneumococcal Vaccine: 65+ Years  Completed     COVID-19 Vaccine  Completed     IPV IMMUNIZATION  Aged Out     MENINGITIS IMMUNIZATION  Aged Out     URINE DRUG SCREEN  Discontinued         Review of Systems  Constitutional, HEENT, cardiovascular, pulmonary, gi and gu systems are negative, except as otherwise noted.    OBJECTIVE:   /68 (BP Location: Right arm, Patient Position: Sitting, Cuff Size: Adult Regular)   Pulse 84   Temp 98.1  F (36.7  C) (Oral)   Resp 20   Ht 1.511 m (4' 11.5\")   Wt 69.9 kg (154 lb)   SpO2 97%   BMI 30.58 kg/m   Estimated body mass index is 30.58 kg/m  as " "calculated from the following:    Height as of this encounter: 1.511 m (4' 11.5\").    Weight as of this encounter: 69.9 kg (154 lb).  Physical Exam  GENERAL: alert, no distress and fatigued  EYES: Eyes grossly normal to inspection, PERRL and conjunctivae and sclerae normal  HENT: ear canals and TM's normal, nose and mouth without ulcers or lesions  NECK: no adenopathy, no asymmetry, masses, or scars and thyroid normal to palpation  RESP: rhonchi R lower posterior  CV: regular rate and rhythm, normal S1 S2, no S3 or S4, no murmur, click or rub, no peripheral edema and peripheral pulses strong  ABDOMEN: tenderness epigastric and bowel sounds normal  MS: no gross musculoskeletal defects noted, no edema  SKIN: no suspicious lesions or rashes  NEURO: Normal strength and tone, mentation intact and speech normal  PSYCH: mentation appears normal, affect normal/bright and fatigued    Diagnostic Test Results:  Labs reviewed in Epic  No results found for this or any previous visit (from the past 24 hour(s)).    ASSESSMENT / PLAN:   (Z00.00) Encounter for Medicare annual wellness exam  (primary encounter diagnosis)  Comment:   Plan:     (J18.9) Community acquired pneumonia of right lower lobe of lung  Comment: We will treat the pneumonia with antibiotics below.  We will have her continue her asthma medications also.  If she sees worsening asthma symptoms would add a steroid  Plan: OFFICE/OUTPT VISIT,EST,LEVL IV,         amoxicillin-clavulanate (AUGMENTIN) 875-125 MG         tablet, azithromycin (ZITHROMAX) 250 MG tablet            (R50.9) Fever, unspecified fever cause  Comment: Likely pneumonia seen on chest x-ray  Plan: Influenza A/B antigen, Symptomatic COVID-19         Virus (Coronavirus) by PCR Nose, XR Chest 2         Views, OFFICE/OUTPT VISIT,EST,LEVL IV            (E11.9) Type 2 diabetes mellitus without complication, without long-term current use of insulin (H)  Comment: The current medical regimen is effective;  " "continue present plan and medications.    Plan: HEMOGLOBIN A1C, metFORMIN (GLUCOPHAGE) 1000 MG         tablet, atorvastatin (LIPITOR) 10 MG tablet,         OFFICE/OUTPT VISIT,EST,LEVL IV            (I10) Essential hypertension  Comment:   Plan: Basic metabolic panel  (Ca, Cl, CO2, Creat,         Gluc, K, Na, BUN), amLODIPine (NORVASC) 2.5 MG         tablet, losartan (COZAAR) 100 MG tablet,         OFFICE/OUTPT VISIT,EST,LEVL IV        The current medical regimen is effective;  continue present plan and medications.      (J45.40) Moderate persistent asthma, unspecified whether complicated  Comment: The current medical regimen is effective;  continue present plan and medications.  Patient will let me know if her asthma flares from her infection and I will call her in oral steroids  Plan: budesonide-formoterol (SYMBICORT) 160-4.5         MCG/ACT Inhaler, OFFICE/OUTPT VISIT,EST,LEVL IV            (E87.6) Hypokalemia  Comment:   Plan: potassium chloride ER (KLOR-CON) 20 MEQ CR         tablet, OFFICE/OUTPT VISIT,EST,LEVL IV            (K21.00) Gastroesophageal reflux disease with esophagitis without hemorrhage  Comment:   Plan: famotidine (PEPCID) 40 MG tablet, OFFICE/OUTPT         VISIT,EST,LEVL IV        Change from Nexium to famotidine per patient's insurance.  We discussed potential worsening of heartburn during the transition    (Z23) Need for shingles vaccine  Comment:   Plan: Patient will get this when she is feeling better        COUNSELING:  Reviewed preventive health counseling, as reflected in patient instructions       Regular exercise       Healthy diet/nutrition       Bladder control       Fall risk prevention      BMI:   Estimated body mass index is 30.58 kg/m  as calculated from the following:    Height as of this encounter: 1.511 m (4' 11.5\").    Weight as of this encounter: 69.9 kg (154 lb).   Weight management plan: Discussed healthy diet and exercise guidelines      She reports that she quit smoking " about 32 years ago. Her smoking use included cigarettes. She has never been exposed to tobacco smoke. She has never used smokeless tobacco.      Appropriate preventive services were discussed with this patient, including applicable screening as appropriate for cardiovascular disease, diabetes, osteopenia/osteoporosis, and glaucoma.  As appropriate for age/gender, discussed screening for colorectal cancer, prostate cancer, breast cancer, and cervical cancer. Checklist reviewing preventive services available has been given to the patient.    Reviewed patients plan of care and provided an AVS. The Basic Care Plan (routine screening as documented in Health Maintenance) for Carli meets the Care Plan requirement. This Care Plan has been established and reviewed with the Patient.      Iza Cage DO  [facility]    Identified Health Risks:    I have reviewed Opioid Use Disorder and Substance Use Disorder risk factors and made any needed referrals.

## 2023-05-08 NOTE — PATIENT INSTRUCTIONS
Patient Education   Personalized Prevention Plan  You are due for the preventive services outlined below.  Your care team is available to assist you in scheduling these services.  If you have already completed any of these items, please share that information with your care team to update in your medical record.  Health Maintenance Due   Topic Date Due     Asthma Action Plan - yearly  Never done     Zoster (Shingles) Vaccine (1 of 2) Never done     Hepatitis B Vaccine (1 of 3 - Risk 3-dose series) Never done     Flu Vaccine (1) Never done     A1C Lab  04/03/2023     ANNUAL REVIEW OF HM ORDERS  05/06/2023     FALL RISK ASSESSMENT  05/06/2023     Annual Wellness Visit  05/06/2023     Asthma Control Test  05/07/2023     Preventive Health Recommendations    See your health care provider every year to    Review health changes.     Discuss preventive care.      Review your medicines if your doctor has prescribed any.    You no longer need a yearly Pap test unless you've had an abnormal Pap test in the past 10 years. If you have vaginal symptoms, such as bleeding or discharge, be sure to talk with your provider about a Pap test.    Every 1 to 2 years, have a mammogram.  If you are over 69, talk with your health care provider about whether or not you want to continue having screening mammograms.    Every 10 years, have a colonoscopy. Or, have a yearly FIT test (stool test). These exams will check for colon cancer.     Have a cholesterol test every 5 years, or more often if your doctor advises it.     Have a diabetes test (fasting glucose) every three years. If you are at risk for diabetes, you should have this test more often.     At age 65, have a bone density scan (DEXA) to check for osteoporosis (brittle bone disease).    Shots:    Get a flu shot each year.    Get a tetanus shot every 10 years.    Talk to your doctor about your pneumonia vaccines. There are now two you should receive - Pneumovax (PPSV 23) and Prevnar  (PCV 13).    Talk to your pharmacist about the shingles vaccine.    Talk to your doctor about the hepatitis B vaccine.    Nutrition:     Eat at least 5 servings of fruits and vegetables each day.    Eat whole-grain bread, whole-wheat pasta and brown rice instead of white grains and rice.    Get adequate Calcium and Vitamin D.     Lifestyle    Exercise at least 150 minutes a week (30 minutes a day, 5 days a week). This will help you control your weight and prevent disease.    Limit alcohol to one drink per day.    No smoking.     Wear sunscreen to prevent skin cancer.     See your dentist twice a year for an exam and cleaning.    See your eye doctor every 1 to 2 years to screen for conditions such as glaucoma, macular degeneration and cataracts.    Personalized Prevention Plan  You are due for the preventive services outlined below.  Your care team is available to assist you in scheduling these services.  If you have already completed any of these items, please share that information with your care team to update in your medical record.  Health Maintenance   Topic Date Due     ASTHMA ACTION PLAN  Never done     ZOSTER IMMUNIZATION (1 of 2) Never done     HEPATITIS B IMMUNIZATION (1 of 3 - Risk 3-dose series) Never done     INFLUENZA VACCINE (1) Never done     A1C  04/03/2023     ANNUAL REVIEW OF HM ORDERS  05/06/2023     FALL RISK ASSESSMENT  05/06/2023     MEDICARE ANNUAL WELLNESS VISIT  05/06/2023     ASTHMA CONTROL TEST  05/07/2023     MAMMO SCREENING  06/09/2023     LIPID  11/07/2023     MICROALBUMIN  11/07/2023     DIABETIC FOOT EXAM  11/07/2023     EYE EXAM  11/08/2023     BMP  01/03/2024     ADVANCE CARE PLANNING  05/06/2027     DTAP/TDAP/TD IMMUNIZATION (2 - Td or Tdap) 08/20/2031     COLORECTAL CANCER SCREENING  09/20/2031     DEXA  12/26/2037     HEPATITIS C SCREENING  Completed     PHQ-2 (once per calendar year)  Completed     Pneumococcal Vaccine: 65+ Years  Completed     COVID-19 Vaccine  Completed      IPV IMMUNIZATION  Aged Out     MENINGITIS IMMUNIZATION  Aged Out     URINE DRUG SCREEN  Discontinued

## 2023-05-09 LAB
ANION GAP SERPL CALCULATED.3IONS-SCNC: 15 MMOL/L (ref 7–15)
BUN SERPL-MCNC: 10 MG/DL (ref 8–23)
CALCIUM SERPL-MCNC: 8.9 MG/DL (ref 8.8–10.2)
CHLORIDE SERPL-SCNC: 101 MMOL/L (ref 98–107)
CREAT SERPL-MCNC: 0.85 MG/DL (ref 0.51–0.95)
DEPRECATED HCO3 PLAS-SCNC: 23 MMOL/L (ref 22–29)
GFR SERPL CREATININE-BSD FRML MDRD: 73 ML/MIN/1.73M2
GLUCOSE SERPL-MCNC: 131 MG/DL (ref 70–99)
POTASSIUM SERPL-SCNC: 3.6 MMOL/L (ref 3.4–5.3)
SARS-COV-2 RNA RESP QL NAA+PROBE: NEGATIVE
SODIUM SERPL-SCNC: 139 MMOL/L (ref 136–145)

## 2023-05-16 ENCOUNTER — VIRTUAL VISIT (OUTPATIENT)
Dept: FAMILY MEDICINE | Facility: CLINIC | Age: 73
End: 2023-05-16
Payer: COMMERCIAL

## 2023-05-16 DIAGNOSIS — R09.81 NASAL CONGESTION: ICD-10-CM

## 2023-05-16 DIAGNOSIS — B37.31 YEAST INFECTION OF THE VAGINA: ICD-10-CM

## 2023-05-16 DIAGNOSIS — J45.901 MODERATE ASTHMA WITH EXACERBATION, UNSPECIFIED WHETHER PERSISTENT: ICD-10-CM

## 2023-05-16 DIAGNOSIS — Z12.31 VISIT FOR SCREENING MAMMOGRAM: Primary | ICD-10-CM

## 2023-05-16 PROCEDURE — 99213 OFFICE O/P EST LOW 20 MIN: CPT | Mod: VID | Performed by: FAMILY MEDICINE

## 2023-05-16 RX ORDER — FLUCONAZOLE 150 MG/1
150 TABLET ORAL ONCE
Qty: 1 TABLET | Refills: 0 | Status: SHIPPED | OUTPATIENT
Start: 2023-05-16 | End: 2023-05-16

## 2023-05-16 RX ORDER — PREDNISONE 20 MG/1
40 TABLET ORAL DAILY
Qty: 10 TABLET | Refills: 0 | Status: SHIPPED | OUTPATIENT
Start: 2023-05-16 | End: 2023-08-21

## 2023-05-16 RX ORDER — FLUTICASONE PROPIONATE 50 MCG
1 SPRAY, SUSPENSION (ML) NASAL DAILY
Qty: 9.9 ML | Refills: 1 | Status: SHIPPED | OUTPATIENT
Start: 2023-05-16 | End: 2023-08-21

## 2023-05-16 NOTE — PROGRESS NOTES
aCrli is a 72 year old who is being evaluated via a billable video visit.      How would you like to obtain your AVS? [employer]  If the video visit is dropped, the invitation should be resent by: Send to e-mail at: [email or web address]  Will anyone else be joining your video visit? No          Assessment & Plan     Moderate asthma with exacerbation, unspecified whether persistent  The patient should do a prednisone burst and continue her regular medications for asthma    - predniSONE (DELTASONE) 20 MG tablet; Take 2 tablets (40 mg) by mouth daily    Yeast infection of the vagina  Secondary to Augmentin  - fluconazole (DIFLUCAN) 150 MG tablet; Take 1 tablet (150 mg) by mouth once for 1 dose    Nasal congestion  Restart Flonase  - fluticasone (FLONASE) 50 MCG/ACT nasal spray; Spray 1 spray into both nostrils daily      20 minutes spent by me on the date of the encounter doing chart review, history and exam, documentation and further activities per the note         Iza Cage [facility]    Subjective   Carli is a 72 year old, presenting for the following health issues:  Sinus Problem        5/16/2023    12:45pm   Additional Questions   Roomed by Shantal GERMAIN     Patient feeling a little bit better today- just took last dose of Augmentin yesterday (for pneumonia). Cough is better but still there and still having some shortness of breath. She was only taking Symbicort inhaler as needed until RN told her through [employer] that it was meant to be 2 puffs twice daily. She is going to be traveling in 2 weeks and won't come back until July. Wondering if she needs to continue Symbicort daily- she would need to get enough refills to take with her      Acute Illness  Acute illness concerns: Possible sinus infection?  Onset/Duration: Couple days-- was treated with zpak & Augmentin for pneumonia  Symptoms:  Fever: No  Chills/Sweats: YES- chills  Headache (location?): No  Sinus Pressure:  YES  Conjunctivitis:  No  Ear Pain: no  Rhinorrhea: YES  Congestion: YES  Sore Throat: No  Cough: YES-non-productive  Wheeze: YES- sometimes  Decreased Appetite: No  Nausea: No  Vomiting: No  Diarrhea: YES, but better  Dysuria/Freq.: No  Dysuria or Hematuria: No  Fatigue/Achiness: YES  Sick/Strep Exposure: No  Therapies tried and outcome: Finished zpak & augmentin as of yesterday- symbicort, some relief      Review of Systems   Constitutional, HEENT, cardiovascular, pulmonary, gi and gu systems are negative, except as otherwise noted.      Objective           Vitals:  No vitals were obtained today due to virtual visit.    Physical Exam   GENERAL: Healthy, alert and no distress  EYES: Eyes grossly normal to inspection.  No discharge or erythema, or obvious scleral/conjunctival abnormalities.  RESP: No audible wheeze, cough, or visible cyanosis.  No visible retractions or increased work of breathing.    SKIN: Visible skin clear. No significant rash, abnormal pigmentation or lesions.  NEURO: Cranial nerves grossly intact.  Mentation and speech appropriate for age.  PSYCH: Mentation appears normal, affect normal/bright, judgement and insight intact, normal speech and appearance well-groomed.          Video-Visit Details    Type of service:  Video Visit   Video Start Time: 12:59 PM  Video End Time:1:25 PM    Originating Location (pt. Location): Home    Distant Location (provider location):  Off-site  Platform used for Video Visit: [employer]

## 2023-05-23 ENCOUNTER — PATIENT OUTREACH (OUTPATIENT)
Dept: CARE COORDINATION | Facility: CLINIC | Age: 73
End: 2023-05-23
Payer: COMMERCIAL

## 2023-07-06 ENCOUNTER — ANCILLARY PROCEDURE (OUTPATIENT)
Dept: GENERAL RADIOLOGY | Facility: CLINIC | Age: 73
End: 2023-07-06
Attending: NURSE PRACTITIONER
Payer: COMMERCIAL

## 2023-07-06 ENCOUNTER — ANCILLARY PROCEDURE (OUTPATIENT)
Dept: MRI IMAGING | Facility: CLINIC | Age: 73
End: 2023-07-06
Attending: NURSE PRACTITIONER
Payer: COMMERCIAL

## 2023-07-06 DIAGNOSIS — M54.50 CHRONIC MIDLINE LOW BACK PAIN WITHOUT SCIATICA: ICD-10-CM

## 2023-07-06 DIAGNOSIS — M53.3 CHRONIC SACROILIAC JOINT PAIN: ICD-10-CM

## 2023-07-06 DIAGNOSIS — G89.29 CHRONIC MIDLINE LOW BACK PAIN WITHOUT SCIATICA: ICD-10-CM

## 2023-07-06 DIAGNOSIS — G89.29 CHRONIC SACROILIAC JOINT PAIN: ICD-10-CM

## 2023-07-06 DIAGNOSIS — Z98.1 HISTORY OF LUMBAR FUSION: ICD-10-CM

## 2023-07-06 PROCEDURE — 72148 MRI LUMBAR SPINE W/O DYE: CPT | Performed by: RADIOLOGY

## 2023-07-06 PROCEDURE — 72100 X-RAY EXAM L-S SPINE 2/3 VWS: CPT | Performed by: RADIOLOGY

## 2023-07-10 ENCOUNTER — VIRTUAL VISIT (OUTPATIENT)
Dept: NEUROSURGERY | Facility: CLINIC | Age: 73
End: 2023-07-10
Payer: COMMERCIAL

## 2023-07-10 ENCOUNTER — OFFICE VISIT (OUTPATIENT)
Dept: ORTHOPEDICS | Facility: CLINIC | Age: 73
End: 2023-07-10
Payer: COMMERCIAL

## 2023-07-10 DIAGNOSIS — Z98.1 HISTORY OF LUMBAR FUSION: Primary | ICD-10-CM

## 2023-07-10 DIAGNOSIS — M54.50 CHRONIC BILATERAL LOW BACK PAIN WITHOUT SCIATICA: ICD-10-CM

## 2023-07-10 DIAGNOSIS — M53.3 CHRONIC SACROILIAC JOINT PAIN: ICD-10-CM

## 2023-07-10 DIAGNOSIS — M66.249: Primary | ICD-10-CM

## 2023-07-10 DIAGNOSIS — G89.29 CHRONIC SACROILIAC JOINT PAIN: ICD-10-CM

## 2023-07-10 DIAGNOSIS — M18.11 ARTHRITIS OF CARPOMETACARPAL (CMC) JOINT OF RIGHT THUMB: ICD-10-CM

## 2023-07-10 DIAGNOSIS — G89.29 CHRONIC BILATERAL LOW BACK PAIN WITHOUT SCIATICA: ICD-10-CM

## 2023-07-10 PROCEDURE — 99213 OFFICE O/P EST LOW 20 MIN: CPT | Performed by: STUDENT IN AN ORGANIZED HEALTH CARE EDUCATION/TRAINING PROGRAM

## 2023-07-10 PROCEDURE — 99442 PR PHYSICIAN TELEPHONE EVALUATION 11-20 MIN: CPT | Mod: 95 | Performed by: NURSE PRACTITIONER

## 2023-07-10 NOTE — LETTER
"    7/10/2023         RE: Carli Monreal  [address]  [place] [address]        Dear Colleague,    Thank you for referring your patient, Carli Monreal, to the [facility] [place]. Please see a copy of my visit note below.    Date of Service: Jul 10, 2023    Chief Complaint: Post operative follow up.  Bilateral ring finger clicking and catching    Date of Surgery: 1/20/23    Procedure Performed: Right thumb CMC arthroplasty    Interval events: Carli Monreal is a 72 year old female who presents today for a postoperative follow up. She is now just shy of 6 months s/p R thumb CMC arthroplasty. Doing quite well, no pain at the thumb, able to lift a suitcase, has traveled extensively throughout [place] recently. States the base of the thumb around the incision feels \"strange\" and tingly. Has to have help with things requiring  strength like opening jars.    She was diagnosed with bilateral ring finger sagittal band injuries. She wore relative motion splints for 8 weeks, almost 24 hours a day. States it did not help, also was cumbersome. It did not resolve the snapping. She is not too bothered by it and states she can deal with it. She states her rheumatologist here tested her and she does not have rheumatoid arthritis.    Main issue today is low back pain. Had an MRI and a telephone follow-up today to discuss results.    The past medical history was reviewed updated in the EMR. This includes medications, surgeries, social history, and review of systems.    Physical examination:  Well-developed, well-nourished and in no acute distress.  Alert and oriented to surroundings.  On examination of the  right upper extremity, incision is well-healed. There is no erythema, drainage, or dehiscence. Swelling is Mild. Sensation is intact in median, radial and ulnar nerve distributions. Mildly positive Tinel's over the Burch incision. Patient can actively flex and extend all digits " and thumb. Excellent opposition to small finger palmar flexion crease. Palpable and visible ulnar subluxation of the extensor tendon of the ring finger.  Not painful.   Expected stiffness of the thumb.   Fingers are warm and well-perfused.     On examination of the left upper extremity, palpable and visible ulnar subluxation of the extensor tendon of the ring finger, this requires manually straightening and associated pain.   No palpable triggering of the flexor tendon.    Assessment: 72 year old female with the followin.  S/p right thumb CMC arthroplasty,progressing appropriately.  2.  Bilateral ring finger sagittal band injury.  Possible history of rheumatoid arthritis.  3. Chronic low back pain/SI pain    Plan:    1.  WBAT no restrictions for the right thumb. We discussed that weakness in  and pinch strength is an expected result after the surgery she had. She voice understanding.   2.  We discussed another 8 week course of relative motion splinting for the sagittal band injuries. She will try to find her splints. We did briefly discuss surgery but she is not interested at this time and states she can live with it    All questions answered.  Voiced understanding and agreement.  Follow-up with me as needed.    Jasmeet Haque MD    Hand & Upper Extremity Surgery  Department of Orthopedic Surgery  [facility]     normal... Well appearing, awake, alert, oriented to person, place, time/situation and in no apparent distress.

## 2023-07-10 NOTE — NURSING NOTE
"Carli Monreal is a 73 year old female who presents for:  Chief Complaint   Patient presents with     RECHECK     Soreness across lower back and walking better, review MRI        Initial Vitals:  There were no vitals taken for this visit. Estimated body mass index is 30.58 kg/m  as calculated from the following:    Height as of 5/8/23: 4' 11.5\" (1.511 m).    Weight as of 5/8/23: 154 lb (69.9 kg).. There is no height or weight on file to calculate BSA. BP completed using cuff size: NA (Not Taken)  Data Unavailable    Nursing Comments:       Kay Faustin    "

## 2023-07-10 NOTE — PROGRESS NOTES
"The patient has been notified of following:     \"This telephone visit will be conducted via a call between you and your physician/provider. We have found that certain health care needs can be provided without the need for a physical exam.  This service lets us provide the care you need with a short phone conversation.  If a prescription is necessary we can send it directly to your pharmacy.  If lab work is needed we can place an order for that and you can then stop by our lab to have the test done at a later time.    If during the course of the call the physician/provider feels a telephone visit is not appropriate, you will not be charged for this service.\"     Physician/provider has received verbal consent for a Telephone Visit from the patient? Yes     [facility]  Virtual Visit     HPI: Carli Monreal is a 73 year old female with history of osteoporosis and L4-5 fusion in ~2003 with Dr. Jose Pelletier in [place], [place] who presents for evaluation of chronic low back pain and chronic SI joint pain. Patient reports symptoms have been present for years. She denies any new symptoms. She previously followed with [facility] in [place] and received injections with their team. Most recent injection was a few years ago. The injections typically provided relief for a few weeks at at time. She moved back to [place] about 3 years ago and she would like to establish with [facility]. Today, patient reports aching bilateral low back pain. She has difficulty walking more than 2 blocks due to the pain. Patient uses heat packs and Gabapentin. Denies any leg pain, numbness, weakness, falls, foot drop, saddle anesthesia, or bladder/bowel incontinence.     7/10/23: Patient presents for follow-up to review imaging results. Patient reports continued bilateral low back pain that radiates to right > left hip. Denies any weakness, numbness, or other new symptoms. Imaging shows L4-5 " fusion post op changes, and L2-3 moderate spinal canal stenosis and right foraminal stenosis. Patient is not interested in further surgery at this time. She would prefer to try conservative measures including injections.       ROS: 10 point ROS neg other than the symptoms noted above in the HPI.    Imaging:   MR LUMBAR SPINE W/O CONTRAST 7/6/2023 12:52 PM  Impression:   1. Post surgical changes of instrumented anterior and posterior spinal  fusion and spinal canal decompression at L4-5.  2. Multilevel lumbar spondylosis, most pronounced at L2-3 where there  is moderate spinal canal and moderate right neural foraminal stenosis.    XR LUMBAR SPINE 2/3 VIEWS  7/6/2023 11:27 AM    IMPRESSION:   1. No acute osseous abnormality.  2. Postsurgical changes of instrumented posterior fusion at L4-L5  without evidence of hardware complication.  3. Multilevel degenerative changes of the lumbar spine, most  pronounced at L2-L3.    Assessment/Plan:  73 year old female with history of osteoporosis and L4-5 fusion in ~2003 with Dr. Jose Pelletier in [place], [place]. Patient presents for follow-up to review imaging results. Patient reports continued bilateral low back pain that radiates to right > left hip. Imaging shows L4-5 fusion post op changes, and L2-3 moderate spinal canal stenosis and right foraminal stenosis. Patient is not interested in further surgery at this time. She would prefer to try conservative measures including injections.     - Referral to [facility] for injections and comprehensive pain management.  - Advised patient to follow-up if symptoms persist.     Patient voiced understanding and agreement.      Phone call duration: 11 minutes    Britany Rosales CNP  [facility]  [address] [address]  Tel [phone]  Pager [phone]

## 2023-07-10 NOTE — PROGRESS NOTES
"Date of Service: Jul 10, 2023    Chief Complaint: Post operative follow up.  Bilateral ring finger clicking and catching    Date of Surgery: 1/20/23    Procedure Performed: Right thumb CMC arthroplasty    Interval events: Carli Monreal is a 72 year old female who presents today for a postoperative follow up. She is now just shy of 6 months s/p R thumb CMC arthroplasty. Doing quite well, no pain at the thumb, able to lift a suitcase, has traveled extensively throughout [place] recently. States the base of the thumb around the incision feels \"strange\" and tingly. Has to have help with things requiring  strength like opening jars.    She was diagnosed with bilateral ring finger sagittal band injuries. She wore relative motion splints for 8 weeks, almost 24 hours a day. States it did not help, also was cumbersome. It did not resolve the snapping. She is not too bothered by it and states she can deal with it. She states her rheumatologist here tested her and she does not have rheumatoid arthritis.    Main issue today is low back pain. Had an MRI and a telephone follow-up today to discuss results.    The past medical history was reviewed updated in the EMR. This includes medications, surgeries, social history, and review of systems.    Physical examination:  Well-developed, well-nourished and in no acute distress.  Alert and oriented to surroundings.  On examination of the  right upper extremity, incision is well-healed. There is no erythema, drainage, or dehiscence. Swelling is Mild. Sensation is intact in median, radial and ulnar nerve distributions. Mildly positive Tinel's over the Burch incision. Patient can actively flex and extend all digits and thumb. Excellent opposition to small finger palmar flexion crease. Palpable and visible ulnar subluxation of the extensor tendon of the ring finger.  Not painful.   Expected stiffness of the thumb.   Fingers are warm and well-perfused.     On examination of the left " upper extremity, palpable and visible ulnar subluxation of the extensor tendon of the ring finger, this requires manually straightening and associated pain.   No palpable triggering of the flexor tendon.    Assessment: 72 year old female with the followin.  S/p right thumb CMC arthroplasty,progressing appropriately.  2.  Bilateral ring finger sagittal band injury.  Possible history of rheumatoid arthritis.  3. Chronic low back pain/SI pain    Plan:    1.  WBAT no restrictions for the right thumb. We discussed that weakness in  and pinch strength is an expected result after the surgery she had. She voice understanding.   2.  We discussed another 8 week course of relative motion splinting for the sagittal band injuries. She will try to find her splints. We did briefly discuss surgery but she is not interested at this time and states she can live with it    All questions answered.  Voiced understanding and agreement.  Follow-up with me as needed.    Jasmeet Haque MD    Hand & Upper Extremity Surgery  Department of Orthopedic Surgery  [facility]

## 2023-07-10 NOTE — NURSING NOTE
Reason For Visit:   Chief Complaint   Patient presents with     Surgical Followup     3 mo POP right CMC arthroplasty DOS: 1/20/23       Primary MD: Iza Cage  Ref. MD:     Age: 73 year old    ?  No      There were no vitals taken for this visit.      Pain Assessment  Patient Currently in Pain: Denies    Hand Dominance Evaluation  Hand Dominance: Right          QuickDASH Assessment      7/10/2023    12:54 PM   QuickDASH Main   1. Open a tight or new jar Moderate difficulty   2. Do heavy household chores (e.g., wash walls, floors) Mild difficulty   3. Carry a shopping bag or briefcase No difficulty   4. Wash your back No difficulty   5. Use a knife to cut food Mild difficulty   6. Recreational activities in which you take some force or impact through your arm, shoulder or hand (e.g., golf, hammering, tennis, etc.) Unable to answer   7. During the past week, to what extent has your arm, shoulder or hand problem interfered with your normal social activities with family, friends, neighbours or groups Not at all   8. During the past week, were you limited in your work or other regular daily activities as a result of your arm, shoulder or hand problem Slightly limited   9. Arm, shoulder or hand pain None   10.Tingling (pins and needles) in your arm,shoulder or hand Mild   11. During the past week, how much difficulty have you had sleeping because of the pain in your arm, shoulder or hand No difficulty   Quickdash Ability Score 11.36          Current Outpatient Medications   Medication Sig Dispense Refill     albuterol (PROAIR HFA/PROVENTIL HFA/VENTOLIN HFA) 108 (90 Base) MCG/ACT inhaler Inhale 2 puffs into the lungs 4 times daily as needed for shortness of breath / dyspnea        alcohol swab prep pads Use to swab area of injection/venkata as directed. 100 each 3     amLODIPine (NORVASC) 2.5 MG tablet Take 1 tablet (2.5 mg) by mouth daily Do not fill until contacted by patient 90 tablet 1     atorvastatin  (LIPITOR) 10 MG tablet Take 1 tablet (10 mg) by mouth daily Do not fill until contacted by patient 90 tablet 3     blood glucose (NO BRAND SPECIFIED) lancets standard Use to test blood sugar 1 times daily or as directed. 100 each 11     budesonide-formoterol (SYMBICORT) 160-4.5 MCG/ACT Inhaler Inhale 2 puffs into the lungs 2 times daily 10.2 g 4     Cyanocobalamin 3000 MCG SUBL Place 1,500 mg under the tongue daily        docusate sodium (COLACE) 100 MG capsule Take 1 capsule (100 mg) by mouth 2 times daily 30 capsule 0     famotidine (PEPCID) 40 MG tablet Take 1 tablet (40 mg) by mouth 2 times daily 60 tablet 4     fluticasone (FLONASE) 50 MCG/ACT nasal spray Spray 1 spray into both nostrils daily 9.9 mL 1     folic acid (FOLVITE) 1 MG tablet TAKE ONE TABLET BY MOUTH ONE TIME DAILY 90 tablet 0     gabapentin (NEURONTIN) 100 MG capsule TAKE ONE CAPSULE BY MOUTH EVERY NIGHT AT BEDTIME 90 capsule 3     Lancets (ONETOUCH DELICA PLUS UYREXW41I) MISC USE TO TEST BLOOD GLUCOSE ONCE DAILY 100 each 0     losartan (COZAAR) 100 MG tablet Take 1 tablet (100 mg) by mouth daily Do not fill until contacted by patient 90 tablet 1     metFORMIN (GLUCOPHAGE) 1000 MG tablet Take 1 tablet (1,000 mg) by mouth 2 times daily (with meals) Do not fill until contacted by patient 180 tablet 1     multivitamin w/minerals (THERA-VIT-M) tablet Take 1 tablet by mouth daily       ONETOUCH VERIO IQ test strip USE TO TEST BLOOD GLUCOSE ONCE DAILY 200 strip 0     potassium chloride ER (KLOR-CON) 20 MEQ CR tablet Take 1 tablet (20 mEq) by mouth daily 90 tablet 1     predniSONE (DELTASONE) 20 MG tablet Take 2 tablets (40 mg) by mouth daily 10 tablet 0     scopolamine (TRANSDERM) 1 MG/3DAYS 72 hr patch Place 1 patch onto the skin every 72 hours 12 patch 1     tiZANidine (ZANAFLEX) 2 MG tablet Take 1-2 tablets (2-4 mg) by mouth 3 times daily as needed for muscle spasms 30 tablet 0     Vitamin D, Cholecalciferol, 25 MCG (1000 UT) TABS Take 2 tablets by  mouth daily         Allergies   Allergen Reactions     Seasonal Allergies        Mary Ellenclarice Marvin, ATC

## 2023-07-10 NOTE — LETTER
"    7/10/2023         RE: Carli Monreal  [address]  [place] [address]        Dear Colleague,    Thank you for referring your patient, Carli Monreal, to the [facility]. Please see a copy of my visit note below.    The patient has been notified of following:     \"This telephone visit will be conducted via a call between you and your physician/provider. We have found that certain health care needs can be provided without the need for a physical exam.  This service lets us provide the care you need with a short phone conversation.  If a prescription is necessary we can send it directly to your pharmacy.  If lab work is needed we can place an order for that and you can then stop by our lab to have the test done at a later time.    If during the course of the call the physician/provider feels a telephone visit is not appropriate, you will not be charged for this service.\"     Physician/provider has received verbal consent for a Telephone Visit from the patient? Yes     [facility]  Virtual Visit     HPI: Carli Monreal is a 73 year old female with history of osteoporosis and L4-5 fusion in ~2003 with Dr. Jose Pelletier in [place], [place] who presents for evaluation of chronic low back pain and chronic SI joint pain. Patient reports symptoms have been present for years. She denies any new symptoms. She previously followed with [facility] in [place] and received injections with their team. Most recent injection was a few years ago. The injections typically provided relief for a few weeks at at time. She moved back to [place] about 3 years ago and she would like to establish with [facility]. Today, patient reports aching bilateral low back pain. She has difficulty walking more than 2 blocks due to the pain. Patient uses heat packs and Gabapentin. Denies any leg pain, numbness, weakness, falls, foot drop, saddle " anesthesia, or bladder/bowel incontinence.     7/10/23: Patient presents for follow-up to review imaging results. Patient reports continued bilateral low back pain that radiates to right > left hip. Denies any weakness, numbness, or other new symptoms. Imaging shows L4-5 fusion post op changes, and L2-3 moderate spinal canal stenosis and right foraminal stenosis. Patient is not interested in further surgery at this time. She would prefer to try conservative measures including injections.       ROS: 10 point ROS neg other than the symptoms noted above in the HPI.    Imaging:   MR LUMBAR SPINE W/O CONTRAST 7/6/2023 12:52 PM  Impression:   1. Post surgical changes of instrumented anterior and posterior spinal  fusion and spinal canal decompression at L4-5.  2. Multilevel lumbar spondylosis, most pronounced at L2-3 where there  is moderate spinal canal and moderate right neural foraminal stenosis.    XR LUMBAR SPINE 2/3 VIEWS  7/6/2023 11:27 AM    IMPRESSION:   1. No acute osseous abnormality.  2. Postsurgical changes of instrumented posterior fusion at L4-L5  without evidence of hardware complication.  3. Multilevel degenerative changes of the lumbar spine, most  pronounced at L2-L3.    Assessment/Plan:  73 year old female with history of osteoporosis and L4-5 fusion in ~2003 with Dr. Jose Pelletier in [place], [place]. Patient presents for follow-up to review imaging results. Patient reports continued bilateral low back pain that radiates to right > left hip. Imaging shows L4-5 fusion post op changes, and L2-3 moderate spinal canal stenosis and right foraminal stenosis. Patient is not interested in further surgery at this time. She would prefer to try conservative measures including injections.     - Referral to [facility] for injections and comprehensive pain management.  - Advised patient to follow-up if symptoms persist.     Patient voiced understanding and agreement.      Phone  call duration: 11 minutes    Britany Rosales CNP  [facility]  [address] [place] [address], [place] [address]  Tel [phone]  Pager [phone]            Again, thank you for allowing me to participate in the care of your patient.        Sincerely,        Britany Rosales, NP

## 2023-07-11 ENCOUNTER — TELEPHONE (OUTPATIENT)
Dept: PALLIATIVE MEDICINE | Facility: CLINIC | Age: 73
End: 2023-07-11
Payer: COMMERCIAL

## 2023-07-11 NOTE — TELEPHONE ENCOUNTER
"Associated Diagnoses    History of lumbar fusion [Z98.1]  - Primary       Chronic sacroiliac joint pain [M53.3, G89.29]       Chronic bilateral low back pain without sciatica [M54.50, G89.29]         Completed MRI and Xray \"last week\"  Is the pain new? No.  Pain present for \"week .... it's been a while.\"    When did the pain start?  \"About two o' clock in the afternoon last Wednesday.\"  Had a fall last month while playing pickle ball \"but it didn't hurt.\" Patient does identify she has had current pain previously prior to previous injections.    Where is the pain located?  \"Across my lower back and into my right hip.\"  Pain also present in the left hip. Hip described as bottom of waist \"but when I go to move my leg it really hurts.\"    Does the pain radiate? Denies.   Any numbness / weakness? No   How would you rate the pain when active? 10/10     When at rest? 9-10/10   How would you describe the pain? (Ex. Dull, sharp, aching, etc.) \"Kind of a sharp achy feeling.\"    What makes the pain worse? \"Nothing really.\"   Pain is constant regardless of activity.   What helps make the pain better? Has had injections in [place] previously but last injection \"4 or 5 years ago\" only lasted for a day.  [facility].  Patient states that current pain is the same as previously experienced.    Treatments tried:  Tramadol helpful for 2 hours last night. Heating pad does not relieve pain but \"feels better.\" Ice, ibuprofen, showers ineffective. Denies attempting other interventions.      Past injections on chart:  Right SI joint 10/23/2017 - [facility] TONYA DOMÍNGUEZ CNP.     Routing to interventionists for review.    Izzy Land RN  [facility]  [phone]    "

## 2023-07-11 NOTE — TELEPHONE ENCOUNTER
1. Is there imaging in the Chart? Yes (must be within 3 years)  a. Date of Imagin2023  b. Type of Imaging: Lumbar MRI   2. Has the patient had any previous Injections? No  a. If YES, what injection and when?   3. Did the referring provider make any recommendations (look at provider notes)? Yes  a. If YES, what? Chronic sacroiliac joint pain   History of lumbar fusion   Chronic bilateral low back pain without sciatica     After Review please route to Pain Nurse Pool for nursing to triage.          Alice Da Silva  Complex   [facility]

## 2023-07-12 ENCOUNTER — OFFICE VISIT (OUTPATIENT)
Dept: ANESTHESIOLOGY | Facility: CLINIC | Age: 73
End: 2023-07-12
Attending: ANESTHESIOLOGY
Payer: COMMERCIAL

## 2023-07-12 VITALS
SYSTOLIC BLOOD PRESSURE: 127 MMHG | DIASTOLIC BLOOD PRESSURE: 64 MMHG | HEART RATE: 70 BPM | WEIGHT: 158 LBS | BODY MASS INDEX: 31.85 KG/M2 | OXYGEN SATURATION: 99 % | HEIGHT: 59 IN

## 2023-07-12 DIAGNOSIS — G89.29 CHRONIC MIDLINE LOW BACK PAIN WITHOUT SCIATICA: ICD-10-CM

## 2023-07-12 DIAGNOSIS — M54.50 CHRONIC MIDLINE LOW BACK PAIN WITHOUT SCIATICA: ICD-10-CM

## 2023-07-12 DIAGNOSIS — G89.29 CHRONIC SACROILIAC JOINT PAIN: ICD-10-CM

## 2023-07-12 DIAGNOSIS — M54.16 LUMBAR RADICULOPATHY: Primary | ICD-10-CM

## 2023-07-12 DIAGNOSIS — M53.3 CHRONIC SACROILIAC JOINT PAIN: ICD-10-CM

## 2023-07-12 DIAGNOSIS — Z98.1 HISTORY OF LUMBAR FUSION: ICD-10-CM

## 2023-07-12 PROCEDURE — 99204 OFFICE O/P NEW MOD 45 MIN: CPT | Mod: GC | Performed by: ANESTHESIOLOGY

## 2023-07-12 RX ORDER — TRAMADOL HYDROCHLORIDE 50 MG/1
50 TABLET ORAL EVERY 6 HOURS PRN
Qty: 10 TABLET | Refills: 0 | Status: SHIPPED | OUTPATIENT
Start: 2023-07-12 | End: 2023-07-15

## 2023-07-12 ASSESSMENT — PAIN SCALES - GENERAL: PAINLEVEL: WORST PAIN (10)

## 2023-07-12 NOTE — LETTER
7/12/2023       RE: Carli Monreal  [address]  [place] [address]     Dear Colleague,    Thank you for referring your patient, Carli Monreal, to the [facility] [place] at [facility]. Please see a copy of my visit note below.                          [facility] Consultation    Date of visit: 7/12/2023    Reason for consultation:    Carli Monreal is a 73 year old female who is seen in consultation today at the request of her primary care physician, Iza Cage. The patient had a long hx of chronic back pain and is s/p fusion of L4-L5 in 2003. The patient explains about one week ago without a clear inciting event the patient began to have sharp lower left back pain. She explains this pain was sharp and could be rated as a 10/10 in severity. Since, she's noticed the pain as transferred to the right lower back and she believes the pain in her right is even worst than the initial pain on her left. The pain is made worst by activity and is improved to heat pads and rest. The patient also adds she has been taking gabapentin as well as left over tramadol from a right hand surgery she had in January. Otherwise, the patient explains she had a steroid injection about 5 years ago but none since. The also adds she has previous done PT with the last time being about 5 years ago as well. Today the patient denies any changes in her bowel or bladders habits as well as new fevers or chills.    Consultation and Evaluation for: Chronic Back pain     Review of [place] Prescription Monitoring Program (): Today I have also reviewed the patient's history of controlled substance use, as provided by [place] licensed pharmacies and prescriber dispensers.     Review of Pain Questionnaire: Please see the [facility] health questionnaire, which the patient completed and  reviewed with me in detail.    Review of Electronic Chart: Today I have also reviewed available medical information in the patient's medical record at [facility] ([facility]), including relevant provider notes, laboratory work, and imaging.       Chief Complaint:    Chief Complaint   Patient presents with    Consult     Consult Lower Back Pain       Pain history:  Carli Monreal is a 73 year old female who presents for initial evaluation of the following chronic pain complaints. Specifically a week long hx acute lower back pain most pronounced on the right side.     Pain is described as Aching, Tender and Throbbing   Pain rating: intensity ranges from 9/10 to 10/10, and Averages 10/10 on a 0-10 scale.  Aggravating factors include: Activity  Relieving factors include: Heating pad and rest   Activity level/function:              Daily activities:  Can do most things most days, with some rest            Work:  Not applicable  Recent family or social stressors:  none noted  Red Flags: The patient denies bowel or bladder incontinence, parasthesias, weakness, saddle anesthesia, unintentional weight loss, or fever/chills/sweats.       MEDICATIONS FOR PAIN:   - Gabapentin 100 mg TID      Previous pain medications:  Opiates - none and Tramadol  Antiepileptics - gabapentin (Neurontin)  Antidepressants - none   NSAIDs - Apsirin  Muscle relaxants - none  Topicals - none    Past pain treatments:   Pain Clinic:  Yes - A Practice in [place]     PT:  Yes ( 5 years ago)  Psychologist:  No  Self-care:  Yes - physical activity       INJECTIONS:   Previous steroid injections    SURGICAL HISTORY:   Past Surgical History:   Procedure Laterality Date    ARTHROPLASTY CARPOMETACARPAL (THUMB JOINT) Right 1/20/2023    Procedure: ARTHROPLASTY, CARPOMETACARPAL JOINT,RIGHT  THUMB;  Surgeon: Jasmeet Haque MD;  Location: [facility] OR    CATARACT IOL, RT/LT Bilateral 2017    Valeriano Javier MD ([place], [place]).    COLONOSCOPY N/A 09/20/2021    Procedure:  COLONOSCOPY;  Surgeon: Keesha Mancuso MD;  Location: [facility] OR    SMALL BOWEL RESECTION  2012       IMAGING:  MR LUMBAR SPINE W/O CONTRAST 7/6/2023 12:52 PM     Provided History: hx of prior fusion. chronic low back pain.; Chronic  midline low back pain without sciatica; Chronic midline low back pain  without sciatica; Chronic sacroiliac joint pain; Chronic sacroiliac  joint pain; History of lumbar fusion     ICD-10: Chronic midline low back pain without sciatica; Chronic  midline low back pain without sciatica; Chronic sacroiliac joint pain;  Chronic sacroiliac joint pain; History of lumbar fusion     Comparison: Plain radiographs same-day      Technique: Sagittal T1-weighted, sagittal STIR, sagittal  diffusion-weighted (with ADC map), axial T1-weighted, and 3D  volumetric axial and sagittal reconstructed T2-weighted images of the  lumbar spine were obtained without intravenous contrast.      Findings:   There are 5 lumbar-type vertebrae.  The tip of the conus medullaris is  at L1.  Mild anterolisthesis at L3-4.  There is multilevel disc height  narrowing and disc desiccation. Multilevel opposing endplate edema.  Right convex curvature centered at mid lumbar spine.     Postsurgical changes of instrumented anterior and posterior spinal  fusion and decompressive laminectomy at L4-5.      On a level by level basis:     T12-L1: Disc bulge. Mild bilateral neural foraminal stenosis. No  spinal canal stenosis.     L1-2: Disc osteophyte complex and bilateral facet hypertrophy.  Moderate left and mild to moderate right neural foraminal stenosis.  Mild to moderate spinal canal stenosis.     L2-3: Disc osteophyte complex and left greater than right facet  hypertrophy. Moderate to severe left neural foraminal stenosis with  abutment of the exiting nerve root. Moderate right neural foraminal  stenosis. Moderate spinal canal stenosis.     L3-4: Disc bulge. Bilateral facet hypertrophy. Mild bilateral neural  foraminal stenosis.  No spinal canal stenosis.     L4-5: Fusion level. No spinal canal or neuroforaminal stenosis.     L5-S1: Disc bulge. No spinal canal or neuroforaminal stenosis.     Paraspinous tissues are within normal limits.                                                                      Impression:   1. Post surgical changes of instrumented anterior and posterior spinal  fusion and spinal canal decompression at L4-5.  2. Multilevel lumbar spondylosis, most pronounced at L2-3 where there  is moderate spinal canal and moderate right neural foraminal stenosis.         Social History:  Home situation: Lives w/   Occupation/Schooling: retired  Tobacco use: not noted  Drug use: not noted      Past Medical History:  Past Medical History:   Diagnosis Date    Diabetes mellitus (H)     Right bundle branch block     Uncomplicated asthma        Past Surgical History:  Past Surgical History:   Procedure Laterality Date    ARTHROPLASTY CARPOMETACARPAL (THUMB JOINT) Right 1/20/2023    Procedure: ARTHROPLASTY, CARPOMETACARPAL JOINT,RIGHT  THUMB;  Surgeon: Jasmeet Haque MD;  Location: [facility] OR    CATARACT IOL, RT/LT Bilateral 2017    Valeriano Javier MD ([place], [place]).    COLONOSCOPY N/A 09/20/2021    Procedure: COLONOSCOPY;  Surgeon: Keesha Mancuso MD;  Location: [facility] OR    SMALL BOWEL RESECTION  2012       Medications:  Current Outpatient Medications   Medication Sig Dispense Refill    albuterol (PROAIR HFA/PROVENTIL HFA/VENTOLIN HFA) 108 (90 Base) MCG/ACT inhaler Inhale 2 puffs into the lungs 4 times daily as needed for shortness of breath / dyspnea       alcohol swab prep pads Use to swab area of injection/venkata as directed. 100 each 3    amLODIPine (NORVASC) 2.5 MG tablet Take 1 tablet (2.5 mg) by mouth daily Do not fill until contacted by patient 90 tablet 1    atorvastatin (LIPITOR) 10 MG tablet Take 1 tablet (10 mg) by mouth daily Do not fill until contacted by patient 90 tablet 3    blood glucose (NO BRAND SPECIFIED)  lancets standard Use to test blood sugar 1 times daily or as directed. 100 each 11    budesonide-formoterol (SYMBICORT) 160-4.5 MCG/ACT Inhaler Inhale 2 puffs into the lungs 2 times daily 10.2 g 4    Cyanocobalamin 3000 MCG SUBL Place 1,500 mg under the tongue daily       docusate sodium (COLACE) 100 MG capsule Take 1 capsule (100 mg) by mouth 2 times daily 30 capsule 0    famotidine (PEPCID) 40 MG tablet Take 1 tablet (40 mg) by mouth 2 times daily 60 tablet 4    fluticasone (FLONASE) 50 MCG/ACT nasal spray Spray 1 spray into both nostrils daily 9.9 mL 1    folic acid (FOLVITE) 1 MG tablet TAKE ONE TABLET BY MOUTH ONE TIME DAILY 90 tablet 0    gabapentin (NEURONTIN) 100 MG capsule TAKE ONE CAPSULE BY MOUTH EVERY NIGHT AT BEDTIME 90 capsule 3    Lancets (ONETOUCH DELICA PLUS [id number]) MISC USE TO TEST BLOOD GLUCOSE ONCE DAILY 100 each 0    losartan (COZAAR) 100 MG tablet Take 1 tablet (100 mg) by mouth daily Do not fill until contacted by patient 90 tablet 1    metFORMIN (GLUCOPHAGE) 1000 MG tablet Take 1 tablet (1,000 mg) by mouth 2 times daily (with meals) Do not fill until contacted by patient 180 tablet 1    multivitamin w/minerals (THERA-VIT-M) tablet Take 1 tablet by mouth daily      ONETOUCH VERIO IQ test strip USE TO TEST BLOOD GLUCOSE ONCE DAILY 200 strip 0    potassium chloride ER (KLOR-CON) 20 MEQ CR tablet Take 1 tablet (20 mEq) by mouth daily 90 tablet 1    predniSONE (DELTASONE) 20 MG tablet Take 2 tablets (40 mg) by mouth daily 10 tablet 0    scopolamine (TRANSDERM) 1 MG/3DAYS 72 hr patch Place 1 patch onto the skin every 72 hours 12 patch 1    tiZANidine (ZANAFLEX) 2 MG tablet Take 1-2 tablets (2-4 mg) by mouth 3 times daily as needed for muscle spasms 30 tablet 0    Vitamin D, Cholecalciferol, 25 MCG (1000 UT) TABS Take 2 tablets by mouth daily         Allergies:     Allergies   Allergen Reactions    Seasonal Allergies        Family history:  Family History   Problem Relation Age of Onset     "Rheumatoid Arthritis Mother     Eye Surgery Father     Glaucoma Father     Amblyopia Cousin     Inflammatory Bowel Disease No family hx of     Colon Cancer No family hx of     Macular Degeneration No family hx of        ROS:  Constitutional, neuro, ENT, endocrine, pulmonary, cardiac, gastrointestinal, genitourinary, musculoskeletal, integument and psychiatric systems are negative, except as otherwise noted.    Physical Exam:  Vitals:    07/12/23 0903   BP: 127/64   BP Location: Right arm   Patient Position: Chair   Cuff Size: Adult Regular   Pulse: 70   SpO2: 99%   Weight: 71.7 kg (158 lb)   Height: 1.499 m (4' 11\")       GENERAL: Healthy, alert and no distress  EYES: Eyes grossly normal to inspection.  No discharge or erythema, or obvious scleral/conjunctival abnormalities.  RESP: No audible wheeze, cough, or visible cyanosis.  No visible retractions or increased work of breathing.    SKIN: Visible skin clear. No significant rash, abnormal pigmentation or lesions.  Comprehensive back pain exam:  Tenderness of Bilateral lower back, worst on the right   PSYCH: Mentation appears normal, affect normal/bright, judgement and insight intact, normal speech and appearance well-groomed.      Constitutional: Well developed, well nourished, appears stated age.  HEENT: Head atraumatic, normocephalic. Eyes without conjunctival injection or jaundice. Oropharynx clear. Neck supple. No obvious neck masses.  Respiratory: Lungs clear to auscultation bilaterally, no wheezing/rales/rhonchi.   Gastrointestinal: Normoactive bowel sounds. Abdomen soft, non-tender, without guarding/rebound.  Skin: No rash, lesions, or petechiae of exposed skin.   Extremities: Peripheral pulses intact. No clubbing, cyanosis, or edema.  Psychiatric/mental status: Alert, without lethargy or stupor. Speech fluent. Appropriate affect. Mood normal. Able to follow commands without difficulty.     Musculoskeletal exam:  Gait/Station/Posture: antalgia gait (right " limp)    Lumbar spine:  Range of motion limited   Rotation/ext to right: painful    Rotation/ext to left: painful   Myofascial tenderness:  Lower right back     Hip exam:   Limited exam due to patient back pain      ASSESSMENT/PLAN:  The following recommendations were given to the patient. Diagnosis, treatment options, risks, benefits, and alternatives were discussed, and all questions were answered. The patient expressed understanding of the plan for management.     The patient is a 74 y/o F with long hx of chronic back pain who presents one week of acute lower back pain worst on her right side. The patient had a recent Lumbar MRI which illustrated multilevel lumbar spondylosis, most pronounced at L2-3 where there  is moderate spinal canal and moderate right neural foraminal stenosis. Based on these finding Dr. Rodas ordered for the patient undergo a right Transforaminal epidural lumbar steroid injection. In addition based on physical exam there is evidence that some of the patient's pain is myofascial in nature as she had some trigger points in her right lower back. Otherwise, the patient today was counseled to take her gabapentin as prescribed 100 mg TID. While she waits for her lumbar steroid injection the patient will be prescribed a short course of tramadol as this is currently helping her pain. Lastly a new referral for PT was determined to be beneficial.    Plan:   - Schedule a right Transforaminal epidural lumbar steroid injection with Dr. Rodas  - Continue Gabapentin 100 mg TID  - Short course of Tramadol as bridge until procedure  - Take ibuprofen as needed 600 mg Q6hr    - with patient's hx of crohn's she was counseled to stop ibuprofen if she notices any abdominal irritation/pain  - New Referral for physical therapy    FOLLOW UP: Plan for f/u after lumbar epidural steroid injection      Marvin Pereira MD  PGY-3 Anesthesiology Resident    Pain Management & Addiction Medicine      I saw and examined  the patient with the Pain Fellow/Resident. I have reviewed and agree with the resident's note and plan of care and made changes and corrections directly to the body of the note.    TIME SPENT:  BY FELLOW/RESIDENT ALONE 20 MIN  BY MYSELF AND FELLOW/RESIDENT TOGETHER 30 MIN              Again, thank you for allowing me to participate in the care of your patient.      Sincerely,    Teresa Dejesus MD

## 2023-07-12 NOTE — NURSING NOTE
Patient presents with:  Consult: Consult Lower Back Pain      Worst Pain (10)         What medications are you using for pain? Gabapentin, Tramadol    (New patients only) Have you been seen by another pain clinic/ provider? yes    (Return Patients only) What refills are you needing today? No    Expectation wondering if she have to have surgery on her back, possible injections

## 2023-07-12 NOTE — TELEPHONE ENCOUNTER
Screening Questions for Radiology Injections:    Injection to be done at which interventional clinic site? [facility]    Procedure ordered by Bobby    Procedure ordered? RIght L2-3 transforaminal epidural steroid injection     Transforaminal Cervical THOMAS - Send to [facility] ([facility]) - No [facility] providers perform this procedure    What insurance would patient like us to bill for this procedure? ucare    IF SCHEDULING IN [place] PAIN OR SPINE PLEASE SCHEDULE AT LEAST 7-10 BUSINESS DAYS OUT SO A PA CAN BE OBTAINED    Worker's comp or MVA (motor vehicle accident) -Any injection DO NOT SCHEDULE and route to Aron Albarran.      [employer] insurance - For SI joint injections, DO NOT SCHEDULE and route to Candice Srikanth.       ALL BCBS, [employer] and [employer] - DO NOT SCHEDULE and route to Candice Srikanth    MEDICA- facet joint injections, route to Candice Srikanth    Is patient scheduled at [facility]? no   If YES, route every encounter to [facility] CARE NAVIGATION POOL [[phone]]    Is an  needed? No     Patient has a  home? (Review Grid) YES: ok    Any chance of pregnancy? NO   If YES, do NOT schedule and route to RN pool  - Dr. Garsia route to Ni Medley and PM&R Nurse  [[id number]]      Is patient actively being treated for cancer or immunocompromised? No  If YES, do NOT schedule and route to RN pool/ Dr. Garsia's Team    Does the patient have a bleeding or clotting disorder? No     If YES, okay to schedule AND route to RN nurse juno/ Dr. Garsia's Team     (For any patients with platelet count <100, RN must forward to provider)    Is patient taking any Blood Thinners OR Antiplatelet medication?  No   If hold needed, do NOT schedule, route to RN pool/ Dr. Garsia's Team    Examples:   o Blood Thinners: (Coumadin, Warfarin, Jantoven, Pradaxa, Xarelto, Eliquis, Edoxaban, Enoxaparin, Lovenox, Heparin, Arixtra, Fondaparinux or Fragmin)  o Antiplatelet Medications: (Plavix,  Brilinta or Effient)     Is patient taking any aspirin products (includes Excedrin and Fiorinal)? No     If more than 325mg/day, OK to schedule; Instruct Pt to decrease to less than 325 mg for 7 days AND route to RN pool/ Dr. Garsia's Team     For CERVICAL procedures, hold all aspirin products for 6 days.     Tell Pt that if aspirin product is not held for 6 days, the procedure WILL BE cancelled.     Any allergies to contrast dye, iodine, shellfish, or numbing and steroid medications? No    If YES, schedule and add allergy information to appointment notes AND route to the RN pool/ Dr. Garsia's Team    If THOMAS and Contrast Dye / Iodine Allergy? DO NOT SCHEDULE, route to RN pool/ Dr. Garsia's Team    Allergies: Seasonal allergies     Does patient have an active infection or treated for one within the past week? No    Is patient currently taking any antibiotics or steroid medications?  No     For patients on chronic, preventative, or prophylactic antibiotics, procedures may be scheduled.     For patients on antibiotics for active or recent infection, schedule 4 days after completed.    For patients on steroid medications, schedule 4 days after completed.     Has the patient had a flu shot or any other vaccinations within the past 7 days? No  If yes, explain that for the vaccine to work best they need to:       wait 1 week before and 1 week after getting any Vaccine    wait 1 week before and 2 weeks after getting Covid Vaccine #2 or BOOSTER    If patient has concerns about the timing, send to RN pool/ Dr. Garsia's Team    Does patient have an MRI/CT?  YES: MRI Include Date and Check Procedure Scheduling Grid to see if required.    Was the MRI/CT done within the last 3 years?  Yes     If no route to RN Pool/ Dr. Garsia's Team    If yes, where was the MRI/CT done? [facility]     Refer to PACS Transmissions list for approved external locations and route to RN Pool High Priority/ Dr. Garsia's Team    If MRI was not done at  approved external location do NOT schedule and route to RN pool/ Dr. Garsia's Team      If patient has an imaging disc, the injection MAY be scheduled but patient must bring disc to appt or appt will be cancelled.    Is patient able to transfer to a procedure table with minimal or no assistance? Yes     If no, do NOT schedule and route to RN Pool/ Dr. Garsia's Team    Procedure Specific Instructions:    If celiac plexus block, informed patient NPO for 6 hours and that it is okay to take medications with sips of water, especially blood pressure medications Not Applicable         If this is for a cervical procedure, informed patient that aspirin needs to be held for 6 days.   Not Applicable      Sedation, If Sedation is ordered for any procedure, patient must be NPO for 6 hours prior to procedure Not Applicable      If IV needed:    Do not schedule procedures requiring IV placement in the first appointment of the day or first appointment after lunch. Do NOT schedule at 0745, 0815 or 1245. ok    Instructed patient to arrive 30 minutes early for IV start if required. (Check Procedure Scheduling Grid)  Not Applicable    Reminders:    If you are started on any steroids or antibiotics between now and your appointment, you must contact us because the procedure may need to be cancelled.  Yes      As a reminder, receiving steroids can decrease your body's ability to fight infection.   Would you still like to move forward with scheduling the injection?  Yes      IV Sedation is not provided for procedures. If oral anti-anxiety medication is needed, the patient should request this from their referring provider.      Instruct patient to arrive as directed prior to the scheduled appointment time:  If IV needed 30 minutes before appointment time       For patients 85 or older we recommend having an adult stay w/ them for the remainder of the day.       If the patient is Diabetic, remind them to bring their glucometer.      Does the  patient have any questions?  NO  Bouchra Albarran  [facility] [place]

## 2023-07-12 NOTE — PROGRESS NOTES
[facility] Consultation    Date of visit: 7/12/2023    Reason for consultation:    Carli Monreal is a 73 year old female who is seen in consultation today at the request of her primary care physician, Iza Cage. The patient had a long hx of chronic back pain and is s/p fusion of L4-L5 in 2003. The patient explains about one week ago without a clear inciting event the patient began to have sharp lower left back pain. She explains this pain was sharp and could be rated as a 10/10 in severity. Since, she's noticed the pain as transferred to the right lower back and she believes the pain in her right is even worst than the initial pain on her left. The pain is made worst by activity and is improved to heat pads and rest. The patient also adds she has been taking gabapentin as well as left over tramadol from a right hand surgery she had in January. Otherwise, the patient explains she had a steroid injection about 5 years ago but none since. The also adds she has previous done PT with the last time being about 5 years ago as well. Today the patient denies any changes in her bowel or bladders habits as well as new fevers or chills.    Consultation and Evaluation for: Chronic Back pain     Review of [place] Prescription Monitoring Program (): Today I have also reviewed the patient's history of controlled substance use, as provided by [place] licensed pharmacies and prescriber dispensers.     Review of Pain Questionnaire: Please see the [facility] [place] health questionnaire, which the patient completed and reviewed with me in detail.    Review of Electronic Chart: Today I have also reviewed available medical information in the patient's medical record at [facility] ([facility]), including relevant provider notes, laboratory work, and imaging.       Chief Complaint:    Chief Complaint   Patient presents with     Consult     Consult Lower Back Pain       Pain  history:  Carli Monreal is a 73 year old female who presents for initial evaluation of the following chronic pain complaints. Specifically a week long hx acute lower back pain most pronounced on the right side.     Pain is described as Aching, Tender and Throbbing   Pain rating: intensity ranges from 9/10 to 10/10, and Averages 10/10 on a 0-10 scale.  Aggravating factors include: Activity  Relieving factors include: Heating pad and rest   Activity level/function:              Daily activities:  Can do most things most days, with some rest            Work:  Not applicable  Recent family or social stressors:  none noted  Red Flags: The patient denies bowel or bladder incontinence, parasthesias, weakness, saddle anesthesia, unintentional weight loss, or fever/chills/sweats.       MEDICATIONS FOR PAIN:   - Gabapentin 100 mg TID      Previous pain medications:  Opiates - none and Tramadol  Antiepileptics - gabapentin (Neurontin)  Antidepressants - none   NSAIDs - Apsirin  Muscle relaxants - none  Topicals - none    Past pain treatments:   Pain Clinic:  Yes - A Practice in [place]     PT:  Yes ( 5 years ago)  Psychologist:  No  Self-care:  Yes - physical activity       INJECTIONS:   Previous steroid injections    SURGICAL HISTORY:   Past Surgical History:   Procedure Laterality Date     ARTHROPLASTY CARPOMETACARPAL (THUMB JOINT) Right 1/20/2023    Procedure: ARTHROPLASTY, CARPOMETACARPAL JOINT,RIGHT  THUMB;  Surgeon: Jasmeet Haque MD;  [place] [facility] [place]     CATARACT IOL, RT/LT Bilateral 2017    Valeriano Javier MD ([place], [place]).     COLONOSCOPY N/A 09/20/2021    Procedure: COLONOSCOPY;  Surgeon: Keesha Mancuso MD;  [place] [facility] [place]     SMALL BOWEL RESECTION  2012       IMAGING:  MR LUMBAR SPINE W/O CONTRAST 7/6/2023 12:52 PM     Provided History: hx of prior fusion. chronic low back pain.; Chronic  midline low back pain without sciatica; Chronic midline low back pain  without sciatica; Chronic sacroiliac joint pain;  Chronic sacroiliac  joint pain; History of lumbar fusion     ICD-10: Chronic midline low back pain without sciatica; Chronic  midline low back pain without sciatica; Chronic sacroiliac joint pain;  Chronic sacroiliac joint pain; History of lumbar fusion     Comparison: Plain radiographs same-day      Technique: Sagittal T1-weighted, sagittal STIR, sagittal  diffusion-weighted (with ADC map), axial T1-weighted, and 3D  volumetric axial and sagittal reconstructed T2-weighted images of the  lumbar spine were obtained without intravenous contrast.      Findings:   There are 5 lumbar-type vertebrae.  The tip of the conus medullaris is  at L1.  Mild anterolisthesis at L3-4.  There is multilevel disc height  narrowing and disc desiccation. Multilevel opposing endplate edema.  Right convex curvature centered at mid lumbar spine.     Postsurgical changes of instrumented anterior and posterior spinal  fusion and decompressive laminectomy at L4-5.      On a level by level basis:     T12-L1: Disc bulge. Mild bilateral neural foraminal stenosis. No  spinal canal stenosis.     L1-2: Disc osteophyte complex and bilateral facet hypertrophy.  Moderate left and mild to moderate right neural foraminal stenosis.  Mild to moderate spinal canal stenosis.     L2-3: Disc osteophyte complex and left greater than right facet  hypertrophy. Moderate to severe left neural foraminal stenosis with  abutment of the exiting nerve root. Moderate right neural foraminal  stenosis. Moderate spinal canal stenosis.     L3-4: Disc bulge. Bilateral facet hypertrophy. Mild bilateral neural  foraminal stenosis. No spinal canal stenosis.     L4-5: Fusion level. No spinal canal or neuroforaminal stenosis.     L5-S1: Disc bulge. No spinal canal or neuroforaminal stenosis.     Paraspinous tissues are within normal limits.                                                                      Impression:   1. Post surgical changes of instrumented anterior and  posterior spinal  fusion and spinal canal decompression at L4-5.  2. Multilevel lumbar spondylosis, most pronounced at L2-3 where there  is moderate spinal canal and moderate right neural foraminal stenosis.         Social History:  Home situation: Lives w/   Occupation/Schooling: retired  Tobacco use: not noted  Drug use: not noted      Past Medical History:  Past Medical History:   Diagnosis Date     Diabetes mellitus (H)      Right bundle branch block      Uncomplicated asthma        Past Surgical History:  Past Surgical History:   Procedure Laterality Date     ARTHROPLASTY CARPOMETACARPAL (THUMB JOINT) Right 1/20/2023    Procedure: ARTHROPLASTY, CARPOMETACARPAL JOINT,RIGHT  THUMB;  Surgeon: Jasmeet Haque MD;  Location: [facility] OR     CATARACT IOL, RT/LT Bilateral 2017    Valeriano Javier MD ([place], [place]).     COLONOSCOPY N/A 09/20/2021    Procedure: COLONOSCOPY;  Surgeon: Keesha Mancuso MD;  Location: [facility] [place]     SMALL BOWEL RESECTION  2012       Medications:  Current Outpatient Medications   Medication Sig Dispense Refill     albuterol (PROAIR HFA/PROVENTIL HFA/VENTOLIN HFA) 108 (90 Base) MCG/ACT inhaler Inhale 2 puffs into the lungs 4 times daily as needed for shortness of breath / dyspnea        alcohol swab prep pads Use to swab area of injection/venkata as directed. 100 each 3     amLODIPine (NORVASC) 2.5 MG tablet Take 1 tablet (2.5 mg) by mouth daily Do not fill until contacted by patient 90 tablet 1     atorvastatin (LIPITOR) 10 MG tablet Take 1 tablet (10 mg) by mouth daily Do not fill until contacted by patient 90 tablet 3     blood glucose (NO BRAND SPECIFIED) lancets standard Use to test blood sugar 1 times daily or as directed. 100 each 11     budesonide-formoterol (SYMBICORT) 160-4.5 MCG/ACT Inhaler Inhale 2 puffs into the lungs 2 times daily 10.2 g 4     Cyanocobalamin 3000 MCG SUBL Place 1,500 mg under the tongue daily        docusate sodium (COLACE) 100 MG capsule Take 1 capsule (100 mg)  by mouth 2 times daily 30 capsule 0     famotidine (PEPCID) 40 MG tablet Take 1 tablet (40 mg) by mouth 2 times daily 60 tablet 4     fluticasone (FLONASE) 50 MCG/ACT nasal spray Spray 1 spray into both nostrils daily 9.9 mL 1     folic acid (FOLVITE) 1 MG tablet TAKE ONE TABLET BY MOUTH ONE TIME DAILY 90 tablet 0     gabapentin (NEURONTIN) 100 MG capsule TAKE ONE CAPSULE BY MOUTH EVERY NIGHT AT BEDTIME 90 capsule 3     Lancets (ONETOUCH DELICA PLUS [id number]) MISC USE TO TEST BLOOD GLUCOSE ONCE DAILY 100 each 0     losartan (COZAAR) 100 MG tablet Take 1 tablet (100 mg) by mouth daily Do not fill until contacted by patient 90 tablet 1     metFORMIN (GLUCOPHAGE) 1000 MG tablet Take 1 tablet (1,000 mg) by mouth 2 times daily (with meals) Do not fill until contacted by patient 180 tablet 1     multivitamin w/minerals (THERA-VIT-M) tablet Take 1 tablet by mouth daily       ONETOUCH VERIO IQ test strip USE TO TEST BLOOD GLUCOSE ONCE DAILY 200 strip 0     potassium chloride ER (KLOR-CON) 20 MEQ CR tablet Take 1 tablet (20 mEq) by mouth daily 90 tablet 1     predniSONE (DELTASONE) 20 MG tablet Take 2 tablets (40 mg) by mouth daily 10 tablet 0     scopolamine (TRANSDERM) 1 MG/3DAYS 72 hr patch Place 1 patch onto the skin every 72 hours 12 patch 1     tiZANidine (ZANAFLEX) 2 MG tablet Take 1-2 tablets (2-4 mg) by mouth 3 times daily as needed for muscle spasms 30 tablet 0     Vitamin D, Cholecalciferol, 25 MCG (1000 UT) TABS Take 2 tablets by mouth daily         Allergies:     Allergies   Allergen Reactions     Seasonal Allergies        Family history:  Family History   Problem Relation Age of Onset     Rheumatoid Arthritis Mother      Eye Surgery Father      Glaucoma Father      Amblyopia Cousin      Inflammatory Bowel Disease No family hx of      Colon Cancer No family hx of      Macular Degeneration No family hx of        ROS:  Constitutional, neuro, ENT, endocrine, pulmonary, cardiac, gastrointestinal, genitourinary,  "musculoskeletal, integument and psychiatric systems are negative, except as otherwise noted.    Physical Exam:  Vitals:    07/12/23 0903   BP: 127/64   BP Location: Right arm   Patient Position: Chair   Cuff Size: Adult Regular   Pulse: 70   SpO2: 99%   Weight: 71.7 kg (158 lb)   Height: 1.499 m (4' 11\")       GENERAL: Healthy, alert and no distress  EYES: Eyes grossly normal to inspection.  No discharge or erythema, or obvious scleral/conjunctival abnormalities.  RESP: No audible wheeze, cough, or visible cyanosis.  No visible retractions or increased work of breathing.    SKIN: Visible skin clear. No significant rash, abnormal pigmentation or lesions.  Comprehensive back pain exam:  Tenderness of Bilateral lower back, worst on the right   PSYCH: Mentation appears normal, affect normal/bright, judgement and insight intact, normal speech and appearance well-groomed.      Constitutional: Well developed, well nourished, appears stated age.  HEENT: Head atraumatic, normocephalic. Eyes without conjunctival injection or jaundice. Oropharynx clear. Neck supple. No obvious neck masses.  Respiratory: Lungs clear to auscultation bilaterally, no wheezing/rales/rhonchi.   Gastrointestinal: Normoactive bowel sounds. Abdomen soft, non-tender, without guarding/rebound.  Skin: No rash, lesions, or petechiae of exposed skin.   Extremities: Peripheral pulses intact. No clubbing, cyanosis, or edema.  Psychiatric/mental status: Alert, without lethargy or stupor. Speech fluent. Appropriate affect. Mood normal. Able to follow commands without difficulty.     Musculoskeletal exam:  Gait/Station/Posture: antalgia gait (right limp)    Lumbar spine:  Range of motion limited   Rotation/ext to right: painful    Rotation/ext to left: painful   Myofascial tenderness:  Lower right back     Hip exam:   Limited exam due to patient back pain      ASSESSMENT/PLAN:  The following recommendations were given to the patient. Diagnosis, treatment " options, risks, benefits, and alternatives were discussed, and all questions were answered. The patient expressed understanding of the plan for management.     The patient is a 72 y/o F with long hx of chronic back pain who presents one week of acute lower back pain worst on her right side. The patient had a recent Lumbar MRI which illustrated multilevel lumbar spondylosis, most pronounced at L2-3 where there  is moderate spinal canal and moderate right neural foraminal stenosis. Based on these finding Dr. Rodas ordered for the patient undergo a right Transforaminal epidural lumbar steroid injection. In addition based on physical exam there is evidence that some of the patient's pain is myofascial in nature as she had some trigger points in her right lower back. Otherwise, the patient today was counseled to take her gabapentin as prescribed 100 mg TID. While she waits for her lumbar steroid injection the patient will be prescribed a short course of tramadol as this is currently helping her pain. Lastly a new referral for PT was determined to be beneficial.    Plan:   - Schedule a right Transforaminal epidural lumbar steroid injection with Dr. Rodas  - Continue Gabapentin 100 mg TID  - Short course of Tramadol as bridge until procedure  - Take ibuprofen as needed 600 mg Q6hr    - with patient's hx of crohn's she was counseled to stop ibuprofen if she notices any abdominal irritation/pain  - New Referral for physical therapy    FOLLOW UP: Plan for f/u after lumbar epidural steroid injection      Marvin Pereira MD  PGY-3 Anesthesiology Resident    Pain Management & Addiction Medicine      I saw and examined the patient with the Pain Fellow/Resident. I have reviewed and agree with the resident's note and plan of care and made changes and corrections directly to the body of the note.    TIME SPENT:  BY FELLOW/RESIDENT ALONE 20 MIN  BY MYSELF AND FELLOW/RESIDENT TOGETHER 30 MIN      Teresa Dejesus MD  Pain Medicine,  Department of Anesthesiology  , [facility]

## 2023-07-28 ENCOUNTER — RADIOLOGY INJECTION OFFICE VISIT (OUTPATIENT)
Dept: PALLIATIVE MEDICINE | Facility: CLINIC | Age: 73
End: 2023-07-28
Attending: NURSE PRACTITIONER
Payer: COMMERCIAL

## 2023-07-28 VITALS — OXYGEN SATURATION: 99 % | DIASTOLIC BLOOD PRESSURE: 66 MMHG | HEART RATE: 66 BPM | SYSTOLIC BLOOD PRESSURE: 145 MMHG

## 2023-07-28 DIAGNOSIS — G89.29 CHRONIC BILATERAL LOW BACK PAIN WITHOUT SCIATICA: ICD-10-CM

## 2023-07-28 DIAGNOSIS — M54.16 LUMBAR RADICULOPATHY: ICD-10-CM

## 2023-07-28 DIAGNOSIS — M54.50 CHRONIC BILATERAL LOW BACK PAIN WITHOUT SCIATICA: ICD-10-CM

## 2023-07-28 DIAGNOSIS — M53.3 CHRONIC SACROILIAC JOINT PAIN: ICD-10-CM

## 2023-07-28 DIAGNOSIS — Z98.1 HISTORY OF LUMBAR FUSION: ICD-10-CM

## 2023-07-28 DIAGNOSIS — G89.29 CHRONIC SACROILIAC JOINT PAIN: ICD-10-CM

## 2023-07-28 PROCEDURE — 64483 NJX AA&/STRD TFRM EPI L/S 1: CPT | Mod: RT | Performed by: PAIN MEDICINE

## 2023-07-28 RX ORDER — DEXAMETHASONE SODIUM PHOSPHATE 10 MG/ML
10 INJECTION, SOLUTION INTRAMUSCULAR; INTRAVENOUS ONCE
Status: COMPLETED | OUTPATIENT
Start: 2023-07-28 | End: 2023-07-28

## 2023-07-28 RX ADMIN — DEXAMETHASONE SODIUM PHOSPHATE 10 MG: 10 INJECTION, SOLUTION INTRAMUSCULAR; INTRAVENOUS at 10:04

## 2023-07-28 ASSESSMENT — PAIN SCALES - GENERAL
PAINLEVEL: NO PAIN (1)
PAINLEVEL: SEVERE PAIN (6)

## 2023-07-28 NOTE — PROGRESS NOTES
Pre procedure Diagnosis: lumbar radiculopathy, lumbar degenerative disc disease   Post procedure Diagnosis: Same  Procedure performed: lumbar transforaminal epidural steroid injection at right L2-3, fluoroscopically guided, contrast controlled  Anesthesia: none  Complications: none  Operators: Florencio Watson MD     Indications:   Carli Monreal is a 73 year old female.  They have a history of right lbp rad to her le.  Exam shows neg slump and they have tried conservative treatment including meds/pt/surgery.    MRI reviewed  Options/alternatives, benefits and risks were discussed with the patient including bleeding, infection, tissue trauma, numbness, weakness, paralysis, spinal cord injury, radiation exposure, headache and reaction to medications. Questions were answered to her satisfaction and she agrees to proceed. Voluntary informed consent was obtained and signed.     Vitals were reviewed: Yes  BP (!) 145/66   Pulse 66   SpO2 99%   Allergies were reviewed:  Yes   Medications were reviewed:  Yes   Pre-procedure pain score: 6/10    Procedure:  After getting informed consent, patient was brought into the procedure suite and was placed in a prone position on the procedure table.   A Pause for the Cause was performed.  Patient was prepped and draped in sterile fashion.     After identifying the right L2-3 neuroforamen, the C-arm was rotated to a right lateral oblique angle.  A total of 4ml of Lidocaine 1% was used to anesthetize the skin and the needle track at a skin entry site coaxial with the fluoroscopy beam, and overriding the superior aspect of the neuroforamen.  A 22 gauge 3.5 inch spinal needle was advanced under intermittent fluoroscopy until it entered the foramen superiorly.    The position was then inspected from anteroposterior and lateral views, and the needle adjusted appropriately.  A total of 2ml of Omnipaque-300 was injected, confirming appropriate position, with spread into the nerve root  sheath and the epidural space, with no intravascular uptake. 8ml was wasted    Then, after repeated negative aspiration, a combination of Decadron 10 mg, 0.25% bupivacaine 2 ml, diluted with 3ml of normal saline was injected.     Hemostasis was achieved, the area was cleaned, and bandaids were placed when appropriate.  The patient tolerated the procedure well, and was taken to the recovery room.    Images were saved to PACS.    Post-procedure pain score: 1/10  Follow-up includes:   -f/u phone call in one week  -f/u with referring provider    Florencio Watson MD  [facility] [place]

## 2023-07-28 NOTE — PATIENT INSTRUCTIONS
[facility]   Procedure Discharge Instructions    Today you saw:      Dr. Florencio Watson,       You had an:  Epidural steroid injection       Medications used:  Lidocaine   Bupivacaine   Dexamethasone Omnipaque  Ropivicaine   Kenalog   Gadolinium  Normal saline          Be cautious when walking. Numbness and/or weakness in the lower extremities may occur for up to 6-8 hours after the procedure due to effect of the local anesthetic  Do not drive for 6 hours. The effect of the local anesthetic could slow your reflexes.   You may resume your regular activities after 24 hours  Avoid strenuous activity for the first 24 hours  You may shower, however avoid swimming, tub baths or hot tubs for 24 hours following your procedure  You may have a mild to moderate increase in pain for several days following the injection.  It may take up to 14 days for the steroid medication to start working although you may feel the effect as early as a few days after the procedure.     You may use ice packs for 10-15 minutes, 3 to 4 times a day at the injection site for comfort  Do not use heat to painful areas for 6 to 8 hours. This will give the local anesthetic time to wear off and prevent you from accidentally burning your skin.   Unless you have been directed to avoid the use of anti-inflammatory medications (NSAIDS), you may use medications such as ibuprofen, Aleve or Tylenol for pain control if needed.   If you have diabetes, check your blood sugar more frequently than usual as your blood sugar may be higher than normal for 10-14 days following a steroid injection. Contact your doctor who manages your diabetes if your blood sugar is higher than usual  Possible side effects of steroids that you may experience include flushing, elevated blood pressure, increased appetite, mild headaches and restlessness.  All of these symptoms will get better with time.  If you experience any of the following, call the Pain  Clinic during work hours (Mon-Friday 8-4:30 pm) at [phone] or the Provider Line after hours at [phone]:  -Fever over 100 degree F  -Swelling, bleeding, redness, drainage, warmth at the injection site  -Progressive weakness or numbness in your legs   -Loss of bowel or bladder function  -Unusual new onset of pain that is not improving

## 2023-07-28 NOTE — NURSING NOTE
Discharge Information    IV Discontiued Time:  NA    Amount of Fluid Infused:  NA    Discharge Criteria = When patient returns to baseline or as per MD order    Consciousness:  Pt is fully awake    Circulation:  BP +/- 20% of pre-procedure level    Respiration:  Patient is able to breathe deeply    O2 Sat:  Patient is able to maintain O2 Sat >92% on room air    Activity:  Moves 4 extremities on command    Ambulation:  Patient is able to stand and walk or stand and pivot into wheelchair    Dressing:  Clean/dry or No Dressing    Notes:   Discharge instructions and AVS given to patient    Patient meets criteria for discharge?  YES    Admitted to PCU?  No    Responsible adult present to accompany patient home?  Yes    Signature/Title:    Izzy Land RN  RN Care Coordinator  [facility]

## 2023-07-28 NOTE — NURSING NOTE
Pre-procedure Intake  If YES to any questions or NO to having a   Please complete laminated checklist and leave on the computer keyboard for Provider, verbally inform provider if able.    For SCS Trial, RFA's or any sedation procedure:  Have you been fasting? NA  If yes, for how long?     Are you taking any any blood thinners such as Coumadin, Warfarin, Jantoven, Pradaxa Xarelto, Eliquis, Edoxaban, Enoxaparin, Lovenox, Heparin, Arixtra, Fondaparinux, or Fragmin? OR Antiplatelet medication such as Plavix, Brilinta, or Effient?   No   If yes, when did you take your last dose?     Do you take aspirin?  No  If cervical procedure, have you held aspirin for 6 days?   NA    Do you have any allergies to contrast dye, iodine, steroid and/or numbing medications?  NO    Are you currently taking antibiotics or have an active infection?  NO    Have you had a fever/elevated temperature within the past week? NO    Are you currently taking oral steroids? NO    Do you have a ? Yes    Are you pregnant or breastfeeding?  NO    Have you received the COVID-19 vaccine? Yes  If yes, was it your 1st, 2nd or only dose needed? 2nd dose and booster  Date of most recent vaccine: 04/23/2023    Notify provider and RNs if systolic BP >170, diastolic BP >100, P >100 or O2 sats < 90%     Ni Strong MA  [facility]

## 2023-07-29 ENCOUNTER — OFFICE VISIT (OUTPATIENT)
Dept: URGENT CARE | Facility: URGENT CARE | Age: 73
End: 2023-07-29
Payer: COMMERCIAL

## 2023-07-29 ENCOUNTER — E-VISIT (OUTPATIENT)
Dept: FAMILY MEDICINE | Facility: CLINIC | Age: 73
End: 2023-07-29
Payer: COMMERCIAL

## 2023-07-29 VITALS
RESPIRATION RATE: 19 BRPM | WEIGHT: 159.8 LBS | BODY MASS INDEX: 32.28 KG/M2 | HEART RATE: 68 BPM | SYSTOLIC BLOOD PRESSURE: 171 MMHG | OXYGEN SATURATION: 100 % | DIASTOLIC BLOOD PRESSURE: 71 MMHG | TEMPERATURE: 96.9 F

## 2023-07-29 DIAGNOSIS — R30.0 DYSURIA: ICD-10-CM

## 2023-07-29 DIAGNOSIS — N30.90 BLADDER INFECTION: Primary | ICD-10-CM

## 2023-07-29 DIAGNOSIS — N30.01 ACUTE CYSTITIS WITH HEMATURIA: Primary | ICD-10-CM

## 2023-07-29 LAB
ALBUMIN UR-MCNC: 100 MG/DL
APPEARANCE UR: ABNORMAL
BACTERIA #/AREA URNS HPF: ABNORMAL /HPF
BILIRUB UR QL STRIP: NEGATIVE
COLOR UR AUTO: YELLOW
GLUCOSE UR STRIP-MCNC: NEGATIVE MG/DL
HGB UR QL STRIP: ABNORMAL
KETONES UR STRIP-MCNC: NEGATIVE MG/DL
LEUKOCYTE ESTERASE UR QL STRIP: ABNORMAL
NITRATE UR QL: NEGATIVE
PH UR STRIP: 5.5 [PH] (ref 5–7)
RBC #/AREA URNS AUTO: ABNORMAL /HPF
SP GR UR STRIP: 1.01 (ref 1–1.03)
SQUAMOUS #/AREA URNS AUTO: ABNORMAL /LPF
UROBILINOGEN UR STRIP-ACNC: 0.2 E.U./DL
WBC #/AREA URNS AUTO: >100 /HPF
WBC CLUMPS #/AREA URNS HPF: PRESENT /HPF

## 2023-07-29 PROCEDURE — 87086 URINE CULTURE/COLONY COUNT: CPT | Performed by: PHYSICIAN ASSISTANT

## 2023-07-29 PROCEDURE — 99213 OFFICE O/P EST LOW 20 MIN: CPT | Performed by: PHYSICIAN ASSISTANT

## 2023-07-29 PROCEDURE — 81001 URINALYSIS AUTO W/SCOPE: CPT | Performed by: PHYSICIAN ASSISTANT

## 2023-07-29 PROCEDURE — 99421 OL DIG E/M SVC 5-10 MIN: CPT | Performed by: FAMILY MEDICINE

## 2023-07-29 RX ORDER — CEPHALEXIN 500 MG/1
500 CAPSULE ORAL 4 TIMES DAILY
Qty: 28 CAPSULE | Refills: 0 | Status: SHIPPED | OUTPATIENT
Start: 2023-07-29 | End: 2023-08-05

## 2023-07-29 ASSESSMENT — ENCOUNTER SYMPTOMS
DYSURIA: 1
DIFFICULTY URINATING: 0
FLANK PAIN: 0
GASTROINTESTINAL NEGATIVE: 1
CONSTITUTIONAL NEGATIVE: 1
FREQUENCY: 1
HEMATURIA: 0

## 2023-07-29 NOTE — PROGRESS NOTES
Assessment & Plan     (N30.01) Acute cystitis with hematuria  (primary encounter diagnosis)  Comment: The patient is afebrile, in no apparent distress.  No symptoms consistent with pyelonephritis today.  Blood pressure was elevated but the patient has no symptoms of ACS today.  Plan: cephALEXin (KEFLEX) 500 MG capsule              Urinalysis showed the presence of blood, large leukocyte esterace elevated protein albumin,, moderate bacteria, elevated WBCs and RBCs and WBC clumps, concerning for urinary tract infection.  Urine culture results were inconclusive, showed a mixture of urogenital enoch.    Recommend the following cares and precautions. Rest/hydrate.  Push fluids, avoid caffeine and alcohol. Follow up in 2-3 days with PCP if symptoms persist.     Avoid having a full bladder. Avoid feminine hygiene sprays and douches. Avoid bubble baths/bath oils, and food and beverages that may irritate the bladder.  Antibiotics are still necessary to treat acute bacterial cystitis even if taking cranberry juice or tablets.        Take all of the antibiotic, even if you begin to feel better. Drink cranberry juice.   Use OTC AZO for urinary relief for temporary relief of UTI symptoms for 2 days. This will discolor your urine.     Void after sexual activity.  Wipe front to back after using the bathroom.  Stay adequately hydrated (8-10, 8-ounce glasses of water daily).     If the treatment isn't working, your symptoms will stay the same, get worse, or you will develop new symptoms. Follow-up urgently if you develop a fever (higher than 100.5 degrees), chills, body aches, flank pain, lower stomach pain, nausea/ vomiting, and blood in the urine. You should also follow-up, after taking medicine for 3 days if you still have a burning feeling when you urinate.       (R30.0) Dysuria  Comment:   Plan: UA Macroscopic with reflex to Microscopic and         Culture - Lab Collect, Urine Microscopic Exam,         Urine Culture, Urine  Culture Aerobic Bacterial                         No follow-ups on file.    Gera Hernandez PA-C  [facility] [place] Boyce is a 73 year old, presenting for the following health issues:  Urinary Problem (Having urgency and not able to void much and it burns.  Had a UTI in June and was given nitrofurantoin/Phenazopyridine.)      HPI   The patient is a 73-year-old female with a past medical history of hypertension, rheumatoid arthritis, Crohn's disease, osteoarthritis, moderate persistent asthma and type 2 diabetes who presents for evaluation of UTI symptoms which have been worsening over the past 2 days.  Was treated for UTI in June 2023 with nitrofurantoin and pyridium with good relief of symptoms.  Now has burning with urination and a feeling of urgency.  Unable to void much because it burns.  She has increased her fluid intake, had to drink 2 large glasses of water to be able to leave a urine sample in clinic.  No other treatments tried for relief of current symptoms.  She denies any hematuria, back pain or flank pain, fevers, body aches or rigors, abdominal pain, nausea or vomiting, pelvic pain or pressure, vaginal bleeding or discharge.            Review of Systems   Constitutional: Negative.    Gastrointestinal: Negative.    Genitourinary:  Positive for dysuria, frequency and urgency. Negative for decreased urine volume, difficulty urinating, dyspareunia, enuresis, flank pain, genital sores, hematuria, menstrual problem, pelvic pain, vaginal bleeding, vaginal discharge and vaginal pain.            Objective    BP (!) 171/71 (BP Location: Left arm, Patient Position: Sitting, Cuff Size: Adult Regular)   Pulse 68   Temp 96.9  F (36.1  C) (Tympanic)   Resp 19   Wt 72.5 kg (159 lb 12.8 oz)   SpO2 100%   BMI 32.28 kg/m    Body mass index is 32.28 kg/m .  Physical Exam  Vitals reviewed.   Constitutional:       General: She is not in acute distress.     Appearance: Normal  appearance. She is not ill-appearing, toxic-appearing or diaphoretic.   HENT:      Head: Normocephalic and atraumatic.      Mouth/Throat:      Mouth: Mucous membranes are moist.      Pharynx: Oropharynx is clear. No oropharyngeal exudate or posterior oropharyngeal erythema.   Cardiovascular:      Rate and Rhythm: Normal rate and regular rhythm.      Pulses: Normal pulses.      Heart sounds: Normal heart sounds. No murmur heard.  Pulmonary:      Effort: Pulmonary effort is normal. No respiratory distress.      Breath sounds: Normal breath sounds. No stridor. No wheezing, rhonchi or rales.   Chest:      Chest wall: No tenderness.   Abdominal:      General: Abdomen is flat. Bowel sounds are normal. There is no distension.      Palpations: Abdomen is soft.      Tenderness: There is no abdominal tenderness. There is no right CVA tenderness, left CVA tenderness or guarding.   Musculoskeletal:      Cervical back: Neck supple. No rigidity or tenderness. No muscular tenderness.   Lymphadenopathy:      Cervical: No cervical adenopathy.      Right cervical: No superficial, deep or posterior cervical adenopathy.     Left cervical: No superficial, deep or posterior cervical adenopathy.   Neurological:      Mental Status: She is alert and oriented to person, place, and time.      Sensory: No sensory deficit.

## 2023-07-30 DIAGNOSIS — E11.9 TYPE 2 DIABETES MELLITUS WITHOUT COMPLICATION, WITHOUT LONG-TERM CURRENT USE OF INSULIN (H): ICD-10-CM

## 2023-07-31 ENCOUNTER — TELEPHONE (OUTPATIENT)
Dept: FAMILY MEDICINE | Facility: CLINIC | Age: 73
End: 2023-07-31
Payer: COMMERCIAL

## 2023-07-31 LAB — BACTERIA UR CULT: NORMAL

## 2023-07-31 RX ORDER — LANCETS 33 GAUGE
EACH MISCELLANEOUS
Qty: 100 EACH | Refills: 0 | Status: SHIPPED | OUTPATIENT
Start: 2023-07-31 | End: 2023-10-23

## 2023-07-31 RX ORDER — NITROFURANTOIN 25; 75 MG/1; MG/1
100 CAPSULE ORAL 2 TIMES DAILY
Qty: 14 CAPSULE | Refills: 0 | Status: SHIPPED | OUTPATIENT
Start: 2023-07-31 | End: 2023-08-05

## 2023-07-31 RX ORDER — BLOOD SUGAR DIAGNOSTIC
STRIP MISCELLANEOUS
Qty: 200 STRIP | Refills: 0 | Status: SHIPPED | OUTPATIENT
Start: 2023-07-31 | End: 2024-02-06

## 2023-07-31 NOTE — PATIENT INSTRUCTIONS
Dear Carli Monreal    After reviewing your responses, I've been able to diagnose you with a urinary tract infection, which is a common infection of the bladder with bacteria.  This is not a sexually transmitted infection, though urinating immediately after intercourse can help prevent infections.  Drinking lots of fluids is also helpful to clear your current infection and prevent the next one.      I have sent a prescription for antibiotics to your pharmacy to treat this infection.    It is important that you take all of your prescribed medication even if your symptoms are improving after a few doses.  Taking all of your medicine helps prevent the symptoms from returning.     If your symptoms worsen, you develop pain in your back or stomach, develop fevers, or are not improving in 5 days, please contact your primary care provider for an appointment or visit any of our convenient Walk-in or Urgent Care Centers to be seen, which can be found on our website here.    Thanks again for choosing us as your health care partner,    Iza Cage, DO

## 2023-07-31 NOTE — TELEPHONE ENCOUNTER
[facility] calling regarding rx nitrofurantoin 1 tab twice a day for 5 days disp 14 tabs.      Rx doesn't match with amount to be filled.    Verified that pt should take rx BID for 5 days = 10 total tabs     Pharmacy will make change to the order.        Judith Acosta RN

## 2023-08-19 ENCOUNTER — MYC MEDICAL ADVICE (OUTPATIENT)
Dept: FAMILY MEDICINE | Facility: CLINIC | Age: 73
End: 2023-08-19
Payer: COMMERCIAL

## 2023-08-19 DIAGNOSIS — G89.29 CHRONIC BILATERAL LOW BACK PAIN, UNSPECIFIED WHETHER SCIATICA PRESENT: ICD-10-CM

## 2023-08-19 DIAGNOSIS — M54.50 CHRONIC BILATERAL LOW BACK PAIN, UNSPECIFIED WHETHER SCIATICA PRESENT: ICD-10-CM

## 2023-08-20 ENCOUNTER — HEALTH MAINTENANCE LETTER (OUTPATIENT)
Age: 73
End: 2023-08-20

## 2023-08-21 ENCOUNTER — TELEPHONE (OUTPATIENT)
Dept: ANESTHESIOLOGY | Facility: CLINIC | Age: 73
End: 2023-08-21
Payer: COMMERCIAL

## 2023-08-21 RX ORDER — GABAPENTIN 100 MG/1
100 CAPSULE ORAL 3 TIMES DAILY
Qty: 90 CAPSULE | Refills: 2 | Status: SHIPPED | OUTPATIENT
Start: 2023-08-21 | End: 2024-05-31

## 2023-08-21 NOTE — TELEPHONE ENCOUNTER
Pt sees Dr. Newton at [facility].       7/28/23: right L2-3 transforaminal THOMAS with Dr. Watson.    Routed to their team to address.    Jazmin RN-BSN  [facility]   [phone]

## 2023-08-21 NOTE — TELEPHONE ENCOUNTER
The order placed on 7/20 was the result of an 'order TBD' written by neurosurgery, Britany Rosales NP after 7/10 OV with [facility].    Pt has since seen and established care with [place]jesus [place]12[place]23 [place] [facility] [place].    This pt has never been seen or clinically followed by Dr Rodas. We are happy to see pt for injections if she wishes to have them in [place] but will need an order from her pain management provider to do so.    Her order was completed in 7/28 with Shirley in [place], there are no longer active injection orders to fulfill    Marina GAMEZ RN Care Coordinator  [facility]

## 2023-08-21 NOTE — TELEPHONE ENCOUNTER
[facility] Call Center    Phone Message    May a detailed message be left on voicemail: yes     Reason for Call: Other: Carli calling to request a call back to discuss her injection and gabapentin prescription. Carli stated that if she is not available for a call back and her spouse, Amilcar answers, you may talk to him.     Action Taken: Message routed to:  Other: [facility]    Travel Screening: Not Applicable

## 2023-08-21 NOTE — TELEPHONE ENCOUNTER
Routing [employer] message to PCP    Pt requesting increased Gabapentin 100mg TID      7/12  - Schedule a right Transforaminal epidural lumbar steroid injection with Dr. Rodas  - Continue Gabapentin 100 mg TID  - Short course of Tramadol as bridge until procedure  - Take ibuprofen as needed 600 mg Q6hr               - with patient's hx of crohn's she was counseled to stop ibuprofen if she notices any abdominal irritation/pain  - New Referral for physical therapy      Procedure injection was done 7/28    Post-procedure pain score: 1/10  Follow-up includes:   -f/u phone call in one week  -f/u with referring provider     Florencio Watson MD  [facility]      CHARLES Wong    Triage Nurse  [facility]

## 2023-08-21 NOTE — TELEPHONE ENCOUNTER
RN spoke with patient regarding her injection and gabapentin. Patient reports her gabapentin prescription did not reflect taking it TID as recommended. Patient obtained new prescription from PCP. Patient had THOMAS completed with Dr. Watson. Patient reports some relief from the injection and is also finding additional relief from increasing gabapentin. Writer assisted to schedule follow up appointment on 9/29. Writer advised patient to contact clinic if she has any further questions or concerns. Patient understood and agreed.    Ni Maxwell RNCC

## 2023-09-29 ENCOUNTER — OFFICE VISIT (OUTPATIENT)
Dept: ANESTHESIOLOGY | Facility: CLINIC | Age: 73
End: 2023-09-29
Payer: COMMERCIAL

## 2023-09-29 VITALS
OXYGEN SATURATION: 95 % | HEART RATE: 81 BPM | DIASTOLIC BLOOD PRESSURE: 65 MMHG | HEIGHT: 59 IN | BODY MASS INDEX: 32.05 KG/M2 | SYSTOLIC BLOOD PRESSURE: 149 MMHG | WEIGHT: 159 LBS

## 2023-09-29 DIAGNOSIS — M79.18 PIRIFORMIS MUSCLE PAIN: Primary | ICD-10-CM

## 2023-09-29 DIAGNOSIS — G89.29 CHRONIC MIDLINE LOW BACK PAIN WITHOUT SCIATICA: ICD-10-CM

## 2023-09-29 DIAGNOSIS — M54.50 CHRONIC MIDLINE LOW BACK PAIN WITHOUT SCIATICA: ICD-10-CM

## 2023-09-29 DIAGNOSIS — Z98.1 HISTORY OF LUMBAR FUSION: ICD-10-CM

## 2023-09-29 PROCEDURE — 99214 OFFICE O/P EST MOD 30 MIN: CPT | Mod: GC | Performed by: ANESTHESIOLOGY

## 2023-09-29 ASSESSMENT — PAIN SCALES - GENERAL: PAINLEVEL: WORST PAIN (10)

## 2023-09-29 NOTE — NURSING NOTE
Patient presents with:  Follow Up: Follow-up Lower Back, Hip Pain      Worst Pain (10)         What medications are you using for pain? Nothing    (New patients only) Have you been seen by another pain clinic/ provider? no    (Return Patients only) What refills are you needing today? No    Not taking the Gabapentin

## 2023-09-29 NOTE — LETTER
9/29/2023       RE: Carli Monreal  [address]  [place] [address]       Dear Colleague,    Thank you for referring your patient, Carli Monreal, to the [facility] [place] at [facility]. Please see a copy of my visit note below.    [facility]    Date of visit: 9/29/2023    Chief complaint:   Chief Complaint   Patient presents with    Follow Up     Follow-up Lower Back, Hip Pain       Interval history:  Carli Monreal was last seen by Dr. Dejesus on 7/12/23.      Recommendations/plan at the last visit included:  The following recommendations were given to the patient. Diagnosis, treatment options, risks, benefits, and alternatives were discussed, and all questions were answered. The patient expressed understanding of the plan for management.      The patient is a 74 y/o F with long hx of chronic back pain who presents one week of acute lower back pain worst on her right side. The patient had a recent Lumbar MRI which illustrated multilevel lumbar spondylosis, most pronounced at L2-3 where there  is moderate spinal canal and moderate right neural foraminal stenosis. Based on these finding Dr. Rodas ordered for the patient undergo a right Transforaminal epidural lumbar steroid injection. In addition based on physical exam there is evidence that some of the patient's pain is myofascial in nature as she had some trigger points in her right lower back. Otherwise, the patient today was counseled to take her gabapentin as prescribed 100 mg TID. While she waits for her lumbar steroid injection the patient will be prescribed a short course of tramadol as this is currently helping her pain. Lastly a new referral for PT was determined to be beneficial.     Plan:   - Schedule a right Transforaminal epidural lumbar steroid injection with Dr. Rodas  - Continue Gabapentin 100 mg TID  - Short  course of Tramadol as bridge until procedure  - Take ibuprofen as needed 600 mg Q6hr               - with patient's hx of crohn's she was counseled to stop ibuprofen if she notices any abdominal irritation/pain  - New Referral for physical therapy    Since her last visit, Carli Monreal reports:  The R L2/3 TF THOMAS was performed on 7/28/23. Reports that the steroid did take about 3 weeks to reach effect but did seem to improve her pain for about 1 week then slowly progressed and was about back to her baseline about 1 month ago. Continues to report R lower back and buttocks pain which is described as sharp and worse with any activity. Heat is most helpful for this. Had an exacerbation last night, tripped over a cord and fell onto her left side and has had exacerbation of pain. Denies radicular pain, numbness/tingling or weakness in the lower extremity, bowel or bladder changes.     Pain scores:  Pain intensity on average is 10 on a scale of 0-10.     Current pain treatments:   - Gabapentin 100 mg TID though she does not take this consistently as it is sedating for her.      Previous pain medications:  Opiates - none and Tramadol  Antiepileptics - gabapentin (Neurontin)  Antidepressants - none   NSAIDs - Apsirin  Muscle relaxants - none  Topicals - none     Past pain treatments:   Pain Clinic:  Yes - A Practice in [place]     PT:  Yes ( 5 years ago)  Psychologist:  No  Self-care:  Yes - physical activity        INJECTIONS:   Previous steroid injections    Medications:  Current Outpatient Medications   Medication Sig Dispense Refill    alcohol swab prep pads Use to swab area of injection/venkata as directed. 100 each 3    amLODIPine (NORVASC) 2.5 MG tablet Take 1 tablet (2.5 mg) by mouth daily Do not fill until contacted by patient 90 tablet 1    atorvastatin (LIPITOR) 10 MG tablet Take 1 tablet (10 mg) by mouth daily Do not fill until contacted by patient 90 tablet 3    blood glucose (ONETOUCH VERIO IQ) test strip USE TO  "TEST BLOOD GLUCOSE ONCE DAILY 200 strip 0    budesonide-formoterol (SYMBICORT) 160-4.5 MCG/ACT Inhaler Inhale 2 puffs into the lungs 2 times daily 10.2 g 4    Cyanocobalamin 3000 MCG SUBL Place 1,500 mg under the tongue daily       folic acid (FOLVITE) 1 MG tablet TAKE ONE TABLET BY MOUTH ONE TIME DAILY 90 tablet 0    gabapentin (NEURONTIN) 100 MG capsule Take 1 capsule (100 mg) by mouth 3 times daily 90 capsule 2    Lancets (ONETOUCH DELICA PLUS [id number]) MISC USE TO TEST BLOOD GLUCOSE ONCE DAILY 100 each 0    losartan (COZAAR) 100 MG tablet Take 1 tablet (100 mg) by mouth daily Do not fill until contacted by patient 90 tablet 1    metFORMIN (GLUCOPHAGE) 1000 MG tablet Take 1 tablet (1,000 mg) by mouth 2 times daily (with meals) Do not fill until contacted by patient 180 tablet 1    multivitamin w/minerals (THERA-VIT-M) tablet Take 1 tablet by mouth daily      potassium chloride ER (KLOR-CON) 20 MEQ CR tablet Take 1 tablet (20 mEq) by mouth daily 90 tablet 1    Vitamin D, Cholecalciferol, 25 MCG (1000 UT) TABS Take 2 tablets by mouth daily         Medical History: any changes in medical history since they were last seen? No    Review of Systems:  The 14 system ROS was reviewed from the intake questionnaire, and is positive for above and per below.    Physical Exam:  Blood pressure (!) 149/65, pulse 81, height 1.499 m (4' 11\"), weight 72.1 kg (159 lb), SpO2 95 %.  General: healthy, alert, and no distress  Gait: Antalgic, guarding R knee    Lumbar Spine , SIJ & Hip Exam:    Inspection:   Scar: midline lumbar scar c/w prior fusion  Posture: stooped    Palpation:   TTP over the R lumbar paraspinal muscles, R piriformis and gluteus muscles and R greater trochanter. Minimal TTP on the left side.     Lumbar Spine ROM  Limited d/t acute pain exacerbation following fall.     Hip ROM:   IR in neutral WNL      Hip Specific Exam:                     Right          Left  Supine limb roll:                        No pain       " No pain  CARIDAD:                                     positive     negative  FADIR:                                      positive     negative    SIJ provocation tests:                                Right            Left  Guero finger test / SIJ tenderness:              positive     negative  CARIDAD:                                                       positive     negative  Pelvic Distraction test:                                negative     negative    Nerve tension sign                   Right           Left  SLR/slump:                             negative     negative    Strength (in sitting)                 Right        Left  Hip Flexion (L1-2)                      5/5           5/5  Knee Extension (L3)                  5/5           5/5  Ankle Dorsiflexion (L4)               5/5           5/5  Ankle Plantarflexion                   5/5           5/5  1st MTP Extension (L5)             5/5           5/5  Ankle Eversion (S1)                   5/5           5/5      Sensation:    Right lower limb: Sensation intact to light touch over all dermatomes  Left lower limb : Sensation intact to light touch over all dermatomes      Reflexes:                          Right        Left  Patellar                               2/4          2/4  Achilles                               2/4          2/4  Clonus                             Absent    Absent     Assessment:   Low back pain  R buttocks pain  R piriformis strain  R hip extensor weakness  Myofascial pain    Carli Monreal is a 73 year old female who is seen at the pain clinic for chronic R low back and buttocks pain. She is seen in follow up following R L2 TF THOMAS injection at the end of July. Did not get any pain relief from this. Today her assessment is somewhat clouded by recent fall and acute pain d/t this. With that said, today most of her pain is muscular, located around the R lumbar paraspinals and R gluteal/piriformis region. There is palpable spasm in the R  piriformis muscle. Her response to heat, lack of neurologic deficit or neural tension signs, and lack of response to THOMAS makes pain seem to be more muscular/myofascial in nature rather than spine/radicular at this time.     Plan:  Physical Therapy:  Will refer to pain PT to work on Hip stretching, gluteal/hip extensor and core strengthening, and HEP. Could consider TPI if a possibility.   Diagnostic Studies:  None today  Medication Management: Recommend she trial 400mg ibuprofen TID. She will also cautiously try taking the prescribed gabapentin. She will start this at night first.   Further procedures recommended: R piriformis injection ordered. Will wait until the end of October for this given her recent injection at the end of July  Follow up: 2-3 months    Irineo Aguilar MD  Pain Fellow Physician  [facility]    I saw and examined the patient with the Pain Fellow/Resident. I have reviewed and agree with the resident's note and plan of care and made changes and corrections directly to the body of the note.    TIME SPENT:  BY FELLOW/RESIDENT ALONE 20 MIN  BY MYSELF AND FELLOW/RESIDENT TOGETHER 20 MIN            Again, thank you for allowing me to participate in the care of your patient.      Sincerely,    Teresa Dejesus MD

## 2023-09-29 NOTE — PROGRESS NOTES
[facility]    Date of visit: 9/29/2023    Chief complaint:   Chief Complaint   Patient presents with    Follow Up     Follow-up Lower Back, Hip Pain       Interval history:  Carli Monreal was last seen by Dr. Dejesus on 7/12/23.      Recommendations/plan at the last visit included:  The following recommendations were given to the patient. Diagnosis, treatment options, risks, benefits, and alternatives were discussed, and all questions were answered. The patient expressed understanding of the plan for management.      The patient is a 72 y/o F with long hx of chronic back pain who presents one week of acute lower back pain worst on her right side. The patient had a recent Lumbar MRI which illustrated multilevel lumbar spondylosis, most pronounced at L2-3 where there  is moderate spinal canal and moderate right neural foraminal stenosis. Based on these finding Dr. Rodas ordered for the patient undergo a right Transforaminal epidural lumbar steroid injection. In addition based on physical exam there is evidence that some of the patient's pain is myofascial in nature as she had some trigger points in her right lower back. Otherwise, the patient today was counseled to take her gabapentin as prescribed 100 mg TID. While she waits for her lumbar steroid injection the patient will be prescribed a short course of tramadol as this is currently helping her pain. Lastly a new referral for PT was determined to be beneficial.     Plan:   - Schedule a right Transforaminal epidural lumbar steroid injection with Dr. Rodas  - Continue Gabapentin 100 mg TID  - Short course of Tramadol as bridge until procedure  - Take ibuprofen as needed 600 mg Q6hr               - with patient's hx of crohn's she was counseled to stop ibuprofen if she notices any abdominal irritation/pain  - New Referral for physical therapy    Since her last visit, Carli Monreal reports:  The R L2/3 TF THOMAS was performed on 7/28/23. Reports  that the steroid did take about 3 weeks to reach effect but did seem to improve her pain for about 1 week then slowly progressed and was about back to her baseline about 1 month ago. Continues to report R lower back and buttocks pain which is described as sharp and worse with any activity. Heat is most helpful for this. Had an exacerbation last night, tripped over a cord and fell onto her left side and has had exacerbation of pain. Denies radicular pain, numbness/tingling or weakness in the lower extremity, bowel or bladder changes.     Pain scores:  Pain intensity on average is 10 on a scale of 0-10.     Current pain treatments:   - Gabapentin 100 mg TID though she does not take this consistently as it is sedating for her.      Previous pain medications:  Opiates - none and Tramadol  Antiepileptics - gabapentin (Neurontin)  Antidepressants - none   NSAIDs - Apsirin  Muscle relaxants - none  Topicals - none     Past pain treatments:   Pain Clinic:  Yes - A Practice in [place]     PT:  Yes ( 5 years ago)  Psychologist:  No  Self-care:  Yes - physical activity        INJECTIONS:   Previous steroid injections    Medications:  Current Outpatient Medications   Medication Sig Dispense Refill    alcohol swab prep pads Use to swab area of injection/venkata as directed. 100 each 3    amLODIPine (NORVASC) 2.5 MG tablet Take 1 tablet (2.5 mg) by mouth daily Do not fill until contacted by patient 90 tablet 1    atorvastatin (LIPITOR) 10 MG tablet Take 1 tablet (10 mg) by mouth daily Do not fill until contacted by patient 90 tablet 3    blood glucose (ONETOUCH VERIO IQ) test strip USE TO TEST BLOOD GLUCOSE ONCE DAILY 200 strip 0    budesonide-formoterol (SYMBICORT) 160-4.5 MCG/ACT Inhaler Inhale 2 puffs into the lungs 2 times daily 10.2 g 4    Cyanocobalamin 3000 MCG SUBL Place 1,500 mg under the tongue daily       folic acid (FOLVITE) 1 MG tablet TAKE ONE TABLET BY MOUTH ONE TIME DAILY 90 tablet 0    gabapentin (NEURONTIN) 100 MG  "capsule Take 1 capsule (100 mg) by mouth 3 times daily 90 capsule 2    Lancets (ONETOUCH DELICA PLUS [id number]) MISC USE TO TEST BLOOD GLUCOSE ONCE DAILY 100 each 0    losartan (COZAAR) 100 MG tablet Take 1 tablet (100 mg) by mouth daily Do not fill until contacted by patient 90 tablet 1    metFORMIN (GLUCOPHAGE) 1000 MG tablet Take 1 tablet (1,000 mg) by mouth 2 times daily (with meals) Do not fill until contacted by patient 180 tablet 1    multivitamin w/minerals (THERA-VIT-M) tablet Take 1 tablet by mouth daily      potassium chloride ER (KLOR-CON) 20 MEQ CR tablet Take 1 tablet (20 mEq) by mouth daily 90 tablet 1    Vitamin D, Cholecalciferol, 25 MCG (1000 UT) TABS Take 2 tablets by mouth daily         Medical History: any changes in medical history since they were last seen? No    Review of Systems:  The 14 system ROS was reviewed from the intake questionnaire, and is positive for above and per below.    Physical Exam:  Blood pressure (!) 149/65, pulse 81, height 1.499 m (4' 11\"), weight 72.1 kg (159 lb), SpO2 95 %.  General: healthy, alert, and no distress  Gait: Antalgic, guarding R knee    Lumbar Spine , SIJ & Hip Exam:    Inspection:   Scar: midline lumbar scar c/w prior fusion  Posture: stooped    Palpation:   TTP over the R lumbar paraspinal muscles, R piriformis and gluteus muscles and R greater trochanter. Minimal TTP on the left side.     Lumbar Spine ROM  Limited d/t acute pain exacerbation following fall.     Hip ROM:   IR in neutral WNL      Hip Specific Exam:                     Right          Left  Supine limb roll:                        No pain       No pain  CARIDAD:                                     positive     negative  FADIR:                                      positive     negative    SIJ provocation tests:                                Right            Left  Guero finger test / SIJ tenderness:              positive     negative  CARIDAD:                                                    "    positive     negative  Pelvic Distraction test:                                negative     negative    Nerve tension sign                   Right           Left  SLR/slump:                             negative     negative    Strength (in sitting)                 Right        Left  Hip Flexion (L1-2)                      5/5           5/5  Knee Extension (L3)                  5/5           5/5  Ankle Dorsiflexion (L4)               5/5           5/5  Ankle Plantarflexion                   5/5           5/5  1st MTP Extension (L5)             5/5           5/5  Ankle Eversion (S1)                   5/5           5/5      Sensation:    Right lower limb: Sensation intact to light touch over all dermatomes  Left lower limb : Sensation intact to light touch over all dermatomes      Reflexes:                          Right        Left  Patellar                               2/4          2/4  Achilles                               2/4          2/4  Clonus                             Absent    Absent     Assessment:   Low back pain  R buttocks pain  R piriformis strain  R hip extensor weakness  Myofascial pain    Carli Monreal is a 73 year old female who is seen at the pain clinic for chronic R low back and buttocks pain. She is seen in follow up following R L2 TF THOMAS injection at the end of July. Did not get any pain relief from this. Today her assessment is somewhat clouded by recent fall and acute pain d/t this. With that said, today most of her pain is muscular, located around the R lumbar paraspinals and R gluteal/piriformis region. There is palpable spasm in the R piriformis muscle. Her response to heat, lack of neurologic deficit or neural tension signs, and lack of response to THOMAS makes pain seem to be more muscular/myofascial in nature rather than spine/radicular at this time.     Plan:  Physical Therapy:  Will refer to pain PT to work on Hip stretching, gluteal/hip extensor and core strengthening, and HEP.  Could consider TPI if a possibility.   Diagnostic Studies:  None today  Medication Management: Recommend she trial 400mg ibuprofen TID. She will also cautiously try taking the prescribed gabapentin. She will start this at night first.   Further procedures recommended: R piriformis injection ordered. Will wait until the end of October for this given her recent injection at the end of July  Follow up: 2-3 months    Irineo Aguilar MD  Pain Fellow Physician  [facility] [place]    I saw and examined the patient with the Pain Fellow/Resident. I have reviewed and agree with the resident's note and plan of care and made changes and corrections directly to the body of the note.    TIME SPENT:  BY FELLOW/RESIDENT ALONE 20 MIN  BY MYSELF AND FELLOW/RESIDENT TOGETHER 20 MIN      Teresa Dejesus MD  Pain Medicine, Department of Anesthesiology  , [facility] [place]

## 2023-09-29 NOTE — PATIENT INSTRUCTIONS
Medications:    Take Ibuprofen as needed      Referrals:    Pain Physical Therapy Referral placed-   Call [phone] to schedule your appointment.        Procedures:    Call to schedule your procedure: [phone] option #2  Piriformis Muscle Injection     Your pre-procedure instructions are below, please call our clinic if you have any questions.      Recommended Follow up:      Follow up in 3 months as needed.       To speak with a nurse, schedule/reschedule/cancel a clinic appointment, or request a medication refill call: [phone].    You can also reach us by [employer]: [email or web address]      Procedure Information related to COVID-19     Please call [phone] option #2 to schedule, reschedule, or cancel your procedure appointment.   Phones are answered Monday - Friday from 08:00 - 4:30pm.  Leave a voicemail with your name, birth date, and phone number if no one is available to take your call.        You no longer need to test for COVID- 19 prior to your procedure/surgery, unless your physician specifically requests that you test. If you experience COVID symptoms or have tested positive for COVID-19 within 14 days of your scheduled surgery or procedure, please update our office right away and your procedure may have to be postponed.       The procedure center staff will call you several days before the procedure to review important information that you will need to know for the day of the procedure.     Please contact the clinic if you have further questions about this information [phone].        Information related to Scheduling and Pre-Procedure Instructions:    If you must reschedule your procedure more than two times, you must follow up in clinic before rescheduling again.    Preparing for your procedure    CAUTION - FAILURE TO FOLLOW THESE PRE-PROCEDURE INSTRUCTIONS WILL RESULT IN YOUR PROCEDURE BEING RESCHEDULED.    Your Procedure: Piriformis Muscle Injection         You must have a  take you home after your procedure. Transportation by taxi or para-transit is okay as long as you have a responsible adult accompany you. You must provide your 's full name and contact number at time of check in.     Fasting Protocol Please have nothing to eat or drink 1 hour prior to arrival.     Medications If you take any medications, DO NOT STOP. Take your medications as usual the day of your procedure with a sip of water AT LEAST 2 HOURS PRIOR TO ARRIVAL.    Antibiotics If you are currently taking antibiotics, you must complete the entire dose 7 days prior to your scheduled procedure. You must be clear of any signs or symptoms of infection. If you begin antibiotics, please contact our clinic for instructions.     Fever, Chills, or Rash If you experience a fever of higher than 100 degrees, chills, rash, or open wounds during the one week before your procedure, please call the clinic to see if you may proceed with your procedure.      Medication Hold List  **Patients under Cardiology/Neurology care should consult their provider prior to the pain procedure to verify pre-procedure medication instructions. The information below contains general guidelines.**        Blood Thinners If you are taking daily ASPIRIN, PLAVIX, OR OTHER BLOOD THINNERS SUCH AS COUMADIN/WARFARIN, we will need your prescribing doctor to sign a release permitting you to stop these medications. Once approved by your prescribing doctor - STOP ALL BLOOD THINNERS BASED ON THE TIME TABLE BELOW PRIOR TO YOUR PROCEDURE. If you have been instructed to stop WARFARIN(COUMADIN), you must have an INR lab drawn the day before your procedure. Your INR must be within normal limits before we can perform your injection. MEDICATIONS CAN BE RESTARTED AFTER YOUR PROCEDURE.    24 HOUR HOLD  Lovenox (enoxaparin)  Agrylin (Anagrelide)    3 DAY HOLD  Xarelto (rivaroxaban)    5 DAY HOLD  Coumadin (Warfarin)  Brilinta (ticagrelor) 7 DAY  HOLD  Anacin, Bufferin, Ecotrin, Excedrin, Aggrenox (Aspirin)  Pradexa (Dabigatran)  Elmiron (Pentosan)  Plavix (Clopidogrel Bisulfate)  Pletal (Cilostazol)    10 DAY HOLD  Effient (Prasugel)    14 DAY HOLD  Ticlid (ticlopidine)        Non-steroidal Anti-inflammatories (NSAIDs) DO NOT TAKE any non-steroidal anti-inflammatory medications (NSAIDs) listed on the table below. MEDICATIONS CAN BE RESTARTED AFTER YOUR PROCEDURE. Celebrex is OK to take and does not need to be discontinued.     Medications to stop:  1 DAY HOLD  Advil, Motrin (Ibuprofen)  Voltaren (Diclofenac)  Toradol (Ketorolac)    3 DAY HOLD  Arthrotec (diciofenac sodium/misoprostol)  Clinoril (Sulindac)  Indocin (Indomethacin)  Lodine (Etodolac)  Vicoprofen (Hydrocodone and Ibuprofen)  Apixaban (Eliquis)    4 DAY HOLD  Mobic (Meloxicam)  Naprosyn (Naproxen)   7 DAY HOLD  Aleve (Naproxen sodium)  Darvon compound (contains aspirin)  Norgesic Forte (contains aspirin)  Oruvall (Ketoprofen)  Percodan (contains aspirin)  Relafen (Nabumetone)  Salsalate  Trilisate  Vitamin E (more than 400 mg per day)  Any medication containing aspirin    14 DAY HOLD  Daypro (Oxaprozin)  Feldene (Piroxicam)            To speak with a nurse, schedule/reschedule/cancel a clinic appointment, or request a medication refill call: [phone]    You can also reach us by [employer]: [email or web address]

## 2023-10-02 ENCOUNTER — TELEPHONE (OUTPATIENT)
Dept: PALLIATIVE MEDICINE | Facility: CLINIC | Age: 73
End: 2023-10-02
Payer: COMMERCIAL

## 2023-10-02 ENCOUNTER — HOSPITAL ENCOUNTER (OUTPATIENT)
Facility: AMBULATORY SURGERY CENTER | Age: 73
Discharge: HOME | End: 2023-10-02
Attending: ANESTHESIOLOGY
Payer: COMMERCIAL

## 2023-10-02 PROBLEM — M79.18 PIRIFORMIS MUSCLE PAIN: Status: ACTIVE | Noted: 2023-09-29

## 2023-10-02 NOTE — CONFIDENTIAL NOTE
RN reviewed chart. Pre-procedure instructions sent via [employer] 9/29/23 after office visit.     Tiera Chaparro RN

## 2023-10-02 NOTE — TELEPHONE ENCOUNTER
Patient is scheduled for surgery with Dr. Dejesus    Spoke with: patient    Date of Surgery: 11/17/23    Location: [facility]    Informed patient they will need an adult  yes    Additional comments: patient is aware of date and time of the procedure.        Dorota Schneider MA on 10/2/2023 at 1:00 PM    
Inadequate Oral Intake

## 2023-10-03 ENCOUNTER — OFFICE VISIT (OUTPATIENT)
Dept: RHEUMATOLOGY | Facility: CLINIC | Age: 73
End: 2023-10-03
Attending: INTERNAL MEDICINE
Payer: COMMERCIAL

## 2023-10-03 ENCOUNTER — LAB (OUTPATIENT)
Dept: LAB | Facility: CLINIC | Age: 73
End: 2023-10-03
Payer: COMMERCIAL

## 2023-10-03 VITALS
OXYGEN SATURATION: 98 % | HEIGHT: 59 IN | DIASTOLIC BLOOD PRESSURE: 70 MMHG | SYSTOLIC BLOOD PRESSURE: 148 MMHG | HEART RATE: 71 BPM | WEIGHT: 158 LBS | BODY MASS INDEX: 31.85 KG/M2

## 2023-10-03 DIAGNOSIS — R30.0 DYSURIA: ICD-10-CM

## 2023-10-03 DIAGNOSIS — M25.561 ACUTE PAIN OF RIGHT KNEE: ICD-10-CM

## 2023-10-03 DIAGNOSIS — M15.9 GENERALIZED OSTEOARTHRITIS: ICD-10-CM

## 2023-10-03 DIAGNOSIS — M15.9 GENERALIZED OSTEOARTHRITIS: Primary | ICD-10-CM

## 2023-10-03 LAB
CRP SERPL-MCNC: <3 MG/L
ERYTHROCYTE [SEDIMENTATION RATE] IN BLOOD BY WESTERGREN METHOD: 22 MM/HR (ref 0–30)

## 2023-10-03 PROCEDURE — 36415 COLL VENOUS BLD VENIPUNCTURE: CPT | Performed by: PATHOLOGY

## 2023-10-03 PROCEDURE — 87086 URINE CULTURE/COLONY COUNT: CPT | Performed by: PHYSICIAN ASSISTANT

## 2023-10-03 PROCEDURE — 85652 RBC SED RATE AUTOMATED: CPT | Performed by: PATHOLOGY

## 2023-10-03 PROCEDURE — 86140 C-REACTIVE PROTEIN: CPT | Performed by: PATHOLOGY

## 2023-10-03 PROCEDURE — 99000 SPECIMEN HANDLING OFFICE-LAB: CPT | Performed by: PATHOLOGY

## 2023-10-03 PROCEDURE — G0463 HOSPITAL OUTPT CLINIC VISIT: HCPCS | Performed by: INTERNAL MEDICINE

## 2023-10-03 PROCEDURE — 99214 OFFICE O/P EST MOD 30 MIN: CPT | Performed by: INTERNAL MEDICINE

## 2023-10-03 ASSESSMENT — PAIN SCALES - GENERAL: PAINLEVEL: MODERATE PAIN (5)

## 2023-10-03 NOTE — PROGRESS NOTES
Ms. Carli Monreal returns for evaluation and management of Crohn's arthropathy. Joint disease is complicated by osteoarthritis. Failed sulfasalazine due to intolerance. No history of treatment with azathioprine. Previously treated with remicade and humira. No history of simponi use. Most recent treatment with methotrexate 15 mg weekly and Cimzia.           She complains of chronic back pains both in the lumbar region and between the shoulder blades. Gabapentin can be helpful but she doesn't use this regularly.    She has R>L hand pains focused on the thumbs. She can have finger triggering as well. She anticipates hand surgery this winter to alleviate some of this problem. She has pain at rest that worsens with use. Weather changes increase the pain. She feels that her wrist is swollen on the right but no redness or warmth.    She also describes R>L knee pain laterally but this is chronic. Finally, she has a history of SI joint disease.    Her Crohn's is currently pretty quiet but she requires chronic imodium treatment. She has not used immunosuppression for the past 2 years for her GI track. She fell a year ago but no recent fractures.    Past Medical Illness:  Medical-osteoarthritis, osteoporosis, Crohn's, rheumatoid arthritis, RBBB, emphysema, Graham's neuroma, vitamin D insuficfiency, type II diabetes, HTN, , chronic pain syndrome, lumbar DDD/DJD with chronic low back pain  Surgical-partial small bowel resection, lumbar spine fusion L4-5, surgery for tennis elbow, appendectomy, cholecystectomy, hernia repair, bilateral carpal tunnel release  Injuries-bilateral foot fractures, left ankle fracture, left patella fracture, whiplash injury    Active Problems:    Patient Active Problem List   Diagnosis    Moderate persistent asthma, unspecified whether complicated    Essential hypertension    Type 2 diabetes mellitus without complication, without long-term current use of insulin (H)    Rheumatoid arthritis involving  both hands, unspecified whether rheumatoid factor present (H)    Chronic bilateral low back pain, unspecified whether sciatica present    Arthropathy in Crohn's disease (H)    Crohn's disease of small intestine with complication (H)    Hypokalemia    Insomnia, unspecified type    Arthritis of cpqkqhrp-xbdpybtrc-jvtklrjbu joint of right hand    Arthritis of carpometacarpal (CMC) joint of right thumb    Primary osteoarthritis of both hands    Aftercare following surgery for injury and trauma    Pain in both hands    Thumb pain, right    Piriformis muscle pain     Allergies   Allergen Reactions    Seasonal Allergies      Social History:   with two children, former smoker, EtOH is 3 per week    Social History     Socioeconomic History    Marital status:      Spouse name: Not on file    Number of children: Not on file    Years of education: Not on file    Highest education level: Not on file   Occupational History    Not on file   Tobacco Use    Smoking status: Former     Types: Cigarettes     Quit date: 1991     Years since quittin.4     Passive exposure: Never    Smokeless tobacco: Never   Vaping Use    Vaping Use: Never used   Substance and Sexual Activity    Alcohol use: Yes     Comment: Wine- couple x/ week    Drug use: Never    Sexual activity: Not Currently     Partners: Male   Other Topics Concern    Parent/sibling w/ CABG, MI or angioplasty before 65F 55M? Not Asked   Social History Narrative    Not on file     Social Determinants of Health     Financial Resource Strain: Not on file   Food Insecurity: Not on file   Transportation Needs: Not on file   Physical Activity: Not on file   Stress: Not on file   Social Connections: Not on file   Interpersonal Safety: Not on file   Housing Stability: Not on file     Family History:  Aunt with RA  Mother dead with RA, CAD, MI, CHF, CKD  Father dead with A.fib, dementia  Brother dead with Agent Orange  Daughter with MS  Son with autism    PMSF  history personally obtained and updated by me this visit.    Review Of Systems:  +diarrhea  +asthma  +seasonal allergies  Remainder of the 14 point ROS obtained and found negative.    Physical Exam:  Constitutional: WD-WN-WG cooperative  Eyes: nl EOM, sclera  ENT: nl external ears, nose, hearing, lips  Neck: no visible thyroid enlargement  Resp:  Cardiac:  MS: +reduced neck rotation and flexion to 30 degrees, no extension; severe R>L CMC squaring with right sided tenderness and weakened ; bony swelling right wrist. Normal prayer but poor tuck right. Modest pain with full right knee ROM. No important SI joint tenderness. All other shoulder, elbow, wrist, hand joints were examined and otherwise found normal.  No active synovitis.  Skin: no alopecia, rash  Neuro: nl cranial nerves  Psych: nl judgement, affect.    Laboratory:      Impression:    History of chronic inflammatory polyarthritis-associated with Crohn's disease and involving spine, hands, knees, and feet. Today I observe no clear inflammatory arthropathy or active synovitis. Nonetheless, she is not currently treated with immunosuppression and systemic inflammation cannot be excluded. Plan to get inflammatory markers today as well as SI joint Xrays. I will write with results and schedule earlier follow up if it appears that immunosuppression could be beneficial.    Osteoarthritis-severe in the hands/wrists. Management in hand surgery clinic is anticipated.    Crohn's-I have recommended follow up in the GI clinic for diarrhea.    Chronic pain syndrome-with incomplete control on intermittent gabapentin. I have recommened that she use the gabapentin nightly and consider dose escalation to improve pain control.    Bone health-with frequent fractures and reportedly osteoporosis, s/p a single reclast treatment that she failed to tolerate well. Plan is to re-evaluate her bone care by DEXA and then consider an alternative such as Prolia in the primary care  clinic.    Plan follow up with me in 12 months.      A total of 43 minutes was spent in face to face patient interaction and chart review on the day of service.

## 2023-10-03 NOTE — NURSING NOTE
"Chief Complaint   Patient presents with    RECHECK     Follow-up      BP (!) 148/70   Pulse 71   Ht 1.499 m (4' 11\")   Wt 71.7 kg (158 lb)   SpO2 98%   BMI 31.91 kg/m    Amaris Soliman CMA   10/3/2023 9:59 AM    "

## 2023-10-03 NOTE — LETTER
10/3/2023       RE: Carli Monreal  [address]  [place] [address]     Dear Colleague,    Thank you for referring your patient, Carli Monreal, to the [facility] [place] [facility]. Please see a copy of my visit note below.      Ms. Carli Monreal returns for evaluation and management of Crohn's arthropathy. Joint disease is complicated by osteoarthritis. Failed sulfasalazine due to intolerance. No history of treatment with azathioprine. Previously treated with remicade and humira. No history of simponi use. Most recent treatment with methotrexate 15 mg weekly and Cimzia. No immunosuppressive agents at the moment.     She chronic sore on both hands R>L without AM stiffness, which does not disturb her daily activities.  She had R trapeziectomy in Jan 2023 for refractory R thumb pain from osteoarthritis/arthritis.    She has warm and mild swelling of her R knee at baseline. She tripped on an extension cord and fell on R knee 5 days ago, which made the pain and swelling worse. She had 1 fall last year in the bathroom. This is her 1st fall in 2023.    She has lower back pain and bilateral hip pain. She had lumbar fusion surgery and follow up with pain clinic. They felt that her pain is more muscle related at present and not from DJD or disc disease. She has PT and steroid injection scheduled for November.     Can walk a couple blocks before she needs to rest due to back pain. She denies to use a cane but sometimes use a scooter for transporting within her apartment. She lives on 3rd floor with elevator. She tries to avoid stairs.    Some soreness, no tingling or numbness on both feet, from DM neuropathy     She still has multiple loose stool without blood >3 BM per day. She uses 1-4 imodium per day for symptoms control.    Past Medical Illness:  Medical-osteoarthritis, osteoporosis, Crohn's, rheumatoid arthritis, RBBB,  emphysema, Graham's neuroma, vitamin D insuficfiency, type II diabetes, HTN, chronic pain syndrome, lumbar DDD/DJD with chronic low back pain  Surgical-partial small bowel resection, lumbar spine fusion L4-5, surgery for tennis elbow, appendectomy, cholecystectomy, hernia repair, bilateral carpal tunnel release  Injuries-bilateral foot fractures, left ankle fracture, left patella fracture, whiplash injury    Active Problems:    Patient Active Problem List   Diagnosis    Moderate persistent asthma, unspecified whether complicated    Essential hypertension    Type 2 diabetes mellitus without complication, without long-term current use of insulin (H)    Rheumatoid arthritis involving both hands, unspecified whether rheumatoid factor present (H)    Chronic bilateral low back pain, unspecified whether sciatica present    Arthropathy in Crohn's disease (H)    Crohn's disease of small intestine with complication (H)    Hypokalemia    Insomnia, unspecified type    Arthritis of cxnzxicx-zsyytevgh-aczfxjoxu joint of right hand    Arthritis of carpometacarpal (CMC) joint of right thumb    Primary osteoarthritis of both hands    Aftercare following surgery for injury and trauma    Pain in both hands    Thumb pain, right    Piriformis muscle pain     Allergies   Allergen Reactions    Seasonal Allergies      Social History:   with two children, former smoker, EtOH is 3 per week    Social History     Socioeconomic History    Marital status:      Spouse name: Not on file    Number of children: Not on file    Years of education: Not on file    Highest education level: Not on file   Occupational History    Not on file   Tobacco Use    Smoking status: Former     Types: Cigarettes     Quit date: 1991     Years since quittin.4     Passive exposure: Never    Smokeless tobacco: Never   Vaping Use    Vaping Use: Never used   Substance and Sexual Activity    Alcohol use: Yes     Comment: Wine- couple x/ week    Drug  use: Never    Sexual activity: Not Currently     Partners: Male   Other Topics Concern    Parent/sibling w/ CABG, MI or angioplasty before 65F 55M? Not Asked   Social History Narrative    Not on file     Social Determinants of Health     Financial Resource Strain: Not on file   Food Insecurity: Not on file   Transportation Needs: Not on file   Physical Activity: Not on file   Stress: Not on file   Social Connections: Not on file   Interpersonal Safety: Not on file   Housing Stability: Not on file     Family History:  Aunt with RA  Mother dead with RA, CAD, MI, CHF, CKD  Father dead with A.fib, dementia  Brother dead with Agent Orange  Daughter with MS  Son with autism    PMSF history personally obtained and updated by me this visit.    Review Of Systems:  +mild headache - sometimes need aspirin, less than once a month  +tinnitus, some difficulty hearing with high background noise   +mild dyspnea with fast-pace walking - albuterol as needed   +seasonal allergies  Remainder of the 14 point ROS obtained and found negative.    Physical Exam:  Constitutional: WD-WN-WG cooperative  Eyes: nl EOM, sclera  ENT: nl external ears, nose, hearing, lips  Neck: no visible thyroid enlargement  Resp: no dyspnea, clear both lung  Cardiac:regular, no murmur  MS: +reduced neck rotation and flexion; no swelling or tenderness and normal ROM of both hands, firm ; Normal prayer but poor tuck  Mild tenderness over lower lumbar and bilateral posterior hips, no SI joint tenderness  Hip - back pain with active hip flexion ~20* bilaterally  R hip - limit flexion 90 degrees  Rt knee - swelling anterior&posterior, tenderness along joint line R>L, pain with active ROM; no overt instability  Normal L knee without swelling or tenderness  All other shoulder, elbow, ankles joints were examined and otherwise found normal.  No active synovitis.  Skin: no alopecia, rash  Neuro: nl cranial nerves  Psych: nl judgement, affect    Laboratory:    Study  Result    Narrative & Impression   EXAM: XR SACROILIAC JOINT 1/2 VIEWS  12/13/2022 2:04 PM       HISTORY: Crohn's disease of small intestine with complication (H);  Arthropathy in Crohn's disease (H); Arthropathy in Crohn's disease (H)     COMPARISON: None     FINDINGS: Supine outlet and bilateral oblique views of the sacroiliac  joints.     Mild bilateral sacral joint degenerative change. No erosion or  ankylosis. Mild degenerative changes of the hips. L4-L5 fusion  instrumentation. Surgical suture projects of the right lower quadrant.  Calcified fibroid projects over the central pelvis.                                                                       IMPRESSION: No radiographic evidence of inflammatory sacroiliitis.           JHONNY GAGE MD (Joe)        Component      Latest Ref Rng 12/13/2022  1:39 PM   CRP Inflammation      <5.00 mg/L <3.00    Sed Rate      0 - 30 mm/hr 29      Component      Latest Ref Rng 1/3/2023  10:07 AM   WBC      4.0 - 11.0 10e3/uL 5.7    RBC Count      3.80 - 5.20 10e6/uL 3.89    Hemoglobin      11.7 - 15.7 g/dL 10.8 (L)    Hematocrit      35.0 - 47.0 % 34.0 (L)    MCV      78 - 100 fL 87    MCH      26.5 - 33.0 pg 27.8    MCHC      31.5 - 36.5 g/dL 31.8    RDW      10.0 - 15.0 % 12.9    Platelet Count      150 - 450 10e3/uL 181       Legend:  (L) Low    Component      Latest Ref Rng 5/8/2023  11:47 AM   Sodium      136 - 145 mmol/L 139    Potassium      3.4 - 5.3 mmol/L 3.6    Chloride      98 - 107 mmol/L 101    Carbon Dioxide (CO2)      22 - 29 mmol/L 23    Anion Gap      7 - 15 mmol/L 15    Urea Nitrogen      8.0 - 23.0 mg/dL 10.0    Creatinine      0.51 - 0.95 mg/dL 0.85    Calcium      8.8 - 10.2 mg/dL 8.9    Glucose      70 - 99 mg/dL 131 (H)    GFR Estimate      >60 mL/min/1.73m2 73         Impression:    History of chronic inflammatory polyarthritis-associated with Crohn's disease and involving spine, hands, knees, and feet with no immunosuppressant. She has severe  osteoarthritis on several joints (R thumb, wrists, lumbar spines). Review of x-rays of her right wrist, L spine, and SI joints shows degenerative changes. There are no evidence of inflammatory joint diseases (no syndesmosis of spine and preserve SI joint space). She has no clinical significant arthritis, no enthesitis, and no dactylitis. Her right pain can be explained by osteoarthritis with recent trauma. I will schedule orthopedic follow up to properly address her knee pain and swelling as this is likely a mechanical derangement. I don't think she has inflammatory arthritis associated with Crohn disease. I will sent inflammatory markers to confirm an absence of inflammatory process.      Crohn's- only symptomatic treatment with imodium    Chronic pain syndrome-manage with gabapentin and PT - follow with pain clinic    Bone health-with frequent fractures and reportedly osteoporosis.    Plan no follow up since this is less likely inflammatory arthritis, but patient can return PRN if evidence of systemic inflammatory disease was to reappear.      A total of 38 minutes was spent in face to face patient interaction and chart review on the day of service.      Again, thank you for allowing me to participate in the care of your patient.      Sincerely,    Mc Martinez MD

## 2023-10-03 NOTE — PROGRESS NOTES
Ms. Carli Monreal returns for evaluation and management of Crohn's arthropathy. Joint disease is complicated by osteoarthritis. Failed sulfasalazine due to intolerance. No history of treatment with azathioprine. Previously treated with remicade and humira. No history of simponi use. Most recent treatment with methotrexate 15 mg weekly and Cimzia. No immunosuppressive agents at the moment.     She chronic sore on both hands R>L without AM stiffness, which does not disturb her daily activities.  She had R trapeziectomy in Jan 2023 for refractory R thumb pain from osteoarthritis/arthritis.    She has warm and mild swelling of her R knee at baseline. She tripped on an extension cord and fell on R knee 5 days ago, which made the pain and swelling worse. She had 1 fall last year in the bathroom. This is her 1st fall in 2023.    She has lower back pain and bilateral hip pain. She had lumbar fusion surgery and follow up with pain clinic. They felt that her pain is more muscle related at present and not from DJD or disc disease. She has PT and steroid injection scheduled for November.     Can walk a couple blocks before she needs to rest due to back pain. She denies to use a cane but sometimes use a scooter for transporting within her apartment. She lives on 3rd floor with elevator. She tries to avoid stairs.    Some soreness, no tingling or numbness on both feet, from DM neuropathy     She still has multiple loose stool without blood >3 BM per day. She uses 1-4 imodium per day for symptoms control.    Past Medical Illness:  Medical-osteoarthritis, osteoporosis, Crohn's, rheumatoid arthritis, RBBB, emphysema, Graham's neuroma, vitamin D insuficfiency, type II diabetes, HTN, chronic pain syndrome, lumbar DDD/DJD with chronic low back pain  Surgical-partial small bowel resection, lumbar spine fusion L4-5, surgery for tennis elbow, appendectomy, cholecystectomy, hernia repair, bilateral carpal tunnel  release  Injuries-bilateral foot fractures, left ankle fracture, left patella fracture, whiplash injury    Active Problems:    Patient Active Problem List   Diagnosis    Moderate persistent asthma, unspecified whether complicated    Essential hypertension    Type 2 diabetes mellitus without complication, without long-term current use of insulin (H)    Rheumatoid arthritis involving both hands, unspecified whether rheumatoid factor present (H)    Chronic bilateral low back pain, unspecified whether sciatica present    Arthropathy in Crohn's disease (H)    Crohn's disease of small intestine with complication (H)    Hypokalemia    Insomnia, unspecified type    Arthritis of ctvlejct-lhozleyof-umdnldybb joint of right hand    Arthritis of carpometacarpal (CMC) joint of right thumb    Primary osteoarthritis of both hands    Aftercare following surgery for injury and trauma    Pain in both hands    Thumb pain, right    Piriformis muscle pain     Allergies   Allergen Reactions    Seasonal Allergies      Social History:   with two children, former smoker, EtOH is 3 per week    Social History     Socioeconomic History    Marital status:      Spouse name: Not on file    Number of children: Not on file    Years of education: Not on file    Highest education level: Not on file   Occupational History    Not on file   Tobacco Use    Smoking status: Former     Types: Cigarettes     Quit date: 1991     Years since quittin.4     Passive exposure: Never    Smokeless tobacco: Never   Vaping Use    Vaping Use: Never used   Substance and Sexual Activity    Alcohol use: Yes     Comment: Wine- couple x/ week    Drug use: Never    Sexual activity: Not Currently     Partners: Male   Other Topics Concern    Parent/sibling w/ CABG, MI or angioplasty before 65F 55M? Not Asked   Social History Narrative    Not on file     Social Determinants of Health     Financial Resource Strain: Not on file   Food Insecurity: Not on  file   Transportation Needs: Not on file   Physical Activity: Not on file   Stress: Not on file   Social Connections: Not on file   Interpersonal Safety: Not on file   Housing Stability: Not on file     Family History:  Aunt with RA  Mother dead with RA, CAD, MI, CHF, CKD  Father dead with A.fib, dementia  Brother dead with Agent Orange  Daughter with MS  Son with autism    PMSF history personally obtained and updated by me this visit.    Review Of Systems:  +mild headache - sometimes need aspirin, less than once a month  +tinnitus, some difficulty hearing with high background noise   +mild dyspnea with fast-pace walking - albuterol as needed   +seasonal allergies  Remainder of the 14 point ROS obtained and found negative.    Physical Exam:  Constitutional: WD-WN-WG cooperative  Eyes: nl EOM, sclera  ENT: nl external ears, nose, hearing, lips  Neck: no visible thyroid enlargement  Resp: no dyspnea, clear both lung  Cardiac:regular, no murmur  MS: +reduced neck rotation and flexion; no swelling or tenderness and normal ROM of both hands, firm ; Normal prayer but poor tuck  Mild tenderness over lower lumbar and bilateral posterior hips, no SI joint tenderness  Hip - back pain with active hip flexion ~20* bilaterally  R hip - limit flexion 90 degrees  Rt knee - swelling anterior&posterior, tenderness along joint line R>L, pain with active ROM; no overt instability  Normal L knee without swelling or tenderness  All other shoulder, elbow, ankles joints were examined and otherwise found normal.  No active synovitis.  Skin: no alopecia, rash  Neuro: nl cranial nerves  Psych: nl judgement, affect    Laboratory:    Study Result    Narrative & Impression   EXAM: XR SACROILIAC JOINT 1/2 VIEWS  12/13/2022 2:04 PM       HISTORY: Crohn's disease of small intestine with complication (H);  Arthropathy in Crohn's disease (H); Arthropathy in Crohn's disease (H)     COMPARISON: None     FINDINGS: Supine outlet and bilateral  oblique views of the sacroiliac  joints.     Mild bilateral sacral joint degenerative change. No erosion or  ankylosis. Mild degenerative changes of the hips. L4-L5 fusion  instrumentation. Surgical suture projects of the right lower quadrant.  Calcified fibroid projects over the central pelvis.                                                                       IMPRESSION: No radiographic evidence of inflammatory sacroiliitis.           JHONNY GAGE MD (Joe)        Component      Latest Ref Rng 12/13/2022  1:39 PM   CRP Inflammation      <5.00 mg/L <3.00    Sed Rate      0 - 30 mm/hr 29      Component      Latest Ref Rng 1/3/2023  10:07 AM   WBC      4.0 - 11.0 10e3/uL 5.7    RBC Count      3.80 - 5.20 10e6/uL 3.89    Hemoglobin      11.7 - 15.7 g/dL 10.8 (L)    Hematocrit      35.0 - 47.0 % 34.0 (L)    MCV      78 - 100 fL 87    MCH      26.5 - 33.0 pg 27.8    MCHC      31.5 - 36.5 g/dL 31.8    RDW      10.0 - 15.0 % 12.9    Platelet Count      150 - 450 10e3/uL 181       Legend:  (L) Low    Component      Latest Ref Rng 5/8/2023  11:47 AM   Sodium      136 - 145 mmol/L 139    Potassium      3.4 - 5.3 mmol/L 3.6    Chloride      98 - 107 mmol/L 101    Carbon Dioxide (CO2)      22 - 29 mmol/L 23    Anion Gap      7 - 15 mmol/L 15    Urea Nitrogen      8.0 - 23.0 mg/dL 10.0    Creatinine      0.51 - 0.95 mg/dL 0.85    Calcium      8.8 - 10.2 mg/dL 8.9    Glucose      70 - 99 mg/dL 131 (H)    GFR Estimate      >60 mL/min/1.73m2 73         Impression:    History of chronic inflammatory polyarthritis-associated with Crohn's disease and involving spine, hands, knees, and feet with no immunosuppressant. She has severe osteoarthritis on several joints (R thumb, wrists, lumbar spines). Review of x-rays of her right wrist, L spine, and SI joints shows degenerative changes. There are no evidence of inflammatory joint diseases (no syndesmosis of spine and preserve SI joint space). She has no clinical significant  arthritis, no enthesitis, and no dactylitis. Her right pain can be explained by osteoarthritis with recent trauma. I will schedule orthopedic follow up to properly address her knee pain and swelling as this is likely a mechanical derangement. I don't think she has inflammatory arthritis associated with Crohn disease. I will sent inflammatory markers to confirm an absence of inflammatory process.      Crohn's- only symptomatic treatment with imodium    Chronic pain syndrome-manage with gabapentin and PT - follow with pain clinic    Bone health-with frequent fractures and reportedly osteoporosis.    Plan no follow up since this is less likely inflammatory arthritis, but patient can return PRN if evidence of systemic inflammatory disease was to reappear.      A total of 38 minutes was spent in face to face patient interaction and chart review on the day of service.

## 2023-10-04 LAB — BACTERIA UR CULT: NORMAL

## 2023-10-16 DIAGNOSIS — T75.3XXA MOTION SICKNESS, INITIAL ENCOUNTER: ICD-10-CM

## 2023-10-16 RX ORDER — SCOLOPAMINE TRANSDERMAL SYSTEM 1 MG/1
1 PATCH, EXTENDED RELEASE TRANSDERMAL
Qty: 12 PATCH | Refills: 1 | Status: SHIPPED | OUTPATIENT
Start: 2023-10-16

## 2023-10-16 NOTE — TELEPHONE ENCOUNTER
Routing to PCP    Patient going on a trip in Nov and needs scopolamine.     Looks like this was ordered for patient back in 2022 for her motion sickness on her cruises that she goes on.     Is this something you would like the patient to make an appointment for?    RN pended med if pcp is agreeable.    Karie Browne RN

## 2023-10-16 NOTE — TELEPHONE ENCOUNTER
Medication Question or Refill    Contacts         Type Contact Phone/Fax    10/16/2023 09:47 AM CDT Phone (Incoming) DeCarli (Self) [phone] (H)            What medication are you calling about (include dose and sig)?: Transderm scop patches 12pack     Preferred Pharmacy:   [facility] [address] [place] [id number]  Phone: [phone] Fax: [phone]      Controlled Substance Agreement on file:   CSA -- Patient Level:    CSA: None found at the patient level.       Who prescribed the medication?:     Do you need a refill? Yes    When did you use the medication last? unknown    Patient offered an appointment? No    Do you have any questions or concerns?  Yes: Need them before November 4th going on trip       Could we send this information to you in [facility] or would you prefer to receive a phone call?:   Patient would prefer a phone call   Okay to leave a detailed message?: Yes at Home number on file [phone] ([place])

## 2023-10-19 ENCOUNTER — ANCILLARY PROCEDURE (OUTPATIENT)
Dept: GENERAL RADIOLOGY | Facility: CLINIC | Age: 73
End: 2023-10-19
Attending: FAMILY MEDICINE
Payer: COMMERCIAL

## 2023-10-19 ENCOUNTER — OFFICE VISIT (OUTPATIENT)
Dept: ORTHOPEDICS | Facility: CLINIC | Age: 73
End: 2023-10-19
Attending: INTERNAL MEDICINE
Payer: COMMERCIAL

## 2023-10-19 VITALS — HEIGHT: 59 IN | WEIGHT: 158 LBS | BODY MASS INDEX: 31.85 KG/M2

## 2023-10-19 DIAGNOSIS — M17.11 ARTHRITIS OF RIGHT KNEE: Primary | ICD-10-CM

## 2023-10-19 DIAGNOSIS — M25.561 ACUTE PAIN OF RIGHT KNEE: ICD-10-CM

## 2023-10-19 DIAGNOSIS — M15.9 GENERALIZED OSTEOARTHRITIS: ICD-10-CM

## 2023-10-19 PROCEDURE — 73562 X-RAY EXAM OF KNEE 3: CPT | Mod: TC | Performed by: RADIOLOGY

## 2023-10-19 PROCEDURE — 99214 OFFICE O/P EST MOD 30 MIN: CPT | Mod: 25 | Performed by: FAMILY MEDICINE

## 2023-10-19 PROCEDURE — 20611 DRAIN/INJ JOINT/BURSA W/US: CPT | Mod: RT | Performed by: FAMILY MEDICINE

## 2023-10-19 RX ADMIN — BETAMETHASONE SODIUM PHOSPHATE AND BETAMETHASONE ACETATE 6 MG: 3; 3 INJECTION, SUSPENSION INTRA-ARTICULAR; INTRALESIONAL; INTRAMUSCULAR; SOFT TISSUE at 14:03

## 2023-10-19 RX ADMIN — ROPIVACAINE HYDROCHLORIDE 4 ML: 5 INJECTION, SOLUTION EPIDURAL; INFILTRATION; PERINEURAL at 14:03

## 2023-10-19 NOTE — PROGRESS NOTES
ASSESSMENT & PLAN    Carli was seen today for pain.    Diagnoses and all orders for this visit:    Generalized osteoarthritis  -     Orthopedic  Referral    Acute pain of right knee  -     Orthopedic  Referral      This issue is acute and Worsening.    # Right Knee Arthritis: Carli Monreal  was seen today for right knee injury. Symptoms had been going on since 9/27/23 after a fall on right side. On examination there are positive findings of tenderness to palpation over the knee joint line. Imaging findings showed mild knee arthritis, mild knee effusion, no fracture. Likely cause of patient's condition due to flare of knee arthritis. Counseled patient on nature of condition and treatment options.  Given this plan as below, follow-up 1 mon as needed     Image Findings: right knee x-rays showing mild knee effusion, no fracture  Treatment: Activities as tolerated, home exercises given today  Medications/Injections: Limited tylenol/ibuprofen for pain for 1-2 weeks, Topical Voltaren gel, right knee aspiration/steroid injection  Follow-up: In one month if symptoms do not improve, sooner if worsening  Can consider gel injection      Artur Vergara MD  [facility] [place]    -----  Chief Complaint   Patient presents with    Right Knee - Pain       SUBJECTIVE  Carli Monreal is a/an 73 year old female who is seen in consultation at the request of  Mc Martinez M.D. for evaluation of right knee pain.     The patient is seen by themselves.      Onset: 9/27/23, 3 week(s) ago. Patient describes injury as fall onto right side.   Location of Pain: right knee, diffuse   Worsened by: kneeling, down stairs   Better with: rest   Treatments tried: rest/activity avoidance  Associated symptoms: swelling improving     Orthopedic/Surgical history: YES - chronic low back pain   Social History/Occupation:  new puppy     No family history pertinent to patient's problem today.      REVIEW  NO LABS   OF SYSTEMS:  Review of Systems  Constitutional, HEENT, cardiovascular, pulmonary, gi and gu systems are negative, except as otherwise noted.    OBJECTIVE:  There were no vitals taken for this visit.   General: healthy, alert and in no distress  HEENT: no scleral icterus or conjunctival erythema  Skin: no suspicious lesions or rash. No jaundice.  CV: distal perfusion intact    Resp: normal respiratory effort without conversational dyspnea   Psych: normal mood and affect  Gait: normal steady gait with appropriate coordination and balance   Neuro: Normal light sensory exam of right lower extremity     Ortho Exam   RIGHT KNEE  Inspection:    Normal alignment; no edema, erythema, or ecchymosis present  Palpation:    Tender about the lateral joint line and medial joint line. Remainder of bony and ligamentous landmarks are nontender.    Mild effusion is present    Patellofemoral crepitus is Present  Range of Motion:     00 extension to 1350 flexion  Strength:    Quadriceps 5/5, hamstrings 5/5, gastrocsoleus 5/5, and tibialis anterior 5/5    Extensor mechanism intact  Special Tests:    Positive: None    Negative: Patellar grind, MCL/valgus stress (0 & 30 deg), LCL/varus stress (0 & 30 deg), Lachman's, anterior drawer, posterior drawer    RADIOLOGY:  I independently ordered, visualized and reviewed these images with the patient  Mild right knee arthritis, mild knee efffusion.       Review of external notes as documented elsewhere in note  Review of the result(s) of each unique test - right knee x-rays       Disclaimer: This note consists of symbols derived from keyboarding, dictation and/or voice recognition software. As a result, there may be errors in the script that have gone undetected. Please consider this when interpreting information found in this chart.    Large Joint Injection/Arthocentesis: R knee joint    Date/Time: 10/19/2023 2:03 PM    Performed by: Artur Vergara MD  Authorized by: Artur Vergara MD     Indications:  Pain and osteoarthritis  Needle Size:  21 G  Guidance: ultrasound    Approach:  Superolateral  Location:  Knee      Medications:  6 mg betamethasone acet & sod phos 6 (3-3) MG/ML; 4 mL ROPivacaine 5 MG/ML  Aspirate amount (mL):  3  Aspirate:  Serous and yellow  Outcome:  Tolerated well, no immediate complications  Procedure discussed: discussed risks, benefits, and alternatives    Consent Given by:  Patient  Timeout: timeout called immediately prior to procedure    Prep: patient was prepped and draped in usual sterile fashion     Ultrasound images of procedure were permanently stored.     Patient reported some improvement of pain after the numbing portion right knee joint aspiration/steroid injection.  Ultrasound guided images were permanently stored.   Aftercare instructions given to patient.  Plan to follow-up as discussed above.     Artur Vergara MD [facility]

## 2023-10-19 NOTE — LETTER
10/19/2023         RE: Carli Monreal  [address]  [place] [address]        Dear Colleague,    Thank you for referring your patient, Carli Monreal, to the [facility] [place]. Please see a copy of my visit note below.    ASSESSMENT & PLAN    Carli was seen today for pain.    Diagnoses and all orders for this visit:    Generalized osteoarthritis  -     Orthopedic  Referral    Acute pain of right knee  -     Orthopedic  Referral      This issue is acute and Worsening.    # Right Knee Arthritis: Carli Monreal  was seen today for right knee injury. Symptoms had been going on since 9/27/23 after a fall on right side. On examination there are positive findings of tenderness to palpation over the knee joint line. Imaging findings showed mild knee arthritis, mild knee effusion, no fracture. Likely cause of patient's condition due to flare of knee arthritis. Counseled patient on nature of condition and treatment options.  Given this plan as below, follow-up 1 mon as needed     Image Findings: right knee x-rays showing mild knee effusion, no fracture  Treatment: Activities as tolerated, home exercises given today  Medications/Injections: Limited tylenol/ibuprofen for pain for 1-2 weeks, Topical Voltaren gel, right knee aspiration/steroid injection  Follow-up: In one month if symptoms do not improve, sooner if worsening  Can consider gel injection      Artur Vergara MD  [facility] [place]    -----  Chief Complaint   Patient presents with     Right Knee - Pain       SUBJECTIVE  Carli Monreal is a/an 73 year old female who is seen in consultation at the request of  Mc aMrtinez M.D. for evaluation of right knee pain.     The patient is seen by themselves.      Onset: 9/27/23, 3 week(s) ago. Patient describes injury as fall onto right side.   Location of Pain: right knee, diffuse   Worsened by: kneeling, down stairs    Better with: rest   Treatments tried: rest/activity avoidance  Associated symptoms: swelling improving     Orthopedic/Surgical history: YES - chronic low back pain   Social History/Occupation:  new puppy     No family history pertinent to patient's problem today.      REVIEW OF SYSTEMS:  Review of Systems  Constitutional, HEENT, cardiovascular, pulmonary, gi and gu systems are negative, except as otherwise noted.    OBJECTIVE:  There were no vitals taken for this visit.   General: healthy, alert and in no distress  HEENT: no scleral icterus or conjunctival erythema  Skin: no suspicious lesions or rash. No jaundice.  CV: distal perfusion intact    Resp: normal respiratory effort without conversational dyspnea   Psych: normal mood and affect  Gait: normal steady gait with appropriate coordination and balance   Neuro: Normal light sensory exam of right lower extremity     Ortho Exam   RIGHT KNEE  Inspection:    Normal alignment; no edema, erythema, or ecchymosis present  Palpation:    Tender about the lateral joint line and medial joint line. Remainder of bony and ligamentous landmarks are nontender.    Mild effusion is present    Patellofemoral crepitus is Present  Range of Motion:     00 extension to 1350 flexion  Strength:    Quadriceps 5/5, hamstrings 5/5, gastrocsoleus 5/5, and tibialis anterior 5/5    Extensor mechanism intact  Special Tests:    Positive: None    Negative: Patellar grind, MCL/valgus stress (0 & 30 deg), LCL/varus stress (0 & 30 deg), Lachman's, anterior drawer, posterior drawer    RADIOLOGY:  I independently ordered, visualized and reviewed these images with the patient  Mild right knee arthritis, mild knee efffusion.       Review of external notes as documented elsewhere in note  Review of the result(s) of each unique test - right knee x-rays       Disclaimer: This note consists of symbols derived from keyboarding, dictation and/or voice recognition software. As a result, there may be errors  in the script that have gone undetected. Please consider this when interpreting information found in this chart.    Large Joint Injection/Arthocentesis: R knee joint    Date/Time: 10/19/2023 2:03 PM    Performed by: Artur Vergara MD  Authorized by: Artur Vergara MD    Indications:  Pain and osteoarthritis  Needle Size:  21 G  Guidance: ultrasound    Approach:  Superolateral  Location:  Knee      Medications:  6 mg betamethasone acet & sod phos 6 (3-3) MG/ML; 4 mL ROPivacaine 5 MG/ML  Aspirate amount (mL):  3  Aspirate:  Serous and yellow  Outcome:  Tolerated well, no immediate complications  Procedure discussed: discussed risks, benefits, and alternatives    Consent Given by:  Patient  Timeout: timeout called immediately prior to procedure    Prep: patient was prepped and draped in usual sterile fashion     Ultrasound images of procedure were permanently stored.     Patient reported some improvement of pain after the numbing portion right knee joint aspiration/steroid injection.  Ultrasound guided images were permanently stored.   Aftercare instructions given to patient.  Plan to follow-up as discussed above.     Artur Vergara MD [facility]            Again, thank you for allowing me to participate in the care of your patient.        Sincerely,        Artur Vergara MD

## 2023-10-19 NOTE — PATIENT INSTRUCTIONS
# Right Knee Arthritis: Carli Monreal  was seen today for right knee injury. Symptoms had been going on since 9/27/23 after a fall on right side. On examination there are positive findings of tenderness to palpation over the knee joint line. Imaging findings showed mild knee arthritis, mild knee effusion, no fracture. Likely cause of patient's condition due to flare of knee arthritis. Counseled patient on nature of condition and treatment options.  Given this plan as below, follow-up 1 mon as needed     Image Findings: right knee x-rays showing mild knee effusion, no fracture  Treatment: Activities as tolerated, home exercises given today  Medications/Injections: Limited tylenol/ibuprofen for pain for 1-2 weeks, Topical Voltaren gel, right knee aspiration/steroid injection  Follow-up: In one month if symptoms do not improve, sooner if worsening  Can consider gel injection    Please call [phone]   Ask for my team if you have any questions or concerns    If you have not yet received the influenza vaccine but would like to get one, please call  [phone] or you can schedule via [facility]    It was great seeing you again today!    Artur Vergara MD, [facility]    FS Injection Discharge Instructions    Procedure: right knee joint aspiration/steroid injection     You may shower, however avoid swimming, tub baths or hot tubs for 24 hours following your procedure  You may have a mild to moderate increase in pain for several days following the injection.  It may take up to 14 days for the steroid medication to start working although you may feel the effect as early as a few days after the procedure.  You may use ice packs for 10-15 minutes, 3 to 4 times a day at the injection site for comfort  You may use anti-inflammatory medications (such as Ibuprofen or Aleve or Advil) or Tylenol for pain control if necessary  If you were fasting, you may resume your normal diet and medications after the procedure  If you have  diabetes, check your blood sugar more frequently than usual as your blood sugar may be higher than normal for 10-14 days following a steroid injection. Contact your doctor who manages your diabetes if your blood sugar is higher than usual    If you experience any of the following, call [facility] @ [phone] or [phone]  -Fever over 100 degree F  -Swelling, bleeding, redness, drainage, warmth at the injection site  - New or worsening pain

## 2023-10-23 DIAGNOSIS — K21.00 GASTROESOPHAGEAL REFLUX DISEASE WITH ESOPHAGITIS WITHOUT HEMORRHAGE: ICD-10-CM

## 2023-10-23 RX ORDER — BETAMETHASONE SODIUM PHOSPHATE AND BETAMETHASONE ACETATE 3; 3 MG/ML; MG/ML
6 INJECTION, SUSPENSION INTRA-ARTICULAR; INTRALESIONAL; INTRAMUSCULAR; SOFT TISSUE
Status: DISCONTINUED | OUTPATIENT
Start: 2023-10-19 | End: 2024-08-01

## 2023-10-23 RX ORDER — ROPIVACAINE HYDROCHLORIDE 5 MG/ML
4 INJECTION, SOLUTION EPIDURAL; INFILTRATION; PERINEURAL
Status: DISCONTINUED | OUTPATIENT
Start: 2023-10-19 | End: 2024-08-01

## 2023-10-23 NOTE — TELEPHONE ENCOUNTER
Routing to PCP/NE providers    RN called patient.    It says it as discontinued, but she said she still takes this med. RN does not see any note of why this med was discontinued    RN pended med for provider to sign if agreeable    Karie Browne RN

## 2023-10-23 NOTE — TELEPHONE ENCOUNTER
[id number] faxed a Refill Request for    famotidine (PEPCID) 40 MG tablet (Discontinued)       Last Written Prescription Date:  5/8/2023  Last Fill Quantity: 60 tablet,   # refills: 4  Last Office Visit: 7/29/2023 KELSEY Branham  Future Office visit:    Next 5 appointments (look out 90 days)      Dec 18, 2023  1:00 PM  (Arrive by 12:40 PM)  Provider Visit with Iza Cage DO  [facility] ([facility] ) [address] [place] [id number] [phone]             Routing refill request to provider for review/approval because:  Drug not active on patient's medication list

## 2023-10-24 RX ORDER — FAMOTIDINE 40 MG/1
40 TABLET, FILM COATED ORAL 2 TIMES DAILY
Qty: 180 TABLET | Refills: 1 | Status: SHIPPED | OUTPATIENT
Start: 2023-10-24 | End: 2024-05-31

## 2023-11-15 ENCOUNTER — THERAPY VISIT (OUTPATIENT)
Dept: PHYSICAL THERAPY | Facility: CLINIC | Age: 73
End: 2023-11-15
Attending: ANESTHESIOLOGY
Payer: COMMERCIAL

## 2023-11-15 DIAGNOSIS — M79.18 PIRIFORMIS MUSCLE PAIN: ICD-10-CM

## 2023-11-15 DIAGNOSIS — G89.29 CHRONIC MIDLINE LOW BACK PAIN WITHOUT SCIATICA: ICD-10-CM

## 2023-11-15 DIAGNOSIS — Z98.1 HISTORY OF LUMBAR FUSION: ICD-10-CM

## 2023-11-15 DIAGNOSIS — M54.50 CHRONIC MIDLINE LOW BACK PAIN WITHOUT SCIATICA: ICD-10-CM

## 2023-11-15 PROCEDURE — 97112 NEUROMUSCULAR REEDUCATION: CPT | Mod: GP | Performed by: PHYSICAL THERAPIST

## 2023-11-15 PROCEDURE — 97161 PT EVAL LOW COMPLEX 20 MIN: CPT | Mod: GP | Performed by: PHYSICAL THERAPIST

## 2023-11-15 PROCEDURE — 97110 THERAPEUTIC EXERCISES: CPT | Mod: GP | Performed by: PHYSICAL THERAPIST

## 2023-11-15 NOTE — PROGRESS NOTES
PHYSICAL THERAPY EVALUATION  Type of Visit: Evaluation    See electronic medical record for Abuse and Falls Screening details.    Subjective       Presenting condition or subjective complaint:  chronic LBP, hx lumbar fusion, piriformis muscle pain  Date of onset: 09/29/23      Living Environment  Social support:   lives with   Equipment owned:   has 2WW    Employment:    sells cruises, primarily sitting, phone calls    Patient goals for therapy:  tolerate daily routine better, stand longer, walk longer, decr pain and fatigue    Pain assessment: Pain present, current 3/10, range 2-10/10     Objective   LUMBAR SPINE EVALUATION  POSTURE: Sitting Posture: Rounded shoulders, Forward head, Thoracic kyphosis increased, hyperactive UT B  GAIT:   Assistive Device(s): None, has 2WW available at home  Gait Deviations: Antalgic  Stride length decreased, min arm swing  and trunk rotation  BALANCE/PROPRIOCEPTION: Single Leg Stance Eyes Open (seconds): poor, guarded.  Able to hold 1-2 seconds, shaky for R and L  ROM:  lumbar flex 50%, ext 10% with ERP     Assessment & Plan   CLINICAL IMPRESSIONS  Medical Diagnosis: Lumbago, hx lumbar fusion, piriformis muscle pain    Treatment Diagnosis: Lumbago, hx lumbar fusion, piriformis muscle pain   Impression/Assessment: Patient is a 73 year old female with chronic LBP, piriformis muscle pain, hx lumbar fusion complaints.  The following significant findings have been identified: Pain, Decreased ROM/flexibility, Decreased strength, Impaired gait, Impaired muscle performance, Decreased activity tolerance, and Impaired posture. These impairments interfere with their ability to perform self care tasks, work tasks, household chores, household mobility, and community mobility as compared to previous level of function.     Clinical Decision Making (Complexity):  Clinical Presentation: Stable/Uncomplicated  Clinical Presentation Rationale: based on medical and personal factors listed in PT  evaluation  Clinical Decision Making (Complexity): Low complexity    PLAN OF CARE  Treatment Interventions:  Interventions: Neuromuscular Re-education, Therapeutic Activity, Therapeutic Exercise, Self-Care/Home Management    Long Term Goals     PT Goal 1  Goal Identifier: standing  Goal Description: able to stand 15 minutes, pain 5/10  Rationale: to maximize safety and independence with performance of ADLs and functional tasks;to maximize safety and independence within the home;to maximize safety and independence within the community;to maximize safety and independence with self cares  Target Date: 03/15/24      Frequency of Treatment: 1x wk x 4 wks, 2x month x 3 months  Duration of Treatment: 4 months    Education Assessment:        Risks and benefits of evaluation/treatment have been explained.   Patient/Family/caregiver agrees with Plan of Care.     Evaluation Time:     PT Eval, Low Complexity Minutes (08092): 35    Signing Clinician: [facility]                                                                                   OUTPATIENT PHYSICAL THERAPY      PLAN OF TREATMENT FOR OUTPATIENT REHABILITATION   Patient's Last Name, First Name, Carli Feng YOB: 1950   Provider's Name   [facility]   Medical Record No.  [id number]     Onset Date: 09/29/23  Start of Care Date: 11/15/23     Medical Diagnosis:  Lumbago, hx lumbar fusion, piriformis muscle pain      PT Treatment Diagnosis:  Lumbago, hx lumbar fusion, piriformis muscle pain Plan of Treatment  Frequency/Duration: 1x wk x 4 wks, 2x month x 3 months/ 4 months    Certification date from 11/15/23 to 02/12/24         See note for plan of treatment details and functional goals     Rudy Luong, PT                         I CERTIFY THE NEED FOR THESE SERVICES FURNISHED UNDER        THIS PLAN OF TREATMENT AND WHILE UNDER MY CARE     (Physician attestation of  this document indicates review and certification of the therapy plan).              Referring Provider:  Teresa Dejesus    Initial Assessment  See Epic Evaluation- Start of Care Date: 11/15/23

## 2023-11-17 ENCOUNTER — TELEPHONE (OUTPATIENT)
Dept: PALLIATIVE MEDICINE | Facility: CLINIC | Age: 73
End: 2023-11-17
Payer: COMMERCIAL

## 2023-11-17 NOTE — TELEPHONE ENCOUNTER
Phoned the patient to reschedule injection procedure with dr. Dejesus. Patient stated she is not ready to reschedule the procedure now.

## 2023-11-28 ENCOUNTER — THERAPY VISIT (OUTPATIENT)
Dept: PHYSICAL THERAPY | Facility: CLINIC | Age: 73
End: 2023-11-28
Attending: ANESTHESIOLOGY
Payer: COMMERCIAL

## 2023-11-28 DIAGNOSIS — G89.29 CHRONIC BILATERAL LOW BACK PAIN, UNSPECIFIED WHETHER SCIATICA PRESENT: Primary | ICD-10-CM

## 2023-11-28 DIAGNOSIS — M54.50 CHRONIC BILATERAL LOW BACK PAIN, UNSPECIFIED WHETHER SCIATICA PRESENT: Primary | ICD-10-CM

## 2023-11-28 PROCEDURE — 97110 THERAPEUTIC EXERCISES: CPT | Mod: GP | Performed by: PHYSICAL THERAPIST

## 2023-11-28 PROCEDURE — 97112 NEUROMUSCULAR REEDUCATION: CPT | Mod: GP | Performed by: PHYSICAL THERAPIST

## 2023-12-07 ENCOUNTER — OFFICE VISIT (OUTPATIENT)
Dept: FAMILY MEDICINE | Facility: CLINIC | Age: 73
End: 2023-12-07
Attending: FAMILY MEDICINE
Payer: COMMERCIAL

## 2023-12-07 VITALS
OXYGEN SATURATION: 98 % | RESPIRATION RATE: 20 BRPM | SYSTOLIC BLOOD PRESSURE: 134 MMHG | DIASTOLIC BLOOD PRESSURE: 80 MMHG | HEART RATE: 76 BPM | WEIGHT: 156.4 LBS | TEMPERATURE: 98.8 F | BODY MASS INDEX: 30.7 KG/M2 | HEIGHT: 60 IN

## 2023-12-07 DIAGNOSIS — Z23 NEED FOR SHINGLES VACCINE: ICD-10-CM

## 2023-12-07 DIAGNOSIS — I10 ESSENTIAL HYPERTENSION: ICD-10-CM

## 2023-12-07 DIAGNOSIS — Z23 NEED FOR PROPHYLACTIC VACCINATION AGAINST HEPATITIS B VIRUS: ICD-10-CM

## 2023-12-07 DIAGNOSIS — E11.9 TYPE 2 DIABETES MELLITUS WITHOUT COMPLICATION, WITHOUT LONG-TERM CURRENT USE OF INSULIN (H): Primary | ICD-10-CM

## 2023-12-07 DIAGNOSIS — Z29.11 NEED FOR VACCINATION AGAINST RESPIRATORY SYNCYTIAL VIRUS: ICD-10-CM

## 2023-12-07 DIAGNOSIS — Z12.31 VISIT FOR SCREENING MAMMOGRAM: ICD-10-CM

## 2023-12-07 DIAGNOSIS — Z23 NEED FOR PROPHYLACTIC VACCINATION AGAINST HEPATITIS A: ICD-10-CM

## 2023-12-07 DIAGNOSIS — J45.40 MODERATE PERSISTENT ASTHMA, UNSPECIFIED WHETHER COMPLICATED: ICD-10-CM

## 2023-12-07 LAB
ANION GAP SERPL CALCULATED.3IONS-SCNC: 10 MMOL/L (ref 7–15)
BUN SERPL-MCNC: 12.9 MG/DL (ref 8–23)
CALCIUM SERPL-MCNC: 9.5 MG/DL (ref 8.8–10.2)
CHLORIDE SERPL-SCNC: 107 MMOL/L (ref 98–107)
CHOLEST SERPL-MCNC: 104 MG/DL
CREAT SERPL-MCNC: 0.86 MG/DL (ref 0.51–0.95)
CREAT UR-MCNC: 193 MG/DL
DEPRECATED HCO3 PLAS-SCNC: 22 MMOL/L (ref 22–29)
EGFRCR SERPLBLD CKD-EPI 2021: 71 ML/MIN/1.73M2
GLUCOSE SERPL-MCNC: 119 MG/DL (ref 70–99)
HBA1C MFR BLD: 6.1 % (ref 0–5.6)
HDLC SERPL-MCNC: 59 MG/DL
LDLC SERPL CALC-MCNC: 22 MG/DL
MICROALBUMIN UR-MCNC: 37.8 MG/L
MICROALBUMIN/CREAT UR: 19.59 MG/G CR (ref 0–25)
NONHDLC SERPL-MCNC: 45 MG/DL
POTASSIUM SERPL-SCNC: 4.2 MMOL/L (ref 3.4–5.3)
SODIUM SERPL-SCNC: 139 MMOL/L (ref 135–145)
TRIGL SERPL-MCNC: 117 MG/DL

## 2023-12-07 PROCEDURE — 99207 PR FOOT EXAM NO CHARGE: CPT | Mod: 25 | Performed by: FAMILY MEDICINE

## 2023-12-07 PROCEDURE — 82570 ASSAY OF URINE CREATININE: CPT | Performed by: FAMILY MEDICINE

## 2023-12-07 PROCEDURE — 36415 COLL VENOUS BLD VENIPUNCTURE: CPT | Performed by: FAMILY MEDICINE

## 2023-12-07 PROCEDURE — 82043 UR ALBUMIN QUANTITATIVE: CPT | Performed by: FAMILY MEDICINE

## 2023-12-07 PROCEDURE — 80048 BASIC METABOLIC PNL TOTAL CA: CPT | Performed by: FAMILY MEDICINE

## 2023-12-07 PROCEDURE — 83036 HEMOGLOBIN GLYCOSYLATED A1C: CPT | Performed by: FAMILY MEDICINE

## 2023-12-07 PROCEDURE — 99215 OFFICE O/P EST HI 40 MIN: CPT | Performed by: FAMILY MEDICINE

## 2023-12-07 PROCEDURE — 80061 LIPID PANEL: CPT | Performed by: FAMILY MEDICINE

## 2023-12-07 RX ORDER — RESPIRATORY SYNCYTIAL VIRUS VACCINE 120MCG/0.5
0.5 KIT INTRAMUSCULAR ONCE
Qty: 1 EACH | Refills: 0 | Status: CANCELLED | OUTPATIENT
Start: 2023-12-07 | End: 2023-12-07

## 2023-12-07 ASSESSMENT — ASTHMA QUESTIONNAIRES: ACT_TOTALSCORE: 15

## 2023-12-07 ASSESSMENT — PAIN SCALES - GENERAL: PAINLEVEL: NO PAIN (0)

## 2023-12-07 NOTE — PROGRESS NOTES
"  Assessment & Plan     Type 2 diabetes mellitus without complication, without long-term current use of insulin (H)  The current medical regimen is effective;  continue present plan and medications.  We discussed reducing her metformin from 1000 twice daily to 850 mg twice daily but she declined.  - HEMOGLOBIN A1C; Future  - Lipid panel reflex to direct LDL Non-fasting; Future  - Albumin Random Urine Quantitative with Creat Ratio; Future  - Adult Eye  Referral; Future  - FOOT EXAM  - HEMOGLOBIN A1C  - Albumin Random Urine Quantitative with Creat Ratio    Essential hypertension  The current medical regimen is effective;  continue present plan and medications.    - Basic metabolic panel  (Ca, Cl, CO2, Creat, Gluc, K, Na, BUN); Future  - Basic metabolic panel  (Ca, Cl, CO2, Creat, Gluc, K, Na, BUN)    Moderate persistent asthma, unspecified whether complicated  The patient's asthma is not currently controlled.  I recommended she start her Symbicort and continue her albuterol.  If she is not seeing improvement she could contact the office and I would prescribe her prednisone.    Visit for screening mammogram    - MA SCREENING DIGITAL BILAT - Future  (s+30); Future    Need for shingles vaccine  Declines    Need for prophylactic vaccination against hepatitis A  Declines    Need for prophylactic vaccination against hepatitis B virus  Declines    Need for vaccination against respiratory syncytial virus  Declines      40 minutes spent by me on the date of the encounter doing chart review, history and exam, documentation and further activities per the note       BMI:   Estimated body mass index is 31.06 kg/m  as calculated from the following:    Height as of this encounter: 1.511 m (4' 11.5\").    Weight as of this encounter: 70.9 kg (156 lb 6.4 oz).       Follow-up in 6 months for diabetes checkup    DO JJ [facility]    Mary Boyce is a 73 year old, presenting for the " following health issues:  Chronic Disease Management        12/7/2023     1:36 PM   Additional Questions   Roomed by Shantal BRITTON   Accompanied by self         12/7/2023     1:36 PM   Patient Reported Additional Medications   Patient reports taking the following new medications none       History of Present Illness       Diabetes:   She presents for follow up of diabetes.  She is checking home blood glucose one time daily.   She checks blood glucose before meals.  Blood glucose is never over 200 and never under 70. She is aware of hypoglycemia symptoms including shakiness.    She has no concerns regarding her diabetes at this time.  She is having burning in feet.  The patient has not had a diabetic eye exam in the last 12 months.            Metformin 1000 mg twice daily    Lab Results   Component Value Date    A1C 6.1 12/07/2023    A1C 6.2 05/08/2023    A1C 6.1 01/03/2023    A1C 6.3 11/07/2022    A1C 6.6 05/06/2022    A1C 7.0 04/13/2021    A1C 8.4 02/09/2021             Hyperlipidemia Follow-Up    Are you regularly taking any medication or supplement to lower your cholesterol?   Yes- atorvastatin  Are you having muscle aches or other side effects that you think could be caused by your cholesterol lowering medication?  No  LDL Cholesterol Calculated   Date Value Ref Range Status   11/07/2022 26 <=100 mg/dL Final   02/09/2021 63.7 0.0 - 130.0 mg/dL Final       Hypertension Follow-up    Do you check your blood pressure regularly outside of the clinic? No   Are you following a low salt diet? Yes  Are your blood pressures ever more than 140 on the top number (systolic) OR more   than 90 on the bottom number (diastolic), for example 140/90? No  Norvasc 2.5 mg daily and losartan 100 g daily    Asthma Follow-Up    Was ACT completed today?  Yes Symbicort and albuterol .  She had a fever 101 for a few days and ongoing cough for three weeks.  She has not used her symbicort      12/7/2023     1:34 PM   ACT Total Scores   ACT TOTAL  "SCORE (Goal Greater than or Equal to 20) 15   In the past 12 months, how many times did you visit the emergency room for your asthma without being admitted to the hospital? 0   In the past 12 months, how many times were you hospitalized overnight because of your asthma? 0       How many days per week do you miss taking your asthma controller medication?  Only uses as needed  Please describe any recent triggers for your asthma: upper respiratory infections, humidity, and cold air  Have you had any Emergency Room Visits, Urgent Care Visits, or Hospital Admissions since your last office visit?  No      Review of Systems   Constitutional, HEENT, cardiovascular, pulmonary, gi and gu systems are negative, except as otherwise noted.      Objective    /80 (BP Location: Right arm, Patient Position: Sitting, Cuff Size: Adult Large)   Pulse 76   Temp 98.8  F (37.1  C) (Oral)   Resp 20   Ht 1.511 m (4' 11.5\")   Wt 70.9 kg (156 lb 6.4 oz)   SpO2 98%   BMI 31.06 kg/m    Body mass index is 31.06 kg/m .  Physical Exam   GENERAL: healthy, alert and no distress  NECK: no adenopathy, no asymmetry, masses, or scars and thyroid normal to palpation  RESP: lungs clear to auscultation - no rales, rhonchi or wheezes  CV: regular rate and rhythm, normal S1 S2, no S3 or S4, no murmur, click or rub, no peripheral edema and peripheral pulses strong  MS: no gross musculoskeletal defects noted, no edema  SKIN: no suspicious lesions or rashes  PSYCH: mentation appears normal, affect normal/bright  Diabetic foot exam: normal DP and PT pulses, no trophic changes or ulcerative lesions, normal sensory exam, and normal monofilament exam    Results for orders placed or performed in visit on 12/07/23 (from the past 24 hour(s))   HEMOGLOBIN A1C   Result Value Ref Range    Hemoglobin A1C 6.1 (H) 0.0 - 5.6 %                     "

## 2023-12-26 ENCOUNTER — THERAPY VISIT (OUTPATIENT)
Dept: PHYSICAL THERAPY | Facility: CLINIC | Age: 73
End: 2023-12-26
Attending: ANESTHESIOLOGY
Payer: COMMERCIAL

## 2023-12-26 DIAGNOSIS — G89.29 CHRONIC BILATERAL LOW BACK PAIN, UNSPECIFIED WHETHER SCIATICA PRESENT: Primary | ICD-10-CM

## 2023-12-26 DIAGNOSIS — M54.50 CHRONIC BILATERAL LOW BACK PAIN, UNSPECIFIED WHETHER SCIATICA PRESENT: Primary | ICD-10-CM

## 2023-12-26 PROCEDURE — 97112 NEUROMUSCULAR REEDUCATION: CPT | Mod: 59 | Performed by: PHYSICAL THERAPIST

## 2023-12-26 PROCEDURE — 97530 THERAPEUTIC ACTIVITIES: CPT | Mod: GP | Performed by: PHYSICAL THERAPIST

## 2023-12-26 PROCEDURE — 97110 THERAPEUTIC EXERCISES: CPT | Mod: 59 | Performed by: PHYSICAL THERAPIST

## 2024-01-11 ENCOUNTER — THERAPY VISIT (OUTPATIENT)
Dept: PHYSICAL THERAPY | Facility: CLINIC | Age: 74
End: 2024-01-11
Payer: COMMERCIAL

## 2024-01-11 DIAGNOSIS — G89.29 CHRONIC BILATERAL LOW BACK PAIN, UNSPECIFIED WHETHER SCIATICA PRESENT: Primary | ICD-10-CM

## 2024-01-11 DIAGNOSIS — M54.50 CHRONIC BILATERAL LOW BACK PAIN, UNSPECIFIED WHETHER SCIATICA PRESENT: Primary | ICD-10-CM

## 2024-01-11 PROCEDURE — 97110 THERAPEUTIC EXERCISES: CPT | Mod: GP | Performed by: PHYSICAL THERAPIST

## 2024-01-11 PROCEDURE — 97112 NEUROMUSCULAR REEDUCATION: CPT | Mod: GP | Performed by: PHYSICAL THERAPIST

## 2024-01-12 PROBLEM — G89.29 CHRONIC BILATERAL LOW BACK PAIN, UNSPECIFIED WHETHER SCIATICA PRESENT: Status: RESOLVED | Noted: 2021-04-13 | Resolved: 2024-01-12

## 2024-01-12 PROBLEM — M54.50 CHRONIC BILATERAL LOW BACK PAIN, UNSPECIFIED WHETHER SCIATICA PRESENT: Status: RESOLVED | Noted: 2021-04-13 | Resolved: 2024-01-12

## 2024-01-12 NOTE — PROGRESS NOTES
DISCHARGE  Reason for Discharge: Patient has met all goals.    Discharge Plan: Patient to continue home program.    Referring Provider:  Teresa Dejesus       01/12/24 0500   Appointment Info   Signing clinician's name / credentials Rudy Luong PT   Total/Authorized Visits E[employer]   Visits Used 4   Medical Diagnosis Lumbago, hx lumbar fusion, piriformis muscle pain   PT Tx Diagnosis Lumbago, hx lumbar fusion, piriformis muscle pain   Quick Adds Certification   Progress Note/Certification   Start of Care Date 11/15/23   Onset of illness/injury or Date of Surgery 09/29/23   Therapy Frequency 1x wk x 4 wks, 2x month x 3 months   Predicted Duration 4 months   Certification date from 11/15/23   Certification date to 02/12/24   PT Goal 1   Goal Identifier standing   Goal Description able to stand 15 minutes, pain 5/10   Rationale to maximize safety and independence with performance of ADLs and functional tasks;to maximize safety and independence within the home;to maximize safety and independence within the community;to maximize safety and independence with self cares   Goal Progress able to stand 15 minutes, pain 5/10   Target Date 03/15/24   Date Met 01/11/24   Subjective Report   Subjective Report Feels she is doing well with managing her pain overall.  Feels ready to DC from Pain PT to HEP.  Appreciative for all the pain PT info and knowledge.   Objective Measures   Objective Measures Objective Measure 1;Objective Measure 2   Objective Measure 1   Objective Measure AROM: lumbar flex 50%, ext 25%   Objective Measure 2   Objective Measure Gait exhibits improved quality with good upright posture, equal step length, good B heel strike, toe off   Treatment Interventions (PT)   Interventions Therapeutic Procedure/Exercise;Neuromuscular Re-education;Therapeutic Activity   Therapeutic Procedure/Exercise   Therapeutic Procedures: strength, endurance, ROM, flexibillity minutes (16630) 30   Therapeutic Procedures Ther Proc  2;Ther Proc 3;Ther Proc 4;Ther Proc 5;Ther Proc 6   Ther Proc 1 stretches for piriformis, pretzel R and L   Ther Proc 2 stand and sit PPT   Ther Proc 3 Tolerates increased pace with chauhan walking and improved natural arm swing, trunk rotation   Ther Proc 4 GM SL (clamshells) R and L to fatigue -> ion incr reps   Ther Proc 5 for core strengthening, performed sit to/from stands from normal height chair   Ther Proc 6 Lateral stepping to fatigue   Skilled Intervention cues for proper form   Patient Response/Progress good   Ther Proc 1 - Details standing hip flexor stretch   Therapeutic Activity   Therapeutic Activities Ther Act 2   Ther Act 1 ed on body mech with focus on sit to/from stand with cues for placement of hands, feet, trunk   Ther Act 1 - Details ed on power vs endurance muscles   Ther Act 2 ed on functional reaching, ADLs - ed COG, [place], lever arm   Skilled Intervention cues for proper form   Patient Response/Progress good   Neuromuscular Re-education   Neuromuscular re-ed of mvmt, balance, coord, kinesthetic sense, posture, proprioception minutes (86813) 15   Neuromuscular Re-education Neuro Re-ed 2;Neuro Re-ed 3;Neuro Re-ed 4;Neuro Re-ed 5   Neuro Re-ed 1 ed, work on 'finds' of neutral spine and in static, in prep for dynamic   Neuro Re-ed 2 PNE, Pain Knowledge, ed on Tissues.  Tissues heal (tssue issues)   Neuro Re-ed 3 work on breathing in upright, WB positions parmjit for prolonged standing   Neuro Re-ed 3 - Details ed on following functional sequence of 'breathe, stabilize, move'   Neuro Re-ed 4 add staggered stance ->  standing alignment - tripod feet, soft knees, neutral spine, thumbs fwd, soft chin tuck   Neuro Re-ed 5 Self care -> energy conservation with supine decompression as option -> create good habits   Skilled Intervention pain science, cues for proper form   Patient Response/Progress good   Plan   Updates to plan of care DC to HEP   Total Session Time   Timed Code Treatment Minutes 45   Total  Treatment Time (sum of timed and untimed services) 45

## 2024-01-18 ENCOUNTER — ANCILLARY PROCEDURE (OUTPATIENT)
Dept: GENERAL RADIOLOGY | Facility: CLINIC | Age: 74
End: 2024-01-18
Attending: FAMILY MEDICINE
Payer: COMMERCIAL

## 2024-01-18 ENCOUNTER — OFFICE VISIT (OUTPATIENT)
Dept: ORTHOPEDICS | Facility: CLINIC | Age: 74
End: 2024-01-18
Payer: COMMERCIAL

## 2024-01-18 VITALS
DIASTOLIC BLOOD PRESSURE: 69 MMHG | WEIGHT: 156 LBS | HEART RATE: 80 BPM | SYSTOLIC BLOOD PRESSURE: 161 MMHG | BODY MASS INDEX: 30.98 KG/M2

## 2024-01-18 DIAGNOSIS — M25.512 LEFT SHOULDER PAIN: ICD-10-CM

## 2024-01-18 DIAGNOSIS — G89.29 CHRONIC LEFT SHOULDER PAIN: Primary | ICD-10-CM

## 2024-01-18 DIAGNOSIS — M17.11 ARTHRITIS OF RIGHT KNEE: ICD-10-CM

## 2024-01-18 DIAGNOSIS — M25.512 CHRONIC LEFT SHOULDER PAIN: Primary | ICD-10-CM

## 2024-01-18 PROCEDURE — 2894A LARGE JOINT INJECTION/ARTHOCENTESIS: L SUBACROMIAL BURSA: CPT | Mod: LT | Performed by: FAMILY MEDICINE

## 2024-01-18 PROCEDURE — 73030 X-RAY EXAM OF SHOULDER: CPT | Mod: TC | Performed by: RADIOLOGY

## 2024-01-18 PROCEDURE — 20611 DRAIN/INJ JOINT/BURSA W/US: CPT | Mod: 50 | Performed by: FAMILY MEDICINE

## 2024-01-18 PROCEDURE — 99214 OFFICE O/P EST MOD 30 MIN: CPT | Mod: 25 | Performed by: FAMILY MEDICINE

## 2024-01-18 RX ADMIN — ROPIVACAINE HYDROCHLORIDE 4 ML: 5 INJECTION, SOLUTION EPIDURAL; INFILTRATION; PERINEURAL at 11:02

## 2024-01-18 RX ADMIN — BETAMETHASONE SODIUM PHOSPHATE AND BETAMETHASONE ACETATE 6 MG: 3; 3 INJECTION, SUSPENSION INTRA-ARTICULAR; INTRALESIONAL; INTRAMUSCULAR; SOFT TISSUE at 11:02

## 2024-01-18 NOTE — PROGRESS NOTES
ASSESSMENT & PLAN    Carli was seen today for pain.    Diagnoses and all orders for this visit:    Chronic left shoulder pain  -     XR Shoulder Left G/E 3 Views; Future  -     Large Joint Injection/Arthocentesis: L subacromial bursa    Arthritis of right knee  -     Large Joint Injection/Arthocentesis: R knee joint  -     diclofenac (VOLTAREN) 1 % topical gel; Apply 4 g topically 4 times daily      This issue is acute on chronic and Worsening.    # Left Shoulder Pain, Right Knee Arthritis: Carli Monreal  was seen today for acute on chronic left shoulder and right knee pain. Symptoms had been going on for 3 mon for the shoulder, 6+ mon for knee. On examination there are positive findings of tenderness palpation over the rotator cuff tendon on the left side, pain but no significant weakness on rotator cuff testing, tenderness palpation over the right lateral knee joint line. Imaging findings showed no significant arthritis in the left shoulder, moderate right knee arthritis on the lateral side. Likely cause of patient's condition due to irritated rotator cuff tendon of the left shoulder, flare of right knee arthritis. Other possible conditions contributing to symptoms include left shoulder arthritis.  Counseled patient on nature of condition and treatment options.  Given this plan as below, follow-up 1 to 2 months as needed     Image Findings: No significant left shoulder arthritis, mild right arthritis  Treatment: Activities as tolerated, home exercises given today  Job: As tolerated    Medications/Injections: Limited tylenol/ibuprofen for pain for 1-2 weeks, Topical Voltaren gel, left subacromial bursa and right knee joint steroid injections  Follow-up: In one month if symptoms do not improve, sooner if worsening  Can consider shoulder joint steroid injection, PT      Artur Vergara MD  [facility] [place] [facility] was present with the resident during the history and exam.  I discussed  the case with the resident and agree with the findings as documented in the assessment and plan.     -----  Chief Complaint   Patient presents with    Left Shoulder - Pain       SUBJECTIVE  Carli Monreal is a/an 73 year old female who is seen as a self referral for evaluation of left shoulder pain.     The patient is seen by themselves.  The patient is Right handed    Onset: 2-3 month(s) ago. Reports insidious onset without acute precipitating event.  Location of Pain: lateral upper arm, tip of the shoulder   Worsened by: all the time, raising arm above the head, reaching the arm across the body. Can interfere with sleep  Treatments tried: Voltaren gel, ibuprofen, aspirin. No help  Associated symptoms: pulling sensation with movement, sharp pain     Also reports her R knee is again bothersome. Got some good relief from the last injection this fall and wonders if she can get a repeat. Did take a few weeks for that to have effect. Remains on the lateral aspect. No change in character from previous    Orthopedic/Surgical history: NO  Social History/Occupation: semi-retired - sells cruises     No family history pertinent to patient's problem today.     REVIEW OF SYSTEMS:  Review of Systems  Constitutional, HEENT, cardiovascular, pulmonary, gi and gu systems are negative, except as otherwise noted.    OBJECTIVE:  BP (!) 161/69   Pulse 80   Wt 70.8 kg (156 lb)   BMI 30.98 kg/m     General: healthy, alert and in no distress  HEENT: no scleral icterus or conjunctival erythema  Skin: no suspicious lesions or rash. No jaundice.  CV: distal perfusion intact   Resp: normal respiratory effort without conversational dyspnea   Psych: normal mood and affect  Gait: normal steady gait with appropriate coordination and balance   Neuro: Normal light sensory exam of bilateral upper extremity     Ortho Exam   LEFT SHOULDER  Inspection:    no swelling, bruising, discoloration, or obvious deformity or asymmetry  Palpation:    Tender  about the anterior capsule, proximal biceps tendon, and supraspinatus insertion. Remainder of bony and tendinous landmarks are nontender.  Active Range of Motion:     Abduction 900, , , IR L2.    Strength:    Scapular plane abduction 5/5,  ER 5/5, IR 5/5, biceps 5/5, triceps 5/5  Special Tests:    Positive: Neer's, Calvin', supraspinatus (empty can), drop arm/painful arc, and crossed arm adduction    Negative: [place]'s and Speed's    RIGHT KNEE  Inspection:    Normal alignment; no edema, erythema, or ecchymosis present  Palpation:    Tender about the lateral joint line. Remainder of bony and ligamentous landmarks are nontender.    No effusion is present    Patellofemoral crepitus is Present  Range of Motion:     00 extension to 1350 flexion  Strength:    Quadriceps 5/5, hamstrings 5/5, gastrocsoleus 5/5, and tibialis anterior 5/5    Extensor mechanism intact  Special Tests:    Positive: none    Negative: MCL/valgus stress (0 & 30 deg), LCL/varus stress (0 & 30 deg), Lachman's, anterior drawer, posterior drawer     RADIOLOGY:  I independently   ordered, visualized and reviewed these images with the patient  Left shoulder x-rays showing no shoulder arthritis    IMPRESSION: Mild AC joint arthrosis. Glenohumeral joint space is  maintained. No evidence of acute fracture or dislocation.     BILL BOBBY MD       Review of external notes as documented elsewhere in note  Review of the result(s) of each unique test - left shoulder x-rays       Disclaimer: This note consists of symbols derived from keyboarding, dictation and/or voice recognition software. As a result, there may be errors in the script that have gone undetected. Please consider this when interpreting information found in this chart.    Large Joint Injection/Arthocentesis: L subacromial bursa    Date/Time: 1/18/2024 11:02 AM    Performed by: Atrur Vergara MD  Authorized by: Artur Vergara MD    Indications:  Pain  Needle Size:  25  G  Guidance: ultrasound    Approach:  Lateral  Location:  Shoulder      Site:  L subacromial bursa  Medications:  6 mg betamethasone acet & sod phos 6 (3-3) MG/ML; 4 mL ROPivacaine 5 MG/ML  Medications comment:  Actual amount of ropivacaine used 4 mL  Outcome:  Tolerated well, no immediate complications  Procedure discussed: discussed risks, benefits, and alternatives    Consent Given by:  Patient  Timeout: timeout called immediately prior to procedure    Prep: patient was prepped and draped in usual sterile fashion     Ultrasound images of procedure were permanently stored.     Patient reported significant improvement of pain after the numbing portion right knee joint and left subacromial bursa steroid injections.  Ultrasound guided images were permanently stored.   Aftercare instructions given to patient.  Plan to follow-up as discussed above.     Artur Vergara MD [facility]      Large Joint Injection/Arthocentesis: R knee joint    Date/Time: 1/18/2024 11:02 AM    Performed by: Artur Vergara MD  Authorized by: Artur Vergara MD    Indications:  Pain  Needle Size:  25 G  Guidance: ultrasound    Approach:  Superolateral  Location:  Knee      Medications:  6 mg betamethasone acet & sod phos 6 (3-3) MG/ML; 4 mL ROPivacaine 5 MG/ML  Outcome:  Tolerated well, no immediate complications  Procedure discussed: discussed risks, benefits, and alternatives    Consent Given by:  Patient  Timeout: timeout called immediately prior to procedure    Prep: patient was prepped and draped in usual sterile fashion     Ultrasound images of procedure were permanently stored.

## 2024-01-18 NOTE — LETTER
1/18/2024         RE: Carli Monreal  [address]  [place] [address]        Dear Colleague,    Thank you for referring your patient, Carli Monreal, to the [facility] [place]. Please see a copy of my visit note below.    ASSESSMENT & PLAN    Carli was seen today for pain.    Diagnoses and all orders for this visit:    Chronic left shoulder pain  -     XR Shoulder Left G/E 3 Views; Future  -     Large Joint Injection/Arthocentesis: L subacromial bursa    Arthritis of right knee  -     Large Joint Injection/Arthocentesis: R knee joint  -     diclofenac (VOLTAREN) 1 % topical gel; Apply 4 g topically 4 times daily      This issue is acute on chronic and Worsening.    # Left Shoulder Pain, Right Knee Arthritis: Carli Monreal  was seen today for acute on chronic left shoulder and right knee pain. Symptoms had been going on for 3 mon for the shoulder, 6+ mon for knee. On examination there are positive findings of tenderness palpation over the rotator cuff tendon on the left side, pain but no significant weakness on rotator cuff testing, tenderness palpation over the right lateral knee joint line. Imaging findings showed no significant arthritis in the left shoulder, moderate right knee arthritis on the lateral side. Likely cause of patient's condition due to irritated rotator cuff tendon of the left shoulder, flare of right knee arthritis. Other possible conditions contributing to symptoms include left shoulder arthritis.  Counseled patient on nature of condition and treatment options.  Given this plan as below, follow-up 1 to 2 months as needed     Image Findings: No significant left shoulder arthritis, mild right arthritis  Treatment: Activities as tolerated, home exercises given today  Job: As tolerated    Medications/Injections: Limited tylenol/ibuprofen for pain for 1-2 weeks, Topical Voltaren gel, left subacromial bursa and right knee joint steroid  injections  Follow-up: In one month if symptoms do not improve, sooner if worsening  Can consider shoulder joint steroid injection, PT      Artur Vergara MD  [facility] [place]    I was present with the resident during the history and exam.  I discussed the case with the resident and agree with the findings as documented in the assessment and plan.     -----  Chief Complaint   Patient presents with     Left Shoulder - Pain       SUBJECTIVE  Carli Monreal is a/an 73 year old female who is seen as a self referral for evaluation of left shoulder pain.     The patient is seen by themselves.  The patient is Right handed    Onset: 2-3 month(s) ago. Reports insidious onset without acute precipitating event.  Location of Pain: lateral upper arm, tip of the shoulder   Worsened by: all the time, raising arm above the head, reaching the arm across the body. Can interfere with sleep  Treatments tried: Voltaren gel, ibuprofen, aspirin. No help  Associated symptoms: pulling sensation with movement, sharp pain     Also reports her R knee is again bothersome. Got some good relief from the last injection this fall and wonders if she can get a repeat. Did take a few weeks for that to have effect. Remains on the lateral aspect. No change in character from previous    Orthopedic/Surgical history: NO  Social History/Occupation: semi-retired - sells cruises     No family history pertinent to patient's problem today.     REVIEW OF SYSTEMS:  Review of Systems  Constitutional, HEENT, cardiovascular, pulmonary, gi and gu systems are negative, except as otherwise noted.    OBJECTIVE:  BP (!) 161/69   Pulse 80   Wt 70.8 kg (156 lb)   BMI 30.98 kg/m     General: healthy, alert and in no distress  HEENT: no scleral icterus or conjunctival erythema  Skin: no suspicious lesions or rash. No jaundice.  CV: distal perfusion intact   Resp: normal respiratory effort without conversational dyspnea   Psych: normal  mood and affect  Gait: normal steady gait with appropriate coordination and balance   Neuro: Normal light sensory exam of bilateral upper extremity     Ortho Exam   LEFT SHOULDER  Inspection:    no swelling, bruising, discoloration, or obvious deformity or asymmetry  Palpation:    Tender about the anterior capsule, proximal biceps tendon, and supraspinatus insertion. Remainder of bony and tendinous landmarks are nontender.  Active Range of Motion:     Abduction 900, , , IR L2.    Strength:    Scapular plane abduction 5/5,  ER 5/5, IR 5/5, biceps 5/5, triceps 5/5  Special Tests:    Positive: Neer's, Calvin', supraspinatus (empty can), drop arm/painful arc, and crossed arm adduction    Negative: [place]'s and Speed's    RIGHT KNEE  Inspection:    Normal alignment; no edema, erythema, or ecchymosis present  Palpation:    Tender about the lateral joint line. Remainder of bony and ligamentous landmarks are nontender.    No effusion is present    Patellofemoral crepitus is Present  Range of Motion:     00 extension to 1350 flexion  Strength:    Quadriceps 5/5, hamstrings 5/5, gastrocsoleus 5/5, and tibialis anterior 5/5    Extensor mechanism intact  Special Tests:    Positive: none    Negative: MCL/valgus stress (0 & 30 deg), LCL/varus stress (0 & 30 deg), Lachman's, anterior drawer, posterior drawer     RADIOLOGY:  I independently   ordered, visualized and reviewed these images with the patient  Left shoulder x-rays showing no shoulder arthritis    IMPRESSION: Mild AC joint arthrosis. Glenohumeral joint space is  maintained. No evidence of acute fracture or dislocation.     BILL BOBBY MD       Review of external notes as documented elsewhere in note  Review of the result(s) of each unique test - left shoulder x-rays       Disclaimer: This note consists of symbols derived from keyboarding, dictation and/or voice recognition software. As a result, there may be errors in the script that have gone  undetected. Please consider this when interpreting information found in this chart.    Large Joint Injection/Arthocentesis: L subacromial bursa    Date/Time: 1/18/2024 11:02 AM    Performed by: Artur Vergara MD  Authorized by: Artur Vergara MD    Indications:  Pain  Needle Size:  25 G  Guidance: ultrasound    Approach:  Lateral  Location:  Shoulder      Site:  L subacromial bursa  Medications:  6 mg betamethasone acet & sod phos 6 (3-3) MG/ML; 4 mL ROPivacaine 5 MG/ML  Medications comment:  Actual amount of ropivacaine used 4 mL  Outcome:  Tolerated well, no immediate complications  Procedure discussed: discussed risks, benefits, and alternatives    Consent Given by:  Patient  Timeout: timeout called immediately prior to procedure    Prep: patient was prepped and draped in usual sterile fashion     Ultrasound images of procedure were permanently stored.     Patient reported significant improvement of pain after the numbing portion right knee joint and left subacromial bursa steroid injections.  Ultrasound guided images were permanently stored.   Aftercare instructions given to patient.  Plan to follow-up as discussed above.     Artur Vergara MD [facility] [place]      Large Joint Injection/Arthocentesis: R knee joint    Date/Time: 1/18/2024 11:02 AM    Performed by: Artur Vergara MD  Authorized by: Artur Vergara MD    Indications:  Pain  Needle Size:  25 G  Guidance: ultrasound    Approach:  Superolateral  Location:  Knee      Medications:  6 mg betamethasone acet & sod phos 6 (3-3) MG/ML; 4 mL ROPivacaine 5 MG/ML  Outcome:  Tolerated well, no immediate complications  Procedure discussed: discussed risks, benefits, and alternatives    Consent Given by:  Patient  Timeout: timeout called immediately prior to procedure    Prep: patient was prepped and draped in usual sterile fashion     Ultrasound images of procedure were permanently stored.          Again, thank you  for allowing me to participate in the care of your patient.        Sincerely,        Artur Vergara MD

## 2024-01-18 NOTE — PATIENT INSTRUCTIONS
# Left Shoulder Pain, Right Knee Arthritis: Carli Monreal  was seen today for acute on chronic left shoulder and right knee pain. Symptoms had been going on for 3 mon for the shoulder, 6+ mon for knee. On examination there are positive findings of tenderness palpation over the rotator cuff tendon on the left side, pain but no significant weakness on rotator cuff testing, tenderness palpation over the right lateral knee joint line. Imaging findings showed no significant arthritis in the left shoulder, moderate right knee arthritis on the lateral side. Likely cause of patient's condition due to irritated rotator cuff tendon of the left shoulder, flare of right knee arthritis. Other possible conditions contributing to symptoms include left shoulder arthritis.  Counseled patient on nature of condition and treatment options.  Given this plan as below, follow-up 1 to 2 months as needed     Image Findings: No significant left shoulder arthritis, mild right arthritis  Treatment: Activities as tolerated, home exercises given today  Job: As tolerated    Medications/Injections: Limited tylenol/ibuprofen for pain for 1-2 weeks, Topical Voltaren gel, left subacromial bursa and right knee joint steroid injections  Follow-up: In one month if symptoms do not improve, sooner if worsening  Can consider shoulder joint steroid injection, PT    Please call [phone]   Ask for my team if you have any questions or concerns    If you have not yet received the influenza vaccine but would like to get one, please call  [phone] or you can schedule via [employer]    It was great seeing you again today!    Artur Vergara MD, CAQSM        FSOC Injection Discharge Instructions    Procedure: Left shoulder subacromial bursa, right knee joint steroid injection     You may shower, however avoid swimming, tub baths or hot tubs for 24 hours following your procedure  You may have a mild to moderate increase in pain for several days following  the injection.  It may take up to 14 days for the steroid medication to start working although you may feel the effect as early as a few days after the procedure.  You may use ice packs for 10-15 minutes, 3 to 4 times a day at the injection site for comfort  You may use anti-inflammatory medications (such as Ibuprofen or Aleve or Advil) or Tylenol for pain control if necessary  If you were fasting, you may resume your normal diet and medications after the procedure  If you have diabetes, check your blood sugar more frequently than usual as your blood sugar may be higher than normal for 10-14 days following a steroid injection. Contact your doctor who manages your diabetes if your blood sugar is higher than usual    If you experience any of the following, call [facility] @ [phone] or [phone]  -Fever over 100 degree F  -Swelling, bleeding, redness, drainage, warmth at the injection site  - New or worsening pain

## 2024-01-26 RX ORDER — ROPIVACAINE HYDROCHLORIDE 5 MG/ML
4 INJECTION, SOLUTION EPIDURAL; INFILTRATION; PERINEURAL
Status: DISCONTINUED | OUTPATIENT
Start: 2024-01-18 | End: 2024-08-01

## 2024-01-26 RX ORDER — BETAMETHASONE SODIUM PHOSPHATE AND BETAMETHASONE ACETATE 3; 3 MG/ML; MG/ML
6 INJECTION, SUSPENSION INTRA-ARTICULAR; INTRALESIONAL; INTRAMUSCULAR; SOFT TISSUE
Status: DISCONTINUED | OUTPATIENT
Start: 2024-01-18 | End: 2024-08-01

## 2024-02-02 ENCOUNTER — MYC MEDICAL ADVICE (OUTPATIENT)
Dept: FAMILY MEDICINE | Facility: CLINIC | Age: 74
End: 2024-02-02
Payer: COMMERCIAL

## 2024-02-02 DIAGNOSIS — K50.019 CROHN'S DISEASE OF SMALL INTESTINE WITH COMPLICATION (H): Primary | ICD-10-CM

## 2024-02-05 NOTE — TELEPHONE ENCOUNTER
Routing Leondra musict message to PCP.    Patient requesting a referral to Gastroenterology due to Crohns disease.    Last seen by you 12/7/23.    Willing to place referral?    Christine M Klisch, RN

## 2024-02-06 DIAGNOSIS — K50.919 CROHN'S DISEASE WITH COMPLICATION, UNSPECIFIED GASTROINTESTINAL TRACT LOCATION (H): ICD-10-CM

## 2024-02-06 DIAGNOSIS — E11.9 TYPE 2 DIABETES MELLITUS WITHOUT COMPLICATION, WITHOUT LONG-TERM CURRENT USE OF INSULIN (H): ICD-10-CM

## 2024-02-06 RX ORDER — FOLIC ACID 1 MG/1
TABLET ORAL
Qty: 90 TABLET | Refills: 0 | Status: SHIPPED | OUTPATIENT
Start: 2024-02-06 | End: 2024-05-08

## 2024-02-06 RX ORDER — LANCETS 33 GAUGE
EACH MISCELLANEOUS
Qty: 100 EACH | Refills: 0 | Status: SHIPPED | OUTPATIENT
Start: 2024-02-06

## 2024-02-06 RX ORDER — BLOOD SUGAR DIAGNOSTIC
STRIP MISCELLANEOUS
Qty: 100 STRIP | Refills: 0 | Status: SHIPPED | OUTPATIENT
Start: 2024-02-06 | End: 2024-05-31

## 2024-02-14 ENCOUNTER — TELEPHONE (OUTPATIENT)
Dept: FAMILY MEDICINE | Facility: CLINIC | Age: 74
End: 2024-02-14
Payer: COMMERCIAL

## 2024-02-14 NOTE — TELEPHONE ENCOUNTER
Patient Quality Outreach    Patient is due for the following:   Hypertension -  BP check    Next Steps:   Schedule a nurse only visit for BP check    Patient does have Annual Wellness scheduled for 5/31/24 with PCP, but last BP was high so will see if patient can come in for ancillary BP check    Type of outreach:    Phone, left message for patient/parent to call back.        Questions for provider review:    None           Shantal Irby, CMA

## 2024-02-20 DIAGNOSIS — E87.6 HYPOKALEMIA: ICD-10-CM

## 2024-02-20 DIAGNOSIS — E11.9 TYPE 2 DIABETES MELLITUS WITHOUT COMPLICATION, WITHOUT LONG-TERM CURRENT USE OF INSULIN (H): ICD-10-CM

## 2024-02-20 DIAGNOSIS — I10 ESSENTIAL HYPERTENSION: ICD-10-CM

## 2024-02-20 RX ORDER — AMLODIPINE BESYLATE 2.5 MG/1
2.5 TABLET ORAL DAILY
Qty: 90 TABLET | Refills: 0 | Status: SHIPPED | OUTPATIENT
Start: 2024-02-20 | End: 2024-05-31

## 2024-02-20 RX ORDER — POTASSIUM CHLORIDE 1500 MG/1
20 TABLET, EXTENDED RELEASE ORAL DAILY
Qty: 90 TABLET | Refills: 0 | Status: SHIPPED | OUTPATIENT
Start: 2024-02-20 | End: 2024-05-31

## 2024-02-21 NOTE — TELEPHONE ENCOUNTER
No return call- no ancillary appt scheduled. Will just follow up at next visit in May    Shantal Irby MA

## 2024-03-17 ENCOUNTER — HEALTH MAINTENANCE LETTER (OUTPATIENT)
Age: 74
End: 2024-03-17

## 2024-03-18 ENCOUNTER — OFFICE VISIT (OUTPATIENT)
Dept: OPHTHALMOLOGY | Facility: CLINIC | Age: 74
End: 2024-03-18
Attending: FAMILY MEDICINE
Payer: COMMERCIAL

## 2024-03-18 DIAGNOSIS — H52.4 PRESBYOPIA: ICD-10-CM

## 2024-03-18 DIAGNOSIS — Z01.01 ENCOUNTER FOR EXAMINATION OF EYES AND VISION WITH ABNORMAL FINDINGS: Primary | ICD-10-CM

## 2024-03-18 DIAGNOSIS — Z96.1 PSEUDOPHAKIA, BOTH EYES: ICD-10-CM

## 2024-03-18 DIAGNOSIS — E11.9 TYPE 2 DIABETES MELLITUS WITHOUT COMPLICATION, WITHOUT LONG-TERM CURRENT USE OF INSULIN (H): ICD-10-CM

## 2024-03-18 DIAGNOSIS — H43.813 POSTERIOR VITREOUS DETACHMENT OF BOTH EYES: ICD-10-CM

## 2024-03-18 PROCEDURE — 92014 COMPRE OPH EXAM EST PT 1/>: CPT | Performed by: OPHTHALMOLOGY

## 2024-03-18 PROCEDURE — 92015 DETERMINE REFRACTIVE STATE: CPT | Performed by: OPHTHALMOLOGY

## 2024-03-18 ASSESSMENT — REFRACTION_MANIFEST
OS_CYLINDER: SPHERE
OD_SPHERE: -1.00
OS_ADD: +2.50
OS_SPHERE: -0.25
OD_AXIS: 130
OD_CYLINDER: +0.50
OD_ADD: +2.50

## 2024-03-18 ASSESSMENT — REFRACTION_WEARINGRX
OD_SPHERE: +2.00
SPECS_TYPE: OTC
OS_SPHERE: +2.00

## 2024-03-18 ASSESSMENT — EXTERNAL EXAM - LEFT EYE: OS_EXAM: 2+ BROW PTOSIS

## 2024-03-18 ASSESSMENT — VISUAL ACUITY
OD_CC: J1
CORRECTION_TYPE: GLASSES
OS_CC: J1
METHOD: SNELLEN - LINEAR
OD_SC: 20/40
OS_SC: 20/20

## 2024-03-18 ASSESSMENT — CONF VISUAL FIELD
OD_INFERIOR_NASAL_RESTRICTION: 0
OD_NORMAL: 1
OD_SUPERIOR_TEMPORAL_RESTRICTION: 0
OD_SUPERIOR_NASAL_RESTRICTION: 0
OD_INFERIOR_TEMPORAL_RESTRICTION: 0
OS_SUPERIOR_NASAL_RESTRICTION: 0
OS_INFERIOR_NASAL_RESTRICTION: 0
OS_NORMAL: 1
OS_INFERIOR_TEMPORAL_RESTRICTION: 0
OS_SUPERIOR_TEMPORAL_RESTRICTION: 0

## 2024-03-18 ASSESSMENT — SLIT LAMP EXAM - LIDS
COMMENTS: 1+ DERMATOCHALASIS
COMMENTS: 1+ DERMATOCHALASIS

## 2024-03-18 ASSESSMENT — CUP TO DISC RATIO
OD_RATIO: 0.3
OS_RATIO: 0.2

## 2024-03-18 ASSESSMENT — TONOMETRY
IOP_METHOD: APPLANATION
OD_IOP_MMHG: 12
OS_IOP_MMHG: 12

## 2024-03-18 ASSESSMENT — EXTERNAL EXAM - RIGHT EYE: OD_EXAM: 2+ BROW PTOSIS

## 2024-03-18 NOTE — PROGRESS NOTES
Current Eye Medications:  ***     Subjective: diabetic, dilated exam - ***.     Type II DM - oral med controlled.  Lab Results   Component Value Date    A1C 6.1 12/07/2023    A1C 6.2 05/08/2023    A1C 6.1 01/03/2023    A1C 6.3 11/07/2022    A1C 6.6 05/06/2022    A1C 7.0 04/13/2021    A1C 8.4 02/09/2021      Objective:  See Ophthalmology Exam.       Assessment:      Plan:   See Patient Instructions.

## 2024-03-18 NOTE — LETTER
"    3/18/2024         RE: Carli Monreal  [address]  [place] [address]        Dear Colleague,    Thank you for referring your patient, Carli Monreal, to the [facility]. Please see a copy of my visit note below.     Current Eye Medications:  artificial tears as needed      Subjective:  Diabetic eye exam: patient complains of a decrease in her distance visual acuity. She wears a +2.00 OTC and is satisfied with her near visual acuity.    Lab Results   Component Value Date    A1C 6.1 12/07/2023    A1C 6.2 05/08/2023    A1C 6.1 01/03/2023    A1C 6.3 11/07/2022    A1C 6.6 05/06/2022    A1C 7.0 04/13/2021    A1C 8.4 02/09/2021        Objective:  See Ophthalmology Exam.       Assessment:  Stable eye exam in patient with diabetes.  No diabetic retinopathy.      ICD-10-CM    1. Encounter for examination of eyes and vision with abnormal findings  Z01.01       2. Presbyopia  H52.4       3. Type 2 diabetes mellitus without complication, without long-term current use of insulin (H)  E11.9 Adult Eye  Referral      4. Pseudophakia, both eyes  Z96.1       5. Posterior vitreous detachment of both eyes  H43.813            Plan:  Glasses prescription given - optional  Can continue to use +2.00 over the counter reading glasses.     May use artificial tears up to four times a day (like Refresh Optive, Systane Balance, or TheraTears. Avoid \"get the red out\" drops and generic artifical tears).     Possible clouding of posterior capsule both eyes discussed.     Call in November 2024 for an appointment in March 2025 for Complete Exam    Dr. Roth [phone]           Again, thank you for allowing me to participate in the care of your patient.        Sincerely,        Gordon Roth MD  "

## 2024-03-18 NOTE — PROGRESS NOTES
" Current Eye Medications:  artificial tears as needed      Subjective:  Diabetic eye exam: patient complains of a decrease in her distance visual acuity. She wears a +2.00 OTC and is satisfied with her near visual acuity.    Lab Results   Component Value Date    A1C 6.1 12/07/2023    A1C 6.2 05/08/2023    A1C 6.1 01/03/2023    A1C 6.3 11/07/2022    A1C 6.6 05/06/2022    A1C 7.0 04/13/2021    A1C 8.4 02/09/2021        Objective:  See Ophthalmology Exam.       Assessment:  Stable eye exam in patient with diabetes.  No diabetic retinopathy.      ICD-10-CM    1. Encounter for examination of eyes and vision with abnormal findings  Z01.01       2. Presbyopia  H52.4       3. Type 2 diabetes mellitus without complication, without long-term current use of insulin (H)  E11.9 Adult Eye  Referral      4. Pseudophakia, both eyes  Z96.1       5. Posterior vitreous detachment of both eyes  H43.813            Plan:  Glasses prescription given - optional  Can continue to use +2.00 over the counter reading glasses.     May use artificial tears up to four times a day (like Refresh Optive, Systane Balance, or TheraTears. Avoid \"get the red out\" drops and generic artifical tears).     Possible clouding of posterior capsule both eyes discussed.     Call in November 2024 for an appointment in March 2025 for Complete Exam    Dr. Roth [phone]         "

## 2024-03-18 NOTE — PATIENT INSTRUCTIONS
"Glasses prescription given - optional  Can continue to use +2.00 over the counter reading glasses.     May use artificial tears up to four times a day (like Refresh Optive, Systane Balance, or TheraTears. Avoid \"get the red out\" drops and generic artifical tears).     Possible clouding of posterior capsule both eyes discussed.     Call in November 2024 for an appointment in March 2025 for Complete Exam    Dr. Roth ([phone]      Patient Education   Diabetes weakens the blood vessels all over the body, including the eyes. Damage to the blood vessels in the eyes can cause swelling or bleeding into part of the eye (called the retina). This is called diabetic retinopathy (GELY-tin--puh-thee). If not treated, this disease can cause vision loss or blindness.   Symptoms may include blurred or distorted vision, but many people have no symptoms. It's important to see your eye doctor regularly to check for problems.   Early treatment and good control can help protect your vision. Here are the things you can do to help prevent vision loss:      1. Keep your blood sugar levels under tight control.      2. Bring high blood pressure under control.      3. No smoking.      4. Have yearly dilated eye exams.     "

## 2024-04-08 ENCOUNTER — PATIENT OUTREACH (OUTPATIENT)
Dept: CARE COORDINATION | Facility: CLINIC | Age: 74
End: 2024-04-08
Payer: COMMERCIAL

## 2024-04-15 DIAGNOSIS — I10 ESSENTIAL HYPERTENSION: ICD-10-CM

## 2024-04-15 RX ORDER — LOSARTAN POTASSIUM 100 MG/1
100 TABLET ORAL DAILY
Qty: 90 TABLET | Refills: 0 | Status: SHIPPED | OUTPATIENT
Start: 2024-04-15 | End: 2024-05-31

## 2024-04-22 ENCOUNTER — PATIENT OUTREACH (OUTPATIENT)
Dept: CARE COORDINATION | Facility: CLINIC | Age: 74
End: 2024-04-22
Payer: COMMERCIAL

## 2024-05-08 DIAGNOSIS — K50.919 CROHN'S DISEASE WITH COMPLICATION, UNSPECIFIED GASTROINTESTINAL TRACT LOCATION (H): ICD-10-CM

## 2024-05-08 RX ORDER — FOLIC ACID 1 MG/1
TABLET ORAL
Qty: 90 TABLET | Refills: 0 | Status: SHIPPED | OUTPATIENT
Start: 2024-05-08 | End: 2024-08-06

## 2024-05-14 ENCOUNTER — TELEPHONE (OUTPATIENT)
Dept: FAMILY MEDICINE | Facility: CLINIC | Age: 74
End: 2024-05-14
Payer: COMMERCIAL

## 2024-05-14 NOTE — TELEPHONE ENCOUNTER
Patient Quality Outreach    Patient is due for the following:   Diabetes -  Diabetic Follow-Up Visit  Breast Cancer Screening - Mammogram  Physical Annual Wellness Visit    Next Steps:   Schedule a Annual Wellness Visit    Type of outreach:    Chart review performed, no outreach needed.      Patient has appt scheduled 5/31/24    Questions for provider review:    None           Shantal Irby CMA

## 2024-05-23 ENCOUNTER — OFFICE VISIT (OUTPATIENT)
Dept: ORTHOPEDICS | Facility: CLINIC | Age: 74
End: 2024-05-23
Payer: COMMERCIAL

## 2024-05-23 ENCOUNTER — ANCILLARY PROCEDURE (OUTPATIENT)
Dept: GENERAL RADIOLOGY | Facility: CLINIC | Age: 74
End: 2024-05-23
Attending: FAMILY MEDICINE
Payer: COMMERCIAL

## 2024-05-23 DIAGNOSIS — M25.561 CHRONIC PAIN OF RIGHT KNEE: Primary | ICD-10-CM

## 2024-05-23 DIAGNOSIS — M17.0 PRIMARY OSTEOARTHRITIS OF BOTH KNEES: ICD-10-CM

## 2024-05-23 DIAGNOSIS — G89.29 CHRONIC PAIN OF RIGHT KNEE: Primary | ICD-10-CM

## 2024-05-23 DIAGNOSIS — S99.922A INJURY OF LEFT TOE: ICD-10-CM

## 2024-05-23 DIAGNOSIS — G89.29 CHRONIC LEFT SHOULDER PAIN: ICD-10-CM

## 2024-05-23 DIAGNOSIS — S99.922A INJURY OF TOE ON LEFT FOOT, INITIAL ENCOUNTER: ICD-10-CM

## 2024-05-23 DIAGNOSIS — M25.512 CHRONIC LEFT SHOULDER PAIN: ICD-10-CM

## 2024-05-23 PROCEDURE — 99214 OFFICE O/P EST MOD 30 MIN: CPT | Performed by: FAMILY MEDICINE

## 2024-05-23 PROCEDURE — 73630 X-RAY EXAM OF FOOT: CPT | Mod: TC | Performed by: RADIOLOGY

## 2024-05-23 NOTE — PROGRESS NOTES
ASSESSMENT & PLAN    Carli was seen today for follow up and pain.    Diagnoses and all orders for this visit:    Chronic pain of right knee  -     MR Knee Right w/o Contrast; Future    Injury of left toe  -     XR Foot LT G/E 3 vw; Future    Primary osteoarthritis of both knees  -     (PRE-AUTH REQUEST) 48 mg hylan (SYNVISC ONE) injection 48 mg/6mL-ONCE    Chronic left shoulder pain  -     MR Shoulder Left w/o Contrast; Future      This issue is acute on chronic and Worsening.    # Chronic Left Shoulder and Right Knee Pain, Left Toe Fracture: Carli Monreal  was seen today for follow-up on left shoulder, right knee and right great toe injury. Symptoms had been going on for 3 weeks for great toe, 6+ mon for left shoulder and right knee. On examination there are positive findings of test palpation over the right knee joint line, swelling and tenderness palpation over the left fifth toe, pain over the anterior left shoulder, pain with rotator cuff testing.  She did have a left subacromial and right knee joint steroid injections on 1/18/2024 that reported only improvement for about a week.  X-rays today showing nondisplaced fifth toe fracture.  Likely cause of her pain due to continued pain over the right knee, left shoulder joint pain, left great toe fracture.  Given steroid ejections did not help plan to get MRIs of the left shoulder right knee and follow-up in clinic afterwards to go over the results.  Advised patient to start wearing postop shoe to help with pain for the left foot plan otherwise as below.     Image Findings: Nondisplaced left fifth toe fracture  Treatment: Activities as tolerated, can use postop shoe, supportive shoe otherwise, left shoulder and right knee MRIs ordered    Medications/Injections: Biofreeze as needed, defer for now  Follow-up: In clinic afterwards to go over the MRI results and consideration of right knee Synvisc and possible left shoulder joint steroid injections    Artur ARRINGTON  MD Jai  [facility] [place]    -----  Chief Complaint   Patient presents with    Right Knee - Follow Up    Left 5th Toe - Pain       SUBJECTIVE  Carli Monreal is a/an 73 year old female who is seen as a self referral for evaluation of left little toe.     The patient is seen by themselves.      Onset: 3 week(s) ago. Patient describes injury as trying to stand up and jammed it into coffee.   Location of Pain: left little toe   Worsened by: walking   Better with: nothing   Treatments tried: walking boot, wearing different shoes   Associated symptoms: no distal numbness or tingling; denies swelling or warmth    Interim History - May 23, 2024  Last visit on 1/18/2024 right knee joint and left subacromial steroid injection were performed.  No changes in symptoms. No interim injury.   Has a cruise in July.     No family history pertinent to patient's problem today.      REVIEW OF SYSTEMS:  Review of Systems  Constitutional, HEENT, cardiovascular, pulmonary, gi and gu systems are negative, except as otherwise noted.    OBJECTIVE:  There were no vitals taken for this visit.   General: healthy, alert and in no distress  HEENT: no scleral icterus or conjunctival erythema  Skin: no suspicious lesions or rash. No jaundice.  CV: distal perfusion intact    Resp: normal respiratory effort without conversational dyspnea   Psych: normal mood and affect  Gait: normal steady gait with appropriate coordination and balance    Neuro: Normal light sensory exam of bilateral lower extremities    Ortho Exam   RIGHT KNEE  Inspection:    Normal alignment; no edema, erythema, or ecchymosis present  Palpation:    Tender about the lateral joint line and medial joint line. Remainder of bony and ligamentous landmarks are nontender.    No effusion is present    Patellofemoral crepitus is Present  Range of Motion:     00 extension to 1350 flexion  Strength:    Quadriceps 5/5, hamstrings 5/5, gastrocsoleus 5/5, and  tibialis anterior 5/5    Extensor mechanism intact  Special Tests:    Positive: Patellar grind    Negative: MCL/valgus stress (0 & 30 deg), LCL/varus stress (0 & 30 deg), Lachman's, anterior drawer, posterior drawer    LEFT FOOT  Inspection:    swelling over the 5th toe    Palpation:    Tender about the 5th phalanx    No tenderness over the calcaneus or Lisfranc joint  Range of Motion:     Active toe flexion and extension  - intact    Active ankle range of motion - intact    Passive ankle range of motion - intact  Strength:    Intrinsic foot musculature strength - intact    Ankle strength - intact  Special Tests:    Positive: None    Negative: heel strike and Lisfranc joint tenderness    LEFT SHOULDER  Inspection:    no swelling, bruising, discoloration, or obvious deformity or asymmetry  Palpation:    Tender about the supraspinatus insertion. Remainder of bony and tendinous landmarks are nontender.  Active Range of Motion:     Abduction 1350, FF 1350, , IR hip pocket.       Strength:    Scapular plane abduction 5-/5, painful,  ER 5/5, IR 5/5, biceps 5/5, triceps 5/5         RADIOLOGY:  I independently ordered, visualized and reviewed these images with the patient  Left foot x-rays showing non-displaced 5th toe fracture      IMPRESSION: Mild AC joint arthrosis. Glenohumeral joint space is  maintained. No evidence of acute fracture or dislocation.     BILL BOBBY MD     IMPRESSION:      RIGHT KNEE: There is mild lateral compartment degenerative arthrosis.  No acute fracture. No sizable joint effusion.     LEFT KNEE: Frontal and sunrise views of the left knee demonstrate  preserved joint spaces.     SELENE SANCHES MD   Review of external notes as documented elsewhere in note  Review of the result(s) of each unique test - left foot x-rays       Disclaimer: This note consists of symbols derived from keyboarding, dictation and/or voice recognition software. As a result, there may be errors in the script  that have gone undetected. Please consider this when interpreting information found in this chart.

## 2024-05-23 NOTE — PATIENT INSTRUCTIONS
# Chronic Left Shoulder and Right Knee Pain, Left Toe Fracture: Carli Monreal  was seen today for follow-up on left shoulder, right knee and right great toe injury. Symptoms had been going on for 3 weeks for great toe, 6+ mon for left shoulder and right knee. On examination there are positive findings of test palpation over the right knee joint line, swelling and tenderness palpation over the left fifth toe, pain over the anterior left shoulder, pain with rotator cuff testing.  She did have a left subacromial and right knee joint steroid injections on 1/18/2024 that reported only improvement for about a week.  X-rays today showing nondisplaced fifth toe fracture.  Likely cause of her pain due to continued pain over the right knee, left shoulder joint pain, left great toe fracture.  Given steroid ejections did not help plan to get MRIs of the left shoulder right knee and follow-up in clinic afterwards to go over the results.  Advised patient to start wearing postop shoe to help with pain for the left foot plan otherwise as below.     Image Findings: Nondisplaced left fifth toe fracture  Treatment: Activities as tolerated, can use postop shoe, supportive shoe otherwise, left shoulder and right knee MRIs ordered    Medications/Injections: Biofreeze as needed, defer for now  Follow-up: In clinic afterwards to go over the MRI results and consideration of right knee Synvisc and possible left shoulder joint steroid injections    Please call [phone]   Ask for my team if you have any questions or concerns    If you have not yet received the influenza vaccine but would like to get one, please call  [phone] or you can schedule via [facility]    It was great seeing you again today!    Artur Vergara MD, [facility]

## 2024-05-23 NOTE — LETTER
5/23/2024         RE: Carli Monreal  [address]  [place] [address]        Dear Colleague,    Thank you for referring your patient, Carli Monreal, to the [facility] [place]. Please see a copy of my visit note below.    ASSESSMENT & PLAN    Carli was seen today for follow up and pain.    Diagnoses and all orders for this visit:    Chronic pain of right knee  -     MR Knee Right w/o Contrast; Future    Injury of left toe  -     XR Foot LT G/E 3 vw; Future    Primary osteoarthritis of both knees  -     (PRE-AUTH REQUEST) 48 mg hylan (SYNVISC ONE) injection 48 mg/6mL-ONCE    Chronic left shoulder pain  -     MR Shoulder Left w/o Contrast; Future      This issue is acute on chronic and Worsening.    # Chronic Left Shoulder and Right Knee Pain, Left Toe Fracture: Carli Monreal  was seen today for follow-up on left shoulder, right knee and right great toe injury. Symptoms had been going on for 3 weeks for great toe, 6+ mon for left shoulder and right knee. On examination there are positive findings of test palpation over the right knee joint line, swelling and tenderness palpation over the left fifth toe, pain over the anterior left shoulder, pain with rotator cuff testing.  She did have a left subacromial and right knee joint steroid injections on 1/18/2024 that reported only improvement for about a week.  X-rays today showing nondisplaced fifth toe fracture.  Likely cause of her pain due to continued pain over the right knee, left shoulder joint pain, left great toe fracture.  Given steroid ejections did not help plan to get MRIs of the left shoulder right knee and follow-up in clinic afterwards to go over the results.  Advised patient to start wearing postop shoe to help with pain for the left foot plan otherwise as below.     Image Findings: Nondisplaced left fifth toe fracture  Treatment: Activities as tolerated, can use postop shoe, supportive shoe otherwise, left  shoulder and right knee MRIs ordered    Medications/Injections: Biofreeze as needed, defer for now  Follow-up: In clinic afterwards to go over the MRI results and consideration of right knee Synvisc and possible left shoulder joint steroid injections    Artur Vergara MD  [facility] [place]    -----  Chief Complaint   Patient presents with     Right Knee - Follow Up     Left 5th Toe - Pain       SUBJECTIVE  Carli Monreal is a/an 73 year old female who is seen as a self referral for evaluation of left little toe.     The patient is seen by themselves.      Onset: 3 week(s) ago. Patient describes injury as trying to stand up and jammed it into coffee.   Location of Pain: left little toe   Worsened by: walking   Better with: nothing   Treatments tried: walking boot, wearing different shoes   Associated symptoms: no distal numbness or tingling; denies swelling or warmth    Interim History - May 23, 2024  Last visit on 1/18/2024 right knee joint and left subacromial steroid injection were performed.  No changes in symptoms. No interim injury.   Has a cruise in July.     No family history pertinent to patient's problem today.      REVIEW OF SYSTEMS:  Review of Systems  Constitutional, HEENT, cardiovascular, pulmonary, gi and gu systems are negative, except as otherwise noted.    OBJECTIVE:  There were no vitals taken for this visit.   General: healthy, alert and in no distress  HEENT: no scleral icterus or conjunctival erythema  Skin: no suspicious lesions or rash. No jaundice.  CV: distal perfusion intact    Resp: normal respiratory effort without conversational dyspnea   Psych: normal mood and affect  Gait: normal steady gait with appropriate coordination and balance    Neuro: Normal light sensory exam of bilateral lower extremities    Ortho Exam   RIGHT KNEE  Inspection:    Normal alignment; no edema, erythema, or ecchymosis present  Palpation:    Tender about the lateral joint line  and medial joint line. Remainder of bony and ligamentous landmarks are nontender.    No effusion is present    Patellofemoral crepitus is Present  Range of Motion:     00 extension to 1350 flexion  Strength:    Quadriceps 5/5, hamstrings 5/5, gastrocsoleus 5/5, and tibialis anterior 5/5    Extensor mechanism intact  Special Tests:    Positive: Patellar grind    Negative: MCL/valgus stress (0 & 30 deg), LCL/varus stress (0 & 30 deg), Lachman's, anterior drawer, posterior drawer    LEFT FOOT  Inspection:    swelling over the 5th toe    Palpation:    Tender about the 5th phalanx    No tenderness over the calcaneus or Lisfranc joint  Range of Motion:     Active toe flexion and extension  - intact    Active ankle range of motion - intact    Passive ankle range of motion - intact  Strength:    Intrinsic foot musculature strength - intact    Ankle strength - intact  Special Tests:    Positive: None    Negative: heel strike and Lisfranc joint tenderness    LEFT SHOULDER  Inspection:    no swelling, bruising, discoloration, or obvious deformity or asymmetry  Palpation:    Tender about the supraspinatus insertion. Remainder of bony and tendinous landmarks are nontender.  Active Range of Motion:     Abduction 1350, FF 1350, , IR hip pocket.       Strength:    Scapular plane abduction 5-/5, painful,  ER 5/5, IR 5/5, biceps 5/5, triceps 5/5         RADIOLOGY:  I independently ordered, visualized and reviewed these images with the patient  Left foot x-rays showing non-displaced 5th toe fracture      IMPRESSION: Mild AC joint arthrosis. Glenohumeral joint space is  maintained. No evidence of acute fracture or dislocation.     BILL BOBBY MD     IMPRESSION:      RIGHT KNEE: There is mild lateral compartment degenerative arthrosis.  No acute fracture. No sizable joint effusion.     LEFT KNEE: Frontal and sunrise views of the left knee demonstrate  preserved joint spaces.     SELENE SANCHES MD   Review of external notes  as documented elsewhere in note  Review of the result(s) of each unique test - left foot x-rays       Disclaimer: This note consists of symbols derived from keyboarding, dictation and/or voice recognition software. As a result, there may be errors in the script that have gone undetected. Please consider this when interpreting information found in this chart.      Again, thank you for allowing me to participate in the care of your patient.        Sincerely,        Artur Vergara MD

## 2024-05-31 ENCOUNTER — OFFICE VISIT (OUTPATIENT)
Dept: FAMILY MEDICINE | Facility: CLINIC | Age: 74
End: 2024-05-31
Payer: COMMERCIAL

## 2024-05-31 VITALS
WEIGHT: 156 LBS | DIASTOLIC BLOOD PRESSURE: 70 MMHG | HEIGHT: 60 IN | RESPIRATION RATE: 24 BRPM | BODY MASS INDEX: 30.63 KG/M2 | SYSTOLIC BLOOD PRESSURE: 130 MMHG | HEART RATE: 78 BPM | OXYGEN SATURATION: 98 % | TEMPERATURE: 98.4 F

## 2024-05-31 DIAGNOSIS — Z00.00 ENCOUNTER FOR MEDICARE ANNUAL WELLNESS EXAM: Primary | ICD-10-CM

## 2024-05-31 DIAGNOSIS — J45.40 MODERATE PERSISTENT ASTHMA, UNSPECIFIED WHETHER COMPLICATED: ICD-10-CM

## 2024-05-31 DIAGNOSIS — I10 ESSENTIAL HYPERTENSION: ICD-10-CM

## 2024-05-31 DIAGNOSIS — E11.9 TYPE 2 DIABETES MELLITUS WITHOUT COMPLICATION, WITHOUT LONG-TERM CURRENT USE OF INSULIN (H): ICD-10-CM

## 2024-05-31 DIAGNOSIS — K21.00 GASTROESOPHAGEAL REFLUX DISEASE WITH ESOPHAGITIS WITHOUT HEMORRHAGE: ICD-10-CM

## 2024-05-31 DIAGNOSIS — E87.6 HYPOKALEMIA: ICD-10-CM

## 2024-05-31 DIAGNOSIS — G89.29 CHRONIC BILATERAL LOW BACK PAIN, UNSPECIFIED WHETHER SCIATICA PRESENT: ICD-10-CM

## 2024-05-31 DIAGNOSIS — M54.50 CHRONIC BILATERAL LOW BACK PAIN, UNSPECIFIED WHETHER SCIATICA PRESENT: ICD-10-CM

## 2024-05-31 DIAGNOSIS — Z12.31 ENCOUNTER FOR SCREENING MAMMOGRAM FOR BREAST CANCER: ICD-10-CM

## 2024-05-31 LAB
HBA1C MFR BLD: 6.3 % (ref 0–5.6)
HOLD SPECIMEN: NORMAL

## 2024-05-31 PROCEDURE — 36415 COLL VENOUS BLD VENIPUNCTURE: CPT | Performed by: FAMILY MEDICINE

## 2024-05-31 PROCEDURE — 99214 OFFICE O/P EST MOD 30 MIN: CPT | Mod: 25 | Performed by: FAMILY MEDICINE

## 2024-05-31 PROCEDURE — 80048 BASIC METABOLIC PNL TOTAL CA: CPT | Performed by: FAMILY MEDICINE

## 2024-05-31 PROCEDURE — 83036 HEMOGLOBIN GLYCOSYLATED A1C: CPT | Performed by: FAMILY MEDICINE

## 2024-05-31 PROCEDURE — 90480 ADMN SARSCOV2 VAC 1/ONLY CMP: CPT | Performed by: FAMILY MEDICINE

## 2024-05-31 PROCEDURE — G0439 PPPS, SUBSEQ VISIT: HCPCS | Performed by: FAMILY MEDICINE

## 2024-05-31 PROCEDURE — 91320 SARSCV2 VAC 30MCG TRS-SUC IM: CPT | Performed by: FAMILY MEDICINE

## 2024-05-31 RX ORDER — BLOOD SUGAR DIAGNOSTIC
STRIP MISCELLANEOUS
Qty: 100 STRIP | Refills: 0 | Status: SHIPPED | OUTPATIENT
Start: 2024-05-31 | End: 2024-09-03

## 2024-05-31 RX ORDER — GABAPENTIN 100 MG/1
100 CAPSULE ORAL 3 TIMES DAILY PRN
Qty: 90 CAPSULE | Refills: 3 | Status: SHIPPED | OUTPATIENT
Start: 2024-05-31

## 2024-05-31 RX ORDER — FAMOTIDINE 40 MG/1
40 TABLET, FILM COATED ORAL 2 TIMES DAILY
Qty: 180 TABLET | Refills: 1 | Status: SHIPPED | OUTPATIENT
Start: 2024-05-31

## 2024-05-31 RX ORDER — POTASSIUM CHLORIDE 1500 MG/1
20 TABLET, EXTENDED RELEASE ORAL DAILY
Qty: 90 TABLET | Refills: 1 | Status: SHIPPED | OUTPATIENT
Start: 2024-05-31

## 2024-05-31 RX ORDER — METFORMIN HCL 500 MG
1000 TABLET, EXTENDED RELEASE 24 HR ORAL
Qty: 180 TABLET | Refills: 1 | Status: SHIPPED | OUTPATIENT
Start: 2024-05-31

## 2024-05-31 RX ORDER — ATORVASTATIN CALCIUM 10 MG/1
10 TABLET, FILM COATED ORAL DAILY
Qty: 90 TABLET | Refills: 3 | Status: SHIPPED | OUTPATIENT
Start: 2024-05-31

## 2024-05-31 RX ORDER — LANCETS 33 GAUGE
EACH MISCELLANEOUS
Qty: 100 EACH | Refills: 0 | Status: CANCELLED | OUTPATIENT
Start: 2024-05-31

## 2024-05-31 RX ORDER — RESPIRATORY SYNCYTIAL VIRUS VACCINE 120MCG/0.5
0.5 KIT INTRAMUSCULAR ONCE
Qty: 1 EACH | Refills: 0 | Status: CANCELLED | OUTPATIENT
Start: 2024-05-31 | End: 2024-05-31

## 2024-05-31 RX ORDER — AMLODIPINE BESYLATE 2.5 MG/1
2.5 TABLET ORAL DAILY
Qty: 90 TABLET | Refills: 1 | Status: SHIPPED | OUTPATIENT
Start: 2024-05-31

## 2024-05-31 RX ORDER — LOSARTAN POTASSIUM 100 MG/1
100 TABLET ORAL DAILY
Qty: 90 TABLET | Refills: 1 | Status: SHIPPED | OUTPATIENT
Start: 2024-05-31

## 2024-05-31 RX ORDER — BUDESONIDE AND FORMOTEROL FUMARATE DIHYDRATE 160; 4.5 UG/1; UG/1
2 AEROSOL RESPIRATORY (INHALATION) 2 TIMES DAILY
Qty: 10.2 G | Refills: 4 | Status: SHIPPED | OUTPATIENT
Start: 2024-05-31

## 2024-05-31 SDOH — HEALTH STABILITY: PHYSICAL HEALTH: ON AVERAGE, HOW MANY MINUTES DO YOU ENGAGE IN EXERCISE AT THIS LEVEL?: 0 MIN

## 2024-05-31 SDOH — HEALTH STABILITY: PHYSICAL HEALTH: ON AVERAGE, HOW MANY DAYS PER WEEK DO YOU ENGAGE IN MODERATE TO STRENUOUS EXERCISE (LIKE A BRISK WALK)?: 0 DAYS

## 2024-05-31 ASSESSMENT — ASTHMA QUESTIONNAIRES
QUESTION_5 LAST FOUR WEEKS HOW WOULD YOU RATE YOUR ASTHMA CONTROL: WELL CONTROLLED
QUESTION_2 LAST FOUR WEEKS HOW OFTEN HAVE YOU HAD SHORTNESS OF BREATH: ONCE OR TWICE A WEEK
QUESTION_4 LAST FOUR WEEKS HOW OFTEN HAVE YOU USED YOUR RESCUE INHALER OR NEBULIZER MEDICATION (SUCH AS ALBUTEROL): NOT AT ALL
ACT_TOTALSCORE: 23
QUESTION_1 LAST FOUR WEEKS HOW MUCH OF THE TIME DID YOUR ASTHMA KEEP YOU FROM GETTING AS MUCH DONE AT WORK, SCHOOL OR AT HOME: NONE OF THE TIME
QUESTION_3 LAST FOUR WEEKS HOW OFTEN DID YOUR ASTHMA SYMPTOMS (WHEEZING, COUGHING, SHORTNESS OF BREATH, CHEST TIGHTNESS OR PAIN) WAKE YOU UP AT NIGHT OR EARLIER THAN USUAL IN THE MORNING: NOT AT ALL
ACT_TOTALSCORE: 23

## 2024-05-31 ASSESSMENT — SOCIAL DETERMINANTS OF HEALTH (SDOH): HOW OFTEN DO YOU GET TOGETHER WITH FRIENDS OR RELATIVES?: ONCE A WEEK

## 2024-05-31 ASSESSMENT — PAIN SCALES - GENERAL: PAINLEVEL: MODERATE PAIN (5)

## 2024-05-31 NOTE — PATIENT INSTRUCTIONS
"Preventive Care Advice   This is general advice we often give to help people stay healthy. Your care team may have specific advice just for you. Please talk to your care team about your own preventive care needs.  Lifestyle  Exercise at least 150 minutes each week (30 minutes a day, 5 days a week).  Do muscle strengthening activities 2 days a week. These help control your weight and prevent disease.  No smoking.  Wear sunscreen to prevent skin cancer.  Have your home tested for radon every 2 to 5 years. Radon is a colorless, odorless gas that can harm your lungs. To learn more, go to www.health.[facility].mn. and search for \"Radon in Homes.\"  Keep guns unloaded and locked up in a safe place like a safe or gun vault, or, use a gun lock and hide the keys. Always lock away bullets separately. To learn more, visit [place].gov and search for \"safe gun storage.\"  Nutrition  Eat 5 or more servings of fruits and vegetables each day.  Try wheat bread, brown rice and whole grain pasta (instead of white bread, rice, and pasta).  Get enough calcium and vitamin D. Check the label on foods and aim for 100% of the RDA (recommended daily allowance).  Regular exams  Have a dental exam and cleaning every 6 months.  See your health care team every year to talk about:  Any changes in your health.  Any medicines your care team has prescribed.  Preventive care, family planning, and ways to prevent chronic diseases.  Shots (vaccines)   HPV shots (up to age 26), if you've never had them before.  Hepatitis B shots (up to age 59), if you've never had them before.  COVID-19 shot: Get this shot when it's due.  Flu shot: Get a flu shot every year.  Tetanus shot: Get a tetanus shot every 10 years.  Pneumococcal, hepatitis A, and RSV shots: Ask your care team if you need these based on your risk.  Shingles shot (for age 50 and up).  General health tests  Diabetes screening:  Starting at age 35, Get screened for diabetes at least every 3 years.  If " you are younger than age 35, ask your care team if you should be screened for diabetes.  Cholesterol test: At age 39, start having a cholesterol test every 5 years, or more often if advised.  Bone density scan (DEXA): At age 50, ask your care team if you should have this scan for osteoporosis (brittle bones).  Hepatitis C: Get tested at least once in your life.  Abdominal aortic aneurysm screening: Talk to your doctor about having this screening if you:  Have ever smoked; and  Are biologically male; and  Are between the ages of 65 and 75.  STIs (sexually transmitted infections)  Before age 24: Ask your care team if you should be screened for STIs.  After age 24: Get screened for STIs if you're at risk. You are at risk for STIs (including HIV) if:  You are sexually active with more than one person.  You don't use condoms every time.  You or a partner was diagnosed with a sexually transmitted infection.  If you are at risk for HIV, ask about PrEP medicine to prevent HIV.  Get tested for HIV at least once in your life, whether you are at risk for HIV or not.  Cancer screening tests  Cervical cancer screening: If you have a cervix, begin getting regular cervical cancer screening tests at age 21. Most people who have regular screenings with normal results can stop after age 65. Talk about this with your provider.  Breast cancer scan (mammogram): If you've ever had breasts, begin having regular mammograms starting at age 40. This is a scan to check for breast cancer.  Colon cancer screening: It is important to start screening for colon cancer at age 45.  Have a colonoscopy test every 10 years (or more often if you're at risk) Or, ask your provider about stool tests like a FIT test every year or Cologuard test every 3 years.  To learn more about your testing options, visit: www.[employer]/764180.pdf.  For help making a decision, visit: vilma/ua20087.  Prostate cancer screening test: If you have a prostate and are age 55  to 69, ask your provider if you would benefit from a yearly prostate cancer screening test.  Lung cancer screening: If you are a current or former smoker age 50 to 80, ask your care team if ongoing lung cancer screenings are right for you.  For informational purposes only. Not to replace the advice of your health care provider. Copyright   2023 [employer]. All rights reserved. Clinically reviewed by the [facility] Transitions Program. [employer] [id number] - REV 04/24.    Learning About Being Active as an Older Adult  Why is being active important as you get older?     Being active is one of the best things you can do for your health. And it's never too late to start. Being active--or getting active, if you aren't already--has definite benefits. It can:  Give you more energy,  Keep your mind sharp.  Improve balance to reduce your risk of falls.  Help you manage chronic illness with fewer medicines.  No matter how old you are, how fit you are, or what health problems you have, there is a form of activity that will work for you. And the more physical activity you can do, the better your overall health will be.  What kinds of activity can help you stay healthy?  Being more active will make your daily activities easier. Physical activity includes planned exercise and things you do in daily life. There are four types of activity:  Aerobic.  Doing aerobic activity makes your heart and lungs strong.  Includes walking, dancing, and gardening.  Aim for at least 2  hours spread throughout the week.  It improves your energy and can help you sleep better.  Muscle-strengthening.  This type of activity can help maintain muscle and strengthen bones.  Includes climbing stairs, using resistance bands, and lifting or carrying heavy loads.  Aim for at least twice a week.  It can help protect the knees and other joints.  Stretching.  Stretching gives you better range of motion in joints and muscles.  Includes upper arm  stretches, calf stretches, and gentle yoga.  Aim for at least twice a week, preferably after your muscles are warmed up from other activities.  It can help you function better in daily life.  Balancing.  This helps you stay coordinated and have good posture.  Includes heel-to-toe walking, larry chi, and certain types of yoga.  Aim for at least 3 days a week.  It can reduce your risk of falling.  Even if you have a hard time meeting the recommendations, it's better to be more active than less active. All activity done in each category counts toward your weekly total. You'd be surprised how daily things like carrying groceries, keeping up with grandchildren, and taking the stairs can add up.  What keeps you from being active?  If you've had a hard time being more active, you're not alone. Maybe you remember being able to do more. Or maybe you've never thought of yourself as being active. It's frustrating when you can't do the things you want. Being more active can help. What's holding you back?  Getting started.  Have a goal, but break it into easy tasks. Small steps build into big accomplishments.  Staying motivated.  If you feel like skipping your activity, remember your goal. Maybe you want to move better and stay independent. Every activity gets you one step closer.  Not feeling your best.  Start with 5 minutes of an activity you enjoy. Prove to yourself you can do it. As you get comfortable, increase your time.  You may not be where you want to be. But you're in the process of getting there. Everyone starts somewhere.  How can you find safe ways to stay active?  Talk with your doctor about any physical challenges you're facing. Make a plan with your doctor if you have a health problem or aren't sure how to get started with activity.  If you're already active, ask your doctor if there is anything you should change to stay safe as your body and health change.  If you tend to feel dizzy after you take medicine, avoid  "activity at that time. Try being active before you take your medicine. This will reduce your risk of falls.  If you plan to be active at home, make sure to clear your space before you get started. Remove things like TV cords, coffee tables, and throw rugs. It's safest to have plenty of space to move freely.  The key to getting more active is to take it slow and steady. Try to improve only a little bit at a time. Pick just one area to improve on at first. And if an activity hurts, stop and talk to your doctor.  Where can you learn more?  Go to https://www.[employer].net/patiented  Enter P600 in the search box to learn more about \"Learning About Being Active as an Older Adult.\"  Current as of: June 5, 2023               Content Version: 14.0    3459-4816 [employer].   Care instructions adapted under license by your healthcare professional. If you have questions about a medical condition or this instruction, always ask your healthcare professional. [employer] disclaims any warranty or liability for your use of this information.      Nutrition for Older Adults: Care Instructions  Overview     Good nutrition is important at any age. But it is especially important for older adults. Eating healthy foods helps keep your body strong. And it can help lower your risk for disease.  As you get older, your body needs more of certain nutrients. These include vitamin B12, calcium, and vitamin D. But it may be harder for you to get these and other important nutrients. This could be for many reasons. You may not feel as hungry as you used to. Or you could have problems with your teeth or mouth that make it hard to chew. Or you may not enjoy planning and preparing meals, especially if you live alone.  Talk with your doctor if you want help getting the most nutrition from what you eat. They may have you work with a dietitian to help you plan meals.  Follow-up care is a key part of your treatment and safety. " Be sure to make and go to all appointments, and call your doctor if you are having problems. It's also a good idea to know your test results and keep a list of the medicines you take.  How can you care for yourself at home?  To stay healthy  Eat a variety of foods. The more you vary the foods you eat, the more vitamins, minerals, and other nutrients you get.  Ask your doctor if you should take a multivitamin. Choose one with about 100% of the daily value (DV) for vitamins and minerals. Do not take more than 100% of the daily value for any vitamin or mineral unless your doctor tells you to. Talk with your doctor if you are not sure which multivitamin is right for you.  Try to eat lots of fruits and vegetables. Fresh or frozen vegetables and fruits are healthy choices. Choose canned vegetables that have no salt added and fruits that are canned in their own juice or light syrup.  Include foods that are high in vitamin B12 in your diet. Good choices are fortified breakfast cereal, nonfat or low-fat milk and other dairy products, meat, poultry, fish, and eggs.  Get enough calcium and vitamin D. Good choices include nonfat or low-fat milk, cheese, and yogurt. Other good options are tofu, orange juice with added calcium, and some leafy green vegetables, such as fatimah greens and kale. If you don't use milk products, talk to your doctor about calcium and vitamin D supplements.  Try to eat protein foods every day. Good choices include lean meat, fish, poultry, eggs, and cheese. Other good options are cooked beans, peanut butter, and nuts and seeds.  Choose whole grains for half of the grains you eat. Look for 100% whole wheat bread, whole-grain cereals, brown rice, and other whole grains.  If you have constipation  Eat high-fiber foods every day if you can. These include fruits, vegetables, cooked dried beans, and whole grains.  Drink plenty of fluids. If you have kidney, heart, or liver disease and have to limit fluids,  talk with your doctor before you increase the amount of fluids you drink.  Ask your doctor if stool softeners may help keep your bowels regular.  If you have mouth problems that make chewing hard  Pick canned or cooked fruits and vegetables. These are often softer.  Chop or shred meat, poultry, and fish. Add sauce or gravy to the meat to help keep it moist.  Pick other protein foods that are soft. These include cheese, peanut butter, cooked beans, cottage cheese, and eggs.  If you have trouble shopping for yourself  Ask a local food store to deliver groceries to your home.  Contact your local area agency on aging and ask about resources that can help.  Ask a family member or neighbor to help you.  If you have trouble preparing meals  Try easier cooking methods such as using a slow cooker or microwave oven.  Let the grocery store do some of the work for you. Look for precut, washed, and ready-to-eat foods.  Take part in group meal programs. You can find these through senior citizen programs.  Have meals brought to your home. Your community may offer programs that deliver meals, such as Meals on Wheels. Or you could use an online meal delivery service.  If you are able, take a cooking class.  If your appetite is poor  Try to eat meals on a regular schedule. It may help to eat smaller meals more often throughout the day.  If you can, eat some meals with other people. You could ask family or friends to eat with you. Or you could take part in group meal programs offered in your community.  Ask your doctor if your medicines could cause appetite or taste problems. If so, ask about changing medicines.  Add spices and herbs to increase the flavor of food.  If you think you are depressed, ask your doctor for help. Depression can affect your appetite. And it can make it hard to do everyday activities like grocery shopping and making meals. Treatment can help.  When should you call for help?  Watch closely for changes in your  "health, and be sure to contact your doctor if you have any problems.  Where can you learn more?  Go to https://www.[employer].net/patiented  Enter L643 in the search box to learn more about \"Nutrition for Older Adults: Care Instructions.\"  Current as of: September 25, 2023               Content Version: 14.0 2006-2024 [employer].   Care instructions adapted under license by your healthcare professional. If you have questions about a medical condition or this instruction, always ask your healthcare professional. [employer] disclaims any warranty or liability for your use of this information.       Learning About Risk for Heart Attack and Stroke (01:56)  Your health professional recommends that you watch this short online health video.  Learn what raises your risk for having a heart attack or stroke and how you can lower your risk.  Purpose:  Explains risk factors for heart attack and stroke and ways to lower the risk.  Goal:  Users will understand what it means to be at risk for having a heart attack or stroke and what they can do to reduce their risk.     How to watch the video    Scan the QR code   OR Visit the website    https://hwi.se/[email or web address]   Current as of: June 24, 2023               Content Version: 14.0 2006-2024 [employer].   Care instructions adapted under license by your healthcare professional. If you have questions about a medical condition or this instruction, always ask your healthcare professional. [employer] disclaims any warranty or liability for your use of this information.      "

## 2024-05-31 NOTE — PROGRESS NOTES
Preventive Care Visit  [facility]  Iza Cage DO, Family Medicine  May 31, 2024      Assessment & Plan     Encounter for Medicare annual wellness exam      Type 2 diabetes mellitus without complication, without long-term current use of insulin (H)    The patient's diabetes is overtreated so we will reduce her metformin to 1000 mg daily extended release instead of thousand twice daily.  Patient will monitor her sugars at home and come in for a diabetes recheck in 6 months    - HEMOGLOBIN A1C; Future  - HEMOGLOBIN A1C  - atorvastatin (LIPITOR) 10 MG tablet; Take 1 tablet (10 mg) by mouth daily Do not fill until contacted by patient  - metFORMIN (GLUCOPHAGE XR) 500 MG 24 hr tablet; Take 2 tablets (1,000 mg) by mouth daily (with dinner)  - blood glucose (ONETOUCH VERIO IQ) test strip; USE TO TEST BLOOD GLUCOSE ONCE DAILY    Essential hypertension  The current medical regimen is effective;  continue present plan and medications.    - Basic metabolic panel  (Ca, Cl, CO2, Creat, Gluc, K, Na, BUN); Future  - Basic metabolic panel  (Ca, Cl, CO2, Creat, Gluc, K, Na, BUN)  - amLODIPine (NORVASC) 2.5 MG tablet; Take 1 tablet (2.5 mg) by mouth daily  - losartan (COZAAR) 100 MG tablet; Take 1 tablet (100 mg) by mouth daily    Chronic bilateral low back pain, unspecified whether sciatica present  The current medical regimen is effective;  continue present plan and medications.    - gabapentin (NEURONTIN) 100 MG capsule; Take 1 capsule (100 mg) by mouth 3 times daily as needed for neuropathic pain    Gastroesophageal reflux disease with esophagitis without hemorrhage  The current medical regimen is effective;  continue present plan and medications.    - famotidine (PEPCID) 40 MG tablet; Take 1 tablet (40 mg) by mouth 2 times daily    Hypokalemia  The current medical regimen is effective;  continue present plan and medications.    - potassium chloride daniel ER (KLOR-CON M20) 20 MEQ CR tablet; Take 1  "tablet (20 mEq) by mouth daily    Moderate persistent asthma, unspecified whether complicated  The current medical regimen is effective;  continue present plan and medications.    - budesonide-formoterol (SYMBICORT) 160-4.5 MCG/ACT Inhaler; Inhale 2 puffs into the lungs 2 times daily    Encounter for screening mammogram for breast cancer    - MA Screen Bilateral w/[place]; Future        BMI  Estimated body mass index is 30.98 kg/m  as calculated from the following:    Height as of this encounter: 1.511 m (4' 11.5\").    Weight as of this encounter: 70.8 kg (156 lb).       Counseling  Appropriate preventive services were discussed with this patient, including applicable screening as appropriate for fall prevention, nutrition, physical activity, Tobacco-use cessation, weight loss and cognition.  Checklist reviewing preventive services available has been given to the patient.  Reviewed patient's diet, addressing concerns and/or questions.   The patient was instructed to see the dentist every 6 months.   Discussed possible causes of fatigue.     Follow-up in 6 months for diabetes    Mary Boyce is a 73 year old, presenting for the following:  Physical        5/31/2024    12:47 PM   Additional Questions   Roomed by Shantal BRITTON         Health Care Directive  Patient does not have a Health Care Directive or Living Will: Discussed advance care planning with patient; information given to patient to review.    HPI      Diabetes Follow-up    How often are you checking your blood sugar? One time daily  What time of day are you checking your blood sugars (select all that apply)?  Before meals  Have you had any blood sugars above 200?  No  Have you had any blood sugars below 70?  No  What symptoms do you notice when your blood sugar is low?  Shaky, Dizzy, Weak, and Lethargy  What concerns do you have today about your diabetes? None   Do you have any of these symptoms? (Select all that apply)  No numbness or tingling in feet.  No " redness, sores or blisters on feet.  No complaints of excessive thirst.  No reports of blurry vision.  No significant changes to weight.    Metformin 1000 mg twice daily    Lab Results   Component Value Date    A1C 6.3 05/31/2024    A1C 6.1 12/07/2023    A1C 6.2 05/08/2023    A1C 6.1 01/03/2023    A1C 6.3 11/07/2022    A1C 7.0 04/13/2021    A1C 8.4 02/09/2021             Hyperlipidemia Follow-Up    Are you regularly taking any medication or supplement to lower your cholesterol?   Yes- atorvastatin  Are you having muscle aches or other side effects that you think could be caused by your cholesterol lowering medication?  No  LDL Cholesterol Calculated   Date Value Ref Range Status   12/07/2023 22 <=100 mg/dL Final   02/09/2021 63.7 0.0 - 130.0 mg/dL Final         Hypertension Follow-up    Do you check your blood pressure regularly outside of the clinic? No   Are you following a low salt diet? Yes  Are your blood pressures ever more than 140 on the top number (systolic) OR more   than 90 on the bottom number (diastolic), for example 140/90? No    BP Readings from Last 2 Encounters:   05/31/24 130/70   01/18/24 (!) 161/69     Hemoglobin A1C (%)   Date Value   05/31/2024 6.3 (H)   12/07/2023 6.1 (H)   04/13/2021 7.0 (H)   02/09/2021 8.4 (A)     LDL Cholesterol Calculated (mg/dL)   Date Value   12/07/2023 22   11/07/2022 26   02/09/2021 63.7         Asthma Follow-Up    Was ACT completed today?  Yes        5/31/2024     1:00 PM   ACT Total Scores   ACT TOTAL SCORE (Goal Greater than or Equal to 20) 23   In the past 12 months, how many times did you visit the emergency room for your asthma without being admitted to the hospital? 0   In the past 12 months, how many times were you hospitalized overnight because of your asthma? 0        How many days per week do you miss taking your asthma controller medication?  0  Please describe any recent triggers for your asthma: humidity and cold air  Have you had any Emergency Room  Visits, Urgent Care Visits, or Hospital Admissions since your last office visit?  No            5/31/2024   General Health   How would you rate your overall physical health? Good   Feel stress (tense, anxious, or unable to sleep) Not at all         5/31/2024   Nutrition   Diet: Regular (no restrictions)         5/31/2024   Exercise   Days per week of moderate/strenous exercise 0 days   Average minutes spent exercising at this level 0 min   (!) EXERCISE CONCERN      5/31/2024   Social Factors   Frequency of gathering with friends or relatives Once a week   Worry food won't last until get money to buy more No   Food not last or not have enough money for food? No   Do you have housing?  Yes   Are you worried about losing your housing? No   Lack of transportation? No   Unable to get utilities (heat,electricity)? No         5/31/2024   Fall Risk   Fallen 2 or more times in the past year? No    No   Trouble with walking or balance? No    No          5/31/2024   Activities of Daily Living- Home Safety   Needs help with the following daily activites None of the above   Safety concerns in the home None of the above         5/31/2024   Dental   Dentist two times every year? (!) NO         5/31/2024   Hearing Screening   Hearing concerns? None of the above         5/31/2024   Driving Risk Screening   Patient/family members have concerns about driving No         5/31/2024   General Alertness/Fatigue Screening   Have you been more tired than usual lately? (!) YES         5/31/2024   Urinary Incontinence Screening   Bothered by leaking urine in past 6 months No         5/31/2024   TB Screening   Were you born outside of the [place]? No         Today's PHQ-2 Score:       5/31/2024    12:58 PM   PHQ-2 ( 1999 [employer])   Q1: Little interest or pleasure in doing things 0   Q2: Feeling down, depressed or hopeless 0   PHQ-2 Score 0   Q1: Little interest or pleasure in doing things Not at all   Q2: Feeling down, depressed or hopeless Not at  all   PHQ-2 Score 0           2024   Substance Use   Alcohol more than 3/day or more than 7/wk No   Do you have a current opioid prescription? No   How severe/bad is pain from 1 to 10? 3/10   Do you use any other substances recreationally? No     Social History     Tobacco Use    Smoking status: Former     Current packs/day: 0.00     Types: Cigarettes     Quit date: 1991     Years since quittin.1     Passive exposure: Never    Smokeless tobacco: Never   Vaping Use    Vaping status: Never Used   Substance Use Topics    Alcohol use: Yes     Comment: Wine- couple x/ week    Drug use: Never          Mammogram Screening - Mammogram every 1-2 years updated in Health Maintenance based on mutual decision making    ASCVD Risk   The ASCVD Risk score (Jose L DK, et al., 2019) failed to calculate for the following reasons:    The valid total cholesterol range is 130 to 320 mg/dL            Reviewed and updated as needed this visit by Provider                      Current providers sharing in care for this patient include:  Patient Care Team:  Iza Cage DO as PCP - General (Family Medicine)  Kj Rasheed MD as MD (Pulmonary Disease)  Iza Cage DO as Assigned PCP  Keesha Mancuso MD as MD (Gastroenterology)  Gordon Roth MD as MD (Ophthalmology)  Gordon Roth MD as Assigned Surgical Provider  Mc Martinez MD as Assigned Rheumatology Provider  Britany Rosales NP as Assigned Neuroscience Provider  Teresa Dejesus MD as MD (Anesthesiology)  Jasmeet Haque MD as Assigned Musculoskeletal Provider    The following health maintenance items are reviewed in Epic and correct as of today:  Health Maintenance   Topic Date Due    ASTHMA ACTION PLAN  Never done    HEPATITIS A IMMUNIZATION (1 of 2 - Risk 2-dose series) Never done    ZOSTER IMMUNIZATION (1 of 2) Never done    HEPATITIS B IMMUNIZATION (1 of 3 - Risk 3-dose series) Never done    RSV VACCINE (Pregnancy & 60+) (1 - 1-dose 60+  "series) Never done    MAMMO SCREENING  06/09/2023    COVID-19 Vaccine (8 - 2023-24 season) 02/24/2024    ANNUAL REVIEW OF HM ORDERS  05/08/2024    MEDICARE ANNUAL WELLNESS VISIT  05/08/2024    A1C  08/31/2024    INFLUENZA VACCINE (Season Ended) 09/01/2024    ASTHMA CONTROL TEST  11/30/2024    BMP  12/07/2024    LIPID  12/07/2024    MICROALBUMIN  12/07/2024    DIABETIC FOOT EXAM  12/07/2024    EYE EXAM  03/18/2025    FALL RISK ASSESSMENT  05/31/2025    ADVANCE CARE PLANNING  05/19/2028    DTAP/TDAP/TD IMMUNIZATION (2 - Td or Tdap) 08/20/2031    COLORECTAL CANCER SCREENING  09/20/2031    DEXA  12/26/2037    HEPATITIS C SCREENING  Completed    PHQ-2 (once per calendar year)  Completed    Pneumococcal Vaccine: 65+ Years  Completed    IPV IMMUNIZATION  Aged Out    HPV IMMUNIZATION  Aged Out    MENINGITIS IMMUNIZATION  Aged Out    RSV MONOCLONAL ANTIBODY  Aged Out    URINE DRUG SCREEN  Discontinued         Review of Systems  Constitutional, HEENT, cardiovascular, pulmonary, gi and gu systems are negative, except as otherwise noted.     Objective    Exam  /70 (BP Location: Right arm, Patient Position: Sitting, Cuff Size: Adult Regular)   Pulse 78   Temp 98.4  F (36.9  C) (Tympanic)   Resp 24   Ht 1.511 m (4' 11.5\")   Wt 70.8 kg (156 lb)   SpO2 98%   BMI 30.98 kg/m     Estimated body mass index is 30.98 kg/m  as calculated from the following:    Height as of this encounter: 1.511 m (4' 11.5\").    Weight as of this encounter: 70.8 kg (156 lb).    Physical Exam  GENERAL: alert and no distress  EYES: Eyes grossly normal to inspection, PERRL and conjunctivae and sclerae normal  HENT: ear canals and TM's normal, nose and mouth without ulcers or lesions  NECK: no adenopathy, no asymmetry, masses, or scars  RESP: lungs clear to auscultation - no rales, rhonchi or wheezes  BREAST: normal without masses, tenderness or nipple discharge and no palpable axillary masses or adenopathy  CV: regular rate and rhythm, normal S1 " S2, no S3 or S4, no murmur, click or rub, no peripheral edema  ABDOMEN: soft, nontender, no hepatosplenomegaly, no masses and bowel sounds normal  MS: Left foot in a walking boot.  Patient declined to remove  SKIN: no suspicious lesions or rashes  NEURO: Normal strength and tone, mentation intact and speech normal  PSYCH: mentation appears normal, affect normal/bright         5/31/2024   Mini Cog   Clock Draw Score 2 Normal   3 Item Recall 3 objects recalled   Mini Cog Total Score 5              Signed Electronically by: Iza Cage DO

## 2024-06-01 LAB
ANION GAP SERPL CALCULATED.3IONS-SCNC: 11 MMOL/L (ref 7–15)
BUN SERPL-MCNC: 14.9 MG/DL (ref 8–23)
CALCIUM SERPL-MCNC: 10 MG/DL (ref 8.8–10.2)
CHLORIDE SERPL-SCNC: 106 MMOL/L (ref 98–107)
CREAT SERPL-MCNC: 0.86 MG/DL (ref 0.51–0.95)
DEPRECATED HCO3 PLAS-SCNC: 23 MMOL/L (ref 22–29)
EGFRCR SERPLBLD CKD-EPI 2021: 71 ML/MIN/1.73M2
GLUCOSE SERPL-MCNC: 120 MG/DL (ref 70–99)
POTASSIUM SERPL-SCNC: 4.4 MMOL/L (ref 3.4–5.3)
SODIUM SERPL-SCNC: 140 MMOL/L (ref 135–145)

## 2024-06-13 ENCOUNTER — ANCILLARY PROCEDURE (OUTPATIENT)
Dept: MRI IMAGING | Facility: CLINIC | Age: 74
End: 2024-06-13
Attending: FAMILY MEDICINE
Payer: COMMERCIAL

## 2024-06-13 DIAGNOSIS — M25.561 CHRONIC PAIN OF RIGHT KNEE: ICD-10-CM

## 2024-06-13 DIAGNOSIS — G89.29 CHRONIC LEFT SHOULDER PAIN: ICD-10-CM

## 2024-06-13 DIAGNOSIS — G89.29 CHRONIC PAIN OF RIGHT KNEE: ICD-10-CM

## 2024-06-13 DIAGNOSIS — M25.512 CHRONIC LEFT SHOULDER PAIN: ICD-10-CM

## 2024-06-13 PROCEDURE — 73221 MRI JOINT UPR EXTREM W/O DYE: CPT | Mod: LT | Performed by: RADIOLOGY

## 2024-06-13 PROCEDURE — 73721 MRI JNT OF LWR EXTRE W/O DYE: CPT | Mod: RT | Performed by: RADIOLOGY

## 2024-06-20 ENCOUNTER — OFFICE VISIT (OUTPATIENT)
Dept: ORTHOPEDICS | Facility: CLINIC | Age: 74
End: 2024-06-20
Payer: COMMERCIAL

## 2024-06-20 VITALS — BODY MASS INDEX: 30.98 KG/M2 | HEIGHT: 60 IN

## 2024-06-20 DIAGNOSIS — M17.0 PRIMARY OSTEOARTHRITIS OF BOTH KNEES: Primary | ICD-10-CM

## 2024-06-20 DIAGNOSIS — G89.29 CHRONIC LEFT SHOULDER PAIN: ICD-10-CM

## 2024-06-20 DIAGNOSIS — M25.512 CHRONIC LEFT SHOULDER PAIN: ICD-10-CM

## 2024-06-20 PROCEDURE — 99214 OFFICE O/P EST MOD 30 MIN: CPT | Performed by: FAMILY MEDICINE

## 2024-06-20 ASSESSMENT — PAIN SCALES - GENERAL: PAINLEVEL: WORST PAIN (10)

## 2024-06-20 NOTE — PROGRESS NOTES
ASSESSMENT & PLAN    Carli was seen today for follow up and follow up.    Diagnoses and all orders for this visit:    Primary osteoarthritis of both knees  -     Orthopedic  Referral; Future    Chronic left shoulder pain  -     Orthopedic  Referral; Future        # Chronic Left Shoulder and Right Knee Pain: Carli Monreal was seen today for follow-up on left shoulder, right knee. Symptoms had been going on 6+ mon for left shoulder and right knee. On examination there are positive findings of test palpation over the right knee joint line, swelling and tenderness palpation, pain over the anterior left shoulder, pain with rotator cuff testing.  She did have a left subacromial and right knee joint steroid injections on 1/18/2024 that reported only improvement for about a week.  Reviewed left shoulder MRI showing rotator cuff tears and right knee MRI showing meniscal tears and arthritis.  Advised patient surgical treatment would likely be a right knee replacement, may consider left shoulder rotator cuff repair the likely will be a shoulder replacement as well.  Offered a gel injection in the right knee, trial of a left shoulder joint steroid injection but patient would like to speak with the surgeon first.  She will follow-up with me after speaking with surgeon in about 5 weeks.       Image Findings: Reviewed left shoulder and right knee MRIs with patient today  Treatment: Activities as tolerated, referral to orthopedic surgery placed   Medications/Injections: Biofreeze as needed, defer for now  Follow-up: In clinic after speaking with orthopedic surgery can consider gel injection in the right knee, steroid injection in the left shoulder joint  Need to schedule two separate appointments with the same surgeon    -----    SUBJECTIVE:  Carli Monreal is a 74 year old female who is seen in follow-up for right knee and left shoulder pain. They were last seen 5/23/2024.  The patient is seen by  "themselves.    Since their last visit reports persisting pain.  Would like to review right knee and left shoulder MRI. They indicate that their current pain level is 10/10. They have tried walking boot, wearing different shoes.        Patient's past medical, surgical, social, and family histories were reviewed today and no changes are noted.    REVIEW OF SYSTEMS:  Constitutional: NEGATIVE for fever, chills, change in weight  Skin: NEGATIVE for worrisome rashes, moles or lesions  GI/: NEGATIVE for bowel or bladder changes  Neuro: NEGATIVE for weakness, dizziness or paresthesias    OBJECTIVE:  Ht 1.511 m (4' 11.5\")   BMI 30.98 kg/m     General: healthy, alert and in no distress  HEENT: no scleral icterus or conjunctival erythema  Skin: no suspicious lesions or rash. No jaundice.  CV: regular rhythm by palpation, no pedal edema  Resp: normal respiratory effort without conversational dyspnea   Psych: normal mood and affect  Gait: normal steady gait with appropriate coordination and balance  Neuro: normal light touch sensory exam of the extremities.    MSK:    RIGHT SHOULDER  Inspection:    no swelling, bruising, discoloration, or obvious deformity or asymmetry  Palpation:    Tender about the anterior/posterior shoulder.   Active Range of Motion:     Abduction 1050, FF 1050, , IR L1.       Strength:    Scapular plane abduction 5/5,  ER 5/5, IR 5/5, biceps 5/5, triceps 5/5        LEFT KNEE  Inspection:    Normal alignment; no edema, erythema, or ecchymosis present  Palpation:    Tender about the lateral joint line and medial joint line. Remainder of bony and ligamentous landmarks are nontender.    No effusion is present    Patellofemoral crepitus is Absent  Range of Motion:     00 extension to 1350 flexion  Strength:    Quadriceps 5/5, hamstrings 5/5, gastrocsoleus 5/5, and tibialis anterior 5/5    Extensor mechanism intact       Independent visualization of the below image:  EXAM: MR Left shoulder without  " contrast 6/14/2024 8:19 AM     TECHNIQUE: Multiplanar, multisequence imaging of the Left shoulder  were obtained without administration of intravenous or intra-articular  gadolinium contrast using routine protocol.     History: left shoulder pain chronic, concern for arthritis vs rotator  cuff tear, failed 6+ weeks of conservative management; Chronic left  shoulder pain; Chronic left shoulder pain      Comparison: Radiographs 1/18/2024     Findings:    Reviewed left shoulder and right knee MRI studies     ROTATOR CUFF and ASSOCIATED STRUCTURES  Rotator cuff: Full-thickness tear of the anterior through middle  supraspinatus at the footprint measuring 14 mm in width AP with medial  retraction of approximately 18 mm. Infraspinatus tendinosis with foci  of low-grade intrasubstance tearing. Teres minor is intact.  Subscapularis tendinosis with low-grade intrasubstance tearing of the  upper fibers at the footprint.     Bursa: Mild fluid in the subacromial-subdeltoid bursa.     Musculature: Mild to moderate supraspinatus atrophy. Deltoid muscle  bulk maintained.     Acromioclavicular joint  There are moderate degenerative changes of the acromioclavicular  joint. Acromion is type 2 in sagittal morphology.  Coracoacromial  ligament is not thickened.     OSSEOUS STRUCTURES  No fracture, marrow contusion or marrow infiltration.     LONG BICIPITAL TENDON  The long head of the biceps tendon is normally situated within the  bicipital groove. Tendinosis of the intra-articular component.     GLENOHUMERAL JOINT  Joint fluid: Physiologic amount of joint fluid is present.     Cartilage and subarticular bone:  No focal hyaline cartilage defects  are noted. No Hill-Sachs, reverse Hill-Sachs, or bony Bankart lesions  are seen.     Labrum: Multifocal labral tearing.     ANCILLARY FINDINGS:  None                                                                      Impression:     1. Rotator cuff:  *  Full-thickness tear of the anterior  through middle supraspinatus at  the footprint.   *  Infraspinatus tendinosis with foci of low-grade intrasubstance  tearing.   *  Subscapularis tendinosis with low-grade intrasubstance tearing of  the upper fibers at the footprint.  *  Mild to moderate supraspinatus atrophy.     2. Tendinosis of the intra-articular long head of the biceps tendon.     JHONNY GAGE MD (Joe)     MR right knee without contrast 6/14/2024 8:30 AM     Techniques: Multiplanar multisequence imaging of the right knee was  obtained without administration of intra-articular or intravenous  contrast using routing protocol.     History: chronic right knee pain failed 6 weeks conservative tx,  concern for insufficiency fracture; Chronic pain of right knee;  Chronic pain of right knee     Comparison: None     Findings:      Motion partially degrading images.      MENISCI:  Medial meniscus: Horizontal oblique tear of the posterior horn  extending to the tibial articular surface.  Lateral meniscus: Predominately horizontal tear of the lateral  meniscus involving body, anterior and posterior horn horns with  extension into the anterior and likely posterior root ligaments. There  is apparent centrally displaced meniscal flap, most consistent with  bucket vs. yaniv-bucket handle tear.     LIGAMENTS  Cruciate ligaments: Intact.  Medial supporting structures: Low-grade sprain of the tibial  collateral ligament.  Lateral supporting structures: Intact. Moderate proximal fibular  collateral ligament sprain. Conjoined tendinosis.     EXTENSOR MECHANISM  Intact. Distal quadriceps and distal patellar tendon tendinosis.     FLUID  Small knee joint effusion with mild synovial proliferation. Small  Baker cyst with internal septations.     OSSEOUS and ARTICULAR STRUCTURES  Bones: No fracture, contusion, or osseous lesion is seen.  Suprapatellar enthesophyte.     Patellofemoral compartment: Multifocal areas of full-thickness  cartilage loss over the patellar  median ridge and lateral facet with  areas of subchondral marrow edema and cystic change. Moderate grade  cartilage fissuring over the trochlear groove and lateral facet.     Medial compartment: Focal full-thickness cartilage fissuring over the  central weightbearing medial femoral condyle.     Lateral compartment: Multifocal areas high to full-thickness chondral  loss including along the more central/posterior weightbearing junction  of lateral femoral condyle (sagittal image 9) and along the posterior  weightbearing surface of the femoral condyle (axial image 15), and  full-thickness cartilage loss along the central tibial plateau with  subchondral marrow edema.      ANCILLARY FINDINGS  Mild intramuscular edema in the posterior compartment of the proximal  calf, likely a delayed onset muscle soreness. Infrapatellar  subcutaneous edema.                                                                      Impression:  1. Predominantly horizontal tear of the lateral meniscus with likely  centrally displaced component i.e. Bucket-handle vs. yaniv-Bucket  handle tear.  2. Horizontal oblique tear in the posterior horn of the medial  meniscus.  3. 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  4. Low-grade sprain of the tibial collateral ligament.  5. Small joint effusion with mild synovitis.     I have personally reviewed the examination and initial interpretation  and I agree with the findings.     ANTOINETTE Vergara MD, [facility]    Disclaimer: This note consists of symbols derived from keyboarding, dictation and/or voice recognition software. As a result, there may be errors in the script that have gone undetected. Please consider this when interpreting information found in this chart.

## 2024-06-20 NOTE — PATIENT INSTRUCTIONS
# Chronic Left Shoulder and Right Knee Pain: Carli Monreal was seen today for follow-up on left shoulder, right knee. Symptoms had been going on 6+ mon for left shoulder and right knee. On examination there are positive findings of test palpation over the right knee joint line, swelling and tenderness palpation, pain over the anterior left shoulder, pain with rotator cuff testing.  She did have a left subacromial and right knee joint steroid injections on 1/18/2024 that reported only improvement for about a week.  Reviewed left shoulder MRI showing rotator cuff tears and right knee MRI showing meniscal tears and arthritis.  Advised patient surgical treatment would likely be a right knee replacement, may consider left shoulder rotator cuff repair the likely will be a shoulder replacement as well.  Offered a gel injection in the right knee, trial of a left shoulder joint steroid injection but patient would like to speak with the surgeon first.  She will follow-up with me after speaking with surgeon in about 5 weeks.       Image Findings: Reviewed left shoulder and right knee MRIs with patient today  Treatment: Activities as tolerated, referral to orthopedic surgery placed   Medications/Injections: Biofreeze as needed, defer for now  Follow-up: In clinic after speaking with orthopedic surgery can consider gel injection in the right knee, steroid injection in the left shoulder joint  Need to schedule two separate appointments with the same surgeon    Please call [phone]   Ask for my team if you have any questions or concerns    If you have not yet received the influenza vaccine but would like to get one, please call  [phone] or you can schedule via [employer]    It was great seeing you again today!    Artur Vergara MD, [facility]

## 2024-06-20 NOTE — LETTER
6/20/2024      Carli Monreal  [address]  [place] [address]      Dear Colleague,    Thank you for referring your patient, Carli Monreal, to the [facility] [place]. Please see a copy of my visit note below.    ASSESSMENT & PLAN    Carli was seen today for follow up and follow up.    Diagnoses and all orders for this visit:    Primary osteoarthritis of both knees  -     Orthopedic  Referral; Future    Chronic left shoulder pain  -     Orthopedic  Referral; Future        # Chronic Left Shoulder and Right Knee Pain: Carli Monreal was seen today for follow-up on left shoulder, right knee. Symptoms had been going on 6+ mon for left shoulder and right knee. On examination there are positive findings of test palpation over the right knee joint line, swelling and tenderness palpation, pain over the anterior left shoulder, pain with rotator cuff testing.  She did have a left subacromial and right knee joint steroid injections on 1/18/2024 that reported only improvement for about a week.  Reviewed left shoulder MRI showing rotator cuff tears and right knee MRI showing meniscal tears and arthritis.  Advised patient surgical treatment would likely be a right knee replacement, may consider left shoulder rotator cuff repair the likely will be a shoulder replacement as well.  Offered a gel injection in the right knee, trial of a left shoulder joint steroid injection but patient would like to speak with the surgeon first.  She will follow-up with me after speaking with surgeon in about 5 weeks.       Image Findings: Reviewed left shoulder and right knee MRIs with patient today  Treatment: Activities as tolerated, referral to orthopedic surgery placed   Medications/Injections: Biofreeze as needed, defer for now  Follow-up: In clinic after speaking with orthopedic surgery can consider gel injection in the right knee, steroid injection in the left shoulder joint  Need  "to schedule two separate appointments with the same surgeon    -----    SUBJECTIVE:  Carli Monreal is a 74 year old female who is seen in follow-up for right knee and left shoulder pain. They were last seen 5/23/2024.  The patient is seen by themselves.    Since their last visit reports persisting pain.  Would like to review right knee and left shoulder MRI. They indicate that their current pain level is 10/10. They have tried walking boot, wearing different shoes.        Patient's past medical, surgical, social, and family histories were reviewed today and no changes are noted.    REVIEW OF SYSTEMS:  Constitutional: NEGATIVE for fever, chills, change in weight  Skin: NEGATIVE for worrisome rashes, moles or lesions  GI/: NEGATIVE for bowel or bladder changes  Neuro: NEGATIVE for weakness, dizziness or paresthesias    OBJECTIVE:  Ht 1.511 m (4' 11.5\")   BMI 30.98 kg/m     General: healthy, alert and in no distress  HEENT: no scleral icterus or conjunctival erythema  Skin: no suspicious lesions or rash. No jaundice.  CV: regular rhythm by palpation, no pedal edema  Resp: normal respiratory effort without conversational dyspnea   Psych: normal mood and affect  Gait: normal steady gait with appropriate coordination and balance  Neuro: normal light touch sensory exam of the extremities.    MSK:    RIGHT SHOULDER  Inspection:    no swelling, bruising, discoloration, or obvious deformity or asymmetry  Palpation:    Tender about the anterior/posterior shoulder.   Active Range of Motion:     Abduction 1050, FF 1050, , IR L1.       Strength:    Scapular plane abduction 5/5,  ER 5/5, IR 5/5, biceps 5/5, triceps 5/5        LEFT KNEE  Inspection:    Normal alignment; no edema, erythema, or ecchymosis present  Palpation:    Tender about the lateral joint line and medial joint line. Remainder of bony and ligamentous landmarks are nontender.    No effusion is present    Patellofemoral crepitus is Absent  Range of Motion: "     00 extension to 1350 flexion  Strength:    Quadriceps 5/5, hamstrings 5/5, gastrocsoleus 5/5, and tibialis anterior 5/5    Extensor mechanism intact       Independent visualization of the below image:  EXAM: MR Left shoulder without  contrast 6/14/2024 8:19 AM     TECHNIQUE: Multiplanar, multisequence imaging of the Left shoulder  were obtained without administration of intravenous or intra-articular  gadolinium contrast using routine protocol.     History: left shoulder pain chronic, concern for arthritis vs rotator  cuff tear, failed 6+ weeks of conservative management; Chronic left  shoulder pain; Chronic left shoulder pain      Comparison: Radiographs 1/18/2024     Findings:    Reviewed left shoulder and right knee MRI studies     ROTATOR CUFF and ASSOCIATED STRUCTURES  Rotator cuff: Full-thickness tear of the anterior through middle  supraspinatus at the footprint measuring 14 mm in width AP with medial  retraction of approximately 18 mm. Infraspinatus tendinosis with foci  of low-grade intrasubstance tearing. Teres minor is intact.  Subscapularis tendinosis with low-grade intrasubstance tearing of the  upper fibers at the footprint.     Bursa: Mild fluid in the subacromial-subdeltoid bursa.     Musculature: Mild to moderate supraspinatus atrophy. Deltoid muscle  bulk maintained.     Acromioclavicular joint  There are moderate degenerative changes of the acromioclavicular  joint. Acromion is type 2 in sagittal morphology.  Coracoacromial  ligament is not thickened.     OSSEOUS STRUCTURES  No fracture, marrow contusion or marrow infiltration.     LONG BICIPITAL TENDON  The long head of the biceps tendon is normally situated within the  bicipital groove. Tendinosis of the intra-articular component.     GLENOHUMERAL JOINT  Joint fluid: Physiologic amount of joint fluid is present.     Cartilage and subarticular bone:  No focal hyaline cartilage defects  are noted. No Hill-Sachs, reverse Hill-Sachs, or bony  Bankart lesions  are seen.     Labrum: Multifocal labral tearing.     ANCILLARY FINDINGS:  None                                                                      Impression:     1. Rotator cuff:  *  Full-thickness tear of the anterior through middle supraspinatus at  the footprint.   *  Infraspinatus tendinosis with foci of low-grade intrasubstance  tearing.   *  Subscapularis tendinosis with low-grade intrasubstance tearing of  the upper fibers at the footprint.  *  Mild to moderate supraspinatus atrophy.     2. Tendinosis of the intra-articular long head of the biceps tendon.     JHONNY GAGE MD (Joe)     MR right knee without contrast 6/14/2024 8:30 AM     Techniques: Multiplanar multisequence imaging of the right knee was  obtained without administration of intra-articular or intravenous  contrast using routing protocol.     History: chronic right knee pain failed 6 weeks conservative tx,  concern for insufficiency fracture; Chronic pain of right knee;  Chronic pain of right knee     Comparison: None     Findings:      Motion partially degrading images.      MENISCI:  Medial meniscus: Horizontal oblique tear of the posterior horn  extending to the tibial articular surface.  Lateral meniscus: Predominately horizontal tear of the lateral  meniscus involving body, anterior and posterior horn horns with  extension into the anterior and likely posterior root ligaments. There  is apparent centrally displaced meniscal flap, most consistent with  bucket vs. yaniv-bucket handle tear.     LIGAMENTS  Cruciate ligaments: Intact.  Medial supporting structures: Low-grade sprain of the tibial  collateral ligament.  Lateral supporting structures: Intact. Moderate proximal fibular  collateral ligament sprain. Conjoined tendinosis.     EXTENSOR MECHANISM  Intact. Distal quadriceps and distal patellar tendon tendinosis.     FLUID  Small knee joint effusion with mild synovial proliferation. Small  Baker cyst with internal  septations.     OSSEOUS and ARTICULAR STRUCTURES  Bones: No fracture, contusion, or osseous lesion is seen.  Suprapatellar enthesophyte.     Patellofemoral compartment: Multifocal areas of full-thickness  cartilage loss over the patellar median ridge and lateral facet with  areas of subchondral marrow edema and cystic change. Moderate grade  cartilage fissuring over the trochlear groove and lateral facet.     Medial compartment: Focal full-thickness cartilage fissuring over the  central weightbearing medial femoral condyle.     Lateral compartment: Multifocal areas high to full-thickness chondral  loss including along the more central/posterior weightbearing junction  of lateral femoral condyle (sagittal image 9) and along the posterior  weightbearing surface of the femoral condyle (axial image 15), and  full-thickness cartilage loss along the central tibial plateau with  subchondral marrow edema.      ANCILLARY FINDINGS  Mild intramuscular edema in the posterior compartment of the proximal  calf, likely a delayed onset muscle soreness. Infrapatellar  subcutaneous edema.                                                                      Impression:  1. Predominantly horizontal tear of the lateral meniscus with likely  centrally displaced component i.e. Bucket-handle vs. yaniv-Bucket  handle tear.  2. Horizontal oblique tear in the posterior horn of the medial  meniscus.  3. 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  4. Low-grade sprain of the tibial collateral ligament.  5. Small joint effusion with mild synovitis.     I have personally reviewed the examination and initial interpretation  and I agree with the findings.     ANTOINETTE Vergara MD, [facility]    Disclaimer: This note consists of symbols derived from keyboarding, dictation and/or voice recognition software. As a result, there may be errors in the script that have  gone undetected. Please consider this when interpreting information found in this chart.      Again, thank you for allowing me to participate in the care of your patient.        Sincerely,        Artur Vergara MD

## 2024-06-21 NOTE — PROGRESS NOTES
CHIEF COMPLAINT: Left shoulder pain    DIAGNOSIS: Left shoulder rotator cuff tear      OCCUPATION/SPORT: retired - sells cruises     HPI:   Carli Monreal is a very pleasant 74 year old, right-hand dominant female who presents for evaluation of left shoulder pain.  Symptoms started in October of 2023. There was not a precipitating event. The pain is located to the lateral shoulder. Worst pain is rated a 10+ of 10, and current pain is rated at 0 of 10. Symptoms are worsened by movement and usage. Symptoms are improved with rest and bio freeze. Patient has tried left shoulder subacromial bursa injection with Dr. Vergara on 1/18/24 with minimal relief. Associated symptoms include pain and limited ROM. Patient has pain radiating down the arm into the upper arm and neck, with no numbness. Notably, the patient has had no history of surgery. No other concerns or complaints at this time.  SANE score R - 100 L - 50    PAST MEDICAL HISTORY:  Past Medical History:   Diagnosis Date    Diabetes mellitus (H)     Right bundle branch block     Uncomplicated asthma        PAST SURGICAL HISTORY:  Past Surgical History:   Procedure Laterality Date    ARTHROPLASTY CARPOMETACARPAL (THUMB JOINT) Right 1/20/2023    Procedure: ARTHROPLASTY, CARPOMETACARPAL JOINT,RIGHT  THUMB;  Surgeon: Jasmeet Haque MD;  Location: [facility] OR    CATARACT IOL, RT/LT Bilateral 2017    Valeriano Javier MD ([place], [place]).    COLONOSCOPY N/A 09/20/2021    Procedure: COLONOSCOPY;  Surgeon: Keesha Mancuso MD;  Location: [facility] OR    SMALL BOWEL RESECTION  2012       CURRENT MEDICATIONS:  Current Outpatient Medications   Medication Sig Dispense Refill    alcohol swab prep pads Use to swab area of injection/venkata as directed. 100 each 3    amLODIPine (NORVASC) 2.5 MG tablet Take 1 tablet (2.5 mg) by mouth daily 90 tablet 1    atorvastatin (LIPITOR) 10 MG tablet Take 1 tablet (10 mg) by mouth daily Do not fill until contacted by patient 90 tablet 3    blood glucose  (NO BRAND SPECIFIED) lancets standard Use to test blood sugar 1 times daily or as directed. 100 Lancet 11    blood glucose (ONETOUCH VERIO IQ) test strip USE TO TEST BLOOD GLUCOSE ONCE DAILY 100 strip 0    budesonide-formoterol (SYMBICORT) 160-4.5 MCG/ACT Inhaler Inhale 2 puffs into the lungs 2 times daily 10.2 g 4    Cyanocobalamin 3000 MCG SUBL Place 1,500 mg under the tongue daily       famotidine (PEPCID) 40 MG tablet Take 1 tablet (40 mg) by mouth 2 times daily 180 tablet 1    folic acid (FOLVITE) 1 MG tablet TAKE ONE TABLET BY MOUTH ONE TIME DAILY 90 tablet 0    gabapentin (NEURONTIN) 100 MG capsule Take 1 capsule (100 mg) by mouth 3 times daily as needed for neuropathic pain 90 capsule 3    Lancets (ONETOUCH DELICA PLUS [id number]) MISC USE TO TEST BLOOD GLUCOSE ONCE DAILY 100 each 0    losartan (COZAAR) 100 MG tablet Take 1 tablet (100 mg) by mouth daily 90 tablet 1    metFORMIN (GLUCOPHAGE XR) 500 MG 24 hr tablet Take 2 tablets (1,000 mg) by mouth daily (with dinner) 180 tablet 1    multivitamin w/minerals (THERA-VIT-M) tablet Take 1 tablet by mouth daily      potassium chloride daniel ER (KLOR-CON M20) 20 MEQ CR tablet Take 1 tablet (20 mEq) by mouth daily 90 tablet 1    scopolamine (TRANSDERM) 1 MG/3DAYS 72 hr patch Place 1 patch onto the skin every 72 hours 12 patch 1    Vitamin D, Cholecalciferol, 25 MCG (1000 UT) TABS Take 2 tablets by mouth daily         ALLERGIES:      Allergies   Allergen Reactions    Seasonal Allergies          FAMILY HISTORY: No pertinent family history, reviewed in EMR.    SOCIAL HISTORY:   Social History     Socioeconomic History    Marital status:      Spouse name: Not on file    Number of children: Not on file    Years of education: Not on file    Highest education level: Not on file   Occupational History    Not on file   Tobacco Use    Smoking status: Former     Current packs/day: 0.00     Types: Cigarettes     Quit date: 1991     Years since quittin.2      Passive exposure: Never    Smokeless tobacco: Never   Vaping Use    Vaping status: Never Used   Substance and Sexual Activity    Alcohol use: Yes     Comment: Wine- couple x/ week    Drug use: Never    Sexual activity: Not Currently     Partners: Male   Other Topics Concern    Parent/sibling w/ CABG, MI or angioplasty before 65F 55M? Not Asked   Social History Narrative    Not on file     Social Determinants of Health     Financial Resource Strain: Low Risk  (5/31/2024)    Financial Resource Strain     Within the past 12 months, have you or your family members you live with been unable to get utilities (heat, electricity) when it was really needed?: No   Food Insecurity: Low Risk  (5/31/2024)    Food Insecurity     Within the past 12 months, did you worry that your food would run out before you got money to buy more?: No     Within the past 12 months, did the food you bought just not last and you didn t have money to get more?: No   Transportation Needs: Low Risk  (5/31/2024)    Transportation Needs     Within the past 12 months, has lack of transportation kept you from medical appointments, getting your medicines, non-medical meetings or appointments, work, or from getting things that you need?: No   Physical Activity: Inactive (5/31/2024)    Exercise Vital Sign     Days of Exercise per Week: 0 days     Minutes of Exercise per Session: 0 min   Stress: No Stress Concern Present (5/31/2024)    [employer] [place] [employer] - Occupational Stress Questionnaire     Feeling of Stress : Not at all   Social Connections: Unknown (5/31/2024)    Social Connection and Isolation Panel [NHANES]     Frequency of Communication with Friends and Family: Not on file     Frequency of Social Gatherings with Friends and Family: Once a week     Attends [employer] Services: Not on file     Active Member of Clubs or Organizations: Not on file     Attends Club or Organization Meetings: Not on file     Marital Status: Not on file    Interpersonal Safety: Low Risk  (5/31/2024)    Interpersonal Safety     Do you feel physically and emotionally safe where you currently live?: Yes     Within the past 12 months, have you been hit, slapped, kicked or otherwise physically hurt by someone?: No     Within the past 12 months, have you been humiliated or emotionally abused in other ways by your partner or ex-partner?: No   Housing Stability: Low Risk  (5/31/2024)    Housing Stability     Do you have housing? : Yes     Are you worried about losing your housing?: No       REVIEW OF SYSTEMS: Positive for that noted in past medical history and history of present illness and otherwise reviewed in EMR    PHYSICAL EXAM:  Patient is Data Unavailable and weighs 0 lbs 0 oz There were no vitals taken for this visit.  There is no height or weight on file to calculate BMI.   Constitutional: Well-developed, well-nourished, healthy appearing female.  Skin: Warm, dry   HEENT: Normal  Cardiac: Well perfused extremities, strong 2+ peripheral pulses. No edema.   Pulmonary: Breathing room air    Musculoskeletal:   Left Shoulder:  AROM left shoulder: 160/160/50/L1   AROM right shoulder: 160/160/50/L1   PROM left shoulder: 170/170/50/L1   4/5 supraspinatus, 4/5 infraspinatus, 5/5 subscapularis  - AC joint pain, - cross body adduction  + Neer and Calvin impingement signs  - belly-press/lift-off  - Speed's test  - Apprehension  - Jerk test  -No atrophy  - Hornblower's  - ER lag  Neurovascular exam and cervical spine exam are normal.    X-RAYS:   AP, lateral, zanca, and axillary radiographs of the left shoulder were ordered and reviewed by me personally showing no osseous joint abnormalities, well maintained glenohumeral and AC joint spaces      ADVANCED IMAGING:   MRI of left shoulder were completed and reviewed showing: full thickness supraspinatus tear, mild infraspinatus and subscapularis tendinosis, tendinosis of long head of biceps tendon, grade 2 atrophy of the  supraspinatus and infraspinatus    IMPRESSION: 74 year old-year-old right hand dominant female, with left shoulder rotator cuff tear.    PLAN:     I discussed with the patient the etiology of their condition. We discussed at length the options as noted above.  We discussed options including conservative versus surgical intervention.  Conservatively the patient has tried a cortisone injection as well as physical therapy and has not had good symptomatic relief.  We discussed alternative conservative measures including a suprascapular nerve block.  In terms of surgical options we discussed a rotator cuff repair but I did discuss with the patient that based on the ROHI score that there would be a greater than 50% chance of a rotator cuff free tear or not healing.  We discussed alternative pain relieving procedures such as the tuberoplasty depending on the repair ability of the rotator cuff or as augmentation.  We discussed that with preserved function I would not recommend a reverse shoulder replacement.  We discussed first trying a suprascapular nerve block, if no improvement will return to clinic for discussion of further surgical options.    At the conclusion of the office visit, Carli verbally acknowledged that I answered all of her questions satisfactorily.    Maia Thibodeaux MD  Orthopedic Surgery Sports Medicine and Shoulder Surgery

## 2024-06-28 NOTE — TELEPHONE ENCOUNTER
DIAGNOSIS: Chronic left shoulder pain [M25.512, G89.29]   APPOINTMENT DATE: 07/01/2024   NOTES STATUS DETAILS   OFFICE NOTE from referring provider Internal 06/20/2024 - Artur Vergara MD - [facility]   INJECTIONS Internal 01/18/2024 - LT Subacromial Bursa Injection   (IMAGES & REPORTS) Internal

## 2024-07-01 ENCOUNTER — OFFICE VISIT (OUTPATIENT)
Dept: ORTHOPEDICS | Facility: CLINIC | Age: 74
End: 2024-07-01
Payer: COMMERCIAL

## 2024-07-01 ENCOUNTER — PRE VISIT (OUTPATIENT)
Dept: ORTHOPEDICS | Facility: CLINIC | Age: 74
End: 2024-07-01

## 2024-07-01 DIAGNOSIS — M75.122 NONTRAUMATIC COMPLETE TEAR OF LEFT ROTATOR CUFF: ICD-10-CM

## 2024-07-01 DIAGNOSIS — M25.512 LEFT SHOULDER PAIN, UNSPECIFIED CHRONICITY: Primary | ICD-10-CM

## 2024-07-01 PROCEDURE — 99214 OFFICE O/P EST MOD 30 MIN: CPT | Performed by: ORTHOPAEDIC SURGERY

## 2024-07-01 NOTE — LETTER
7/1/2024      Carli Monreal  [address]  [place] [address]      Dear Colleague,    Thank you for referring your patient, Carli Monreal, to the [facility] [place]. Please see a copy of my visit note below.    CHIEF COMPLAINT: Left shoulder pain    DIAGNOSIS: Left shoulder rotator cuff tear      OCCUPATION/SPORT: retired - sells cruises     HPI:   Carli Monreal is a very pleasant 74 year old, right-hand dominant female who presents for evaluation of left shoulder pain.  Symptoms started in October of 2023. There was not a precipitating event. The pain is located to the lateral shoulder. Worst pain is rated a 10+ of 10, and current pain is rated at 0 of 10. Symptoms are worsened by movement and usage. Symptoms are improved with rest and bio freeze. Patient has tried left shoulder subacromial bursa injection with Dr. Vergara on 1/18/24 with minimal relief. Associated symptoms include pain and limited ROM. Patient has pain radiating down the arm into the upper arm and neck, with no numbness. Notably, the patient has had no history of surgery. No other concerns or complaints at this time.  SANE score R - 100 L - 50    PAST MEDICAL HISTORY:  Past Medical History:   Diagnosis Date    Diabetes mellitus (H)     Right bundle branch block     Uncomplicated asthma        PAST SURGICAL HISTORY:  Past Surgical History:   Procedure Laterality Date    ARTHROPLASTY CARPOMETACARPAL (THUMB JOINT) Right 1/20/2023    Procedure: ARTHROPLASTY, CARPOMETACARPAL JOINT,RIGHT  THUMB;  Surgeon: Jasmeet Haque MD;  Location: [facility] OR    CATARACT IOL, RT/LT Bilateral 2017    Valeriano Javier MD ([place], [place]).    COLONOSCOPY N/A 09/20/2021    Procedure: COLONOSCOPY;  Surgeon: Keesha Mancuso MD;  Location: [facility] OR    SMALL BOWEL RESECTION  2012       CURRENT MEDICATIONS:  Current Outpatient Medications   Medication Sig Dispense Refill    alcohol swab prep pads Use to swab area of injection/venkata  as directed. 100 each 3    amLODIPine (NORVASC) 2.5 MG tablet Take 1 tablet (2.5 mg) by mouth daily 90 tablet 1    atorvastatin (LIPITOR) 10 MG tablet Take 1 tablet (10 mg) by mouth daily Do not fill until contacted by patient 90 tablet 3    blood glucose (NO BRAND SPECIFIED) lancets standard Use to test blood sugar 1 times daily or as directed. 100 Lancet 11    blood glucose (ONETOUCH VERIO IQ) test strip USE TO TEST BLOOD GLUCOSE ONCE DAILY 100 strip 0    budesonide-formoterol (SYMBICORT) 160-4.5 MCG/ACT Inhaler Inhale 2 puffs into the lungs 2 times daily 10.2 g 4    Cyanocobalamin 3000 MCG SUBL Place 1,500 mg under the tongue daily       famotidine (PEPCID) 40 MG tablet Take 1 tablet (40 mg) by mouth 2 times daily 180 tablet 1    folic acid (FOLVITE) 1 MG tablet TAKE ONE TABLET BY MOUTH ONE TIME DAILY 90 tablet 0    gabapentin (NEURONTIN) 100 MG capsule Take 1 capsule (100 mg) by mouth 3 times daily as needed for neuropathic pain 90 capsule 3    Lancets (ONETOUCH DELICA PLUS [id number]) MISC USE TO TEST BLOOD GLUCOSE ONCE DAILY 100 each 0    losartan (COZAAR) 100 MG tablet Take 1 tablet (100 mg) by mouth daily 90 tablet 1    metFORMIN (GLUCOPHAGE XR) 500 MG 24 hr tablet Take 2 tablets (1,000 mg) by mouth daily (with dinner) 180 tablet 1    multivitamin w/minerals (THERA-VIT-M) tablet Take 1 tablet by mouth daily      potassium chloride daniel ER (KLOR-CON M20) 20 MEQ CR tablet Take 1 tablet (20 mEq) by mouth daily 90 tablet 1    scopolamine (TRANSDERM) 1 MG/3DAYS 72 hr patch Place 1 patch onto the skin every 72 hours 12 patch 1    Vitamin D, Cholecalciferol, 25 MCG (1000 UT) TABS Take 2 tablets by mouth daily         ALLERGIES:      Allergies   Allergen Reactions    Seasonal Allergies          FAMILY HISTORY: No pertinent family history, reviewed in EMR.    SOCIAL HISTORY:   Social History     Socioeconomic History    Marital status:      Spouse name: Not on file    Number of children: Not on file    Years of  education: Not on file    Highest education level: Not on file   Occupational History    Not on file   Tobacco Use    Smoking status: Former     Current packs/day: 0.00     Types: Cigarettes     Quit date: 1991     Years since quittin.2     Passive exposure: Never    Smokeless tobacco: Never   Vaping Use    Vaping status: Never Used   Substance and Sexual Activity    Alcohol use: Yes     Comment: Wine- couple x/ week    Drug use: Never    Sexual activity: Not Currently     Partners: Male   Other Topics Concern    Parent/sibling w/ CABG, MI or angioplasty before 65F 55M? Not Asked   Social History Narrative    Not on file     Social Determinants of Health     Financial Resource Strain: Low Risk  (2024)    Financial Resource Strain     Within the past 12 months, have you or your family members you live with been unable to get utilities (heat, electricity) when it was really needed?: No   Food Insecurity: Low Risk  (2024)    Food Insecurity     Within the past 12 months, did you worry that your food would run out before you got money to buy more?: No     Within the past 12 months, did the food you bought just not last and you didn t have money to get more?: No   Transportation Needs: Low Risk  (2024)    Transportation Needs     Within the past 12 months, has lack of transportation kept you from medical appointments, getting your medicines, non-medical meetings or appointments, work, or from getting things that you need?: No   Physical Activity: Inactive (2024)    Exercise Vital Sign     Days of Exercise per Week: 0 days     Minutes of Exercise per Session: 0 min   Stress: No Stress Concern Present (2024)    [employer] - Occupational Stress Questionnaire     Feeling of Stress : Not at all   Social Connections: Unknown (2024)    Social Connection and Isolation Panel [NHANES]     Frequency of Communication with Friends and Family: Not on file      Frequency of Social Gatherings with Friends and Family: Once a week     Attends Yazidism Services: Not on file     Active Member of Clubs or Organizations: Not on file     Attends Club or Organization Meetings: Not on file     Marital Status: Not on file   Interpersonal Safety: Low Risk  (5/31/2024)    Interpersonal Safety     Do you feel physically and emotionally safe where you currently live?: Yes     Within the past 12 months, have you been hit, slapped, kicked or otherwise physically hurt by someone?: No     Within the past 12 months, have you been humiliated or emotionally abused in other ways by your partner or ex-partner?: No   Housing Stability: Low Risk  (5/31/2024)    Housing Stability     Do you have housing? : Yes     Are you worried about losing your housing?: No       REVIEW OF SYSTEMS: Positive for that noted in past medical history and history of present illness and otherwise reviewed in EMR    PHYSICAL EXAM:  Patient is Data Unavailable and weighs 0 lbs 0 oz There were no vitals taken for this visit.  There is no height or weight on file to calculate BMI.   Constitutional: Well-developed, well-nourished, healthy appearing female.  Skin: Warm, dry   HEENT: Normal  Cardiac: Well perfused extremities, strong 2+ peripheral pulses. No edema.   Pulmonary: Breathing room air    Musculoskeletal:   Left Shoulder:  AROM left shoulder: 160/160/50/L1   AROM right shoulder: 160/160/50/L1   PROM left shoulder: 170/170/50/L1   4/5 supraspinatus, 4/5 infraspinatus, 5/5 subscapularis  - AC joint pain, - cross body adduction  + Neer and Calvin impingement signs  - belly-press/lift-off  - Speed's test  - Apprehension  - Jerk test  -No atrophy  - Hornblower's  - ER lag  Neurovascular exam and cervical spine exam are normal.    X-RAYS:   AP, lateral, zanca, and axillary radiographs of the left shoulder were ordered and reviewed by me personally showing no osseous joint abnormalities, well maintained glenohumeral and  AC joint spaces      ADVANCED IMAGING:   MRI of left shoulder were completed and reviewed showing: full thickness supraspinatus tear, mild infraspinatus and subscapularis tendinosis, tendinosis of long head of biceps tendon, grade 2 atrophy of the supraspinatus and infraspinatus    IMPRESSION: 74 year old-year-old right hand dominant female, with left shoulder rotator cuff tear.    PLAN:     I discussed with the patient the etiology of their condition. We discussed at length the options as noted above.  We discussed options including conservative versus surgical intervention.  Conservatively the patient has tried a cortisone injection as well as physical therapy and has not had good symptomatic relief.  We discussed alternative conservative measures including a suprascapular nerve block.  In terms of surgical options we discussed a rotator cuff repair but I did discuss with the patient that based on the ROHI score that there would be a greater than 50% chance of a rotator cuff free tear or not healing.  We discussed alternative pain relieving procedures such as the tuberoplasty depending on the repair ability of the rotator cuff or as augmentation.  We discussed that with preserved function I would not recommend a reverse shoulder replacement.  We discussed first trying a suprascapular nerve block, if no improvement will return to clinic for discussion of further surgical options.    At the conclusion of the office visit, Carli verbally acknowledged that I answered all of her questions satisfactorily.    Maia Thibodeaux MD  Orthopedic Surgery Sports Medicine and Shoulder Surgery

## 2024-07-12 ENCOUNTER — TELEPHONE (OUTPATIENT)
Dept: ORTHOPEDICS | Facility: CLINIC | Age: 74
End: 2024-07-12
Payer: COMMERCIAL

## 2024-07-12 NOTE — TELEPHONE ENCOUNTER
Patient has a referral/order from Maia Hernández. Please have one the pain providers place a case request.

## 2024-07-13 ENCOUNTER — OFFICE VISIT (OUTPATIENT)
Dept: URGENT CARE | Facility: URGENT CARE | Age: 74
End: 2024-07-13
Payer: COMMERCIAL

## 2024-07-13 VITALS
RESPIRATION RATE: 16 BRPM | DIASTOLIC BLOOD PRESSURE: 59 MMHG | HEART RATE: 71 BPM | SYSTOLIC BLOOD PRESSURE: 144 MMHG | OXYGEN SATURATION: 98 % | WEIGHT: 166.4 LBS | BODY MASS INDEX: 33.05 KG/M2 | TEMPERATURE: 97 F

## 2024-07-13 DIAGNOSIS — R30.0 DYSURIA: Primary | ICD-10-CM

## 2024-07-13 LAB
ALBUMIN UR-MCNC: NEGATIVE MG/DL
APPEARANCE UR: ABNORMAL
BACTERIA #/AREA URNS HPF: ABNORMAL /HPF
BILIRUB UR QL STRIP: NEGATIVE
COLOR UR AUTO: YELLOW
GLUCOSE UR STRIP-MCNC: NEGATIVE MG/DL
HGB UR QL STRIP: ABNORMAL
KETONES UR STRIP-MCNC: NEGATIVE MG/DL
LEUKOCYTE ESTERASE UR QL STRIP: ABNORMAL
NITRATE UR QL: NEGATIVE
PH UR STRIP: 5.5 [PH] (ref 5–7)
RBC #/AREA URNS AUTO: ABNORMAL /HPF
SP GR UR STRIP: 1.01 (ref 1–1.03)
UROBILINOGEN UR STRIP-ACNC: 0.2 E.U./DL
WBC #/AREA URNS AUTO: >100 /HPF

## 2024-07-13 PROCEDURE — 87088 URINE BACTERIA CULTURE: CPT | Performed by: PHYSICIAN ASSISTANT

## 2024-07-13 PROCEDURE — 87086 URINE CULTURE/COLONY COUNT: CPT | Performed by: PHYSICIAN ASSISTANT

## 2024-07-13 PROCEDURE — 81001 URINALYSIS AUTO W/SCOPE: CPT

## 2024-07-13 PROCEDURE — 99213 OFFICE O/P EST LOW 20 MIN: CPT | Performed by: PHYSICIAN ASSISTANT

## 2024-07-13 PROCEDURE — 87186 SC STD MICRODIL/AGAR DIL: CPT | Performed by: PHYSICIAN ASSISTANT

## 2024-07-13 RX ORDER — NITROFURANTOIN 25; 75 MG/1; MG/1
100 CAPSULE ORAL 2 TIMES DAILY
Qty: 14 CAPSULE | Refills: 0 | Status: SHIPPED | OUTPATIENT
Start: 2024-07-13 | End: 2024-07-20

## 2024-07-13 ASSESSMENT — ENCOUNTER SYMPTOMS
HEMATURIA: 0
FATIGUE: 0
NAUSEA: 0
PALPITATIONS: 0
ABDOMINAL PAIN: 0
DIARRHEA: 0
VOMITING: 0
CARDIOVASCULAR NEGATIVE: 1
CONSTIPATION: 0
FEVER: 0
DYSURIA: 1
FLANK PAIN: 0
BLOOD IN STOOL: 0
FREQUENCY: 1
CHILLS: 0

## 2024-07-13 ASSESSMENT — PAIN SCALES - GENERAL: PAINLEVEL: NO PAIN (0)

## 2024-07-13 NOTE — PROGRESS NOTES
Mary Boyce is a 74 year old, presenting for the following health issues:  UTI (Urinary frequency. Sx onset- couple of days )    HPI   Genitourinary - Female  Onset/Duration: 4days  Description:   Painful urination (Dysuria): YES           Frequency: YES  Blood in urine (Hematuria): No  Delay in urine (Hesitency): YES  Intensity: moderate  Progression of Symptoms:  same  Accompanying Signs & Symptoms:  Fever/chills: No  Flank pain: No  Nausea and vomiting: No  Vaginal symptoms: none  Abdominal/Pelvic Pain: pressure  History:   History of frequent UTI s: No  History of kidney stones: No  Sexually Active: No  Possibility of pregnancy: No  Precipitating or alleviating factors: None  Therapies tried and outcome: Cranberry juice prn, Increase fluid intake with minimal relief    Patient Active Problem List   Diagnosis    Moderate persistent asthma, unspecified whether complicated    Essential hypertension    Type 2 diabetes mellitus without complication, without long-term current use of insulin (H)    Rheumatoid arthritis involving both hands, unspecified whether rheumatoid factor present (H)    Arthropathy in Crohn's disease (H)    Crohn's disease of small intestine with complication (H)    Hypokalemia    Insomnia, unspecified type    Arthritis of gbyjhwqu-[id number]-[id number] joint of right hand    Arthritis of carpometacarpal (CMC) joint of right thumb    Primary osteoarthritis of both hands    Aftercare following surgery for injury and trauma    Pain in both hands    Thumb pain, right    Piriformis muscle pain     Current Outpatient Medications   Medication Sig Dispense Refill    alcohol swab prep pads Use to swab area of injection/venkata as directed. 100 each 3    amLODIPine (NORVASC) 2.5 MG tablet Take 1 tablet (2.5 mg) by mouth daily 90 tablet 1    atorvastatin (LIPITOR) 10 MG tablet Take 1 tablet (10 mg) by mouth daily Do not fill until contacted by patient 90 tablet 3    blood glucose (NO BRAND SPECIFIED)  lancets standard Use to test blood sugar 1 times daily or as directed. 100 Lancet 11    blood glucose (ONETOUCH VERIO IQ) test strip USE TO TEST BLOOD GLUCOSE ONCE DAILY 100 strip 0    budesonide-formoterol (SYMBICORT) 160-4.5 MCG/ACT Inhaler Inhale 2 puffs into the lungs 2 times daily 10.2 g 4    Cyanocobalamin 3000 MCG SUBL Place 1,500 mg under the tongue daily       famotidine (PEPCID) 40 MG tablet Take 1 tablet (40 mg) by mouth 2 times daily 180 tablet 1    folic acid (FOLVITE) 1 MG tablet TAKE ONE TABLET BY MOUTH ONE TIME DAILY 90 tablet 0    gabapentin (NEURONTIN) 100 MG capsule Take 1 capsule (100 mg) by mouth 3 times daily as needed for neuropathic pain 90 capsule 3    Lancets (ONETOUCH DELICA PLUS [id number]) MISC USE TO TEST BLOOD GLUCOSE ONCE DAILY 100 each 0    losartan (COZAAR) 100 MG tablet Take 1 tablet (100 mg) by mouth daily 90 tablet 1    metFORMIN (GLUCOPHAGE XR) 500 MG 24 hr tablet Take 2 tablets (1,000 mg) by mouth daily (with dinner) 180 tablet 1    multivitamin w/minerals (THERA-VIT-M) tablet Take 1 tablet by mouth daily      potassium chloride daniel ER (KLOR-CON M20) 20 MEQ CR tablet Take 1 tablet (20 mEq) by mouth daily 90 tablet 1    scopolamine (TRANSDERM) 1 MG/3DAYS 72 hr patch Place 1 patch onto the skin every 72 hours 12 patch 1    Vitamin D, Cholecalciferol, 25 MCG (1000 UT) TABS Take 2 tablets by mouth daily        Allergies   Allergen Reactions    Seasonal Allergies        Review of Systems   Constitutional:  Negative for chills, fatigue and fever.   Cardiovascular: Negative.  Negative for chest pain, palpitations and leg swelling.   Gastrointestinal:  Negative for abdominal pain, blood in stool, constipation, diarrhea, nausea and vomiting.   Genitourinary:  Positive for dysuria, frequency and urgency. Negative for flank pain, hematuria, pelvic pain, vaginal bleeding and vaginal discharge.   All other systems reviewed and are negative.          Objective    BP (!) 144/59 (BP Location:  Left arm, Patient Position: Sitting, Cuff Size: Adult Large)   Pulse 71   Temp 97  F (36.1  C) (Tympanic)   Resp 16   Wt 75.5 kg (166 lb 6.4 oz)   SpO2 98%   BMI 33.05 kg/m    Body mass index is 33.05 kg/m .  Physical Exam  Vitals and nursing note reviewed.   Constitutional:       General: She is not in acute distress.     Appearance: Normal appearance. She is well-developed and normal weight. She is not ill-appearing.   Cardiovascular:      Rate and Rhythm: Normal rate and regular rhythm.      Pulses: Normal pulses.      Heart sounds: Normal heart sounds, S1 normal and S2 normal. No murmur heard.     No friction rub. No gallop.   Pulmonary:      Effort: Pulmonary effort is normal. No accessory muscle usage or respiratory distress.      Breath sounds: Normal breath sounds and air entry. No decreased breath sounds, wheezing, rhonchi or rales.   Abdominal:      General: Abdomen is flat. Bowel sounds are normal.      Palpations: Abdomen is soft. There is no hepatomegaly, splenomegaly or mass.      Tenderness: There is no abdominal tenderness. There is no right CVA tenderness, left CVA tenderness, guarding or rebound. Negative signs include Sims's sign, Rovsing's sign, McBurney's sign, psoas sign and obturator sign.      Hernia: No hernia is present.   Genitourinary:     Comments: Declined pelvic exam.  Skin:     General: Skin is warm and dry.   Neurological:      Mental Status: She is alert and oriented to person, place, and time.   Psychiatric:         Mood and Affect: Mood normal.         Behavior: Behavior normal.         Thought Content: Thought content normal.         Judgment: Judgment normal.       Results for orders placed or performed in visit on 07/13/24 (from the past 24 hour(s))   UA Macroscopic with reflex to Microscopic and Culture - Lab Collect    Specimen: Urine, Clean Catch   Result Value Ref Range    Color Urine Yellow Colorless, Straw, Light Yellow, Yellow    Appearance Urine Cloudy (A) Clear     Glucose Urine Negative Negative mg/dL    Bilirubin Urine Negative Negative    Ketones Urine Negative Negative mg/dL    Specific Gravity Urine 1.015 1.003 - 1.035    Blood Urine Moderate (A) Negative    pH Urine 5.5 5.0 - 7.0    Protein Albumin Urine Negative Negative mg/dL    Urobilinogen Urine 0.2 0.2, 1.0 E.U./dL    Nitrite Urine Negative Negative    Leukocyte Esterase Urine Moderate (A) Negative   Urine Microscopic Exam   Result Value Ref Range    Bacteria Urine Few (A) None Seen /HPF    RBC Urine 0-2 0-2 /HPF /HPF    WBC Urine >100 (A) 0-5 /HPF /HPF           Assessment/Plan:  Dysuria:  UA and micro is suspicious for UTI and will empirically treat with [id number].  Will send for UC to help guide abx treatment.  Recommend increase fluids, regular voids, proper wiping techniques and voiding after intercourse.  Educated patient on warning signs of kidney infection and to go to the ER if she develops any of these symptoms.  Recheck in clinic if symptoms worsen or if symptoms do not improve.  -     UA Macroscopic with reflex to Microscopic and Culture - Lab Collect; Future  -     UA Macroscopic with reflex to Microscopic and Culture - Lab Collect  -     Urine Microscopic Exam  -     Urine Culture  -     nitroFURantoin macrocrystal-monohydrate (MACROBID) 100 MG capsule; Take 1 capsule (100 mg) by mouth 2 times daily for 7 days        Klaudia Min PA-C

## 2024-07-14 LAB — BACTERIA UR CULT: ABNORMAL

## 2024-07-19 ENCOUNTER — TELEPHONE (OUTPATIENT)
Dept: ANESTHESIOLOGY | Facility: CLINIC | Age: 74
End: 2024-07-19
Payer: COMMERCIAL

## 2024-07-19 DIAGNOSIS — M25.512 CHRONIC LEFT SHOULDER PAIN: Primary | ICD-10-CM

## 2024-07-19 DIAGNOSIS — G89.29 CHRONIC LEFT SHOULDER PAIN: Primary | ICD-10-CM

## 2024-07-19 RX ORDER — DIAZEPAM 5 MG
5-10 TABLET ORAL
Status: CANCELLED | OUTPATIENT
Start: 2024-07-19

## 2024-07-23 ENCOUNTER — TELEPHONE (OUTPATIENT)
Dept: ANESTHESIOLOGY | Facility: CLINIC | Age: 74
End: 2024-07-23
Payer: COMMERCIAL

## 2024-07-23 DIAGNOSIS — M25.561 PAIN IN BOTH KNEES, UNSPECIFIED CHRONICITY: Primary | ICD-10-CM

## 2024-07-23 DIAGNOSIS — M25.562 PAIN IN BOTH KNEES, UNSPECIFIED CHRONICITY: Primary | ICD-10-CM

## 2024-07-23 PROBLEM — M25.512 CHRONIC LEFT SHOULDER PAIN: Status: ACTIVE | Noted: 2024-07-19

## 2024-07-23 PROBLEM — G89.29 CHRONIC LEFT SHOULDER PAIN: Status: ACTIVE | Noted: 2024-07-19

## 2024-07-23 NOTE — PROGRESS NOTES
[facility]  Orthopaedic Surgery, Joint Replacement Consultation  by Jasmeet Aldana M.D.    Carli Monreal MRN# [id number]    YOB: 1950     Requesting physician: Iza Martinez            Assessment and Plan:   Assessment:  Chronic right knee pain due to degenerative joint disease of right knee of mild to moderate severity.      Prolonged discussion regarding her imaging findings and symptoms.  She has mild osteoarthritis on radiographs with some focal areas of advanced chondromalacia on MRI.  Of note, she does have some valgus deformity of her knee.  With arthritis not yet surgical indications radiographically, we discussed conservative modalities.  She is already utilize corticosteroid injections which did not help her symptoms.  Given her slight correctable valgus deformity, a valgus  brace as well as utilization of a cane in her contralateral hand would be great options for her to help offload her knee and help increase her functionality while decreasing her pain.  Will plan to refer to physical therapy where she can receive the valgus  brace as well as receive cane training and work on exercises for strengthening of her quad.     Plan:  -Plan to initiate care with nonsurgical management.  PT referral for strengthening and range of motion of the right knee  -PT to fit valgus  brace  -Cane training  -Follow-up as needed      --  Carlos Franco MD  Orthopedic Surgery PGY-4    Attending MD (Dr. Jasmeet Aldana) Attestation :  This patient was seen and evaluated by me including a history, exam, and interpretation of all imaging and/or lab data.  I formulated the treatment plan along with either a physician's assistant (PA-C) or training physician (resident/fellow), who also saw the patient. That individual or a scribe has documented the visit in the attached note which I approve.    Jasmeet Aldana MD  [facility]  Oncology and Adult  Reconstructive Surgery  Dept Orthopaedic Surgery, [facility]  [phone] office, [phone] pager  [email or web address]         For additional information and frequently asked questions regarding joint replacements, scan the QR code image below on your phone camera or go to:  [email or web address]        This note was created using dictation software and may contain errors.  Please contact the creator for any clarifications that are needed.           History of Present Illness:   74 year old female  chief complaint Right knee pain    She has been seeing sports medicine for this knee pain has received corticosteroid injections in the past.  Her most recent injection was on 1/16/2024 and only offered her approximately 1 week of relief.  Given that she has been refractory to conservative management thus far, she was referred to our clinic to discuss possibility of surgical management of her chronic knee pain.    She states that her pain occurs intermittently with cycles of flareups and then cycles where it is not quite as bad.  She works as a  and has period of time where she needs to be quite mobile for work.  She states that the pain does not wake her up at night and is not associated with any specific activities.      Current symptoms:  Problem: Right knee OA   Onset and duration: Started a 2 years ago  Awakens from sleep due to sx's:  Yes  Precipitating Injury:  No    Other joints or sites painful:  Yes  Prior treatments:  Non steroidal anti-inflammatory agents or aspirin: Yes  Reduced activity:  Yes  Physical therapy:  Yes  Chiropractic care:  No  Injections with either steroid or viscosupplementation agents:  Yes          Background history:  DX:  Chronic right knee pain  Left rotator cuff tear    TREATMENTS:  1/20/2023, Right trapeziectomy with FCR ligament reconstruction tendon interposition and Right partial trapezoid excision, ([place]), [facility]  "ASC                 Physical Exam:     EXAMINATION pertinent findings:   PSYCH: Pleasant, healthy-appearing, alert, oriented x3, cooperative. Normal mood and affect.  VITAL SIGNS: Height 1.511 m (4' 11.49\"), weight 75.8 kg (167 lb)..  Reviewed nursing intake notes.   Body mass index is 33.18 kg/m .  RESP: non labored breathing   ABD: benign, soft, non-tender, no acute peritoneal findings  SKIN: grossly normal   LYMPHATIC: grossly normal, no adenopathy, no extremity edema  NEURO: grossly normal , no motor deficits  VASCULAR: satisfactory perfusion of all extremities   MUSCULOSKELETAL:   Right  Knee  Appearance: benign  Clinical alignment: Slight valgus  Effusion: None  Tenderness to palpation: Globally, however worse on lateral joint line  Extension: 3 degrees shy of full extension  Flexion: 115  Collateral ligaments: intact  Cruciate ligaments: grossly intact     Left Knee  Appearance: benign  Clinical alignment: Neutral  Effusion: None  Tenderness to palpation: None  Extension: Full  Flexion: 115 degrees  Collateral ligaments: intact  Cruciate ligaments: grossly intact                     Data:   All laboratory data reviewed  All imaging studies reviewed by me    Imaging tests:      X-ray bilateral knee obtained 7/29/2024 independently reviewed demonstrating slight valgus deformity of the right knee.  KL grade 2 changes with overall retained joint spaces.    MRI right knee reviewed demonstrating focal area of chondromalacia laterally as well as in the patellofemoral compartment        DATA for DOCUMENTATION:         Past Medical History:     Patient Active Problem List   Diagnosis    Moderate persistent asthma, unspecified whether complicated    Essential hypertension    Type 2 diabetes mellitus without complication, without long-term current use of insulin (H)    Rheumatoid arthritis involving both hands, unspecified whether rheumatoid factor present (H)    Arthropathy in Crohn's disease (H)    Crohn's disease of " small intestine with complication (H)    Hypokalemia    Insomnia, unspecified type    Arthritis of bvfkzsdn-uxkqdenvd-urmkhkxvj joint of right hand    Arthritis of carpometacarpal (CMC) joint of right thumb    Primary osteoarthritis of both hands    Aftercare following surgery for injury and trauma    Pain in both hands    Thumb pain, right    Piriformis muscle pain    Chronic left shoulder pain     Past Medical History:   Diagnosis Date    Diabetes mellitus (H)     Right bundle branch block     Uncomplicated asthma        Also see scanned health assessment forms.       Past Surgical History:     Past Surgical History:   Procedure Laterality Date    ARTHROPLASTY CARPOMETACARPAL (THUMB JOINT) Right 2023    Procedure: ARTHROPLASTY, CARPOMETACARPAL JOINT,RIGHT  THUMB;  Surgeon: Jasmeet Haque MD;  [place] [facility] [place]    CATARACT IOL, RT/LT Bilateral 2017    Valeriano Javier MD ([place], [place]).    COLONOSCOPY N/A 2021    Procedure: COLONOSCOPY;  Surgeon: Keesha Mancuso MD;  [place] [facility] [place]    SMALL BOWEL RESECTION              Social History:     Social History     Socioeconomic History    Marital status:      Spouse name: Not on file    Number of children: Not on file    Years of education: Not on file    Highest education level: Not on file   Occupational History    Not on file   Tobacco Use    Smoking status: Former     Current packs/day: 0.00     Types: Cigarettes     Quit date: 1991     Years since quittin.3     Passive exposure: Never    Smokeless tobacco: Never   Vaping Use    Vaping status: Never Used   Substance and Sexual Activity    Alcohol use: Yes     Comment: Wine- couple x/ week    Drug use: Never    Sexual activity: Not Currently     Partners: Male   Other Topics Concern    Parent/sibling w/ CABG, MI or angioplasty before 65F 55M? Not Asked   Social History Narrative    Not on file     Social Determinants of Health     Financial Resource Strain: Low Risk  (2024)     Financial Resource Strain     Within the past 12 months, have you or your family members you live with been unable to get utilities (heat, electricity) when it was really needed?: No   Food Insecurity: Low Risk  (5/31/2024)    Food Insecurity     Within the past 12 months, did you worry that your food would run out before you got money to buy more?: No     Within the past 12 months, did the food you bought just not last and you didn t have money to get more?: No   Transportation Needs: Low Risk  (5/31/2024)    Transportation Needs     Within the past 12 months, has lack of transportation kept you from medical appointments, getting your medicines, non-medical meetings or appointments, work, or from getting things that you need?: No   Physical Activity: Inactive (5/31/2024)    Exercise Vital Sign     Days of Exercise per Week: 0 days     Minutes of Exercise per Session: 0 min   Stress: No Stress Concern Present (5/31/2024)    [place] [employer] - Occupational Stress Questionnaire     Feeling of Stress : Not at all   Social Connections: Unknown (5/31/2024)    Social Connection and Isolation Panel [NHANES]     Frequency of Communication with Friends and Family: Not on file     Frequency of Social Gatherings with Friends and Family: Once a week     Attends [employer] Services: Not on file     Active Member of Clubs or Organizations: Not on file     Attends Club or Organization Meetings: Not on file     Marital Status: Not on file   Interpersonal Safety: Low Risk  (5/31/2024)    Interpersonal Safety     Do you feel physically and emotionally safe where you currently live?: Yes     Within the past 12 months, have you been hit, slapped, kicked or otherwise physically hurt by someone?: No     Within the past 12 months, have you been humiliated or emotionally abused in other ways by your partner or ex-partner?: No   Housing Stability: Low Risk  (5/31/2024)    Housing Stability     Do you have housing? :  Yes     Are you worried about losing your housing?: No            Family History:       Family History   Problem Relation Age of Onset    Rheumatoid Arthritis Mother     Eye Surgery Father     Glaucoma Father     Amblyopia Cousin     Inflammatory Bowel Disease No family hx of     Colon Cancer No family hx of     Macular Degeneration No family hx of             Medications:     Current Outpatient Medications   Medication Sig Dispense Refill    alcohol swab prep pads Use to swab area of injection/venkata as directed. 100 each 3    amLODIPine (NORVASC) 2.5 MG tablet Take 1 tablet (2.5 mg) by mouth daily 90 tablet 1    atorvastatin (LIPITOR) 10 MG tablet Take 1 tablet (10 mg) by mouth daily Do not fill until contacted by patient 90 tablet 3    blood glucose (NO BRAND SPECIFIED) lancets standard Use to test blood sugar 1 times daily or as directed. 100 Lancet 11    blood glucose (ONETOUCH VERIO IQ) test strip USE TO TEST BLOOD GLUCOSE ONCE DAILY 100 strip 0    budesonide-formoterol (SYMBICORT) 160-4.5 MCG/ACT Inhaler Inhale 2 puffs into the lungs 2 times daily 10.2 g 4    Cyanocobalamin 3000 MCG SUBL Place 1,500 mg under the tongue daily       famotidine (PEPCID) 40 MG tablet Take 1 tablet (40 mg) by mouth 2 times daily 180 tablet 1    folic acid (FOLVITE) 1 MG tablet TAKE ONE TABLET BY MOUTH ONE TIME DAILY 90 tablet 0    gabapentin (NEURONTIN) 100 MG capsule Take 1 capsule (100 mg) by mouth 3 times daily as needed for neuropathic pain 90 capsule 3    Lancets (ONETOUCH DELICA PLUS [id number]) MISC USE TO TEST BLOOD GLUCOSE ONCE DAILY 100 each 0    losartan (COZAAR) 100 MG tablet Take 1 tablet (100 mg) by mouth daily 90 tablet 1    metFORMIN (GLUCOPHAGE XR) 500 MG 24 hr tablet Take 2 tablets (1,000 mg) by mouth daily (with dinner) 180 tablet 1    multivitamin w/minerals (THERA-VIT-M) tablet Take 1 tablet by mouth daily      potassium chloride daniel ER (KLOR-CON M20) 20 MEQ CR tablet Take 1 tablet (20 mEq) by mouth daily 90  tablet 1    scopolamine (TRANSDERM) 1 MG/3DAYS 72 hr patch Place 1 patch onto the skin every 72 hours 12 patch 1    Vitamin D, Cholecalciferol, 25 MCG (1000 UT) TABS Take 2 tablets by mouth daily       Current Facility-Administered Medications   Medication Dose Route Frequency Provider Last Rate Last Admin    4 mL ropivacaine (NAROPIN) injection 5 mg/mL  4 mL   Artur Vergara MD   4 mL at 01/18/24 1102    4 mL ropivacaine (NAROPIN) injection 5 mg/mL  4 mL   Artur Vergara MD   4 mL at 01/18/24 1102    4 mL ropivacaine (NAROPIN) injection 5 mg/mL  4 mL   Artur Vergara MD   4 mL at 10/19/23 1403    betamethasone acet & sod phos (CELESTONE) injection 6 mg  6 mg   Artur Vergara MD   6 mg at 01/18/24 1102    betamethasone acet & sod phos (CELESTONE) injection 6 mg  6 mg   Artur Vergara MD   6 mg at 01/18/24 1102    betamethasone acet & sod phos (CELESTONE) injection 6 mg  6 mg   Artur Vergara MD   6 mg at 10/19/23 1403              Review of Systems:   A comprehensive 10 point review of systems (constitutional, ENT, cardiac, peripheral vascular, lymphatic, respiratory, GI, , Musculoskeletal, skin, Neurological) was performed and found to be negative except as described in this note.       HOOS Hip Dysfunction & Osteoarthritis Outcome Questionnaire         No data to display                       [unfilled]    KOOS Knee Survey Assessment         No data to display                       Promis 10 Assessment        7/29/2024     2:36 PM   PROMIS 10   In general, would you say your health is: Good   In general, would you say your quality of life is: Good   In general, how would you rate your physical health? Fair   In general, how would you rate your mental health, including your mood and your ability to think? Excellent   In general, how would you rate your satisfaction with your social activities and relationships? Very good   In general, please rate how well you carry out your usual  social activities and roles Good   To what extent are you able to carry out your everyday physical activities such as walking, climbing stairs, carrying groceries, or moving a chair? Moderately   In the past 7 days, how often have you been bothered by emotional problems such as feeling anxious, depressed, or irritable? Rarely   In the past 7 days, how would you rate your fatigue on average? Moderate   In the past 7 days, how would you rate your pain on average, where 0 means no pain, and 10 means worst imaginable pain? 7   In general, would you say your health is: 3   In general, would you say your quality of life is: 3   In general, how would you rate your physical health? 2   In general, how would you rate your mental health, including your mood and your ability to think? 5   In general, how would you rate your satisfaction with your social activities and relationships? 4   In general, please rate how well you carry out your usual social activities and roles. (This includes activities at home, at work and in your community, and responsibilities as a parent, child, spouse, employee, friend, etc.) 3   To what extent are you able to carry out your everyday physical activities such as walking, climbing stairs, carrying groceries, or moving a chair? 3   In the past 7 days, how often have you been bothered by emotional problems such as feeling anxious, depressed, or irritable? 2   In the past 7 days, how would you rate your fatigue on average? 3   In the past 7 days, how would you rate your pain on average, where 0 means no pain, and 10 means worst imaginable pain? 7   Global Mental Health Score 16   Global Physical Health Score 10   PROMIS TOTAL - SUBSCORES 26              Ortho [place] Knee Questionnaire         No data to display                         See intake form completed by patient

## 2024-07-23 NOTE — TELEPHONE ENCOUNTER
RN reviewed patient chart. Pre procedure instructions were sent to the patient.    Ni Maxwell RNCC

## 2024-07-23 NOTE — TELEPHONE ENCOUNTER
Called patient to schedule procedure with Dr. Nicholson    Date of Procedure: 8/15/24    Arrival time given: Yes: Arrival Time 1pm       Procedure Location: [facility]     Verified Location with Patient:  Yes  Address provided to the patient     Pre-op H&P Required:  No: Local anesthesia        Post-Op/Follow Up Appt:  Not Indicated in Request      Informed patient they will need a  to drive them home:  Yes    Patients : Spouse     Patient is aware that pre-op RN from the procedure center will call 2-3 days prior to scheduled procedure to confirm arrival time and review any instructions:  Yes       Additional Comments: N/A        Dorota Schneider MA on 7/23/2024 at 11:03 AM      P: [phone]*

## 2024-07-26 NOTE — TELEPHONE ENCOUNTER
DIAGNOSIS: RIGHT KNEE  Primary osteoarthritis of both knees [M17.0]  - Primary   APPOINTMENT DATE: 07/29/2024   NOTES STATUS DETAILS   OFFICE NOTE from referring provider Internal 06/20/2024 - Artur Vergara MD - [facility]   DEXA Internal    (IMAGES & REPORTS) Internal

## 2024-07-29 ENCOUNTER — ANCILLARY PROCEDURE (OUTPATIENT)
Dept: GENERAL RADIOLOGY | Facility: CLINIC | Age: 74
End: 2024-07-29
Attending: ORTHOPAEDIC SURGERY
Payer: COMMERCIAL

## 2024-07-29 ENCOUNTER — PRE VISIT (OUTPATIENT)
Dept: ORTHOPEDICS | Facility: CLINIC | Age: 74
End: 2024-07-29

## 2024-07-29 ENCOUNTER — OFFICE VISIT (OUTPATIENT)
Dept: ORTHOPEDICS | Facility: CLINIC | Age: 74
End: 2024-07-29
Attending: FAMILY MEDICINE
Payer: COMMERCIAL

## 2024-07-29 VITALS — WEIGHT: 167 LBS | BODY MASS INDEX: 33.67 KG/M2 | HEIGHT: 59 IN

## 2024-07-29 DIAGNOSIS — M17.0 PRIMARY OSTEOARTHRITIS OF BOTH KNEES: ICD-10-CM

## 2024-07-29 DIAGNOSIS — M25.561 PAIN IN BOTH KNEES, UNSPECIFIED CHRONICITY: ICD-10-CM

## 2024-07-29 DIAGNOSIS — G89.29 CHRONIC LEFT SHOULDER PAIN: ICD-10-CM

## 2024-07-29 DIAGNOSIS — M25.512 CHRONIC LEFT SHOULDER PAIN: ICD-10-CM

## 2024-07-29 DIAGNOSIS — M25.562 PAIN IN BOTH KNEES, UNSPECIFIED CHRONICITY: ICD-10-CM

## 2024-07-29 DIAGNOSIS — M17.11 PRIMARY OSTEOARTHRITIS OF RIGHT KNEE: Primary | ICD-10-CM

## 2024-07-29 PROCEDURE — 73560 X-RAY EXAM OF KNEE 1 OR 2: CPT | Mod: RT | Performed by: RADIOLOGY

## 2024-07-29 PROCEDURE — 99214 OFFICE O/P EST MOD 30 MIN: CPT | Mod: GC | Performed by: ORTHOPAEDIC SURGERY

## 2024-07-29 ASSESSMENT — KOOS JR
HOW SEVERE IS YOUR KNEE STIFFNESS AFTER FIRST WAKING IN MORNING: MODERATE
STANDING UPRIGHT: MODERATE
RISING FROM SITTING: SEVERE
KOOS JR SCORING: 42.28
STRAIGHTENING KNEE FULLY: SEVERE
TWISING OR PIVOTING ON KNEE: EXTREME
GOING UP OR DOWN STAIRS: MODERATE
BENDING TO THE FLOOR TO PICK UP OBJECT: MODERATE

## 2024-07-29 NOTE — LETTER
7/29/2024      Carli Monreal  [address]  [place] [address]      Dear Colleague,    Thank you for referring your patient, Carli Monreal, to the [facility] [place]. Please see a copy of my visit note below.        [facility]  Orthopaedic Surgery, Joint Replacement Consultation  by Jasmeet Aldana M.D.    Carli Monreal MRN# [id number]    YOB: 1950     Requesting physician: Iza Martinez            Assessment and Plan:   Assessment:  Chronic right knee pain due to degenerative joint disease of right knee of mild to moderate severity.      Prolonged discussion regarding her imaging findings and symptoms.  She has mild osteoarthritis on radiographs with some focal areas of advanced chondromalacia on MRI.  Of note, she does have some valgus deformity of her knee.  With arthritis not yet surgical indications radiographically, we discussed conservative modalities.  She is already utilize corticosteroid injections which did not help her symptoms.  Given her slight correctable valgus deformity, a valgus  brace as well as utilization of a cane in her contralateral hand would be great options for her to help offload her knee and help increase her functionality while decreasing her pain.  Will plan to refer to physical therapy where she can receive the valgus  brace as well as receive cane training and work on exercises for strengthening of her quad.     Plan:  -Plan to initiate care with nonsurgical management.  PT referral for strengthening and range of motion of the right knee  -PT to fit valgus  brace  -Cane training  -Follow-up as needed      --  Carlos Franco MD  Orthopedic Surgery PGY-4    Attending MD (Dr. Jasmeet Aldana) Attestation :  This patient was seen and evaluated by me including a history, exam, and interpretation of all imaging and/or lab data.  I formulated the treatment plan along with either a  physician's assistant (MERCEDEZ) or training physician (resident/fellow), who also saw the patient. That individual or a scribe has documented the visit in the attached note which I approve.    MD Chase Nazario Family Professor  Oncology and Adult Reconstructive Surgery  Dept Orthopaedic Surgery, [facility]  [phone] office, [phone] pager  [email or web address]         For additional information and frequently asked questions regarding joint replacements, scan the QR code image below on your phone camera or go to:  [email or web address]        This note was created using dictation software and may contain errors.  Please contact the creator for any clarifications that are needed.           History of Present Illness:   74 year old female  chief complaint Right knee pain    She has been seeing sports medicine for this knee pain has received corticosteroid injections in the past.  Her most recent injection was on 1/16/2024 and only offered her approximately 1 week of relief.  Given that she has been refractory to conservative management thus far, she was referred to our clinic to discuss possibility of surgical management of her chronic knee pain.    She states that her pain occurs intermittently with cycles of flareups and then cycles where it is not quite as bad.  She works as a  and has period of time where she needs to be quite mobile for work.  She states that the pain does not wake her up at night and is not associated with any specific activities.      Current symptoms:  Problem: Right knee OA   Onset and duration: Started a 2 years ago  Awakens from sleep due to sx's:  Yes  Precipitating Injury:  No    Other joints or sites painful:  Yes  Prior treatments:  Non steroidal anti-inflammatory agents or aspirin: Yes  Reduced activity:  Yes  Physical therapy:  Yes  Chiropractic care:  No  Injections with either steroid or  "viscosupplementation agents:  Yes          Background history:  DX:  Chronic right knee pain  Left rotator cuff tear    TREATMENTS:  1/20/2023, Right trapeziectomy with FCR ligament reconstruction tendon interposition and Right partial trapezoid excision, ([place]), [facility]                 Physical Exam:     EXAMINATION pertinent findings:   PSYCH: Pleasant, healthy-appearing, alert, oriented x3, cooperative. Normal mood and affect.  VITAL SIGNS: Height 1.511 m (4' 11.49\"), weight 75.8 kg (167 lb)..  Reviewed nursing intake notes.   Body mass index is 33.18 kg/m .  RESP: non labored breathing   ABD: benign, soft, non-tender, no acute peritoneal findings  SKIN: grossly normal   LYMPHATIC: grossly normal, no adenopathy, no extremity edema  NEURO: grossly normal , no motor deficits  VASCULAR: satisfactory perfusion of all extremities   MUSCULOSKELETAL:   Right  Knee  Appearance: benign  Clinical alignment: Slight valgus  Effusion: None  Tenderness to palpation: Globally, however worse on lateral joint line  Extension: 3 degrees shy of full extension  Flexion: 115  Collateral ligaments: intact  Cruciate ligaments: grossly intact     Left Knee  Appearance: benign  Clinical alignment: Neutral  Effusion: None  Tenderness to palpation: None  Extension: Full  Flexion: 115 degrees  Collateral ligaments: intact  Cruciate ligaments: grossly intact                     Data:   All laboratory data reviewed  All imaging studies reviewed by me    Imaging tests:      X-ray bilateral knee obtained 7/29/2024 independently reviewed demonstrating slight valgus deformity of the right knee.  KL grade 2 changes with overall retained joint spaces.    MRI right knee reviewed demonstrating focal area of chondromalacia laterally as well as in the patellofemoral compartment        DATA for DOCUMENTATION:         Past Medical History:     Patient Active Problem List   Diagnosis     Moderate persistent asthma, unspecified whether complicated     " Essential hypertension     Type 2 diabetes mellitus without complication, without long-term current use of insulin (H)     Rheumatoid arthritis involving both hands, unspecified whether rheumatoid factor present (H)     Arthropathy in Crohn's disease (H)     Crohn's disease of small intestine with complication (H)     Hypokalemia     Insomnia, unspecified type     Arthritis of eqotsqap-frgiljmyg-lncibdpaq joint of right hand     Arthritis of carpometacarpal (CMC) joint of right thumb     Primary osteoarthritis of both hands     Aftercare following surgery for injury and trauma     Pain in both hands     Thumb pain, right     Piriformis muscle pain     Chronic left shoulder pain     Past Medical History:   Diagnosis Date     Diabetes mellitus (H)      Right bundle branch block      Uncomplicated asthma        Also see scanned health assessment forms.       Past Surgical History:     Past Surgical History:   Procedure Laterality Date     ARTHROPLASTY CARPOMETACARPAL (THUMB JOINT) Right 2023    Procedure: ARTHROPLASTY, CARPOMETACARPAL JOINT,RIGHT  THUMB;  Surgeon: Jasmeet Haque MD;  Location: [facility] OR     CATARACT IOL, RT/LT Bilateral     aVleriano Javier MD ([place], [place]).     COLONOSCOPY N/A 2021    Procedure: COLONOSCOPY;  Surgeon: Keesha Mancuso MD;  Location: [facility] OR     SMALL BOWEL RESECTION              Social History:     Social History     Socioeconomic History     Marital status:      Spouse name: Not on file     Number of children: Not on file     Years of education: Not on file     Highest education level: Not on file   Occupational History     Not on file   Tobacco Use     Smoking status: Former     Current packs/day: 0.00     Types: Cigarettes     Quit date: 1991     Years since quittin.3     Passive exposure: Never     Smokeless tobacco: Never   Vaping Use     Vaping status: Never Used   Substance and Sexual Activity     Alcohol use: Yes     Comment: Wine- couple x/  week     Drug use: Never     Sexual activity: Not Currently     Partners: Male   Other Topics Concern     Parent/sibling w/ CABG, MI or angioplasty before 65F 55M? Not Asked   Social History Narrative     Not on file     Social Determinants of Health     Financial Resource Strain: Low Risk  (5/31/2024)    Financial Resource Strain      Within the past 12 months, have you or your family members you live with been unable to get utilities (heat, electricity) when it was really needed?: No   Food Insecurity: Low Risk  (5/31/2024)    Food Insecurity      Within the past 12 months, did you worry that your food would run out before you got money to buy more?: No      Within the past 12 months, did the food you bought just not last and you didn t have money to get more?: No   Transportation Needs: Low Risk  (5/31/2024)    Transportation Needs      Within the past 12 months, has lack of transportation kept you from medical appointments, getting your medicines, non-medical meetings or appointments, work, or from getting things that you need?: No   Physical Activity: Inactive (5/31/2024)    Exercise Vital Sign      Days of Exercise per Week: 0 days      Minutes of Exercise per Session: 0 min   Stress: No Stress Concern Present (5/31/2024)    [employer] [place] [employer] - Occupational Stress Questionnaire      Feeling of Stress : Not at all   Social Connections: Unknown (5/31/2024)    Social Connection and Isolation Panel [NHANES]      Frequency of Communication with Friends and Family: Not on file      Frequency of Social Gatherings with Friends and Family: Once a week      Attends Gnosticism Services: Not on file      Active Member of Clubs or Organizations: Not on file      Attends Club or Organization Meetings: Not on file      Marital Status: Not on file   Interpersonal Safety: Low Risk  (5/31/2024)    Interpersonal Safety      Do you feel physically and emotionally safe where you currently live?: Yes       Within the past 12 months, have you been hit, slapped, kicked or otherwise physically hurt by someone?: No      Within the past 12 months, have you been humiliated or emotionally abused in other ways by your partner or ex-partner?: No   Housing Stability: Low Risk  (5/31/2024)    Housing Stability      Do you have housing? : Yes      Are you worried about losing your housing?: No            Family History:       Family History   Problem Relation Age of Onset     Rheumatoid Arthritis Mother      Eye Surgery Father      Glaucoma Father      Amblyopia Cousin      Inflammatory Bowel Disease No family hx of      Colon Cancer No family hx of      Macular Degeneration No family hx of             Medications:     Current Outpatient Medications   Medication Sig Dispense Refill     alcohol swab prep pads Use to swab area of injection/venkata as directed. 100 each 3     amLODIPine (NORVASC) 2.5 MG tablet Take 1 tablet (2.5 mg) by mouth daily 90 tablet 1     atorvastatin (LIPITOR) 10 MG tablet Take 1 tablet (10 mg) by mouth daily Do not fill until contacted by patient 90 tablet 3     blood glucose (NO BRAND SPECIFIED) lancets standard Use to test blood sugar 1 times daily or as directed. 100 Lancet 11     blood glucose (ONETOUCH VERIO IQ) test strip USE TO TEST BLOOD GLUCOSE ONCE DAILY 100 strip 0     budesonide-formoterol (SYMBICORT) 160-4.5 MCG/ACT Inhaler Inhale 2 puffs into the lungs 2 times daily 10.2 g 4     Cyanocobalamin 3000 MCG SUBL Place 1,500 mg under the tongue daily        famotidine (PEPCID) 40 MG tablet Take 1 tablet (40 mg) by mouth 2 times daily 180 tablet 1     folic acid (FOLVITE) 1 MG tablet TAKE ONE TABLET BY MOUTH ONE TIME DAILY 90 tablet 0     gabapentin (NEURONTIN) 100 MG capsule Take 1 capsule (100 mg) by mouth 3 times daily as needed for neuropathic pain 90 capsule 3     Lancets (ONETOUCH DELICA PLUS NCXRMC92L) MISC USE TO TEST BLOOD GLUCOSE ONCE DAILY 100 each 0     losartan (COZAAR) 100 MG tablet Take  1 tablet (100 mg) by mouth daily 90 tablet 1     metFORMIN (GLUCOPHAGE XR) 500 MG 24 hr tablet Take 2 tablets (1,000 mg) by mouth daily (with dinner) 180 tablet 1     multivitamin w/minerals (THERA-VIT-M) tablet Take 1 tablet by mouth daily       potassium chloride daniel ER (KLOR-CON M20) 20 MEQ CR tablet Take 1 tablet (20 mEq) by mouth daily 90 tablet 1     scopolamine (TRANSDERM) 1 MG/3DAYS 72 hr patch Place 1 patch onto the skin every 72 hours 12 patch 1     Vitamin D, Cholecalciferol, 25 MCG (1000 UT) TABS Take 2 tablets by mouth daily       Current Facility-Administered Medications   Medication Dose Route Frequency Provider Last Rate Last Admin     4 mL ropivacaine (NAROPIN) injection 5 mg/mL  4 mL   Artur Vergara MD   4 mL at 01/18/24 1102     4 mL ropivacaine (NAROPIN) injection 5 mg/mL  4 mL   Artur Vergara MD   4 mL at 01/18/24 1102     4 mL ropivacaine (NAROPIN) injection 5 mg/mL  4 mL   Artur Vergara MD   4 mL at 10/19/23 1403     betamethasone acet & sod phos (CELESTONE) injection 6 mg  6 mg   Artur Vergara MD   6 mg at 01/18/24 1102     betamethasone acet & sod phos (CELESTONE) injection 6 mg  6 mg   Artur Vergara MD   6 mg at 01/18/24 1102     betamethasone acet & sod phos (CELESTONE) injection 6 mg  6 mg   Artur Vergara MD   6 mg at 10/19/23 1403              Review of Systems:   A comprehensive 10 point review of systems (constitutional, ENT, cardiac, peripheral vascular, lymphatic, respiratory, GI, , Musculoskeletal, skin, Neurological) was performed and found to be negative except as described in this note.       HOOS Hip Dysfunction & Osteoarthritis Outcome Questionnaire         No data to display                       [unfilled]    KOOS Knee Survey Assessment         No data to display                       Promis 10 Assessment        7/29/2024     2:36 PM   PROMIS 10   In general, would you say your health is: Good   In general, would you say your quality of  life is: Good   In general, how would you rate your physical health? Fair   In general, how would you rate your mental health, including your mood and your ability to think? Excellent   In general, how would you rate your satisfaction with your social activities and relationships? Very good   In general, please rate how well you carry out your usual social activities and roles Good   To what extent are you able to carry out your everyday physical activities such as walking, climbing stairs, carrying groceries, or moving a chair? Moderately   In the past 7 days, how often have you been bothered by emotional problems such as feeling anxious, depressed, or irritable? Rarely   In the past 7 days, how would you rate your fatigue on average? Moderate   In the past 7 days, how would you rate your pain on average, where 0 means no pain, and 10 means worst imaginable pain? 7   In general, would you say your health is: 3   In general, would you say your quality of life is: 3   In general, how would you rate your physical health? 2   In general, how would you rate your mental health, including your mood and your ability to think? 5   In general, how would you rate your satisfaction with your social activities and relationships? 4   In general, please rate how well you carry out your usual social activities and roles. (This includes activities at home, at work and in your community, and responsibilities as a parent, child, spouse, employee, friend, etc.) 3   To what extent are you able to carry out your everyday physical activities such as walking, climbing stairs, carrying groceries, or moving a chair? 3   In the past 7 days, how often have you been bothered by emotional problems such as feeling anxious, depressed, or irritable? 2   In the past 7 days, how would you rate your fatigue on average? 3   In the past 7 days, how would you rate your pain on average, where 0 means no pain, and 10 means worst imaginable pain? 7    Global Mental Health Score 16   Global Physical Health Score 10   PROMIS TOTAL - SUBSCORES 26              Ortho [place] Knee Questionnaire         No data to display                         See intake form completed by patient      Again, thank you for allowing me to participate in the care of your patient.        Sincerely,        Jasmeet Aldana MD

## 2024-07-31 ENCOUNTER — TELEPHONE (OUTPATIENT)
Dept: ORTHOPEDICS | Facility: CLINIC | Age: 74
End: 2024-07-31
Payer: COMMERCIAL

## 2024-07-31 DIAGNOSIS — M17.0 PRIMARY OSTEOARTHRITIS OF BOTH KNEES: Primary | ICD-10-CM

## 2024-07-31 NOTE — TELEPHONE ENCOUNTER
Other: Carli called she needs a prescription for a cane added to her chart so that it can be covered by medicare and she is going to get it at the [facility]. Please contact patient if questions.     Could we send this information to you in ViaSat or would you prefer to receive a phone call?:   Patient would prefer a phone call   Okay to leave a detailed message?: Yes at Home number on file [phone] (home)

## 2024-07-31 NOTE — TELEPHONE ENCOUNTER
- A call was placed to the patient.     - I let her know I placed an order for the cane.      - Patient verbalized understanding of plan and all questions were answered. Call back number to clinic was given and patient was told to call if they had an further questions.

## 2024-08-01 ENCOUNTER — ANCILLARY PROCEDURE (OUTPATIENT)
Dept: GENERAL RADIOLOGY | Facility: CLINIC | Age: 74
End: 2024-08-01
Attending: FAMILY MEDICINE
Payer: COMMERCIAL

## 2024-08-01 ENCOUNTER — OFFICE VISIT (OUTPATIENT)
Dept: ORTHOPEDICS | Facility: CLINIC | Age: 74
End: 2024-08-01
Payer: COMMERCIAL

## 2024-08-01 VITALS — BODY MASS INDEX: 33.67 KG/M2 | HEIGHT: 59 IN | WEIGHT: 167 LBS

## 2024-08-01 DIAGNOSIS — S92.515A CLOSED NONDISPLACED FRACTURE OF PROXIMAL PHALANX OF LESSER TOE OF LEFT FOOT, INITIAL ENCOUNTER: Primary | ICD-10-CM

## 2024-08-01 DIAGNOSIS — S99.922A FOOT INJURY, LEFT, INITIAL ENCOUNTER: ICD-10-CM

## 2024-08-01 PROCEDURE — G2211 COMPLEX E/M VISIT ADD ON: HCPCS | Performed by: FAMILY MEDICINE

## 2024-08-01 PROCEDURE — 99214 OFFICE O/P EST MOD 30 MIN: CPT | Performed by: FAMILY MEDICINE

## 2024-08-01 PROCEDURE — 73630 X-RAY EXAM OF FOOT: CPT | Mod: TC | Performed by: RADIOLOGY

## 2024-08-01 NOTE — LETTER
8/1/2024      Carli Monreal  [address]  [place] [address]      Dear Colleague,    Thank you for referring your patient, Carli Monreal, to the [facility] [place]. Please see a copy of my visit note below.    ASSESSMENT & PLAN    Carli was seen today for follow up and follow up.    Diagnoses and all orders for this visit:    Foot injury, left, initial encounter  -     XR Foot Left G/E 3 Views        # Chronic Left Shoulder and Right Knee Pain, Left Foot Injury: Carli Monreal was seen today for follow-up on left shoulder, right knee. Symptoms had been going on 6+ mon for left shoulder and right knee. She has seen ortho surgery for both with plans for an  knee brace and suprascapular block. She injured her left foot again on a coffee table. Reviewed left shoulder MRI showing rotator cuff tears and right knee MRI showing meniscal tears and arthritis.  On exam she has tenderness to palpation over left 5th toe. Repeat x-rays showing possible repeat fracture likely cause of her pain.      Image Findings: Reviewed left shoulder and right knee MRIs with patient today  Treatment: Activities as tolerated, stiff soled shoe/boot over the next few weeks  Medications/Injections: Biofreeze as needed, defer for now  Follow-up: 3-4 weeks as needed.    -----    SUBJECTIVE:  Carli Monreal is a 74 year old female who is seen in follow-up for right knee and left shoulder pain. They were last seen 6/20/2024.  The patient is seen by themselves.  She is mentioning she might have fractured her foot again, today 8/1/24, she kicked the coffee table.     Since their last visit reports she is doing really well. Everything is all scheduled for those items.  Consult with Dr. Thibodeaux 7/1/24, discussed suprascapular nerve block. Consult with Dr. Aldana 7/29/24 for right knee pain, referred for physical therapy,  brace. They indicate that their current pain level is 8/10. They have tried  "rest/activity avoidance, other medications: Gabapentin, previous imaging (MRI 6/13/24 and xray 6/13/24),.  She will be fitted for a knee brace on 8/15/24. She is receiving an injection on 8/15/24 with [facility].       Patient's past medical, surgical, social, and family histories were reviewed today and no changes are noted.    REVIEW OF SYSTEMS:  Constitutional: NEGATIVE for fever, chills, change in weight  Skin: NEGATIVE for worrisome rashes, moles or lesions  GI/: NEGATIVE for bowel or bladder changes  Neuro: NEGATIVE for weakness, dizziness or paresthesias    OBJECTIVE:  Ht 1.511 m (4' 11.49\")   Wt 75.8 kg (167 lb)   BMI 33.18 kg/m     General: healthy, alert and in no distress  HEENT: no scleral icterus or conjunctival erythema  Skin: no suspicious lesions or rash. No jaundice.  CV: regular rhythm by palpation, no pedal edema  Resp: normal respiratory effort without conversational dyspnea   Psych: normal mood and affect  Gait: normal steady gait with appropriate coordination and balance  Neuro: normal light touch sensory exam of the extremities.    MSK:    LEFT FOOT  Inspection:    swelling over the 5th toe, mild redness   Palpation:    Tender about the 5th phalanx    No tenderness over the Lisfranc joint  Range of Motion:     Active toe flexion and extension  - intact    Active ankle range of motion - intact    Passive ankle range of motion - intact  Strength:    Intrinsic foot musculature strength - intact    Ankle strength - intact       Independent visualization of the below image:     Left foot x-rays, possible refracture of left 5th toe    Artur Vergara MD, [facility]    Disclaimer: This note consists of symbols derived from keyboarding, dictation and/or voice recognition software. As a result, there may be errors in the script that have gone undetected. Please consider this when interpreting information found in this chart.      Again, thank you for allowing me to " participate in the care of your patient.        Sincerely,        Artur Vergara MD

## 2024-08-01 NOTE — PATIENT INSTRUCTIONS
# Chronic Left Shoulder and Right Knee Pain, Left Foot Injury: Carli Monreal was seen today for follow-up on left shoulder, right knee. Symptoms had been going on 6+ mon for left shoulder and right knee. She has seen ortho surgery for both with plans for an  knee brace and suprascapular block. She injured her left foot again on a coffee table. Reviewed left shoulder MRI showing rotator cuff tears and right knee MRI showing meniscal tears and arthritis.  On exam she has tenderness to palpation over left 5th toe. Repeat x-rays showing possible repeat fracture likely cause of her pain.      Image Findings: Reviewed left shoulder and right knee MRIs with patient today  Treatment: Activities as tolerated, stiff soled shoe/boot over the next few weeks  Medications/Injections: Biofreeze as needed, defer for now  Follow-up: 3-4 weeks as needed.    Please call [phone]   Ask for my team if you have any questions or concerns    If you have not yet received the influenza vaccine but would like to get one, please call  [phone] or you can schedule via Bundle    It was great seeing you again today!    Artur Vergara MD, CAQSM

## 2024-08-01 NOTE — PROGRESS NOTES
ASSESSMENT & PLAN    Carli was seen today for follow up and follow up.    Diagnoses and all orders for this visit:    Foot injury, left, initial encounter  -     XR Foot Left G/E 3 Views        # Chronic Left Shoulder and Right Knee Pain, Left Foot Injury: Carli Monreal was seen today for follow-up on left shoulder, right knee. Symptoms had been going on 6+ mon for left shoulder and right knee. She has seen ortho surgery for both with plans for an  knee brace and suprascapular block. She injured her left foot again on a coffee table. Reviewed left shoulder MRI showing rotator cuff tears and right knee MRI showing meniscal tears and arthritis.  On exam she has tenderness to palpation over left 5th toe. Repeat x-rays showing possible repeat fracture likely cause of her pain.      Image Findings: Reviewed left shoulder and right knee MRIs with patient today  Treatment: Activities as tolerated, stiff soled shoe/boot over the next few weeks  Medications/Injections: Biofreeze as needed, defer for now  Follow-up: 3-4 weeks as needed.    -----    SUBJECTIVE:  Carli Monreal is a 74 year old female who is seen in follow-up for right knee and left shoulder pain. They were last seen 6/20/2024.  The patient is seen by themselves.  She is mentioning she might have fractured her foot again, today 8/1/24, she kicked the coffee table.     Since their last visit reports she is doing really well. Everything is all scheduled for those items.  Consult with Dr. Thibodeaux 7/1/24, discussed suprascapular nerve block. Consult with Dr. Aldana 7/29/24 for right knee pain, referred for physical therapy,  brace. They indicate that their current pain level is 8/10. They have tried rest/activity avoidance, other medications: Gabapentin, previous imaging (MRI 6/13/24 and xray 6/13/24),.  She will be fitted for a knee brace on 8/15/24. She is receiving an injection on 8/15/24 with Lyn.       Patient's past medical, surgical, social,  "and family histories were reviewed today and no changes are noted.    REVIEW OF SYSTEMS:  Constitutional: NEGATIVE for fever, chills, change in weight  Skin: NEGATIVE for worrisome rashes, moles or lesions  GI/: NEGATIVE for bowel or bladder changes  Neuro: NEGATIVE for weakness, dizziness or paresthesias    OBJECTIVE:  Ht 1.511 m (4' 11.49\")   Wt 75.8 kg (167 lb)   BMI 33.18 kg/m     General: healthy, alert and in no distress  HEENT: no scleral icterus or conjunctival erythema  Skin: no suspicious lesions or rash. No jaundice.  CV: regular rhythm by palpation, no pedal edema  Resp: normal respiratory effort without conversational dyspnea   Psych: normal mood and affect  Gait: normal steady gait with appropriate coordination and balance  Neuro: normal light touch sensory exam of the extremities.    MSK:    LEFT FOOT  Inspection:    swelling over the 5th toe, mild redness   Palpation:    Tender about the 5th phalanx    No tenderness over the Lisfranc joint  Range of Motion:     Active toe flexion and extension  - intact    Active ankle range of motion - intact    Passive ankle range of motion - intact  Strength:    Intrinsic foot musculature strength - intact    Ankle strength - intact       Independent visualization of the below image:     Left foot x-rays, possible refracture of left 5th toe    Artur Vergara MD, [facility]    Disclaimer: This note consists of symbols derived from keyboarding, dictation and/or voice recognition software. As a result, there may be errors in the script that have gone undetected. Please consider this when interpreting information found in this chart.    "

## 2024-08-06 DIAGNOSIS — K50.919 CROHN'S DISEASE WITH COMPLICATION, UNSPECIFIED GASTROINTESTINAL TRACT LOCATION (H): ICD-10-CM

## 2024-08-06 RX ORDER — FOLIC ACID 1 MG/1
TABLET ORAL
Qty: 90 TABLET | Refills: 0 | Status: SHIPPED | OUTPATIENT
Start: 2024-08-06

## 2024-08-13 ENCOUNTER — OFFICE VISIT (OUTPATIENT)
Dept: GASTROENTEROLOGY | Facility: CLINIC | Age: 74
End: 2024-08-13
Payer: COMMERCIAL

## 2024-08-13 ENCOUNTER — LAB (OUTPATIENT)
Dept: LAB | Facility: CLINIC | Age: 74
End: 2024-08-13
Payer: COMMERCIAL

## 2024-08-13 VITALS
WEIGHT: 160 LBS | OXYGEN SATURATION: 98 % | HEART RATE: 74 BPM | DIASTOLIC BLOOD PRESSURE: 65 MMHG | SYSTOLIC BLOOD PRESSURE: 134 MMHG | HEIGHT: 59 IN | BODY MASS INDEX: 32.25 KG/M2

## 2024-08-13 DIAGNOSIS — K50.019 CROHN'S DISEASE OF SMALL INTESTINE WITH COMPLICATION (H): ICD-10-CM

## 2024-08-13 DIAGNOSIS — R19.7 DIARRHEA, UNSPECIFIED TYPE: ICD-10-CM

## 2024-08-13 DIAGNOSIS — K63.8219 SMALL INTESTINAL BACTERIAL OVERGROWTH (SIBO): ICD-10-CM

## 2024-08-13 DIAGNOSIS — K50.019 CROHN'S DISEASE OF SMALL INTESTINE WITH COMPLICATION (H): Primary | ICD-10-CM

## 2024-08-13 PROCEDURE — 99000 SPECIMEN HANDLING OFFICE-LAB: CPT | Performed by: PATHOLOGY

## 2024-08-13 PROCEDURE — 99205 OFFICE O/P NEW HI 60 MIN: CPT | Performed by: INTERNAL MEDICINE

## 2024-08-13 PROCEDURE — 86364 TISS TRNSGLTMNASE EA IG CLAS: CPT | Performed by: INTERNAL MEDICINE

## 2024-08-13 PROCEDURE — 82784 ASSAY IGA/IGD/IGG/IGM EACH: CPT | Performed by: INTERNAL MEDICINE

## 2024-08-13 PROCEDURE — 36415 COLL VENOUS BLD VENIPUNCTURE: CPT | Performed by: PATHOLOGY

## 2024-08-13 ASSESSMENT — PAIN SCALES - GENERAL: PAINLEVEL: NO PAIN (0)

## 2024-08-13 NOTE — LETTER
8/13/2024      Carli Monreal  [address]  [place] [address]      Dear Colleague,    Thank you for referring your patient, Carli Monreal, to the [facility] [place]. Please see a copy of my visit note below.    CD NEW    PATIENT: Carli Monreal    MRN: [id number]    Date of Birth 1950    Tel: [phone] (home)     PCP: Iza Cgae     HPI: Ms. Monreal is a 74 year old female here for evaluation of Crohn's disease.     When she was 19, was told she had colitis in the setting of loose stools +/- blood. Diagnosed with CD in 2000/2001 when presented to the hospital with severe abd pain.      Was on Remicade around 2005, but then stopped due to an infusion reaction (sharp back and chest pains). Transitioned to Humira but then stopped due to inability to pay. In 2012 had an SBO and underwent surgical resection.  Then switched to Cimzia/MTX, got sick and then stopped this medication Summer 2020.  Was hospitalized in July 2020 for breathing issues. Has not been on any Crohn's maintenance therapy since then.        Last seen by me in 6/2021 and was reporting 5-10 BMs per day as well as arthralgia.     Has not retried cholestyramine since 2020.   Takes imodium (2 pills will work).   Typically, does not take imodium but when she does, takes up to 3-4 pills per day.   Endorses significant gas.     Noteworthy diet history- unremarkable    HBI  General well-being 2 = poor  Abdominal pain 0 = none  Number of liquid stools per day 10-15, + FI (post prandial)  Abdominal mass 0 = none  Current Complications Arthralgia    Constitutional symptoms:  Fever NO  Weight loss NO    Extraintestinal manifestations (anytime during the course of disease)  Possible arthralgias    Patric Classification  AGE AT DIAGNOSIS: A3 above 40 y  CURRENT DISEASE LOCATION: L1: ileal  L4 upper GI: NO  DISEASE BEHAVIOR (since disease onset): B2: stricturing  Perianal disease: NO    Total number of  IBD surgeries (except perianal): 1 (resection in )     Current IBD Medications:  none    Past IBD Medications:  Remicade  Humira  Cimzia +MTX    Past Medical History:   Diagnosis Date     Diabetes mellitus (H)      Right bundle branch block      Uncomplicated asthma         Past Surgical History:   Procedure Laterality Date     ARTHROPLASTY CARPOMETACARPAL (THUMB JOINT) Right 2023    Procedure: ARTHROPLASTY, CARPOMETACARPAL JOINT,RIGHT  THUMB;  Surgeon: Jasmeet Haque MD;  Location: [facility] OR     CATARACT IOL, RT/LT Bilateral     Valeriano Javier MD ([place], [place]).     COLONOSCOPY N/A 2021    Procedure: COLONOSCOPY;  Surgeon: Keesha Mancuso MD;  Location: [facility] OR     SMALL BOWEL RESECTION         Social History     Tobacco Use     Smoking status: Former     Current packs/day: 0.00     Types: Cigarettes     Quit date: 1991     Years since quittin.3     Passive exposure: Never     Smokeless tobacco: Never   Substance Use Topics     Alcohol use: Yes     Comment: Wine- couple x/ week       Family History   Problem Relation Age of Onset     Rheumatoid Arthritis Mother      Eye Surgery Father      Glaucoma Father      Amblyopia Cousin      Inflammatory Bowel Disease No family hx of      Colon Cancer No family hx of      Macular Degeneration No family hx of        Allergies   Allergen Reactions     Seasonal Allergies         Outpatient Encounter Medications as of 2024   Medication Sig Dispense Refill     alcohol swab prep pads Use to swab area of injection/venkata as directed. 100 each 3     amLODIPine (NORVASC) 2.5 MG tablet Take 1 tablet (2.5 mg) by mouth daily 90 tablet 1     atorvastatin (LIPITOR) 10 MG tablet Take 1 tablet (10 mg) by mouth daily Do not fill until contacted by patient 90 tablet 3     blood glucose (NO BRAND SPECIFIED) lancets standard Use to test blood sugar 1 times daily or as directed. 100 Lancet 11     blood glucose (ONETOUCH VERIO IQ) test strip USE TO TEST BLOOD  GLUCOSE ONCE DAILY 100 strip 0     budesonide-formoterol (SYMBICORT) 160-4.5 MCG/ACT Inhaler Inhale 2 puffs into the lungs 2 times daily 10.2 g 4     Cyanocobalamin 3000 MCG SUBL Place 1,500 mg under the tongue daily        famotidine (PEPCID) 40 MG tablet Take 1 tablet (40 mg) by mouth 2 times daily 180 tablet 1     folic acid (FOLVITE) 1 MG tablet TAKE ONE TABLET BY MOUTH ONE TIME DAILY 90 tablet 0     gabapentin (NEURONTIN) 100 MG capsule Take 1 capsule (100 mg) by mouth 3 times daily as needed for neuropathic pain 90 capsule 3     Lancets (ONETOUCH DELICA PLUS [id number]) MISC USE TO TEST BLOOD GLUCOSE ONCE DAILY 100 each 0     losartan (COZAAR) 100 MG tablet Take 1 tablet (100 mg) by mouth daily 90 tablet 1     metFORMIN (GLUCOPHAGE XR) 500 MG 24 hr tablet Take 2 tablets (1,000 mg) by mouth daily (with dinner) 180 tablet 1     multivitamin w/minerals (THERA-VIT-M) tablet Take 1 tablet by mouth daily       potassium chloride daniel ER (KLOR-CON M20) 20 MEQ CR tablet Take 1 tablet (20 mEq) by mouth daily 90 tablet 1     rifaximin (XIFAXAN) 550 MG TABS tablet Take 1 tablet (550 mg) by mouth 3 times daily for 14 days 42 tablet 0     scopolamine (TRANSDERM) 1 MG/3DAYS 72 hr patch Place 1 patch onto the skin every 72 hours 12 patch 1     Vitamin D, Cholecalciferol, 25 MCG (1000 UT) TABS Take 2 tablets by mouth daily       No facility-administered encounter medications on file as of 8/13/2024.      NSAID  YES 2 x/month    Review of Systems  Complete 10 System ROS performed. All are negative except as documented below, in the HPI, or in patient questionnaire from today's visit.    1) Constitutional: No fevers, chills, night sweats or malaise, weight loss or gain  2) Skin: No rash  3) Pulmonary: No wheeze, SOB, cough, sputum or hemoptysis  4) Cardiovascular: No Chest pain or palpitations  5) Genitourinary: No blood in urine or dysuria  6) Endocrine: No increased sweating, hunger, thirst or thyroid problems  7) Hematologic:  "No bruising and easy bleeding  8) Musculoskeletal: no new pain in joints or limitation in ROM  9) Neurologic: No dizziness, paresthesias or weakness or falls  10) Psychiatric:  not depressed/anxious, no sleep problems    PHYSICAL EXAM  Vitals: /65   Pulse 74   Ht 1.511 m (4' 11.49\")   Wt 72.6 kg (160 lb)   SpO2 98%   BMI 31.79 kg/m      No Pain (0)     General appearance  Healthy appearing adult, in no acute distress     Eyes  Sclera anicteric  Pupils round and reactive to light     Ears, nose, mouth and throat  No obvious external lesions of ears and nose  Hearing intact     Neck  Symmetric  No obvious external lesions     Respiratory  Normal respiration, no use of accessory muscles      MSK  Gait normal     Skin  No rashes or jaundice      Psychiatric  Oriented to person, place and time  Appropriate mood and affect.       DATA:  Reviewed in detail past documentation, medications and prior workup available in electronic health records or through outside records.    PERTINENT STUDIES:    6/2021 Fecal calprotectin 50.2     Most recent CBC:  WBC   Date Value Ref Range Status   05/26/2021 6.2 4.0 - 11.0 10e9/L Final     WBC Count   Date Value Ref Range Status   01/03/2023 5.7 4.0 - 11.0 10e3/uL Final   ]  Hemoglobin   Date Value Ref Range Status   01/03/2023 10.8 (L) 11.7 - 15.7 g/dL Final   05/26/2021 11.7 11.7 - 15.7 g/dL Final   ]   Platelet Count   Date Value Ref Range Status   01/03/2023 181 150 - 450 10e3/uL Final   05/26/2021 191 150 - 450 10e9/L Final       Most recent coag:  No results found for: \"INR\"    Most recent hepatic panel:  AST   Date Value Ref Range Status   05/26/2021 35 0 - 45 U/L Final     ALT   Date Value Ref Range Status   05/26/2021 38 0 - 50 U/L Final     No results found for: \"BILICONJ\"   Bilirubin Total   Date Value Ref Range Status   05/26/2021 0.3 0.2 - 1.3 mg/dL Final     Albumin   Date Value Ref Range Status   05/26/2021 3.6 3.4 - 5.0 g/dL Final     Alkaline Phosphatase   Date " Value Ref Range Status   05/26/2021 98 40 - 150 U/L Final       Most recent creatinine:  Creatinine   Date Value Ref Range Status   05/31/2024 0.86 0.51 - 0.95 mg/dL Final   05/26/2021 0.65 0.52 - 1.04 mg/dL Final     Endoscopy:   9/2021:  The perianal and digital rectal examinations were normal.        The Simple Endoscopic Score for Crohn's Disease was determined based on        the endoscopic appearance of the mucosa in the following segments:        - Ileum: Findings include no ulcers present, no ulcerated surfaces, no        affected surfaces and no narrowings. Segment score: 0.        - Right Colon: Findings include no ulcers present, no ulcerated        surfaces, no affected surfaces and no narrowings. Segment score: 0.        - Transverse Colon: Findings include no ulcers present, no ulcerated        surfaces, no affected surfaces and no narrowings. Segment score: 0.        - Left Colon: Findings include no ulcers present, no ulcerated surfaces,        no affected surfaces and no narrowings. Segment score: 0.        - Rectum: Findings include no ulcers present, no ulcerated surfaces, no        affected surfaces and no narrowings. Segment score: 0.        - Total SES-CD aggregate score: 0.        Non-bleeding internal hemorrhoids were found during retroflexion. The        hemorrhoids were mild.        An inverted diverticulum (about 1 cm) was seen in the sigmoid colon.                                           Imaging:  MRE 7/2021:  IMPRESSION: Short segment of fibrosis involving the terminal ileum,  spanning approximately 2 cm. No upstream small bowel dilation. No  evidence of active inflammation at this area or elsewhere throughout  the small bowel or colon.     I have personally reviewed the examination and initial interpretation  and I agree with the findings.      IMPRESSION:    Ms. Monreal is a 74 year old here with Crohn's ileitis, likely stricturing, requiring resection in 2012, previously on Remicade  (infusion reaction), Humira (stopped for financial reasons), Cimzia and methotrexate (stopped due to hospitalization for breathing issues, but unrelated to Crohn's or treatment), currently on no maintenance therapy. Colonoscopy in 9/2021 showed no disease.    Carli endorses significant, post-prandial diarrhea for at least 8 months, although I believe this has been going on for years. DDx includes infection, celiac disease, SIBO, bile acid diarrhea, microscopic colitis, active Crohn's disease and IBS-D.     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 If not covered by insurance, will try colestipol. Will need to make sure this is appropriately spaced from her other medications (with [facility] pharmacy's help)   ---OK to increase imodium up to 16 mg daily  --Referral for colonoscopy with biopsies to rule out microscopic colitis and active Crohn's disease  --Avoid NSAIDs     RTC in 4 months with me    Keesha Mancuso MD  Associate Professor of Medicine  Division of Gastroenterology, Hepatology and Nutrition  [facility]    August 13, 2024    60 minutes spent on the date of the encounter performing chart review, history and exam, documentation and further activities as noted above.          Again, thank you for allowing me to participate in the care of your patient.        Sincerely,        Keesha Mancuso MD

## 2024-08-13 NOTE — PROGRESS NOTES
CD NEW    PATIENT: Carli Monreal    MRN: [id number]    Date of Birth 1950    Tel: [phone] (home)     PCP: Iza Cage     HPI: Ms. Monreal is a 74 year old female here for evaluation of Crohn's disease.     When she was 19, was told she had colitis in the setting of loose stools +/- blood. Diagnosed with CD in 2000/2001 when presented to the hospital with severe abd pain.      Was on Remicade around 2005, but then stopped due to an infusion reaction (sharp back and chest pains). Transitioned to Humira but then stopped due to inability to pay. In 2012 had an SBO and underwent surgical resection.  Then switched to Cimzia/MTX, got sick and then stopped this medication Summer 2020.  Was hospitalized in July 2020 for breathing issues. Has not been on any Crohn's maintenance therapy since then.        Last seen by me in 6/2021 and was reporting 5-10 BMs per day as well as arthralgia.     Has not retried cholestyramine since 2020.   Takes imodium (2 pills will work).   Typically, does not take imodium but when she does, takes up to 3-4 pills per day.   Endorses significant gas.     Noteworthy diet history- unremarkable    HBI  General well-being 2 = poor  Abdominal pain 0 = none  Number of liquid stools per day 10-15, + FI (post prandial)  Abdominal mass 0 = none  Current Complications Arthralgia    Constitutional symptoms:  Fever NO  Weight loss NO    Extraintestinal manifestations (anytime during the course of disease)  Possible arthralgias    [place] Classification  AGE AT DIAGNOSIS: A3 above 40 y  CURRENT DISEASE LOCATION: L1: ileal  L4 upper GI: NO  DISEASE BEHAVIOR (since disease onset): B2: stricturing  Perianal disease: NO    Total number of IBD surgeries (except perianal): 1 (resection in 2012)     Current IBD Medications:  none    Past IBD Medications:  Remicade  Humira  Cimzia +MTX    Past Medical History:   Diagnosis Date    Diabetes mellitus (H)     Right bundle branch block     Uncomplicated  asthma         Past Surgical History:   Procedure Laterality Date    ARTHROPLASTY CARPOMETACARPAL (THUMB JOINT) Right 2023    Procedure: ARTHROPLASTY, CARPOMETACARPAL JOINT,RIGHT  THUMB;  Surgeon: Jasmeet Haque MD;  Location: [facility] OR    CATARACT IOL, RT/LT Bilateral 2017    Valeriano Javier MD ([place], [place]).    COLONOSCOPY N/A 2021    Procedure: COLONOSCOPY;  Surgeon: Keesha Mancuso MD;  Location: [facility] OR    SMALL BOWEL RESECTION         Social History     Tobacco Use    Smoking status: Former     Current packs/day: 0.00     Types: Cigarettes     Quit date: 1991     Years since quittin.3     Passive exposure: Never    Smokeless tobacco: Never   Substance Use Topics    Alcohol use: Yes     Comment: Wine- couple x/ week       Family History   Problem Relation Age of Onset    Rheumatoid Arthritis Mother     Eye Surgery Father     Glaucoma Father     Amblyopia Cousin     Inflammatory Bowel Disease No family hx of     Colon Cancer No family hx of     Macular Degeneration No family hx of        Allergies   Allergen Reactions    Seasonal Allergies         Outpatient Encounter Medications as of 2024   Medication Sig Dispense Refill    alcohol swab prep pads Use to swab area of injection/venkata as directed. 100 each 3    amLODIPine (NORVASC) 2.5 MG tablet Take 1 tablet (2.5 mg) by mouth daily 90 tablet 1    atorvastatin (LIPITOR) 10 MG tablet Take 1 tablet (10 mg) by mouth daily Do not fill until contacted by patient 90 tablet 3    blood glucose (NO BRAND SPECIFIED) lancets standard Use to test blood sugar 1 times daily or as directed. 100 Lancet 11    blood glucose (ONETOUCH VERIO IQ) test strip USE TO TEST BLOOD GLUCOSE ONCE DAILY 100 strip 0    budesonide-formoterol (SYMBICORT) 160-4.5 MCG/ACT Inhaler Inhale 2 puffs into the lungs 2 times daily 10.2 g 4    Cyanocobalamin 3000 MCG SUBL Place 1,500 mg under the tongue daily       famotidine (PEPCID) 40 MG tablet Take 1 tablet (40 mg) by  "mouth 2 times daily 180 tablet 1    folic acid (FOLVITE) 1 MG tablet TAKE ONE TABLET BY MOUTH ONE TIME DAILY 90 tablet 0    gabapentin (NEURONTIN) 100 MG capsule Take 1 capsule (100 mg) by mouth 3 times daily as needed for neuropathic pain 90 capsule 3    Lancets (ONETOUCH DELICA PLUS GPTDRW18R) MISC USE TO TEST BLOOD GLUCOSE ONCE DAILY 100 each 0    losartan (COZAAR) 100 MG tablet Take 1 tablet (100 mg) by mouth daily 90 tablet 1    metFORMIN (GLUCOPHAGE XR) 500 MG 24 hr tablet Take 2 tablets (1,000 mg) by mouth daily (with dinner) 180 tablet 1    multivitamin w/minerals (THERA-VIT-M) tablet Take 1 tablet by mouth daily      potassium chloride daniel ER (KLOR-CON M20) 20 MEQ CR tablet Take 1 tablet (20 mEq) by mouth daily 90 tablet 1    rifaximin (XIFAXAN) 550 MG TABS tablet Take 1 tablet (550 mg) by mouth 3 times daily for 14 days 42 tablet 0    scopolamine (TRANSDERM) 1 MG/3DAYS 72 hr patch Place 1 patch onto the skin every 72 hours 12 patch 1    Vitamin D, Cholecalciferol, 25 MCG (1000 UT) TABS Take 2 tablets by mouth daily       No facility-administered encounter medications on file as of 8/13/2024.      NSAID  YES 2 x/month    Review of Systems  Complete 10 System ROS performed. All are negative except as documented below, in the HPI, or in patient questionnaire from today's visit.    1) Constitutional: No fevers, chills, night sweats or malaise, weight loss or gain  2) Skin: No rash  3) Pulmonary: No wheeze, SOB, cough, sputum or hemoptysis  4) Cardiovascular: No Chest pain or palpitations  5) Genitourinary: No blood in urine or dysuria  6) Endocrine: No increased sweating, hunger, thirst or thyroid problems  7) Hematologic: No bruising and easy bleeding  8) Musculoskeletal: no new pain in joints or limitation in ROM  9) Neurologic: No dizziness, paresthesias or weakness or falls  10) Psychiatric:  not depressed/anxious, no sleep problems    PHYSICAL EXAM  Vitals: /65   Pulse 74   Ht 1.511 m (4' 11.49\") " "  Wt 72.6 kg (160 lb)   SpO2 98%   BMI 31.79 kg/m      No Pain (0)     General appearance  Healthy appearing adult, in no acute distress     Eyes  Sclera anicteric  Pupils round and reactive to light     Ears, nose, mouth and throat  No obvious external lesions of ears and nose  Hearing intact     Neck  Symmetric  No obvious external lesions     Respiratory  Normal respiration, no use of accessory muscles      MSK  Gait normal     Skin  No rashes or jaundice      Psychiatric  Oriented to person, place and time  Appropriate mood and affect.       DATA:  Reviewed in detail past documentation, medications and prior workup available in electronic health records or through outside records.    PERTINENT STUDIES:    6/2021 Fecal calprotectin 50.2     Most recent CBC:  WBC   Date Value Ref Range Status   05/26/2021 6.2 4.0 - 11.0 10e9/L Final     WBC Count   Date Value Ref Range Status   01/03/2023 5.7 4.0 - 11.0 10e3/uL Final   ]  Hemoglobin   Date Value Ref Range Status   01/03/2023 10.8 (L) 11.7 - 15.7 g/dL Final   05/26/2021 11.7 11.7 - 15.7 g/dL Final   ]   Platelet Count   Date Value Ref Range Status   01/03/2023 181 150 - 450 10e3/uL Final   05/26/2021 191 150 - 450 10e9/L Final       Most recent coag:  No results found for: \"INR\"    Most recent hepatic panel:  AST   Date Value Ref Range Status   05/26/2021 35 0 - 45 U/L Final     ALT   Date Value Ref Range Status   05/26/2021 38 0 - 50 U/L Final     No results found for: \"BILICONJ\"   Bilirubin Total   Date Value Ref Range Status   05/26/2021 0.3 0.2 - 1.3 mg/dL Final     Albumin   Date Value Ref Range Status   05/26/2021 3.6 3.4 - 5.0 g/dL Final     Alkaline Phosphatase   Date Value Ref Range Status   05/26/2021 98 40 - 150 U/L Final       Most recent creatinine:  Creatinine   Date Value Ref Range Status   05/31/2024 0.86 0.51 - 0.95 mg/dL Final   05/26/2021 0.65 0.52 - 1.04 mg/dL Final     Endoscopy:   9/2021:  The perianal and digital rectal examinations were " normal.        The Simple Endoscopic Score for Crohn's Disease was determined based on        the endoscopic appearance of the mucosa in the following segments:        - Ileum: Findings include no ulcers present, no ulcerated surfaces, no        affected surfaces and no narrowings. Segment score: 0.        - Right Colon: Findings include no ulcers present, no ulcerated        surfaces, no affected surfaces and no narrowings. Segment score: 0.        - Transverse Colon: Findings include no ulcers present, no ulcerated        surfaces, no affected surfaces and no narrowings. Segment score: 0.        - Left Colon: Findings include no ulcers present, no ulcerated surfaces,        no affected surfaces and no narrowings. Segment score: 0.        - Rectum: Findings include no ulcers present, no ulcerated surfaces, no        affected surfaces and no narrowings. Segment score: 0.        - Total SES-CD aggregate score: 0.        Non-bleeding internal hemorrhoids were found during retroflexion. The        hemorrhoids were mild.        An inverted diverticulum (about 1 cm) was seen in the sigmoid colon.                                           Imaging:  MRE 7/2021:  IMPRESSION: Short segment of fibrosis involving the terminal ileum,  spanning approximately 2 cm. No upstream small bowel dilation. No  evidence of active inflammation at this area or elsewhere throughout  the small bowel or colon.     I have personally reviewed the examination and initial interpretation  and I agree with the findings.      IMPRESSION:    Ms. Monreal is a 74 year old here with Crohn's ileitis, likely stricturing, requiring resection in 2012, previously on Remicade (infusion reaction), Humira (stopped for financial reasons), Cimzia and methotrexate (stopped due to hospitalization for breathing issues, but unrelated to Crohn's or treatment), currently on no maintenance therapy. Colonoscopy in 9/2021 showed no disease.    Carli endorses significant,  post-prandial diarrhea for at least 8 months, although I believe this has been going on for years. DDx includes infection, celiac disease, SIBO, bile acid diarrhea, microscopic colitis, active Crohn's disease and IBS-D.     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 If not covered by insurance, will try colestipol. Will need to make sure this is appropriately spaced from her other medications (with [facility] pharmacy's help)   ---OK to increase imodium up to 16 mg daily  --Referral for colonoscopy with biopsies to rule out microscopic colitis and active Crohn's disease  --Avoid NSAIDs     RTC in 4 months with me    Keesha Mancuso MD  Associate Professor of Medicine  Division of Gastroenterology, Hepatology and Nutrition  [facility]    August 13, 2024    60 minutes spent on the date of the encounter performing chart review, history and exam, documentation and further activities as noted above.

## 2024-08-13 NOTE — PATIENT INSTRUCTIONS
PLAN  --Rule out celiac disease with blood work  --Obtain stool studies to rule out infection + check fecal calprotectin    --Trial of rifaximin for SIBO. If not covered by insurance, will try colestipol. Will need to make sure this is appropriately spaced from her other medications (with [facility] pharmacy's help)   ---OK to increase imodium up to 16 mg daily  --Referral for colonoscopy with biopsies to rule out microscopic colitis and active Crohn's disease  --Avoid NSAIDs     If you have any questions, please don't hesitate to contact us via [employer] or call Deja Whitaker, the Gastroenterology nurse at [phone].

## 2024-08-14 LAB
IGA SERPL-MCNC: 281 MG/DL (ref 84–499)
TTG IGA SER-ACNC: 0.6 U/ML
TTG IGG SER-ACNC: <0.6 U/ML

## 2024-08-15 ENCOUNTER — HOSPITAL ENCOUNTER (OUTPATIENT)
Facility: AMBULATORY SURGERY CENTER | Age: 74
Discharge: HOME | End: 2024-08-15
Attending: ANESTHESIOLOGY | Admitting: ANESTHESIOLOGY
Payer: COMMERCIAL

## 2024-08-15 ENCOUNTER — ANCILLARY PROCEDURE (OUTPATIENT)
Dept: RADIOLOGY | Facility: AMBULATORY SURGERY CENTER | Age: 74
End: 2024-08-15
Attending: ANESTHESIOLOGY
Payer: COMMERCIAL

## 2024-08-15 VITALS
DIASTOLIC BLOOD PRESSURE: 66 MMHG | OXYGEN SATURATION: 98 % | RESPIRATION RATE: 16 BRPM | SYSTOLIC BLOOD PRESSURE: 153 MMHG

## 2024-08-15 DIAGNOSIS — G89.29 CHRONIC LEFT SHOULDER PAIN: ICD-10-CM

## 2024-08-15 DIAGNOSIS — M25.512 CHRONIC LEFT SHOULDER PAIN: ICD-10-CM

## 2024-08-15 DIAGNOSIS — R52 PAIN: ICD-10-CM

## 2024-08-15 LAB

## 2024-08-15 PROCEDURE — 64418 NJX AA&/STRD SPRSCAP NRV: CPT | Mod: LT

## 2024-08-15 PROCEDURE — 83993 ASSAY FOR CALPROTECTIN FECAL: CPT | Performed by: INTERNAL MEDICINE

## 2024-08-15 PROCEDURE — 82962 GLUCOSE BLOOD TEST: CPT | Mod: GZ | Performed by: PATHOLOGY

## 2024-08-15 PROCEDURE — 99000 SPECIMEN HANDLING OFFICE-LAB: CPT | Performed by: PATHOLOGY

## 2024-08-15 PROCEDURE — 87507 IADNA-DNA/RNA PROBE TQ 12-25: CPT | Performed by: INTERNAL MEDICINE

## 2024-08-15 RX ORDER — BUPIVACAINE HYDROCHLORIDE 2.5 MG/ML
INJECTION, SOLUTION EPIDURAL; INFILTRATION; INTRACAUDAL DAILY PRN
Status: DISCONTINUED | OUTPATIENT
Start: 2024-08-15 | End: 2024-08-15 | Stop reason: HOSPADM

## 2024-08-15 RX ORDER — DIAZEPAM 5 MG
5-10 TABLET ORAL
Status: DISCONTINUED | OUTPATIENT
Start: 2024-08-15 | End: 2024-08-16 | Stop reason: HOSPADM

## 2024-08-15 RX ORDER — TRIAMCINOLONE ACETONIDE 40 MG/ML
INJECTION, SUSPENSION INTRA-ARTICULAR; INTRAMUSCULAR DAILY PRN
Status: DISCONTINUED | OUTPATIENT
Start: 2024-08-15 | End: 2024-08-15 | Stop reason: HOSPADM

## 2024-08-15 RX ORDER — LIDOCAINE HYDROCHLORIDE 10 MG/ML
INJECTION, SOLUTION EPIDURAL; INFILTRATION; INTRACAUDAL; PERINEURAL DAILY PRN
Status: DISCONTINUED | OUTPATIENT
Start: 2024-08-15 | End: 2024-08-15 | Stop reason: HOSPADM

## 2024-08-15 NOTE — OP NOTE
Patient: Carli Monreal Age: 74 year old   MRN: [id number] Attending: Dr. Nicholson     Date of Visit: August 15, 2024      PAIN MEDICINE CLINIC PROCEDURE NOTE    ATTENDING CLINICIAN:    Ana M Nicholson MD    ASSISTANT CLINICIAN:  Beatrice Marks MD    PREPROCEDURE DIAGNOSES:  1.  Left shoulder pain  2      PROCEDURE(S) PERFORMED:  1.  Left suprascapular nerve injections  2.  Ultrasound guidance for the above-named procedure(s)      ANESTHESIA:  Local.    BLOOD LOSS:  Minimal.    DRAINS AND SPECIMENS:  None.    COMPLICATIONS:  None.    INDICATIONS:  Carli Monreal is a 74 year old female with a history of  chronic shoulder pain secondary to degenerative arthritis .  The patient stated that the patient was in their usual state of health and denied recent anticoagulant use or recent infections.  Therefore, the plan is ti perform above mentioned procedure.     Procedure Details:    The patient was met in the procedure room, where the patient was identified by name, medical record number and date of birth.  All of the patient s last minute questions were answered. Written informed consent was obtained and saved in the electronic medical record, after the risks, benefits, and alternatives were discussed with the patient.      A formal time-out procedure was performed, as per protocol, including patient name, title of procedure, and site of procedure, and all in the room concurred.  Routine monitors were applied.      The patient was placed in the sitting position on the procedure room table.Routine monitors were placed.  Vital signs were stable.    A chlorhexidine prep was completed followed by sterile draping per standard procedure.  Patient's consent was obtained.  The left side Suprascapular area was prepped using Chloraprep and the area was sterilely draped.  The ultrasound probe was draped and the U/S was used to identify and confirm the depth.  Fascial planes were easily visible as well as subcutaneous tissue and the  supraspinatus fossae with the scapula as the deep border.  A 21G 100 mm  pajunk needle was used in an in-plane fashion and positioned deep to the hyperechoic suprascapular ligament taking care to avoid the visibly pulsatile suprascapular artery.   ~1ml of the steroid local anesthetic was injected to confirm correct placement of the needle tip prior to injection of the remaining 5 mls of 0.25% bupivacaine 4.5 ml and 20 mg( 0.5 ml) of triamcinolone  into the area, with several aspirations being negative for blood.  A screen shot,was taken of nicely extravasating fluid into the the expected location of the supraspinatus nerve.  The needle was removed, and a band-aid was applied.     Light pressure was held at the puncture site(s) to prevent ecchymosis and oozing.  The patient's skin was cleansed, and hemostasis was confirmed.  Band-aids were applied to the needle injection site(s).      Condition:    The patient remained awake and alert throughout the procedure.  The patient tolerated the procedure well and was monitored for approximately 15 minutes afterward in the post procedure area.  There were no immediate post procedure complications noted.  The patient was then discharged to home as per protocol.      Pre-procedure pain score:10/10  Post-procedure pain score: 3/10    Walk your fingers back down to the starting position.  Repeat at least 2 to 4 times. Try to reach higher each time.

## 2024-08-15 NOTE — DISCHARGE INSTRUCTIONS
PAIN INJECTION HOME CARE INSTRUCTIONS  Activity  -Rest today  -Do not work today  -Resume normal activity tomorrow  -DO NOT shower for 24 hours  -DO NOT remove bandaid for 24 hours    Pain  -You may experience soreness at the injection site for one or two days  -You may use an ice pack for 20 minutes every 2 hours for the first 24 hours  -You may use a heating pad after the first 24 hours  -You may use Tylenol (acetaminophen) every 4 hours or other pain medicines as directed by your physician    You may experience numbness radiating into your legs or arms (depending on the procedure location). This numbness may last several hours. Until sensation returns to normal; please use caution in walking, climbing stairs, and stepping out of your vehicle, etc.    Common side effects of steroids:  Not everyone will experience corticosteroid side effects. If side effects are experienced, they will gradually subside in the 7-10 day period following an injection. Most common side effects include:  -Flushed face and/or chest  -Feeling of warmth, particularly in the face but could be an overall feeling of warmth  -Increased blood sugar in diabetic patients  -Menstrual irregularities my occur. If taking hormone-based birth control an alternate method of birth control is recommended  -Sleep disturbances and/or mood swings are possible  -Leg cramps    Please contact us if you have:  -Severe pain  -Fever more than 101.5 degrees Fahrenheit  -Signs of infection at the injection site (redness, swelling, or drainage)    FOR PAIN CENTER PATIENTS:  If you have questions, please contact the Pain Clinic at [phone] Option #1 between the hours of 7:00 am and 3:00 pm Monday through Friday. After office hours you can contact the on call provider by dialing [phone]. If you need immediate attention, we recommend that you go to a hospital emergency room or dial [phone].    FOR PM&R PATIENTS:  For patients seen by the PM and R service, please  call [phone]. (Monday through Friday 8a-5p.  After business hours and weekends call [phone] and ask for the PM and R doctor on call). If you need to fax a pain diary to PM&R the fax number is [phone]. If you are unable to fax, uploading to [employer] is encouraged, then send to provider. If you have procedure scheduling questions please call [phone] Option #2.

## 2024-08-16 ENCOUNTER — MYC MEDICAL ADVICE (OUTPATIENT)
Dept: GASTROENTEROLOGY | Facility: CLINIC | Age: 74
End: 2024-08-16
Payer: COMMERCIAL

## 2024-08-16 DIAGNOSIS — R19.7 DIARRHEA, UNSPECIFIED TYPE: ICD-10-CM

## 2024-08-16 DIAGNOSIS — K63.8219 SMALL INTESTINAL BACTERIAL OVERGROWTH (SIBO): ICD-10-CM

## 2024-08-16 DIAGNOSIS — K50.019 CROHN'S DISEASE OF SMALL INTESTINE WITH COMPLICATION (H): Primary | ICD-10-CM

## 2024-08-16 LAB — CALPROTECTIN STL-MCNT: 24.3 MG/KG (ref 0–49.9)

## 2024-08-27 RX ORDER — MONTELUKAST SODIUM 4 MG/1
TABLET, CHEWABLE ORAL
Qty: 60 TABLET | Refills: 2 | Status: SHIPPED | OUTPATIENT
Start: 2024-08-27

## 2024-08-27 NOTE — TELEPHONE ENCOUNTER
"Placed call to [facility] pharmacy to follow up on Rifaximin. Updated that Rifaximin not covered by patients insurance and would require a prior authorization. Per Dr. Mancuso \"If not covered by insurance, will try colestipol.\" Order placed. Weblancet message sent to patient. MTM referral placed.           "

## 2024-08-31 DIAGNOSIS — E11.9 TYPE 2 DIABETES MELLITUS WITHOUT COMPLICATION, WITHOUT LONG-TERM CURRENT USE OF INSULIN (H): ICD-10-CM

## 2024-09-03 RX ORDER — BLOOD SUGAR DIAGNOSTIC
STRIP MISCELLANEOUS
Qty: 100 STRIP | Refills: 0 | Status: SHIPPED | OUTPATIENT
Start: 2024-09-03

## 2024-09-11 ENCOUNTER — VIRTUAL VISIT (OUTPATIENT)
Dept: GASTROENTEROLOGY | Facility: CLINIC | Age: 74
End: 2024-09-11
Attending: INTERNAL MEDICINE
Payer: COMMERCIAL

## 2024-09-11 VITALS — HEIGHT: 59 IN | WEIGHT: 160 LBS | BODY MASS INDEX: 32.25 KG/M2

## 2024-09-11 DIAGNOSIS — R19.7 DIARRHEA, UNSPECIFIED TYPE: ICD-10-CM

## 2024-09-11 DIAGNOSIS — Z87.898 H/O MOTION SICKNESS: ICD-10-CM

## 2024-09-11 DIAGNOSIS — I10 ESSENTIAL HYPERTENSION: ICD-10-CM

## 2024-09-11 DIAGNOSIS — E11.9 TYPE 2 DIABETES MELLITUS WITHOUT COMPLICATION, WITHOUT LONG-TERM CURRENT USE OF INSULIN (H): ICD-10-CM

## 2024-09-11 DIAGNOSIS — E78.5 HYPERLIPIDEMIA LDL GOAL <100: ICD-10-CM

## 2024-09-11 DIAGNOSIS — M19.031 ARTHRITIS OF SCAPHOID-TRAPEZIUM-TRAPEZOID JOINT OF RIGHT HAND: ICD-10-CM

## 2024-09-11 DIAGNOSIS — J45.40 MODERATE PERSISTENT ASTHMA, UNSPECIFIED WHETHER COMPLICATED: ICD-10-CM

## 2024-09-11 DIAGNOSIS — K50.019 CROHN'S DISEASE OF SMALL INTESTINE WITH COMPLICATION (H): Primary | ICD-10-CM

## 2024-09-11 DIAGNOSIS — E87.6 HYPOKALEMIA: ICD-10-CM

## 2024-09-11 RX ORDER — MULTIVIT-MIN/IRON/FOLIC ACID/K 18-600-40
500 CAPSULE ORAL DAILY
COMMUNITY

## 2024-09-11 RX ORDER — MAGNESIUM 200 MG
1000 TABLET ORAL DAILY
COMMUNITY

## 2024-09-11 RX ORDER — LOPERAMIDE HCL 2 MG
2 CAPSULE ORAL 4 TIMES DAILY PRN
COMMUNITY

## 2024-09-11 ASSESSMENT — PAIN SCALES - GENERAL: PAINLEVEL: NO PAIN (0)

## 2024-09-11 NOTE — NURSING NOTE
Current patient location: [facility] [address]   [place] [address]    Is the patient currently in the state of [place]? YES    Visit mode:VIDEO    If the visit is dropped, the patient can be reconnected by: VIDEO VISIT: Send to e-mail at: [email or web address]    Will anyone else be joining the visit? NO  (If patient encounters technical issues they should call [phone])    How would you like to obtain your AVS? MyChart    Are changes needed to the allergy or medication list? No    Are refills needed on medications prescribed by this physician? NO    Rooming Documentation:  Questionnaire(s) not done per department protocol      Reason for visit: Consult    Rae CROCKETT

## 2024-09-11 NOTE — Clinical Note
9/11/2024      Carli Monreal  [address]  [place] [address]      Dear Colleague,    Thank you for referring your patient, Carli Monreal, to the [facility]. Please see a copy of my visit note below.    Medication Therapy Management (MTM) Encounter    ASSESSMENT:                            Medication Adherence/Access: {adherencechoices:826212}    ***:  ***      PLAN:                            Try taking colestipol 1 g twice daily to see if this improves your diarrhea even further. If you are still forgetting the second dose, today to try 2 g at once to see if this makes a difference.    Reminder to schedule your colonoscopy when able [phone].    Follow-up: as needed     SUBJECTIVE/OBJECTIVE:                          Carli Monreal is a 74 year old female seen for an initial visit. She was referred to me from Dr. Mancuso. {mtmvisitdetails:159876}   Her  is listening as well, but is off camera.    Reason for visit: colestipol check-in    Allergies/ADRs: Reviewed in chart  Tobacco: She reports that she quit smoking about 33 years ago. Her smoking use included cigarettes. She has never been exposed to tobacco smoke. She has never used smokeless tobacco.  Alcohol: {ALCOHOL CONSUMPTION HX:187304}      Medication Adherence/Access: {fumedadherence:421449}    Crohn's Disease/Diarrhea:   Colestipol 1 g daily (written for twice daily)  B-12 1,000 mcg daily   Folic acid 1 mg daily  Famotidine 40 mg twice daily   MVI daily (one-a-day womens 50+)  Vitamin D 50 mcg daily  Loperamide 2 mg daily as needed     Notes it has helped. Notes that she hasn't had to use loperamide much as recently.        :  Amlodipine 2.5 mg daily  Losartan 100 mg daily     BP Readings from Last 3 Encounters:   08/15/24 (!) 153/66   08/13/24 134/65   07/13/24 (!) 144/59     :  Atorvastatin 10 mg daily    Recent Labs   Lab Test 12/07/23  1341 11/07/22  1259   CHOL 104 130   HDL 59 76   LDL 22 26  "  TRIG 117 139     :  Symbicort 2 puffs twice daily     Does rinse mouth. No history     The ASCVD Risk score (Jose L DK, et al., 2019) failed to calculate for the following reasons:    The systolic blood pressure is missing    The valid total cholesterol range is 130 to 320 mg/dL    :  Gabapentin 100 mg three times daily as needed     She is having surgery for her rotator cuff. She did have a nerve block    :  Metformin    Notes 107-112 mg/dL the past couple of days.    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  is her favorite as the ocean cruised. Notes she is     Today's Vitals: Ht 1.499 m (4' 11\")   Wt 72.6 kg (160 lb)   BMI 32.32 kg/m    ----------------  {GRICELDA?:797558}    I spent 28 minutes with this patient today. { :[id number]}. A copy of the visit note was provided to the patient's provider(s).    A summary of these recommendations {GIVEN/NOT GIVEN:745713}.    Ting Marx PharmD, BCACP  MTM Pharmacist   [facility]   Phone: [phone]      Telemedicine Visit Details  Type of service:  Video Conference via [employer]  Start Time: 10:33 AM  End Time: 11:01 AM     Medication Therapy Recommendations  No medication therapy recommendations to display         Again, thank you for allowing me to participate in the care of your patient.        Sincerely,        Ting Marx [facility]  "

## 2024-09-11 NOTE — PROGRESS NOTES
Medication Therapy Management (MTM) Encounter    ASSESSMENT:                            Medication Adherence/Access: No issues identified    Crohn's Disease/Diarrhea: Improving. Notes the colestipol has already helped. Discussed that she would likely benefit from continuing to adjust dose for improvement in control. Discussed that she could try increasing to twice daily first. If she is having trouble remembering the dose, she can also try doing 2 g at one time. Discussed general considerations with colestipol and drug-drug interactions. Encouraged scheduling colonoscopy for disease activity assessment.    Hypertension: Discussed continued blood pressure readings that are at or above goal. Encouraged her to continue monitoring. She may need to increase amlodipine if this continues to be elevated.    High Cholesterol: Stable.    Moderate Persistent Asthma: Stable based on report.    Pain: Unchanged. Plan in place for follow-up with orthopedics.    Type 2 Diabetes: A1c at goal of < 7%.    Hypokalemia: Stable.    Motion Sickness: Stable.    PLAN:                            Try taking colestipol 1 g twice daily to see if this improves your diarrhea even further. If you are still forgetting the second dose, today to try 2 g at once to see if this makes a difference.    Reminder to schedule your colonoscopy when able [phone].    Follow-up: as needed     SUBJECTIVE/OBJECTIVE:                          Carli Monreal is a 74 year old female seen for an initial visit. She was referred to me from Dr. Mancuso.    Her  is listening as well, but is off camera.    Reason for visit: colestipol check-in    Allergies/ADRs: Reviewed in chart  Tobacco: She reports that she quit smoking about 33 years ago. Her smoking use included cigarettes. She has never been exposed to tobacco smoke. She has never used smokeless tobacco.    Medication Adherence/Access: no issues reported    Crohn's Disease/Diarrhea:   Colestipol 1 g daily  (written for twice daily)  B-12 1,000 mcg daily   Folic acid 1 mg daily  Famotidine 40 mg twice daily   MVI daily (one-a-day womens 50+)  Vitamin D 50 mcg daily  Vitamin C 500 mg daily  Loperamide 2 mg daily as needed     Notes the colestipol has helped. Notes that she hasn't had to use loperamide much as recently, but does plan on bringing this when she travels. She goes on cruises often, does tours. She was also prescribed rifaximin, but this was not covered by insurance. She is taking the colestipol daily, but wondering if she really has to take this twice. She isn't opposed to this, just notes that she may forget. It was recommended that she get a colonoscopy for disease assessment, as she was previously on advanced therapy, but has been off for some time.    Hypertension:  Amlodipine 2.5 mg daily  Losartan 100 mg daily     No available home readings at this time. Is aware that her blood pressure runs high.    BP Readings from Last 3 Encounters:   08/15/24 (!) 153/66   08/13/24 134/65   07/13/24 (!) 144/59     High Cholesterol:  Atorvastatin 10 mg daily    No side effects or concerns reported.    Recent Labs   Lab Test 12/07/23  1341 11/07/22  1259   CHOL 104 130   HDL 59 76   LDL 22 26   TRIG 117 139     Moderate Persistent Asthma:  Symbicort 2 puffs twice daily     Does rinse mouth. No history of thrush. No concerns with breathing at this time.    Pain:  Gabapentin 100 mg three times daily as needed     She is having surgery for her rotator cuff. She did have a nerve block but this didn't work too well. She plans to have surgery with [facility].    Type 2 Diabetes:  Metformin 1,000 mg daily  Blood sugar testing supplies    Notes 107-112 mg/dL the past couple of days. Denies concerns with metformin.    Lab Results   Component Value Date    A1C 6.3 05/31/2024    A1C 6.1 12/07/2023    A1C 6.2 05/08/2023    A1C 6.1 01/03/2023    A1C 6.3 11/07/2022    A1C 7.0 04/13/2021    A1C 8.4 02/09/2021      Hypokalemia:  Potassium  "chloride 20 mEq daily    No reported concerns. Initially thought she wasn't taking this, but confirms she is.    Potassium   Date Value Ref Range Status   05/31/2024 4.4 3.4 - 5.3 mmol/L Final   11/07/2022 3.8 3.4 - 5.3 mmol/L Final   05/26/2021 3.7 3.4 - 5.3 mmol/L Final     Motion Sickness:  Scopolamine as needed cruises     Notes she books cruises.  is her favorite as the ocean cruised. She only uses the patches when needed for traveling.    Today's Vitals: Ht 1.499 m (4' 11\")   Wt 72.6 kg (160 lb)   BMI 32.32 kg/m    ----------------    I spent 28 minutes with this patient today. All changes were made via collaborative practice agreement with Keesha Mancuso. A copy of the visit note was provided to the patient's provider(s).    A summary of these recommendations was sent via [employer].    Ting Marx PharmD, BCACP  MTM Pharmacist   [facility]   Phone: [phone]      Telemedicine Visit Details  Type of service:  Video Conference via [employer]  Start Time: 10:33 AM  End Time: 11:01 AM     Medication Therapy Recommendations  Diarrhea, unspecified type    Current Medication: colestipol (COLESTID) 1 g tablet   Rationale: Dose too low - Dosage too low - Effectiveness   Recommendation: Increase Dose   Status: Accepted - no CPA Needed              "

## 2024-09-16 NOTE — PATIENT INSTRUCTIONS
"Recommendations from today's MTM visit:                                                       Try taking colestipol 1 g twice daily to see if this improves your diarrhea even further. If you are still forgetting the second dose, today to try 2 g at once to see if this makes a difference.    Reminder to schedule your colonoscopy when able [phone].    Follow-up: as needed     It was great speaking with you today.  I value your experience and would be very thankful for your time in providing feedback in our clinic survey. In the next few days, you may receive an email or text message from [employer] with a link to a survey related to your  clinical pharmacist.\"     To schedule another MTM appointment, please call the clinic directly or you may call the MTM scheduling line at [phone].    My Clinical Pharmacist's contact information:                                                      Please feel free to contact me with any questions or concerns you have.      Ting WestD, BCACP  MTM Pharmacist   [facility]   Phone: [phone]    "

## 2024-10-02 ENCOUNTER — TELEPHONE (OUTPATIENT)
Dept: GASTROENTEROLOGY | Facility: CLINIC | Age: 74
End: 2024-10-02
Payer: COMMERCIAL

## 2024-10-02 NOTE — TELEPHONE ENCOUNTER
"Endoscopy Scheduling Screen    Have you had any respiratory illness or flu-like symptoms in the last 10 days?  No    What is your communication preference for Instructions and/or Bowel Prep?   MyChart    What insurance is in the chart?  Other:  [employer]    Ordering/Referring Provider: ARSH PONCE   (If ordering provider performs procedure, schedule with ordering provider unless otherwise instructed. )    BMI: Estimated body mass index is 32.32 kg/m  as calculated from the following:    Height as of 9/11/24: 1.499 m (4' 11\").    Weight as of 9/11/24: 72.6 kg (160 lb).     Sedation Ordered  MAC/deep sedation.   BMI<= 45 45 < BMI <= 48 48 < BMI < = 50  BMI > 50   No Restrictions No [facility]  No [facility]  [facility] with exceptions Hospital Only OR Only       Do you have a history of malignant hyperthermia?  No    (Females) Are you currently pregnant?   No     Have you been diagnosed or told you have pulmonary hypertension?   No    Do you have an LVAD?  No    Have you been told you have moderate to severe sleep apnea?  No.    Have you been told you have COPD, asthma, or any other lung disease?  Yes     What breathing problems do you have?  Asthma     Do you use home oxygen?  No    Have your breathing problems required an ED visit or hospitalization in the last year?  No.    Do you have any heart conditions?  Yes     In the past year, have you had any hospitalizations for heart related issues including cardiomyopathy, heart attack, or stent placement?  No    Do you have any implantable devices in your body (pacemaker, ICD)?  No    Do you take nitroglycerine?  No    Have you ever had or are you waiting for an organ transplant?  No. Continue scheduling, no site restrictions.    Have you had a stroke or transient ischemic attack (TIA aka \"mini stroke\" in the last 6 months?   No    Have you been diagnosed with or been told you have cirrhosis of the liver?   No.    Are you currently on dialysis?   No    Do you need " "assistance transferring?   No    BMI: Estimated body mass index is 32.32 kg/m  as calculated from the following:    Height as of 9/11/24: 1.499 m (4' 11\").    Weight as of 9/11/24: 72.6 kg (160 lb).     Is patients BMI > 40 and scheduling location [facility]?  No    Do you take an injectable or oral medication for weight loss or diabetes (excluding insulin)?  No    Do you take the medication Naltrexone?  No    Do you take blood thinners?  No       Prep   Are you currently on dialysis or do you have chronic kidney disease?  No    Do you have a diagnosis of diabetes?  Yes (Golytely Prep)    Do you have a diagnosis of cystic fibrosis (CF)?  No    On a regular basis do you go 3 -5 days between bowel movements?  No    BMI > 40?  No    Preferred Pharmacy:    [facility] [address] [address]  Phone: [phone] Fax: [phone]      Final Scheduling Details     Procedure scheduled  Colonoscopy    Surgeon:  DASIA     Date of procedure:  3/24/25     Pre-OP / PAC:   No - Not required for this site.    Location  [facility] - ASC - Per order.    Sedation   MAC/Deep Sedation - Per order.      Patient Reminders:   You will receive a call from a Nurse to review instructions and health history.  This assessment must be completed prior to your procedure.  Failure to complete the Nurse assessment may result in the procedure being cancelled.      On the day of your procedure, please designate an adult(s) who can drive you home stay with you for the next 24 hours. The medicines used in the exam will make you sleepy. You will not be able to drive.      You cannot take public transportation, ride share services, or non-medical taxi service without a responsible caregiver.  Medical transport services are allowed with the requirement that a responsible caregiver will receive you at your destination.  We require that drivers and caregivers are confirmed prior to your " procedure.

## 2024-10-13 ENCOUNTER — HEALTH MAINTENANCE LETTER (OUTPATIENT)
Age: 74
End: 2024-10-13

## 2024-10-25 ENCOUNTER — MYC MEDICAL ADVICE (OUTPATIENT)
Dept: GASTROENTEROLOGY | Facility: CLINIC | Age: 74
End: 2024-10-25
Payer: COMMERCIAL

## 2024-10-25 DIAGNOSIS — K50.019 CROHN'S DISEASE OF SMALL INTESTINE WITH COMPLICATION (H): ICD-10-CM

## 2024-10-25 DIAGNOSIS — R19.7 DIARRHEA, UNSPECIFIED TYPE: ICD-10-CM

## 2024-10-25 DIAGNOSIS — K63.8219 SMALL INTESTINAL BACTERIAL OVERGROWTH (SIBO): ICD-10-CM

## 2024-10-25 RX ORDER — MONTELUKAST SODIUM 4 MG/1
1 TABLET, CHEWABLE ORAL 2 TIMES DAILY
Qty: 180 TABLET | Refills: 0 | Status: SHIPPED | OUTPATIENT
Start: 2024-10-25

## 2024-11-05 ENCOUNTER — ANCILLARY PROCEDURE (OUTPATIENT)
Dept: MAMMOGRAPHY | Facility: CLINIC | Age: 74
End: 2024-11-05
Attending: FAMILY MEDICINE
Payer: COMMERCIAL

## 2024-11-05 DIAGNOSIS — Z12.31 ENCOUNTER FOR SCREENING MAMMOGRAM FOR BREAST CANCER: ICD-10-CM

## 2024-11-05 PROCEDURE — 77063 BREAST TOMOSYNTHESIS BI: CPT | Mod: TC | Performed by: RADIOLOGY

## 2024-11-05 PROCEDURE — 77067 SCR MAMMO BI INCL CAD: CPT | Mod: TC | Performed by: RADIOLOGY

## 2024-11-09 DIAGNOSIS — K50.919 CROHN'S DISEASE WITH COMPLICATION, UNSPECIFIED GASTROINTESTINAL TRACT LOCATION (H): ICD-10-CM

## 2024-11-11 RX ORDER — FOLIC ACID 1 MG/1
TABLET ORAL
Qty: 90 TABLET | Refills: 0 | Status: SHIPPED | OUTPATIENT
Start: 2024-11-11

## 2024-11-12 DIAGNOSIS — E11.9 TYPE 2 DIABETES MELLITUS WITHOUT COMPLICATION, WITHOUT LONG-TERM CURRENT USE OF INSULIN (H): ICD-10-CM

## 2024-11-12 DIAGNOSIS — E87.6 HYPOKALEMIA: ICD-10-CM

## 2024-11-12 DIAGNOSIS — I10 ESSENTIAL HYPERTENSION: ICD-10-CM

## 2024-11-13 RX ORDER — AMLODIPINE BESYLATE 2.5 MG/1
2.5 TABLET ORAL DAILY
Qty: 90 TABLET | Refills: 0 | Status: SHIPPED | OUTPATIENT
Start: 2024-11-13

## 2024-11-13 RX ORDER — LOSARTAN POTASSIUM 100 MG/1
100 TABLET ORAL DAILY
Qty: 90 TABLET | Refills: 0 | Status: SHIPPED | OUTPATIENT
Start: 2024-11-13

## 2024-11-13 RX ORDER — METFORMIN HYDROCHLORIDE 500 MG/1
1000 TABLET, EXTENDED RELEASE ORAL
Qty: 180 TABLET | Refills: 0 | Status: SHIPPED | OUTPATIENT
Start: 2024-11-13

## 2024-11-13 RX ORDER — POTASSIUM CHLORIDE 1500 MG/1
20 TABLET, EXTENDED RELEASE ORAL DAILY
Qty: 90 TABLET | Refills: 1 | Status: SHIPPED | OUTPATIENT
Start: 2024-11-13

## 2024-11-15 ENCOUNTER — OFFICE VISIT (OUTPATIENT)
Dept: FAMILY MEDICINE | Facility: CLINIC | Age: 74
End: 2024-11-15
Payer: COMMERCIAL

## 2024-11-15 VITALS
DIASTOLIC BLOOD PRESSURE: 76 MMHG | TEMPERATURE: 98.6 F | RESPIRATION RATE: 20 BRPM | HEIGHT: 60 IN | HEART RATE: 80 BPM | BODY MASS INDEX: 32.98 KG/M2 | SYSTOLIC BLOOD PRESSURE: 128 MMHG | OXYGEN SATURATION: 98 % | WEIGHT: 168 LBS

## 2024-11-15 DIAGNOSIS — Z29.11 NEED FOR VACCINATION AGAINST RESPIRATORY SYNCYTIAL VIRUS: ICD-10-CM

## 2024-11-15 DIAGNOSIS — D63.8 ANEMIA OF CHRONIC DISEASE: ICD-10-CM

## 2024-11-15 DIAGNOSIS — J45.40 MODERATE PERSISTENT ASTHMA, UNSPECIFIED WHETHER COMPLICATED: ICD-10-CM

## 2024-11-15 DIAGNOSIS — M06.9 RHEUMATOID ARTHRITIS INVOLVING BOTH HANDS, UNSPECIFIED WHETHER RHEUMATOID FACTOR PRESENT (H): ICD-10-CM

## 2024-11-15 DIAGNOSIS — Z01.818 PREOP GENERAL PHYSICAL EXAM: Primary | ICD-10-CM

## 2024-11-15 DIAGNOSIS — I10 ESSENTIAL HYPERTENSION: ICD-10-CM

## 2024-11-15 DIAGNOSIS — E11.9 TYPE 2 DIABETES MELLITUS WITHOUT COMPLICATION, WITHOUT LONG-TERM CURRENT USE OF INSULIN (H): ICD-10-CM

## 2024-11-15 DIAGNOSIS — M06.4 INFLAMMATORY POLYARTHROPATHY (H): ICD-10-CM

## 2024-11-15 DIAGNOSIS — K50.019 CROHN'S DISEASE OF SMALL INTESTINE WITH COMPLICATION (H): ICD-10-CM

## 2024-11-15 PROBLEM — M79.641 PAIN IN BOTH HANDS: Status: RESOLVED | Noted: 2023-03-13 | Resolved: 2024-11-15

## 2024-11-15 PROBLEM — G89.29 CHRONIC LEFT SHOULDER PAIN: Status: RESOLVED | Noted: 2024-07-19 | Resolved: 2024-11-15

## 2024-11-15 PROBLEM — M19.042 PRIMARY OSTEOARTHRITIS OF BOTH HANDS: Status: RESOLVED | Noted: 2022-12-13 | Resolved: 2024-11-15

## 2024-11-15 PROBLEM — M79.644 THUMB PAIN, RIGHT: Status: RESOLVED | Noted: 2023-03-13 | Resolved: 2024-11-15

## 2024-11-15 PROBLEM — Z48.89 AFTERCARE FOLLOWING SURGERY FOR INJURY AND TRAUMA: Status: RESOLVED | Noted: 2023-03-13 | Resolved: 2024-11-15

## 2024-11-15 PROBLEM — M79.18 PIRIFORMIS MUSCLE PAIN: Status: RESOLVED | Noted: 2023-09-29 | Resolved: 2024-11-15

## 2024-11-15 PROBLEM — M19.041 PRIMARY OSTEOARTHRITIS OF BOTH HANDS: Status: RESOLVED | Noted: 2022-12-13 | Resolved: 2024-11-15

## 2024-11-15 PROBLEM — M79.642 PAIN IN BOTH HANDS: Status: RESOLVED | Noted: 2023-03-13 | Resolved: 2024-11-15

## 2024-11-15 PROBLEM — M25.512 CHRONIC LEFT SHOULDER PAIN: Status: RESOLVED | Noted: 2024-07-19 | Resolved: 2024-11-15

## 2024-11-15 LAB
ANION GAP SERPL CALCULATED.3IONS-SCNC: 13 MMOL/L (ref 7–15)
BUN SERPL-MCNC: 14.3 MG/DL (ref 8–23)
CALCIUM SERPL-MCNC: 9.9 MG/DL (ref 8.8–10.4)
CHLORIDE SERPL-SCNC: 108 MMOL/L (ref 98–107)
CHOLEST SERPL-MCNC: 116 MG/DL
CREAT SERPL-MCNC: 0.84 MG/DL (ref 0.51–0.95)
EGFRCR SERPLBLD CKD-EPI 2021: 73 ML/MIN/1.73M2
ERYTHROCYTE [DISTWIDTH] IN BLOOD BY AUTOMATED COUNT: 12.8 % (ref 10–15)
EST. AVERAGE GLUCOSE BLD GHB EST-MCNC: 128 MG/DL
GLUCOSE SERPL-MCNC: 179 MG/DL (ref 70–99)
HBA1C MFR BLD: 6.1 % (ref 0–5.6)
HCO3 SERPL-SCNC: 22 MMOL/L (ref 22–29)
HCT VFR BLD AUTO: 34.2 % (ref 35–47)
HDLC SERPL-MCNC: 69 MG/DL
HGB BLD-MCNC: 11.1 G/DL (ref 11.7–15.7)
HOLD SPECIMEN: NORMAL
LDLC SERPL CALC-MCNC: 21 MG/DL
MCH RBC QN AUTO: 27.8 PG (ref 26.5–33)
MCHC RBC AUTO-ENTMCNC: 32.5 G/DL (ref 31.5–36.5)
MCV RBC AUTO: 86 FL (ref 78–100)
NONHDLC SERPL-MCNC: 47 MG/DL
PLATELET # BLD AUTO: 195 10E3/UL (ref 150–450)
POTASSIUM SERPL-SCNC: 4 MMOL/L (ref 3.4–5.3)
RBC # BLD AUTO: 3.99 10E6/UL (ref 3.8–5.2)
SODIUM SERPL-SCNC: 143 MMOL/L (ref 135–145)
TRIGL SERPL-MCNC: 130 MG/DL
WBC # BLD AUTO: 7.8 10E3/UL (ref 4–11)

## 2024-11-15 ASSESSMENT — ASTHMA QUESTIONNAIRES: ACT_TOTALSCORE: 24

## 2024-11-15 NOTE — PROGRESS NOTES
Preoperative Evaluation  [facility]  [address] [place] [id number]  Phone: [phone]  Primary Provider: Iza Cage DO  Pre-op Performing Provider: Iza Cage DO  Nov 15, 2024             11/15/2024   Surgical Information   What procedure is being done? rotator cuff repair - Left    Facility or Hospital where procedure/surgery will be performed: [facility] [place]   Who is doing the procedure / surgery? Dr Salazar    Date of surgery / procedure: December 10 2024    Time of surgery / procedure: ?    Where do you plan to recover after surgery? at home with family        Patient-reported     Fax number for surgical facility: [phone]    Assessment & Plan     The proposed surgical procedure is considered INTERMEDIATE risk.    Preop general physical exam      Type 2 diabetes mellitus without complication, without long-term current use of insulin (H)    Stable hold metformin the day of the procedure    - HEMOGLOBIN A1C; Future  - Lipid panel reflex to direct LDL Non-fasting; Future  - Albumin Random Urine Quantitative with Creat Ratio; Future  - HEMOGLOBIN A1C  - Albumin Random Urine Quantitative with Creat Ratio    Essential hypertension  Continue norvasc and losartan   - Basic metabolic panel  (Ca, Cl, CO2, Creat, Gluc, K, Na, BUN); Future    Moderate persistent asthma, unspecified whether complicated  The current medical regimen is effective;  continue present plan and medications.      Rheumatoid arthritis involving both hands, unspecified whether rheumatoid factor present (H)  Not on immunosupressants currently     Inflammatory polyarthropathy (H)  Noted    Crohn's disease of small intestine with complication (H)  Stable     Need for vaccination against respiratory syncytial virus      Anemia of chronic disease    - CBC with platelets; Future           Risks and Recommendations  The patient has the following additional risks and  recommendations for perioperative complications:  Diabetes:  - Patient is not on insulin therapy: regular NPO guidelines can be followed.   Pulmonary:    - Incentive spirometry post-op    Antiplatelet or Anticoagulation Medication Instructions   - Patient is on no antiplatelet or anticoagulation medications.    Additional Medication Instructions   - ACE/ARB: Continue without modification (e.g., MAC anesthesia, neurosurgery, spine surgery, heart failure, or labile hypertension with risk of hypertension).   - Calcium Channel Blockers: May be continued on the day of surgery.   - metformin: DO NOT TAKE day of surgery.   - pregabalin, gabapentin: Continue without modification.   - LABA, inhaled corticosteroid, long-acting anticholinergics: Continue without modification.    Recommendation  Approval given to proceed with proposed procedure, without further diagnostic evaluation.    Mary Boyce is a 74 year old, presenting for the following:  Pre-Op Exam (DOS: 12/10/24)        11/15/2024    12:04 PM   Additional Questions   Roomed by Shantal BRITTON   Accompanied by self         11/15/2024    12:04 PM   Patient Reported Additional Medications   Patient reports taking the following new medications none     HPI related to upcoming procedure: she has pain in the left shoulder. It started about a year ago.  She had an injection which helped only a week or so.  She had an MRI which showed a tear.  She is planning on a repair through [facility].           11/15/2024   Pre-Op Questionnaire   Have you ever had a heart attack or stroke? No    Have you ever had surgery on your heart or blood vessels, such as a stent placement, a coronary artery bypass, or surgery on an artery in your head, neck, heart, or legs? No    Do you have chest pain with activity? No    Do you have a history of heart failure? No    Do you currently have a cold, bronchitis or symptoms of other infection? No    Do you have a cough, shortness of breath, or wheezing? (!)  mild cough in the am.  Sob with rushing     Do you or anyone in your family have previous history of blood clots? No    Do you or does anyone in your family have a serious bleeding problem such as prolonged bleeding following surgeries or cuts? (!) YES her brother bled easily no diagnosis     Have you ever had problems with anemia or been told to take iron pills? (!) YES   She has crohns and RA    Have you had any abnormal blood loss such as black, tarry or bloody stools, or abnormal vaginal bleeding? No    Have you ever had a blood transfusion? (!) YES  In [place] but she is not sure why maybe crohns    Have you ever had a transfusion reaction? No    Are you willing to have a blood transfusion if it is medically needed before, during, or after your surgery? Yes    Have you or any of your relatives ever had problems with anesthesia? No    Do you have sleep apnea, excessive snoring or daytime drowsiness? No    Do you have any artifical heart valves or other implanted medical devices like a pacemaker, defibrillator, or continuous glucose monitor? No    Do you have artificial joints? No    Are you allergic to latex? No        Patient-reported     Health Care Directive  Patient does not have a Health Care Directive: Discussed advance care planning with patient; information given to patient to review.    Preoperative Review of    reviewed - no record of controlled substances prescribed.      Status of Chronic Conditions:  ASTHMA - Patient has a longstanding history of moderate Asthma . Patient has been doing well overall noting NO SYMPTOMS and continues on medication regimen consisting of Symbicort without adverse reactions or side effects.     DIABETES - Patient has a longstanding history of DiabetesType Type II . Patient is being treated with oral agents and denies significant side effects. Control has been good. Complicating factors include but are not limited to: hypertension and hyperlipidemia.   Lab Results    Component Value Date    A1C 6.1 11/15/2024    A1C 6.3 05/31/2024    A1C 6.1 12/07/2023    A1C 6.2 05/08/2023    A1C 6.1 01/03/2023    A1C 7.0 04/13/2021    A1C 8.4 02/09/2021         HYPERLIPIDEMIA - Patient has a long history of significant Hyperlipidemia requiring medication for treatment with recent good control. Patient reports no problems or side effects with the medication.   LDL Cholesterol Calculated   Date Value Ref Range Status   12/07/2023 22 <=100 mg/dL Final   02/09/2021 63.7 0.0 - 130.0 mg/dL Final         HYPERTENSION - Patient has longstanding history of HTN , currently denies any symptoms referable to elevated blood pressure. Specifically denies chest pain, palpitations, dyspnea, orthopnea, PND or peripheral edema. Blood pressure readings have been in normal range. Current medication regimen is as listed below. Patient denies any side effects of medication.     Patient Active Problem List    Diagnosis Date Noted    Arthritis of gzaqneot-vqitifpjv-xetyvdjcs joint of right hand 11/29/2022     Priority: Medium     Added automatically from request for surgery [id number]      Arthritis of carpometacarpal (CMC) joint of right thumb 11/29/2022     Priority: Medium     Added automatically from request for surgery [id number]      Hypokalemia 08/05/2021     Priority: Medium    Insomnia, unspecified type 08/05/2021     Priority: Medium    Arthropathy in Crohn's disease (H) 05/18/2021     Priority: Medium    Crohn's disease of small intestine with complication (H) 05/18/2021     Priority: Medium    Moderate persistent asthma, unspecified whether complicated 04/13/2021     Priority: Medium    Essential hypertension 04/13/2021     Priority: Medium    Type 2 diabetes mellitus without complication, without long-term current use of insulin (H) 04/13/2021     Priority: Medium    Rheumatoid arthritis involving both hands, unspecified whether rheumatoid factor present (H) 04/13/2021     Priority: Medium      Past Medical  History:   Diagnosis Date    Diabetes mellitus (H)     Right bundle branch block     Uncomplicated asthma      Past Surgical History:   Procedure Laterality Date    ARTHROPLASTY CARPOMETACARPAL (THUMB JOINT) Right 1/20/2023    Procedure: ARTHROPLASTY, CARPOMETACARPAL JOINT,RIGHT  THUMB;  Surgeon: Jasmeet Haque MD;  Location: [facility] OR    CATARACT IOL, RT/LT Bilateral 2017    Valeriano Javier MD ([place], [place]).    COLONOSCOPY N/A 09/20/2021    Procedure: COLONOSCOPY;  Surgeon: Keesha Mancuso MD;  Location: [facility] OR    INJECT NERVE BLOCK SUPRASCAPULAR Left 8/15/2024    Procedure: BLOCK, NERVE, SUPRASCAPULAR (left);  Surgeon: Ana M Nicholson MD;  Location: [facility] OR    SMALL BOWEL RESECTION  2012     Current Outpatient Medications   Medication Sig Dispense Refill    alcohol swab prep pads Use to swab area of injection/venkata as directed. 100 each 3    amLODIPine (NORVASC) 2.5 MG tablet TAKE ONE TABLET BY MOUTH ONE TIME DAILY 90 tablet 0    Ascorbic Acid (VITAMIN C) 500 MG CAPS Take 500 mg by mouth daily.      atorvastatin (LIPITOR) 10 MG tablet Take 1 tablet (10 mg) by mouth daily Do not fill until contacted by patient 90 tablet 3    blood glucose (NO BRAND SPECIFIED) lancets standard Use to test blood sugar 1 times daily or as directed. 100 Lancet 11    blood glucose (ONETOUCH VERIO IQ) test strip USE TO TEST BLOOD GLUCOSE ONCE DAILY 100 strip 0    budesonide-formoterol (SYMBICORT) 160-4.5 MCG/ACT Inhaler Inhale 2 puffs into the lungs 2 times daily 10.2 g 4    colestipol (COLESTID) 1 g tablet Take 1 tablet (1 g) by mouth 2 times daily. Take 1 tablet (1g) by mouth 2 times daily. 180 tablet 0    cyanocobalamin 1000 MCG sublingual tablet Place 1,000 mcg under the tongue daily.      famotidine (PEPCID) 40 MG tablet Take 1 tablet (40 mg) by mouth 2 times daily 180 tablet 1    folic acid (FOLVITE) 1 MG tablet TAKE ONE TABLET BY MOUTH ONE TIME DAILY 90 tablet 0    gabapentin (NEURONTIN) 100 MG capsule Take 1 capsule (100 mg) by  "mouth 3 times daily as needed for neuropathic pain 90 capsule 3    Lancets (ONETOUCH DELICA PLUS BVLLIO80X) MISC USE TO TEST BLOOD GLUCOSE ONCE DAILY 100 each 0    loperamide (IMODIUM) 2 MG capsule Take 2 mg by mouth 4 times daily as needed for diarrhea.      losartan (COZAAR) 100 MG tablet TAKE ONE TABLET BY MOUTH ONE TIME DAILY 90 tablet 0    metFORMIN (GLUCOPHAGE XR) 500 MG 24 hr tablet Take two tablets by mouth once daily with dinner. 180 tablet 0    multivitamin w/minerals (THERA-VIT-M) tablet Take 1 tablet by mouth daily      potassium chloride daniel ER (KLOR-CON M20) 20 MEQ CR tablet Take 1 tablet (20 mEq) by mouth daily 90 tablet 1    scopolamine (TRANSDERM) 1 MG/3DAYS 72 hr patch Place 1 patch onto the skin every 72 hours 12 patch 1    Vitamin D, Cholecalciferol, 25 MCG (1000 UT) TABS Take 2 tablets by mouth daily         Allergies   Allergen Reactions    Seasonal Allergies         Social History     Tobacco Use    Smoking status: Former     Current packs/day: 0.00     Types: Cigarettes     Quit date: 1991     Years since quittin.6     Passive exposure: Never    Smokeless tobacco: Never   Substance Use Topics    Alcohol use: Yes     Comment: Wine- couple x/ week       History   Drug Use Unknown             Review of Systems  Constitutional, HEENT, cardiovascular, pulmonary, gi and gu systems are negative, except as otherwise noted.    Objective    /76 (BP Location: Right arm, Patient Position: Sitting, Cuff Size: Adult Regular)   Pulse 80   Temp 98.6  F (37  C) (Oral)   Resp 20   Ht 1.511 m (4' 11.5\")   Wt 76.2 kg (168 lb)   SpO2 98%   BMI 33.36 kg/m     Estimated body mass index is 33.36 kg/m  as calculated from the following:    Height as of this encounter: 1.511 m (4' 11.5\").    Weight as of this encounter: 76.2 kg (168 lb).  Physical Exam  GENERAL: alert and no distress  EYES: Eyes grossly normal to inspection, PERRL and conjunctivae and sclerae normal  HENT: ear canals and TM's " normal, nose and mouth without ulcers or lesions  NECK: no adenopathy, no asymmetry, masses, or scars  RESP: lungs clear to auscultation - no rales, rhonchi or wheezes  CV: regular rate and rhythm, normal S1 S2, no S3 or S4, no murmur, click or rub, no peripheral edema  ABDOMEN: soft, nontender, no hepatosplenomegaly, no masses and bowel sounds normal  MS: no gross musculoskeletal defects noted, no edema  PSYCH: mentation appears normal, affect normal/bright    Recent Labs   Lab Test 05/31/24  1247 12/07/23  1341    139   POTASSIUM 4.4 4.2   CR 0.86 0.86   A1C 6.3* 6.1*        Diagnostics  Labs pending at this time.  Results will be reviewed when available.   No EKG required, no history of coronary heart disease, significant arrhythmia, peripheral arterial disease or other structural heart disease.    Revised Cardiac Risk Index (RCRI)  The patient has the following serious cardiovascular risks for perioperative complications:   - No serious cardiac risks = 0 points     RCRI Interpretation: 0 points: Class I (very low risk - 0.4% complication rate)         Signed Electronically by: Iza Cage DO  A copy of this evaluation report is provided to the requesting physician.          updated daughter at bedside, tba, spoke with Dr. Alberto Queen DO

## 2024-11-15 NOTE — PATIENT INSTRUCTIONS
How to Take Your Medication Before Surgery  Preoperative Medication Instructions   Antiplatelet or Anticoagulation Medication Instructions   - Patient is on no antiplatelet or anticoagulation medications.    Additional Medication Instructions   - ACE/ARB: Continue without modification (e.g., MAC anesthesia, neurosurgery, spine surgery, heart failure, or labile hypertension with risk of hypertension).   - Calcium Channel Blockers: May be continued on the day of surgery.   - Statins: Continue taking on the day of surgery.    - metformin: DO NOT TAKE day of surgery.   - pregabalin, gabapentin: Continue without modification.   - LABA, inhaled corticosteroid, long-acting anticholinergics: Continue without modification.       Patient Education   Preparing for Your Surgery  For Adults  Getting started  In most cases, a nurse will call to review your health history and instructions. They will give you an arrival time based on your scheduled surgery time. Please be ready to share:  Your doctor's clinic name and phone number  Your medical, surgical, and anesthesia history  A list of allergies and sensitivities  A list of medicines, including herbal treatments and over-the-counter drugs  Whether the patient has a legal guardian (ask how to send us the papers in advance)  Note: You may not receive a call if you were seen at our PAC (Preoperative Assessment Center).  Please tell us if you're pregnant--or if there's any chance you might be pregnant. Some surgeries may injure a fetus (unborn baby), so they require a pregnancy test. Surgeries that are safe for a fetus don't always need a test, and you can choose whether to have one.   Preparing for surgery  Within 10 to 30 days of surgery: Have a pre-op exam (sometimes called an H&P, or History and Physical). This can be done at a clinic or pre-operative center.  If you're having a , you may not need this exam. Talk to your care team.  At your pre-op exam, talk to your  care team about all medicines you take. (This includes CBD oil and any drugs, such as THC, marijuana, and other forms of cannabis.) If you need to stop any medicine before surgery, ask when to start taking it again.  This is for your safety. Many medicines and drugs can make you bleed too much during surgery. Some change how well surgery (anesthesia) drugs work.  Call your insurance company to let them know you're having surgery. (If you don't have insurance, call [phone].)  Call your clinic if there's any change in your health. This includes a scrape or scratch near the surgery site, or any signs of a cold (sore throat, runny nose, cough, rash, fever).  Eating and drinking guidelines  For your safety: Unless your surgeon tells you otherwise, follow the guidelines below.  Eat and drink as normal until 8 hours before you arrive for surgery. After that, no food or milk. You can spit out gum when you arrive.  Drink clear liquids until 2 hours before you arrive. These are liquids you can see through, like water, Gatorade, and Propel Water. They also include plain black coffee and tea (no cream or milk).  No alcohol for 24 hours before you arrive. The night before surgery, stop any drinks that contain THC.  If your care team tells you to take medicine on the morning of surgery, it's okay to take it with a sip of water. No other medicines or drugs are allowed (including CBD oil)--follow your care team's instructions.  If you have questions the day of surgery, call your hospital or surgery center.   Preventing infection  Shower or bathe the night before and the morning of surgery. Follow the instructions your clinic gave you. (If no instructions, use regular soap.)  Don't shave or clip hair near your surgery site. We'll remove the hair if needed.  Don't smoke or vape the morning of surgery. No chewing tobacco for 6 hours before you arrive. A nicotine patch is okay. You may spit out nicotine gum when you arrive.  For  some surgeries, the surgeon will tell you to fully quit smoking and nicotine.  We will make every effort to keep you safe from infection. We will:  Clean our hands often with soap and water (or an alcohol-based hand rub).  Clean the skin at your surgery site with a special soap that kills germs.  Give you a special gown to keep you warm. (Cold raises the risk of infection.)  Wear hair covers, masks, gowns, and gloves during surgery.  Give antibiotic medicine, if prescribed. Not all surgeries need this medicine.  What to bring on the day of surgery  Photo ID and insurance card  Copy of your health care directive, if you have one  Glasses and hearing aids (bring cases)  You can't wear contacts during surgery  Inhaler and eye drops, if you use them (tell us about these when you arrive)  CPAP machine or breathing device, if you use them  A few personal items, if spending the night  If you have . . .  A pacemaker, ICD (cardiac defibrillator), or other implant: Bring the ID card.  An implanted stimulator: Bring the remote control.  A legal guardian: Bring a copy of the certified (court-stamped) guardianship papers.  Please remove any jewelry, including body piercings. Leave jewelry and other valuables at home.  If you're going home the day of surgery  You must have a responsible adult drive you home. They should stay with you overnight as well.  If you don't have someone to stay with you, and you aren't safe to go home alone, we may keep you overnight. Insurance often won't pay for this.  After surgery  If it's hard to control your pain or you need more pain medicine, please call your surgeon's office.  Questions?   If you have any questions for your care team, list them here:    ____________________________________________________________________________________________________________________________________________________________________________________________________________________________________________________________  For informational purposes only. Not to replace the advice of your health care provider. Copyright   2003, 2019 [facility]. All rights reserved. Clinically reviewed by Stevie Bach MD. [employer] [id number] - REV 08/24.

## 2024-11-16 LAB
CREAT UR-MCNC: 158 MG/DL
MICROALBUMIN UR-MCNC: 39 MG/L
MICROALBUMIN/CREAT UR: 24.68 MG/G CR (ref 0–25)

## 2025-02-01 DIAGNOSIS — R19.7 DIARRHEA, UNSPECIFIED TYPE: ICD-10-CM

## 2025-02-01 DIAGNOSIS — K63.8219 SMALL INTESTINAL BACTERIAL OVERGROWTH (SIBO): ICD-10-CM

## 2025-02-01 DIAGNOSIS — K50.019 CROHN'S DISEASE OF SMALL INTESTINE WITH COMPLICATION (H): ICD-10-CM

## 2025-02-01 DIAGNOSIS — K50.919 CROHN'S DISEASE WITH COMPLICATION, UNSPECIFIED GASTROINTESTINAL TRACT LOCATION (H): ICD-10-CM

## 2025-02-02 RX ORDER — FOLIC ACID 1 MG/1
TABLET ORAL
Qty: 90 TABLET | Refills: 0 | Status: SHIPPED | OUTPATIENT
Start: 2025-02-02

## 2025-02-06 RX ORDER — COLESTIPOL HYDROCHLORIDE 1 G/1
1 TABLET ORAL 2 TIMES DAILY
Qty: 180 TABLET | Refills: 1 | Status: SHIPPED | OUTPATIENT
Start: 2025-02-06

## 2025-02-06 NOTE — TELEPHONE ENCOUNTER
LVD:  8/13/2024  [facility] [place]  [place]  Medication: colestipol HCL 1 GRam  Refill decision: Medication refilled per  Medication Refill in Ambulatory Care  policy.

## 2025-02-15 DIAGNOSIS — I10 ESSENTIAL HYPERTENSION: ICD-10-CM

## 2025-02-15 DIAGNOSIS — E11.9 TYPE 2 DIABETES MELLITUS WITHOUT COMPLICATION, WITHOUT LONG-TERM CURRENT USE OF INSULIN (H): ICD-10-CM

## 2025-02-16 DIAGNOSIS — E11.9 TYPE 2 DIABETES MELLITUS WITHOUT COMPLICATION, WITHOUT LONG-TERM CURRENT USE OF INSULIN (H): ICD-10-CM

## 2025-02-16 RX ORDER — METFORMIN HYDROCHLORIDE 500 MG/1
1000 TABLET, EXTENDED RELEASE ORAL
Qty: 180 TABLET | Refills: 0 | Status: SHIPPED | OUTPATIENT
Start: 2025-02-16

## 2025-02-16 RX ORDER — BLOOD SUGAR DIAGNOSTIC
STRIP MISCELLANEOUS
Qty: 100 STRIP | Refills: 2 | Status: SHIPPED | OUTPATIENT
Start: 2025-02-16

## 2025-02-16 RX ORDER — AMLODIPINE BESYLATE 2.5 MG/1
2.5 TABLET ORAL DAILY
Qty: 90 TABLET | Refills: 0 | Status: SHIPPED | OUTPATIENT
Start: 2025-02-16

## 2025-02-22 ENCOUNTER — HEALTH MAINTENANCE LETTER (OUTPATIENT)
Age: 75
End: 2025-02-22

## 2025-03-18 ENCOUNTER — OFFICE VISIT (OUTPATIENT)
Dept: GASTROENTEROLOGY | Facility: CLINIC | Age: 75
End: 2025-03-18
Payer: COMMERCIAL

## 2025-03-18 VITALS
HEIGHT: 59 IN | DIASTOLIC BLOOD PRESSURE: 73 MMHG | OXYGEN SATURATION: 98 % | BODY MASS INDEX: 34.05 KG/M2 | WEIGHT: 168.9 LBS | HEART RATE: 78 BPM | SYSTOLIC BLOOD PRESSURE: 142 MMHG

## 2025-03-18 DIAGNOSIS — R19.7 DIARRHEA, UNSPECIFIED TYPE: Primary | ICD-10-CM

## 2025-03-18 PROCEDURE — 3077F SYST BP >= 140 MM HG: CPT | Performed by: INTERNAL MEDICINE

## 2025-03-18 PROCEDURE — 1126F AMNT PAIN NOTED NONE PRSNT: CPT | Performed by: INTERNAL MEDICINE

## 2025-03-18 PROCEDURE — 99214 OFFICE O/P EST MOD 30 MIN: CPT | Performed by: INTERNAL MEDICINE

## 2025-03-18 PROCEDURE — 3078F DIAST BP <80 MM HG: CPT | Performed by: INTERNAL MEDICINE

## 2025-03-18 ASSESSMENT — PAIN SCALES - GENERAL: PAINLEVEL_OUTOF10: NO PAIN (0)

## 2025-03-18 NOTE — PATIENT INSTRUCTIONS
PLAN:  --Continue colestipol BID  --OK to take imodium prn  --Colonoscopy with biopsies to rule out microscopic colitis and active Crohn's disease, currently scheduled for 3/24  --Avoid NSAIDs

## 2025-03-18 NOTE — PROGRESS NOTES
CD follow up  MRN: [id number]    Date of Birth 1950    Tel: [phone] (home)     PCP: Iaz Cage     HPI: Ms. Monreal is a 74 year old female here for evaluation of Crohn's disease.     When she was 19, was told she had colitis in the setting of loose stools +/- blood. Diagnosed with CD in 2000/2001 when presented to the hospital with severe abd pain.      Was on Remicade around 2005, but then stopped due to an infusion reaction (sharp back and chest pains). Transitioned to Humira but then stopped due to inability to pay. In 2012 had an SBO and underwent surgical resection.  Then switched to Cimzia/MTX, got sick and then stopped this medication Summer 2020.  Was hospitalized in July 2020 for breathing issues. Has not been on any Crohn's maintenance therapy since then.        Last seen by me in 6/2021 and was reporting 5-10 BMs per day as well as arthralgia.     Has not retried cholestyramine since 2020.   Takes imodium (2 pills will work).   Typically, does not take imodium but when she does, takes up to 3-4 pills per day.   Endorses significant gas.     Noteworthy diet history- unremarkable    HBI  General well-being 2 = poor  Abdominal pain 0 = none  Number of liquid stools per day 10-15, + FI (post prandial)  Abdominal mass 0 = none  Current Complications Arthralgia    Constitutional symptoms:  Fever NO  Weight loss NO    Extraintestinal manifestations (anytime during the course of disease)  Possible arthralgias    Patric Classification  AGE AT DIAGNOSIS: A3 above 40 y  CURRENT DISEASE LOCATION: L1: ileal  L4 upper GI: NO  DISEASE BEHAVIOR (since disease onset): B2: stricturing  Perianal disease: NO    Total number of IBD surgeries (except perianal): 1 (resection in 2012)     Current IBD Medications:  none    Past IBD Medications:  Remicade  Humira  Cimzia +MTX    Interval history, 3/2025 (in person)    Colestipol is working well. Taking this twice per day. Has 3-5 BMs per day while on this. Thinks  this is significantly improved and is happy.     No weight loss. No blood in stool.     Past Medical History:   Diagnosis Date    Diabetes mellitus (H)     Right bundle branch block     Uncomplicated asthma         Past Surgical History:   Procedure Laterality Date    ARTHROPLASTY CARPOMETACARPAL (THUMB JOINT) Right 2023    Procedure: ARTHROPLASTY, CARPOMETACARPAL JOINT,RIGHT  THUMB;  Surgeon: Jasmeet Haque MD;  Location: [facility] OR    CATARACT IOL, RT/LT Bilateral     Valeriano Javier MD ([place], [place]).    COLONOSCOPY N/A 2021    Procedure: COLONOSCOPY;  Surgeon: Keesha Mancuso MD;  Location: [facility] OR    INJECT NERVE BLOCK SUPRASCAPULAR Left 8/15/2024    Procedure: BLOCK, NERVE, SUPRASCAPULAR (left);  Surgeon: Ana M Nicholson MD;  Location: [facility] OR    SMALL BOWEL RESECTION         Social History     Tobacco Use    Smoking status: Former     Current packs/day: 0.00     Types: Cigarettes     Quit date: 1991     Years since quittin.9     Passive exposure: Never    Smokeless tobacco: Never   Substance Use Topics    Alcohol use: Yes     Comment: Wine- couple x/ week       Family History   Problem Relation Age of Onset    Rheumatoid Arthritis Mother     Eye Surgery Father     Glaucoma Father     Amblyopia Cousin     Inflammatory Bowel Disease No family hx of     Colon Cancer No family hx of     Macular Degeneration No family hx of        Allergies   Allergen Reactions    Seasonal Allergies         Outpatient Encounter Medications as of 3/18/2025   Medication Sig Dispense Refill    alcohol swab prep pads Use to swab area of injection/venkata as directed. 100 each 3    amLODIPine (NORVASC) 2.5 MG tablet TAKE ONE TABLET BY MOUTH ONE TIME DAILY 90 tablet 0    Ascorbic Acid (VITAMIN C) 500 MG CAPS Take 500 mg by mouth daily.      atorvastatin (LIPITOR) 10 MG tablet Take 1 tablet (10 mg) by mouth daily Do not fill until contacted by patient 90 tablet 3    bisacodyl (DULCOLAX) 5 MG EC tablet Two days  prior to exam take two (2) tablets at 4pm. One day prior to exam take two (2) tablets at 4pm 4 tablet 0    blood glucose (NO BRAND SPECIFIED) lancets standard Use to test blood sugar 1 times daily or as directed. 100 Lancet 11    blood glucose (ONETOUCH VERIO IQ) test strip USE TO TEST BLOOD GLUCOSE ONCE DAILY 100 strip 2    budesonide-formoterol (SYMBICORT) 160-4.5 MCG/ACT Inhaler Inhale 2 puffs into the lungs 2 times daily 10.2 g 4    colestipol (COLESTID) 1 g tablet Take 1 tablet (1 g) by mouth 2 times daily. Take 1 tablet (1g) by mouth 2 times daily. 180 tablet 1    cyanocobalamin 1000 MCG sublingual tablet Place 1,000 mcg under the tongue daily.      famotidine (PEPCID) 40 MG tablet Take 1 tablet (40 mg) by mouth 2 times daily 180 tablet 1    folic acid (FOLVITE) 1 MG tablet TAKE ONE TABLET BY MOUTH ONE TIME DAILY 90 tablet 0    gabapentin (NEURONTIN) 100 MG capsule Take 1 capsule (100 mg) by mouth 3 times daily as needed for neuropathic pain 90 capsule 3    Lancets (ONETOUCH DELICA PLUS [id number]) MISC USE TO TEST BLOOD GLUCOSE ONCE DAILY 100 each 0    loperamide (IMODIUM) 2 MG capsule Take 2 mg by mouth 4 times daily as needed for diarrhea.      losartan (COZAAR) 100 MG tablet TAKE ONE TABLET BY MOUTH ONE TIME DAILY 90 tablet 0    metFORMIN (GLUCOPHAGE XR) 500 MG 24 hr tablet Take two tablets by mouth once daily with dinner. 180 tablet 0    multivitamin w/minerals (THERA-VIT-M) tablet Take 1 tablet by mouth daily      polyethylene glycol (GOLYTELY) 236 g suspension Two days before procedure at 5PM fill first container with water. Mix and drink an 8 oz glass every 15 minutes until HALF of the container is gone. Place the remainder in the refrigerator. One day before procedure at 5PM drink second half of bowel prep. Drink an 8 oz glass every 15 minutes until it is gone. Day of procedure 6 hours before arrival time fill the 2nd container with water. Mix and drink an 8 oz glass every 15 minutes until HALF of the  "container is gone. Discard the remaining solution. 8000 mL 0    potassium chloride daniel ER (KLOR-CON M20) 20 MEQ CR tablet Take 1 tablet (20 mEq) by mouth daily 90 tablet 1    scopolamine (TRANSDERM) 1 MG/3DAYS 72 hr patch Place 1 patch onto the skin every 72 hours 12 patch 1    Vitamin D, Cholecalciferol, 25 MCG (1000 UT) TABS Take 2 tablets by mouth daily       No facility-administered encounter medications on file as of 3/18/2025.      NSAID  YES 2 x/month    Review of Systems  Complete 10 System ROS performed. All are negative except as documented below, in the HPI, or in patient questionnaire from today's visit.    1) Constitutional: No fevers, chills, night sweats or malaise, weight loss or gain  2) Skin: No rash  3) Pulmonary: No wheeze, SOB, cough, sputum or hemoptysis  4) Cardiovascular: No Chest pain or palpitations  5) Genitourinary: No blood in urine or dysuria  6) Endocrine: No increased sweating, hunger, thirst or thyroid problems  7) Hematologic: No bruising and easy bleeding  8) Musculoskeletal: no new pain in joints or limitation in ROM  9) Neurologic: No dizziness, paresthesias or weakness or falls  10) Psychiatric:  not depressed/anxious, no sleep problems    PHYSICAL EXAM  Vitals: BP (!) 142/73   Pulse 78   Ht 1.499 m (4' 11\")   Wt 76.6 kg (168 lb 14.4 oz)   SpO2 98%   BMI 34.11 kg/m      No Pain (0)     General appearance  Healthy appearing adult, in no acute distress     Eyes  Sclera anicteric  Pupils round and reactive to light     Ears, nose, mouth and throat  No obvious external lesions of ears and nose  Hearing intact     Neck  Symmetric  No obvious external lesions     Respiratory  Normal respiration, no use of accessory muscles      MSK  Gait normal     Skin  No rashes or jaundice      Psychiatric  Oriented to person, place and time  Appropriate mood and affect.       DATA:  Reviewed in detail past documentation, medications and prior workup available in electronic health records or " "through outside records.    PERTINENT STUDIES:    6/2021 Fecal calprotectin 50.2     Most recent CBC:  WBC   Date Value Ref Range Status   05/26/2021 6.2 4.0 - 11.0 10e9/L Final     WBC Count   Date Value Ref Range Status   11/15/2024 7.8 4.0 - 11.0 10e3/uL Final   ]  Hemoglobin   Date Value Ref Range Status   11/15/2024 11.1 (L) 11.7 - 15.7 g/dL Final   05/26/2021 11.7 11.7 - 15.7 g/dL Final   ]   Platelet Count   Date Value Ref Range Status   11/15/2024 195 150 - 450 10e3/uL Final   05/26/2021 191 150 - 450 10e9/L Final       Most recent coag:  No results found for: \"INR\"    Most recent hepatic panel:  AST   Date Value Ref Range Status   05/26/2021 35 0 - 45 U/L Final     ALT   Date Value Ref Range Status   05/26/2021 38 0 - 50 U/L Final     No results found for: \"BILICONJ\"   Bilirubin Total   Date Value Ref Range Status   05/26/2021 0.3 0.2 - 1.3 mg/dL Final     Albumin   Date Value Ref Range Status   05/26/2021 3.6 3.4 - 5.0 g/dL Final     Alkaline Phosphatase   Date Value Ref Range Status   05/26/2021 98 40 - 150 U/L Final       Most recent creatinine:  Creatinine   Date Value Ref Range Status   11/15/2024 0.84 0.51 - 0.95 mg/dL Final   05/26/2021 0.65 0.52 - 1.04 mg/dL Final     Endoscopy:   9/2021:  The perianal and digital rectal examinations were normal.        The Simple Endoscopic Score for Crohn's Disease was determined based on        the endoscopic appearance of the mucosa in the following segments:        - Ileum: Findings include no ulcers present, no ulcerated surfaces, no        affected surfaces and no narrowings. Segment score: 0.        - Right Colon: Findings include no ulcers present, no ulcerated        surfaces, no affected surfaces and no narrowings. Segment score: 0.        - Transverse Colon: Findings include no ulcers present, no ulcerated        surfaces, no affected surfaces and no narrowings. Segment score: 0.        - Left Colon: Findings include no ulcers present, no ulcerated " surfaces,        no affected surfaces and no narrowings. Segment score: 0.        - Rectum: Findings include no ulcers present, no ulcerated surfaces, no        affected surfaces and no narrowings. Segment score: 0.        - Total SES-CD aggregate score: 0.        Non-bleeding internal hemorrhoids were found during retroflexion. The        hemorrhoids were mild.        An inverted diverticulum (about 1 cm) was seen in the sigmoid colon.                                           Imaging:  MRE 7/2021:  IMPRESSION: Short segment of fibrosis involving the terminal ileum,  spanning approximately 2 cm. No upstream small bowel dilation. No  evidence of active inflammation at this area or elsewhere throughout  the small bowel or colon.     I have personally reviewed the examination and initial interpretation  and I agree with the findings.      IMPRESSION:    Ms. Monreal is a 74 year old here with Crohn's ileitis, likely stricturing, requiring resection in 2012, previously on Remicade (infusion reaction), Humira (stopped for financial reasons), Cimzia and methotrexate (stopped due to hospitalization for breathing issues, but unrelated to Crohn's or treatment), currently on no maintenance therapy. Colonoscopy in 9/2021 showed no disease.    Carli endorses significant, post-prandial diarrhea for at least 8 months, although I believe this has been going on for years. Stool studies were negative for infection and inflammation and blood work was negative for celiac disease. Colestipol has been very helpful.     We will proceed with the following:    DIAGNOSIS:  # Diarrhea  # Likely stricturing Crohn's ileitis, s/p resection in 2012 previously on anti-TNF therapy, on no therapy x years     PLAN:  --Continue colestipol BID  --OK to take imodium prn  --Colonoscopy with biopsies to rule out microscopic colitis and active Crohn's disease, currently scheduled for 3/24  --Avoid NSAIDs     RTC 6 months     Keesha Mancuso MD  Associate  Professor of Medicine  Division of Gastroenterology, Hepatology and Nutrition  [facility]    30 minutes spent on the date of the encounter performing chart review, history and exam, documentation and further activities as noted above.

## 2025-03-18 NOTE — NURSING NOTE
"Do you have a history of colon cancer in your immediate family? NO    If yes who: negative     And what age  were they diagnosed:       Chief Complaint   Patient presents with    Follow Up       Vitals:    03/18/25 1329   BP: (!) 142/73   Pulse: 78   SpO2: 98%   Weight: 76.6 kg (168 lb 14.4 oz)   Height: 1.499 m (4' 11\")       Body mass index is 34.11 kg/m .    Shirin Alvarado MA    "

## 2025-03-18 NOTE — LETTER
3/18/2025      Carli Monreal  [address]  [place] [address]      Dear Colleague,    Thank you for referring your patient, Carli Monreal, to the [facility] [place]. Please see a copy of my visit note below.    CD follow up  MRN: [id number]    Date of Birth 1950    Tel: [phone] (home)     PCP: Iza Cage     HPI: Ms. Monreal is a 74 year old female here for evaluation of Crohn's disease.     When she was 19, was told she had colitis in the setting of loose stools +/- blood. Diagnosed with CD in 2000/2001 when presented to the hospital with severe abd pain.      Was on Remicade around 2005, but then stopped due to an infusion reaction (sharp back and chest pains). Transitioned to Humira but then stopped due to inability to pay. In 2012 had an SBO and underwent surgical resection.  Then switched to Cimzia/MTX, got sick and then stopped this medication Summer 2020.  Was hospitalized in July 2020 for breathing issues. Has not been on any Crohn's maintenance therapy since then.        Last seen by me in 6/2021 and was reporting 5-10 BMs per day as well as arthralgia.     Has not retried cholestyramine since 2020.   Takes imodium (2 pills will work).   Typically, does not take imodium but when she does, takes up to 3-4 pills per day.   Endorses significant gas.     Noteworthy diet history- unremarkable    HBI  General well-being 2 = poor  Abdominal pain 0 = none  Number of liquid stools per day 10-15, + FI (post prandial)  Abdominal mass 0 = none  Current Complications Arthralgia    Constitutional symptoms:  Fever NO  Weight loss NO    Extraintestinal manifestations (anytime during the course of disease)  Possible arthralgias    Patric Classification  AGE AT DIAGNOSIS: A3 above 40 y  CURRENT DISEASE LOCATION: L1: ileal  L4 upper GI: NO  DISEASE BEHAVIOR (since disease onset): B2: stricturing  Perianal disease: NO    Total number of IBD surgeries (except  perianal): 1 (resection in )     Current IBD Medications:  none    Past IBD Medications:  Remicade  Humira  Cimzia +MTX    Interval history, 3/2025 (in person)    Colestipol is working well. Taking this twice per day. Has 3-5 BMs per day while on this. Thinks this is significantly improved and is happy.     No weight loss. No blood in stool.     Past Medical History:   Diagnosis Date     Diabetes mellitus (H)      Right bundle branch block      Uncomplicated asthma         Past Surgical History:   Procedure Laterality Date     ARTHROPLASTY CARPOMETACARPAL (THUMB JOINT) Right 2023    Procedure: ARTHROPLASTY, CARPOMETACARPAL JOINT,RIGHT  THUMB;  Surgeon: Jasmeet Haque MD;  Location: [facility] OR     CATARACT IOL, RT/LT Bilateral     Valeriano Javier MD ([place], [place]).     COLONOSCOPY N/A 2021    Procedure: COLONOSCOPY;  Surgeon: Keesha Mancuso MD;  Location: [facility] OR     INJECT NERVE BLOCK SUPRASCAPULAR Left 8/15/2024    Procedure: BLOCK, NERVE, SUPRASCAPULAR (left);  Surgeon: Ana M Nicholson MD;  Location: [facility] OR     SMALL BOWEL RESECTION         Social History     Tobacco Use     Smoking status: Former     Current packs/day: 0.00     Types: Cigarettes     Quit date: 1991     Years since quittin.9     Passive exposure: Never     Smokeless tobacco: Never   Substance Use Topics     Alcohol use: Yes     Comment: Wine- couple x/ week       Family History   Problem Relation Age of Onset     Rheumatoid Arthritis Mother      Eye Surgery Father      Glaucoma Father      Amblyopia Cousin      Inflammatory Bowel Disease No family hx of      Colon Cancer No family hx of      Macular Degeneration No family hx of        Allergies   Allergen Reactions     Seasonal Allergies         Outpatient Encounter Medications as of 3/18/2025   Medication Sig Dispense Refill     alcohol swab prep pads Use to swab area of injection/venkata as directed. 100 each 3     amLODIPine (NORVASC) 2.5 MG tablet TAKE ONE  TABLET BY MOUTH ONE TIME DAILY 90 tablet 0     Ascorbic Acid (VITAMIN C) 500 MG CAPS Take 500 mg by mouth daily.       atorvastatin (LIPITOR) 10 MG tablet Take 1 tablet (10 mg) by mouth daily Do not fill until contacted by patient 90 tablet 3     bisacodyl (DULCOLAX) 5 MG EC tablet Two days prior to exam take two (2) tablets at 4pm. One day prior to exam take two (2) tablets at 4pm 4 tablet 0     blood glucose (NO BRAND SPECIFIED) lancets standard Use to test blood sugar 1 times daily or as directed. 100 Lancet 11     blood glucose (ONETOUCH VERIO IQ) test strip USE TO TEST BLOOD GLUCOSE ONCE DAILY 100 strip 2     budesonide-formoterol (SYMBICORT) 160-4.5 MCG/ACT Inhaler Inhale 2 puffs into the lungs 2 times daily 10.2 g 4     colestipol (COLESTID) 1 g tablet Take 1 tablet (1 g) by mouth 2 times daily. Take 1 tablet (1g) by mouth 2 times daily. 180 tablet 1     cyanocobalamin 1000 MCG sublingual tablet Place 1,000 mcg under the tongue daily.       famotidine (PEPCID) 40 MG tablet Take 1 tablet (40 mg) by mouth 2 times daily 180 tablet 1     folic acid (FOLVITE) 1 MG tablet TAKE ONE TABLET BY MOUTH ONE TIME DAILY 90 tablet 0     gabapentin (NEURONTIN) 100 MG capsule Take 1 capsule (100 mg) by mouth 3 times daily as needed for neuropathic pain 90 capsule 3     Lancets (ONETOUCH DELICA PLUS [id number]) MISC USE TO TEST BLOOD GLUCOSE ONCE DAILY 100 each 0     loperamide (IMODIUM) 2 MG capsule Take 2 mg by mouth 4 times daily as needed for diarrhea.       losartan (COZAAR) 100 MG tablet TAKE ONE TABLET BY MOUTH ONE TIME DAILY 90 tablet 0     metFORMIN (GLUCOPHAGE XR) 500 MG 24 hr tablet Take two tablets by mouth once daily with dinner. 180 tablet 0     multivitamin w/minerals (THERA-VIT-M) tablet Take 1 tablet by mouth daily       polyethylene glycol (GOLYTELY) 236 g suspension Two days before procedure at 5PM fill first container with water. Mix and drink an 8 oz glass every 15 minutes until HALF of the container is  "gone. Place the remainder in the refrigerator. One day before procedure at 5PM drink second half of bowel prep. Drink an 8 oz glass every 15 minutes until it is gone. Day of procedure 6 hours before arrival time fill the 2nd container with water. Mix and drink an 8 oz glass every 15 minutes until HALF of the container is gone. Discard the remaining solution. 8000 mL 0     potassium chloride daniel ER (KLOR-CON M20) 20 MEQ CR tablet Take 1 tablet (20 mEq) by mouth daily 90 tablet 1     scopolamine (TRANSDERM) 1 MG/3DAYS 72 hr patch Place 1 patch onto the skin every 72 hours 12 patch 1     Vitamin D, Cholecalciferol, 25 MCG (1000 UT) TABS Take 2 tablets by mouth daily       No facility-administered encounter medications on file as of 3/18/2025.      NSAID  YES 2 x/month    Review of Systems  Complete 10 System ROS performed. All are negative except as documented below, in the HPI, or in patient questionnaire from today's visit.    1) Constitutional: No fevers, chills, night sweats or malaise, weight loss or gain  2) Skin: No rash  3) Pulmonary: No wheeze, SOB, cough, sputum or hemoptysis  4) Cardiovascular: No Chest pain or palpitations  5) Genitourinary: No blood in urine or dysuria  6) Endocrine: No increased sweating, hunger, thirst or thyroid problems  7) Hematologic: No bruising and easy bleeding  8) Musculoskeletal: no new pain in joints or limitation in ROM  9) Neurologic: No dizziness, paresthesias or weakness or falls  10) Psychiatric:  not depressed/anxious, no sleep problems    PHYSICAL EXAM  Vitals: BP (!) 142/73   Pulse 78   Ht 1.499 m (4' 11\")   Wt 76.6 kg (168 lb 14.4 oz)   SpO2 98%   BMI 34.11 kg/m      No Pain (0)     General appearance  Healthy appearing adult, in no acute distress     Eyes  Sclera anicteric  Pupils round and reactive to light     Ears, nose, mouth and throat  No obvious external lesions of ears and nose  Hearing intact     Neck  Symmetric  No obvious external lesions   " "  Respiratory  Normal respiration, no use of accessory muscles      MSK  Gait normal     Skin  No rashes or jaundice      Psychiatric  Oriented to person, place and time  Appropriate mood and affect.       DATA:  Reviewed in detail past documentation, medications and prior workup available in electronic health records or through outside records.    PERTINENT STUDIES:    6/2021 Fecal calprotectin 50.2     Most recent CBC:  WBC   Date Value Ref Range Status   05/26/2021 6.2 4.0 - 11.0 10e9/L Final     WBC Count   Date Value Ref Range Status   11/15/2024 7.8 4.0 - 11.0 10e3/uL Final   ]  Hemoglobin   Date Value Ref Range Status   11/15/2024 11.1 (L) 11.7 - 15.7 g/dL Final   05/26/2021 11.7 11.7 - 15.7 g/dL Final   ]   Platelet Count   Date Value Ref Range Status   11/15/2024 195 150 - 450 10e3/uL Final   05/26/2021 191 150 - 450 10e9/L Final       Most recent coag:  No results found for: \"INR\"    Most recent hepatic panel:  AST   Date Value Ref Range Status   05/26/2021 35 0 - 45 U/L Final     ALT   Date Value Ref Range Status   05/26/2021 38 0 - 50 U/L Final     No results found for: \"BILICONJ\"   Bilirubin Total   Date Value Ref Range Status   05/26/2021 0.3 0.2 - 1.3 mg/dL Final     Albumin   Date Value Ref Range Status   05/26/2021 3.6 3.4 - 5.0 g/dL Final     Alkaline Phosphatase   Date Value Ref Range Status   05/26/2021 98 40 - 150 U/L Final       Most recent creatinine:  Creatinine   Date Value Ref Range Status   11/15/2024 0.84 0.51 - 0.95 mg/dL Final   05/26/2021 0.65 0.52 - 1.04 mg/dL Final     Endoscopy:   9/2021:  The perianal and digital rectal examinations were normal.        The Simple Endoscopic Score for Crohn's Disease was determined based on        the endoscopic appearance of the mucosa in the following segments:        - Ileum: Findings include no ulcers present, no ulcerated surfaces, no        affected surfaces and no narrowings. Segment score: 0.        - Right Colon: Findings include no ulcers " present, no ulcerated        surfaces, no affected surfaces and no narrowings. Segment score: 0.        - Transverse Colon: Findings include no ulcers present, no ulcerated        surfaces, no affected surfaces and no narrowings. Segment score: 0.        - Left Colon: Findings include no ulcers present, no ulcerated surfaces,        no affected surfaces and no narrowings. Segment score: 0.        - Rectum: Findings include no ulcers present, no ulcerated surfaces, no        affected surfaces and no narrowings. Segment score: 0.        - Total SES-CD aggregate score: 0.        Non-bleeding internal hemorrhoids were found during retroflexion. The        hemorrhoids were mild.        An inverted diverticulum (about 1 cm) was seen in the sigmoid colon.                                           Imaging:  MRE 7/2021:  IMPRESSION: Short segment of fibrosis involving the terminal ileum,  spanning approximately 2 cm. No upstream small bowel dilation. No  evidence of active inflammation at this area or elsewhere throughout  the small bowel or colon.     I have personally reviewed the examination and initial interpretation  and I agree with the findings.      IMPRESSION:    Ms. Monreal is a 74 year old here with Crohn's ileitis, likely stricturing, requiring resection in 2012, previously on Remicade (infusion reaction), Humira (stopped for financial reasons), Cimzia and methotrexate (stopped due to hospitalization for breathing issues, but unrelated to Crohn's or treatment), currently on no maintenance therapy. Colonoscopy in 9/2021 showed no disease.    Carli endorses significant, post-prandial diarrhea for at least 8 months, although I believe this has been going on for years. Stool studies were negative for infection and inflammation and blood work was negative for celiac disease. Colestipol has been very helpful.     We will proceed with the following:    DIAGNOSIS:  # Diarrhea  # Likely stricturing Crohn's ileitis, s/p  resection in 2012 previously on anti-TNF therapy, on no therapy x years     PLAN:  --Continue colestipol BID  --OK to take imodium prn  --Colonoscopy with biopsies to rule out microscopic colitis and active Crohn's disease, currently scheduled for 3/24  --Avoid NSAIDs     RTC 6 months     Keesha Mancuso MD  Associate Professor of Medicine  Division of Gastroenterology, Hepatology and Nutrition  [facility]    30 minutes spent on the date of the encounter performing chart review, history and exam, documentation and further activities as noted above.          Again, thank you for allowing me to participate in the care of your patient.        Sincerely,        Keesha Mancuso MD    Electronically signed

## 2025-03-21 RX ORDER — ONDANSETRON 2 MG/ML
4 INJECTION INTRAMUSCULAR; INTRAVENOUS EVERY 6 HOURS PRN
Status: CANCELLED | OUTPATIENT
Start: 2025-03-21

## 2025-03-21 RX ORDER — NALOXONE HYDROCHLORIDE 0.4 MG/ML
0.2 INJECTION, SOLUTION INTRAMUSCULAR; INTRAVENOUS; SUBCUTANEOUS
Status: CANCELLED | OUTPATIENT
Start: 2025-03-21

## 2025-03-21 RX ORDER — ONDANSETRON 4 MG/1
4 TABLET, ORALLY DISINTEGRATING ORAL EVERY 6 HOURS PRN
Status: CANCELLED | OUTPATIENT
Start: 2025-03-21

## 2025-03-21 RX ORDER — ONDANSETRON 2 MG/ML
4 INJECTION INTRAMUSCULAR; INTRAVENOUS
Status: CANCELLED | OUTPATIENT
Start: 2025-03-21

## 2025-03-21 RX ORDER — FLUMAZENIL 0.1 MG/ML
0.2 INJECTION, SOLUTION INTRAVENOUS
Status: CANCELLED | OUTPATIENT
Start: 2025-03-21 | End: 2025-03-22

## 2025-03-21 RX ORDER — NALOXONE HYDROCHLORIDE 0.4 MG/ML
0.4 INJECTION, SOLUTION INTRAMUSCULAR; INTRAVENOUS; SUBCUTANEOUS
Status: CANCELLED | OUTPATIENT
Start: 2025-03-21

## 2025-03-21 RX ORDER — PROCHLORPERAZINE MALEATE 5 MG/1
5 TABLET ORAL EVERY 6 HOURS PRN
Status: CANCELLED | OUTPATIENT
Start: 2025-03-21

## 2025-03-24 ENCOUNTER — ANESTHESIA (OUTPATIENT)
Dept: SURGERY | Facility: AMBULATORY SURGERY CENTER | Age: 75
End: 2025-03-24
Payer: COMMERCIAL

## 2025-03-24 ENCOUNTER — ANESTHESIA EVENT (OUTPATIENT)
Dept: SURGERY | Facility: AMBULATORY SURGERY CENTER | Age: 75
End: 2025-03-24
Payer: COMMERCIAL

## 2025-03-24 ENCOUNTER — HOSPITAL ENCOUNTER (OUTPATIENT)
Facility: AMBULATORY SURGERY CENTER | Age: 75
Discharge: HOME | End: 2025-03-24
Attending: INTERNAL MEDICINE | Admitting: INTERNAL MEDICINE
Payer: COMMERCIAL

## 2025-03-24 VITALS
OXYGEN SATURATION: 98 % | SYSTOLIC BLOOD PRESSURE: 118 MMHG | HEART RATE: 75 BPM | BODY MASS INDEX: 33.26 KG/M2 | DIASTOLIC BLOOD PRESSURE: 72 MMHG | WEIGHT: 165 LBS | HEIGHT: 59 IN | RESPIRATION RATE: 16 BRPM | TEMPERATURE: 98 F

## 2025-03-24 VITALS — HEART RATE: 74 BPM

## 2025-03-24 LAB
COLONOSCOPY: NORMAL
GLUCOSE BLDC GLUCOMTR-MCNC: 133 MG/DL (ref 70–99)

## 2025-03-24 PROCEDURE — 88305 TISSUE EXAM BY PATHOLOGIST: CPT | Mod: 26 | Performed by: PATHOLOGY

## 2025-03-24 PROCEDURE — 45380 COLONOSCOPY AND BIOPSY: CPT | Performed by: INTERNAL MEDICINE

## 2025-03-24 PROCEDURE — 88305 TISSUE EXAM BY PATHOLOGIST: CPT | Mod: TC | Performed by: INTERNAL MEDICINE

## 2025-03-24 PROCEDURE — 82962 GLUCOSE BLOOD TEST: CPT | Performed by: PATHOLOGY

## 2025-03-24 RX ORDER — PROPOFOL 10 MG/ML
INJECTION, EMULSION INTRAVENOUS PRN
Status: DISCONTINUED | OUTPATIENT
Start: 2025-03-24 | End: 2025-03-24

## 2025-03-24 RX ORDER — LIDOCAINE HYDROCHLORIDE 20 MG/ML
INJECTION, SOLUTION INFILTRATION; PERINEURAL PRN
Status: DISCONTINUED | OUTPATIENT
Start: 2025-03-24 | End: 2025-03-24

## 2025-03-24 RX ORDER — SODIUM CHLORIDE, SODIUM LACTATE, POTASSIUM CHLORIDE, CALCIUM CHLORIDE 600; 310; 30; 20 MG/100ML; MG/100ML; MG/100ML; MG/100ML
INJECTION, SOLUTION INTRAVENOUS CONTINUOUS PRN
Status: DISCONTINUED | OUTPATIENT
Start: 2025-03-24 | End: 2025-03-24

## 2025-03-24 RX ORDER — LIDOCAINE 40 MG/G
CREAM TOPICAL
Status: DISCONTINUED | OUTPATIENT
Start: 2025-03-24 | End: 2025-03-25 | Stop reason: HOSPADM

## 2025-03-24 RX ORDER — PROPOFOL 10 MG/ML
INJECTION, EMULSION INTRAVENOUS CONTINUOUS PRN
Status: DISCONTINUED | OUTPATIENT
Start: 2025-03-24 | End: 2025-03-24

## 2025-03-24 RX ADMIN — PROPOFOL 100 MG: 10 INJECTION, EMULSION INTRAVENOUS at 13:02

## 2025-03-24 RX ADMIN — SODIUM CHLORIDE, SODIUM LACTATE, POTASSIUM CHLORIDE, CALCIUM CHLORIDE: 600; 310; 30; 20 INJECTION, SOLUTION INTRAVENOUS at 12:59

## 2025-03-24 RX ADMIN — PROPOFOL 200 MCG/KG/MIN: 10 INJECTION, EMULSION INTRAVENOUS at 13:04

## 2025-03-24 RX ADMIN — LIDOCAINE HYDROCHLORIDE 100 MG: 20 INJECTION, SOLUTION INFILTRATION; PERINEURAL at 13:01

## 2025-03-24 NOTE — H&P
Carli Monreal  [id number]  female  74 year old      Reason for procedure/surgery: diarrhea, Crohn's disease    Patient Active Problem List   Diagnosis    Moderate persistent asthma, unspecified whether complicated    Essential hypertension    Type 2 diabetes mellitus without complication, without long-term current use of insulin (H)    Rheumatoid arthritis involving both hands, unspecified whether rheumatoid factor present (H)    Arthropathy in Crohn's disease (H)    Crohn's disease of small intestine with complication (H)    Hypokalemia    Insomnia, unspecified type    Arthritis of fyjwbtbf-adhrujlqt-wmaeiozmy joint of right hand    Arthritis of carpometacarpal (CMC) joint of right thumb       Past Surgical History:    Past Surgical History:   Procedure Laterality Date    ARTHROPLASTY CARPOMETACARPAL (THUMB JOINT) Right 2023    Procedure: ARTHROPLASTY, CARPOMETACARPAL JOINT,RIGHT  THUMB;  Surgeon: Jasmeet Haque MD;  Location: [facility] OR    CATARACT IOL, RT/LT Bilateral     Valeriano Javier MD ([place], [place]).    COLONOSCOPY N/A 2021    Procedure: COLONOSCOPY;  Surgeon: Keesha Mancuso MD;  Location: [facility] OR    INJECT NERVE BLOCK SUPRASCAPULAR Left 8/15/2024    Procedure: BLOCK, NERVE, SUPRASCAPULAR (left);  Surgeon: Ana M Nicholson MD;  Location: [facility] OR    SMALL BOWEL RESECTION         Past Medical History:   Past Medical History:   Diagnosis Date    Diabetes mellitus (H)     Right bundle branch block     Uncomplicated asthma        Social History:   Social History     Tobacco Use    Smoking status: Former     Current packs/day: 0.00     Types: Cigarettes     Quit date: 1991     Years since quittin.9     Passive exposure: Never    Smokeless tobacco: Never   Substance Use Topics    Alcohol use: Yes     Comment: Wine- couple x/ week       Family History:   Family History   Problem Relation Age of Onset    Rheumatoid Arthritis Mother     Eye Surgery Father     Glaucoma Father     Amblyopia  Cousin     Inflammatory Bowel Disease No family hx of     Colon Cancer No family hx of     Macular Degeneration No family hx of        Allergies:   Allergies   Allergen Reactions    Seasonal Allergies        Active Medications:   Current Outpatient Medications   Medication Sig Dispense Refill    amLODIPine (NORVASC) 2.5 MG tablet TAKE ONE TABLET BY MOUTH ONE TIME DAILY 90 tablet 0    budesonide-formoterol (SYMBICORT) 160-4.5 MCG/ACT Inhaler Inhale 2 puffs into the lungs 2 times daily 10.2 g 4    cyanocobalamin 1000 MCG sublingual tablet Place 1,000 mcg under the tongue daily.      famotidine (PEPCID) 40 MG tablet Take 1 tablet (40 mg) by mouth 2 times daily 180 tablet 1    folic acid (FOLVITE) 1 MG tablet TAKE ONE TABLET BY MOUTH ONE TIME DAILY 90 tablet 0    losartan (COZAAR) 100 MG tablet TAKE ONE TABLET BY MOUTH ONE TIME DAILY 90 tablet 0    polyethylene glycol (GOLYTELY) 236 g suspension Two days before procedure at 5PM fill first container with water. Mix and drink an 8 oz glass every 15 minutes until HALF of the container is gone. Place the remainder in the refrigerator. One day before procedure at 5PM drink second half of bowel prep. Drink an 8 oz glass every 15 minutes until it is gone. Day of procedure 6 hours before arrival time fill the 2nd container with water. Mix and drink an 8 oz glass every 15 minutes until HALF of the container is gone. Discard the remaining solution. 8000 mL 0    potassium chloride daniel ER (KLOR-CON M20) 20 MEQ CR tablet Take 1 tablet (20 mEq) by mouth daily 90 tablet 1    scopolamine (TRANSDERM) 1 MG/3DAYS 72 hr patch Place 1 patch onto the skin every 72 hours 12 patch 1    alcohol swab prep pads Use to swab area of injection/venkata as directed. 100 each 3    Ascorbic Acid (VITAMIN C) 500 MG CAPS Take 500 mg by mouth daily.      atorvastatin (LIPITOR) 10 MG tablet Take 1 tablet (10 mg) by mouth daily Do not fill until contacted by patient 90 tablet 3    bisacodyl (DULCOLAX) 5 MG EC  "tablet Two days prior to exam take two (2) tablets at 4pm. One day prior to exam take two (2) tablets at 4pm 4 tablet 0    blood glucose (NO BRAND SPECIFIED) lancets standard Use to test blood sugar 1 times daily or as directed. 100 Lancet 11    blood glucose (ONETOUCH VERIO IQ) test strip USE TO TEST BLOOD GLUCOSE ONCE DAILY 100 strip 2    colestipol (COLESTID) 1 g tablet Take 1 tablet (1 g) by mouth 2 times daily. Take 1 tablet (1g) by mouth 2 times daily. 180 tablet 1    gabapentin (NEURONTIN) 100 MG capsule Take 1 capsule (100 mg) by mouth 3 times daily as needed for neuropathic pain 90 capsule 3    Lancets (ONETOUCH DELICA PLUS [id number]) MISC USE TO TEST BLOOD GLUCOSE ONCE DAILY 100 each 0    loperamide (IMODIUM) 2 MG capsule Take 2 mg by mouth 4 times daily as needed for diarrhea.      metFORMIN (GLUCOPHAGE XR) 500 MG 24 hr tablet Take two tablets by mouth once daily with dinner. 180 tablet 0    multivitamin w/minerals (THERA-VIT-M) tablet Take 1 tablet by mouth daily      Vitamin D, Cholecalciferol, 25 MCG (1000 UT) TABS Take 2 tablets by mouth daily         Systemic Review:   CONSTITUTIONAL: NEGATIVE for fever, chills, change in weight  ENT/MOUTH: NEGATIVE for ear, mouth and throat problems  RESP: NEGATIVE for significant cough or SOB  CV: NEGATIVE for chest pain, palpitations or peripheral edema    Physical Examination:   Vital Signs: /59   Pulse 78   Temp 97.9  F (36.6  C) (Temporal)   Resp 16   Ht 1.499 m (4' 11\")   Wt 74.8 kg (165 lb)   SpO2 95%   BMI 33.33 kg/m    GENERAL: healthy, alert and no distress  NECK: no adenopathy, no asymmetry, masses, or scars  RESP: lungs clear to auscultation - no rales, rhonchi or wheezes  CV: regular rate and rhythm, normal S1 S2, no S3 or S4, no murmur, click or rub, no peripheral edema and peripheral pulses strong  ABDOMEN: soft, nontender, no hepatosplenomegaly, no masses and bowel sounds normal  MS: no gross musculoskeletal defects noted, no " edema    Plan: Appropriate to proceed as scheduled.      Keesha Mancuso MD  3/24/2025    PCP:  Iza Cage

## 2025-03-24 NOTE — ANESTHESIA CARE TRANSFER NOTE
Patient: Carli Monreal    Procedure: Procedure(s):  COLONOSCOPY, WITH BIOPSY       Diagnosis: Crohn's disease of small intestine with complication (H) [K50.019]  Diagnosis Additional Information: No value filed.    Anesthesia Type:   MAC     Note:    Oropharynx: spontaneously breathing  Level of Consciousness: drowsy  Oxygen Supplementation: room air    Independent Airway: airway patency satisfactory and stable  Dentition: dentition unchanged  Vital Signs Stable: post-procedure vital signs reviewed and stable  Report to RN Given: handoff report given  Patient transferred to: Phase II    Handoff Report: Identifed the Patient, Identified the Reponsible Provider, Reviewed the pertinent medical history, Discussed the surgical course, Reviewed Intra-OP anesthesia mangement and issues during anesthesia, Set expectations for post-procedure period and Allowed opportunity for questions and acknowledgement of understanding      Vitals:  Vitals Value Taken Time   /59 03/24/25 1335   Temp 36.6  C (97.9  F) 03/24/25 1335   Pulse 78 03/24/25 1335   Resp 16 03/24/25 1335   SpO2 98 % 03/24/25 1337   Vitals shown include unfiled device data.    Electronically Signed By: CATRACHITA Jones CRNA  March 24, 2025  1:37 PM

## 2025-03-24 NOTE — ANESTHESIA POSTPROCEDURE EVALUATION
Patient: Carli Monreal    Procedure: Procedure(s):  COLONOSCOPY, WITH BIOPSY       Anesthesia Type:  MAC    Note:  Disposition: Outpatient   Postop Pain Control: Uneventful            Sign Out: Well controlled pain   PONV: No   Neuro/Psych: Uneventful            Sign Out: Acceptable/Baseline neuro status   Airway/Respiratory: Uneventful            Sign Out: Acceptable/Baseline resp. status   CV/Hemodynamics: Uneventful            Sign Out: Acceptable CV status; No obvious hypovolemia; No obvious fluid overload   Other NRE: NONE   DID A NON-ROUTINE EVENT OCCUR? No           Last vitals:  Vitals Value Taken Time   /72 03/24/25 1345   Temp 36.7  C (98  F) 03/24/25 1345   Pulse 75 03/24/25 1345   Resp 16 03/24/25 1345   SpO2 81 % 03/24/25 1349   Vitals shown include unfiled device data.    Electronically Signed By: Teresa Dejesus MD  March 24, 2025  3:04 PM

## 2025-03-24 NOTE — ANESTHESIA PREPROCEDURE EVALUATION
Anesthesia Pre-Procedure Evaluation    Patient: Carli Monreal   MRN: [id number] : 1950        Procedure : Procedure(s):  Colonoscopy          Past Medical History:   Diagnosis Date    Diabetes mellitus (H)     Right bundle branch block     Uncomplicated asthma       Past Surgical History:   Procedure Laterality Date    ARTHROPLASTY CARPOMETACARPAL (THUMB JOINT) Right 2023    Procedure: ARTHROPLASTY, CARPOMETACARPAL JOINT,RIGHT  THUMB;  Surgeon: Jasmeet Haque MD;  Location: [facility] OR    CATARACT IOL, RT/LT Bilateral     Valeriano Javier MD ([place], [place]).    COLONOSCOPY N/A 2021    Procedure: COLONOSCOPY;  Surgeon: Keesha Mancuso MD;  Location: [facility] OR    INJECT NERVE BLOCK SUPRASCAPULAR Left 8/15/2024    Procedure: BLOCK, NERVE, SUPRASCAPULAR (left);  Surgeon: Ana M Nicholson MD;  Location: [facility] OR    SMALL BOWEL RESECTION        Allergies   Allergen Reactions    Seasonal Allergies       Social History     Tobacco Use    Smoking status: Former     Current packs/day: 0.00     Types: Cigarettes     Quit date: 1991     Years since quittin.9     Passive exposure: Never    Smokeless tobacco: Never   Substance Use Topics    Alcohol use: Yes     Comment: Wine- couple x/ week      Wt Readings from Last 1 Encounters:   25 74.8 kg (165 lb)        Anesthesia Evaluation            ROS/MED HX  ENT/Pulmonary:     (+)                     Moderate Persistent, asthma                  Neurologic:       Cardiovascular:     (+)  hypertension- -   -  - -                                      METS/Exercise Tolerance:     Hematologic:       Musculoskeletal:   (+)  arthritis (rheumatoid),             GI/Hepatic:       Renal/Genitourinary:       Endo:     (+)  type II DM,   Not using insulin,                 Psychiatric/Substance Use:       Infectious Disease:       Malignancy:       Other:            Physical Exam    Airway        Mallampati: II   TM distance: > 3 FB   Neck ROM: full   Mouth  "opening: > 3 cm    Respiratory Devices and Support         Dental       (+) Completely normal teeth      Cardiovascular   cardiovascular exam normal          Pulmonary   pulmonary exam normal                OUTSIDE LABS:  CBC:   Lab Results   Component Value Date    WBC 7.8 11/15/2024    WBC 5.7 01/03/2023    HGB 11.1 (L) 11/15/2024    HGB 10.8 (L) 01/03/2023    HCT 34.2 (L) 11/15/2024    HCT 34.0 (L) 01/03/2023     11/15/2024     01/03/2023     BMP:   Lab Results   Component Value Date     11/15/2024     05/31/2024    POTASSIUM 4.0 11/15/2024    POTASSIUM 4.4 05/31/2024    CHLORIDE 108 (H) 11/15/2024    CHLORIDE 106 05/31/2024    CO2 22 11/15/2024    CO2 23 05/31/2024    BUN 14.3 11/15/2024    BUN 14.9 05/31/2024    CR 0.84 11/15/2024    CR 0.86 05/31/2024     (H) 11/15/2024    GLC 83 08/15/2024     COAGS: No results found for: \"PTT\", \"INR\", \"FIBR\"  POC: No results found for: \"BGM\", \"HCG\", \"HCGS\"  HEPATIC:   Lab Results   Component Value Date    ALBUMIN 3.6 05/26/2021    PROTTOTAL 7.9 05/26/2021    ALT 38 05/26/2021    AST 35 05/26/2021    ALKPHOS 98 05/26/2021    BILITOTAL 0.3 05/26/2021     OTHER:   Lab Results   Component Value Date    A1C 6.1 (H) 11/15/2024    BANDAR 9.9 11/15/2024    TSH 1.56 05/06/2022    CRP <2.9 05/26/2021    SED 22 10/03/2023       Anesthesia Plan    ASA Status:  3       Anesthesia Type: MAC.   Induction: Intravenous.   Maintenance: TIVA.        Consents    Anesthesia Plan(s) and associated risks, benefits, and realistic alternatives discussed. Questions answered and patient/representative(s) expressed understanding.     - Discussed:     - Discussed with:  Patient            Postoperative Care    Pain management: Multi-modal analgesia.   PONV prophylaxis: Background Propofol Infusion     Comments:               Teresa Dejesus MD    Clinically Significant Risk Factors Present on Admission                   # Hypertension: Noted on problem list           # " "Obesity: Estimated body mass index is 33.33 kg/m  as calculated from the following:    Height as of this encounter: 1.499 m (4' 11\").    Weight as of this encounter: 74.8 kg (165 lb).       # Asthma: noted on problem list          "

## 2025-03-25 LAB
PATH REPORT.COMMENTS IMP SPEC: NORMAL
PATH REPORT.COMMENTS IMP SPEC: NORMAL
PATH REPORT.FINAL DX SPEC: NORMAL
PATH REPORT.GROSS SPEC: NORMAL
PATH REPORT.MICROSCOPIC SPEC OTHER STN: NORMAL
PATH REPORT.RELEVANT HX SPEC: NORMAL
PHOTO IMAGE: NORMAL

## 2025-04-03 ENCOUNTER — TELEPHONE (OUTPATIENT)
Dept: FAMILY MEDICINE | Facility: CLINIC | Age: 75
End: 2025-04-03
Payer: COMMERCIAL

## 2025-04-03 NOTE — TELEPHONE ENCOUNTER
Patient Quality Outreach    Patient is due for the following:   Diabetes -  Diabetic Follow-Up Visit  Hypertension -  Hypertension follow-up visit  Physical Annual Wellness Visit    Action(s) Taken:   Schedule a Annual Wellness Visit    Type of outreach:    Chart review performed, no outreach needed.    Patient is scheduled 6/5/25    Questions for provider review:    Myranda Irby CMA  Chart routed to None.

## 2025-04-09 DIAGNOSIS — J45.40 MODERATE PERSISTENT ASTHMA, UNSPECIFIED WHETHER COMPLICATED: ICD-10-CM

## 2025-04-09 RX ORDER — BUDESONIDE AND FORMOTEROL FUMARATE DIHYDRATE 160; 4.5 UG/1; UG/1
2 AEROSOL RESPIRATORY (INHALATION) 2 TIMES DAILY
Qty: 10.2 G | Refills: 1 | Status: SHIPPED | OUTPATIENT
Start: 2025-04-09

## 2025-04-17 ENCOUNTER — OFFICE VISIT (OUTPATIENT)
Dept: FAMILY MEDICINE | Facility: CLINIC | Age: 75
End: 2025-04-17
Payer: COMMERCIAL

## 2025-04-17 VITALS
HEIGHT: 59 IN | WEIGHT: 166.6 LBS | SYSTOLIC BLOOD PRESSURE: 126 MMHG | TEMPERATURE: 98.6 F | DIASTOLIC BLOOD PRESSURE: 80 MMHG | BODY MASS INDEX: 33.59 KG/M2 | OXYGEN SATURATION: 99 % | HEART RATE: 77 BPM | RESPIRATION RATE: 20 BRPM

## 2025-04-17 DIAGNOSIS — J45.40 MODERATE PERSISTENT ASTHMA, UNSPECIFIED WHETHER COMPLICATED: ICD-10-CM

## 2025-04-17 DIAGNOSIS — D64.9 ANEMIA, UNSPECIFIED TYPE: ICD-10-CM

## 2025-04-17 DIAGNOSIS — Z01.818 PREOP GENERAL PHYSICAL EXAM: Primary | ICD-10-CM

## 2025-04-17 DIAGNOSIS — Z23 NEED FOR VACCINATION: ICD-10-CM

## 2025-04-17 DIAGNOSIS — J30.1 SEASONAL ALLERGIC RHINITIS DUE TO POLLEN: ICD-10-CM

## 2025-04-17 DIAGNOSIS — M06.9 RHEUMATOID ARTHRITIS INVOLVING BOTH HANDS, UNSPECIFIED WHETHER RHEUMATOID FACTOR PRESENT (H): ICD-10-CM

## 2025-04-17 DIAGNOSIS — K50.918 CROHN'S DISEASE, UNSPECIFIED, WITH OTHER COMPLICATION (H): ICD-10-CM

## 2025-04-17 DIAGNOSIS — S83.281D ACUTE LATERAL MENISCUS TEAR OF RIGHT KNEE, SUBSEQUENT ENCOUNTER: ICD-10-CM

## 2025-04-17 DIAGNOSIS — E11.9 TYPE 2 DIABETES MELLITUS WITHOUT COMPLICATION, WITHOUT LONG-TERM CURRENT USE OF INSULIN (H): ICD-10-CM

## 2025-04-17 DIAGNOSIS — I10 ESSENTIAL HYPERTENSION: ICD-10-CM

## 2025-04-17 LAB
ANION GAP SERPL CALCULATED.3IONS-SCNC: 13 MMOL/L (ref 7–15)
BUN SERPL-MCNC: 11.7 MG/DL (ref 8–23)
CALCIUM SERPL-MCNC: 9.7 MG/DL (ref 8.8–10.4)
CHLORIDE SERPL-SCNC: 104 MMOL/L (ref 98–107)
CREAT SERPL-MCNC: 0.81 MG/DL (ref 0.51–0.95)
EGFRCR SERPLBLD CKD-EPI 2021: 76 ML/MIN/1.73M2
ERYTHROCYTE [DISTWIDTH] IN BLOOD BY AUTOMATED COUNT: 13.5 % (ref 10–15)
EST. AVERAGE GLUCOSE BLD GHB EST-MCNC: 146 MG/DL
GLUCOSE SERPL-MCNC: 139 MG/DL (ref 70–99)
HBA1C MFR BLD: 6.7 % (ref 0–5.6)
HCO3 SERPL-SCNC: 24 MMOL/L (ref 22–29)
HCT VFR BLD AUTO: 33.7 % (ref 35–47)
HGB BLD-MCNC: 10.9 G/DL (ref 11.7–15.7)
MCH RBC QN AUTO: 27 PG (ref 26.5–33)
MCHC RBC AUTO-ENTMCNC: 32.3 G/DL (ref 31.5–36.5)
MCV RBC AUTO: 84 FL (ref 78–100)
PLATELET # BLD AUTO: 193 10E3/UL (ref 150–450)
POTASSIUM SERPL-SCNC: 4.5 MMOL/L (ref 3.4–5.3)
RBC # BLD AUTO: 4.03 10E6/UL (ref 3.8–5.2)
SODIUM SERPL-SCNC: 141 MMOL/L (ref 135–145)
WBC # BLD AUTO: 6.6 10E3/UL (ref 4–11)

## 2025-04-17 RX ORDER — LORATADINE 10 MG/1
10 TABLET ORAL DAILY
Qty: 30 TABLET | Refills: 5 | Status: SHIPPED | OUTPATIENT
Start: 2025-04-17

## 2025-04-17 ASSESSMENT — PAIN SCALES - GENERAL: PAINLEVEL_OUTOF10: NO PAIN (0)

## 2025-04-17 ASSESSMENT — ASTHMA QUESTIONNAIRES: ACT_TOTALSCORE: 15

## 2025-04-17 NOTE — PROGRESS NOTES
Preoperative Evaluation  [address] [address]  Phone: [phone]  Fax: [phone]  Primary Provider: Iza Cage DO  Pre-op Performing Provider: Iza Cage DO  Apr 17, 2025 4/17/2025   Surgical Information   What procedure is being done? right knee meniscus repair   Facility or Hospital where procedure/surgery will be performed: [facility] in [place]   Who is doing the procedure / surgery? Dr Salazar   Date of surgery / procedure: April 29 2025   Time of surgery / procedure: Unknown at this time   Where do you plan to recover after surgery? at home with family     Fax number for surgical facility: [phone]    Assessment & Plan     The proposed surgical procedure is considered INTERMEDIATE risk.    Preop general physical exam  Low risk for complication.     Patient wants to stop her potassium so we will recheck next week prior to the procedure    Acute lateral meniscus tear of right knee, subsequent encounter  Surgical correction planned    Rheumatoid arthritis involving both hands, unspecified whether rheumatoid factor present (H)  Noted    Type 2 diabetes mellitus without complication, without long-term current use of insulin (H)  Stable  Lab Results   Component Value Date    A1C 6.7 04/17/2025    A1C 6.1 11/15/2024    A1C 6.3 05/31/2024    A1C 6.1 12/07/2023    A1C 6.2 05/08/2023    A1C 7.0 04/13/2021    A1C 8.4 02/09/2021       - HEMOGLOBIN A1C; Future  - Basic metabolic panel  (Ca, Cl, CO2, Creat, Gluc, K, Na, BUN); Future  - HEMOGLOBIN A1C  - Basic metabolic panel  (Ca, Cl, CO2, Creat, Gluc, K, Na, BUN)    Moderate persistent asthma, unspecified whether complicated  Stable. The current medical regimen is effective;  continue present plan and medications.      Essential hypertension  The current medical regimen is effective;  continue present plan and medications.      Crohn's disease, unspecified, with other complication  (H)  Stable    Need for vaccination             Risks and Recommendations  The patient has the following additional risks and recommendations for perioperative complications:  Cardiovascular:    Diabetes:  - Patient is not on insulin therapy: regular NPO guidelines can be followed.   Pulmonary:    - Incentive spirometry post-op  Anemia/Bleeding/Clotting:    - Anemia and does not require treatment prior to surgery. Monitor hemoglobin postoperatively    Antiplatelet or Anticoagulation Medication Instructions   - We reviewed the medication list and the patient is not on an antiplatelet or anticoagulation medications.    Additional Medication Instructions   - Calcium Channel Blockers (amlodipine, diltiazem, felodipine): May be continued on the day of surgery.   - Statins (atorvastatin, simvastatin, pravastatin) : Continue taking on the day of surgery.    - metformin: DO NOT TAKE day of surgery.   - LABA, inhaled corticosteroid, long-acting anticholinergics: Continue without modification.    Recommendation  Approval given to proceed with proposed procedure, without further diagnostic evaluation.    Follow-up       Mary Boyce is a 74 year old, presenting for the following:  Pre-Op Exam (DOS: 4/29/25)          4/17/2025     9:01 AM   Additional Questions   Roomed by Shantal BRITTON   Accompanied by self         4/17/2025     9:01 AM   Patient Reported Additional Medications   Patient reports taking the following new medications none     HPI: The patient has degenerative meniscus tear of the right knee.  She has seen orthopedics who suggested a repair.          4/17/2025   Pre-Op Questionnaire   Have you ever had a heart attack or stroke? No   Have you ever had surgery on your heart or blood vessels, such as a stent placement, a coronary artery bypass, or surgery on an artery in your head, neck, heart, or legs? No   Do you have chest pain with activity? No   Do you have a history of heart failure? No   Do you currently have  a cold, bronchitis or symptoms of other infection? No   Do you have a cough, shortness of breath, or wheezing? (!) YES allergies, asthma.  Her asthma is worse the last month.  She is using her inhalers.      Do you or anyone in your family have previous history of blood clots? No   Do you or does anyone in your family have a serious bleeding problem such as prolonged bleeding following surgeries or cuts? No   Have you ever had problems with anemia or been told to take iron pills? (!) YES anemia but no iron pills    Have you had any abnormal blood loss such as black, tarry or bloody stools, or abnormal vaginal bleeding? No   Have you ever had a blood transfusion? (!) YES   Have you ever had a transfusion reaction? No   Are you willing to have a blood transfusion if it is medically needed before, during, or after your surgery? Yes   Have you or any of your relatives ever had problems with anesthesia? No   Do you have sleep apnea, excessive snoring or daytime drowsiness? No   Do you have any artifical heart valves or other implanted medical devices like a pacemaker, defibrillator, or continuous glucose monitor? No   Do you have artificial joints? No   Are you allergic to latex? No     Advance Care Planning    Advance care planning document is on file but is outdated.  Patient encouraged to update or provider to update POLST.    Preoperative Review of    reviewed - no record of controlled substances prescribed.      Status of Chronic Conditions:  ANEMIA - Patient has a recent history of moderate-severe anemia, which has not been symptomatic.     ASTHMA - Patient has a longstanding history of moderate-severe Asthma . Patient has been doing well overall noting COUGH and continues on medication regimen consisting of Symbicort without adverse reactions or side effects.     DIABETES - Patient has a longstanding history of DiabetesType Type II . Patient is being treated with oral agents and denies significant side  effects. Control has been good. Complicating factors include but are not limited to: hypertension and hyperlipidemia.     HYPERLIPIDEMIA - Patient has a long history of significant Hyperlipidemia requiring medication for treatment with recent good control. Patient reports no problems or side effects with the medication.     HYPERTENSION - Patient has longstanding history of HTN , currently denies any symptoms referable to elevated blood pressure. Specifically denies chest pain, palpitations, dyspnea, orthopnea, PND or peripheral edema. Blood pressure readings have been in normal range. Current medication regimen is as listed below. Patient denies any side effects of medication.     Patient Active Problem List    Diagnosis Date Noted    Arthritis of zenfbaid-rrqtjmijz-tlhyepazo joint of right hand 11/29/2022     Priority: Medium     Added automatically from request for surgery [id number]      Arthritis of carpometacarpal (CMC) joint of right thumb 11/29/2022     Priority: Medium     Added automatically from request for surgery [id number]      Hypokalemia 08/05/2021     Priority: Medium    Insomnia, unspecified type 08/05/2021     Priority: Medium    Arthropathy in Crohn's disease (H) 05/18/2021     Priority: Medium    Crohn's disease of small intestine with complication (H) 05/18/2021     Priority: Medium    Moderate persistent asthma, unspecified whether complicated 04/13/2021     Priority: Medium    Essential hypertension 04/13/2021     Priority: Medium    Type 2 diabetes mellitus without complication, without long-term current use of insulin (H) 04/13/2021     Priority: Medium    Rheumatoid arthritis involving both hands, unspecified whether rheumatoid factor present (H) 04/13/2021     Priority: Medium      Past Medical History:   Diagnosis Date    Diabetes mellitus (H)     Right bundle branch block     Uncomplicated asthma      Past Surgical History:   Procedure Laterality Date    ARTHROPLASTY CARPOMETACARPAL (THUMB  JOINT) Right 1/20/2023    Procedure: ARTHROPLASTY, CARPOMETACARPAL JOINT,RIGHT  THUMB;  Surgeon: Jasmeet Haque MD;  Location: [facility] OR    CATARACT IOL, RT/LT Bilateral 2017    Valeriano Javier MD ([place], [place]).    COLONOSCOPY N/A 09/20/2021    Procedure: COLONOSCOPY;  Surgeon: Keesha Mancuso MD;  Location: [facility] OR    COLONOSCOPY N/A 3/24/2025    Procedure: COLONOSCOPY, WITH BIOPSY;  Surgeon: Keesha Mancuso MD;  Location: [facility] OR    INJECT NERVE BLOCK SUPRASCAPULAR Left 8/15/2024    Procedure: BLOCK, NERVE, SUPRASCAPULAR (left);  Surgeon: Ana M Nicholson MD;  Location: [facility] OR    SMALL BOWEL RESECTION  2012     Current Outpatient Medications   Medication Sig Dispense Refill    alcohol swab prep pads Use to swab area of injection/venkata as directed. 100 each 3    amLODIPine (NORVASC) 2.5 MG tablet TAKE ONE TABLET BY MOUTH ONE TIME DAILY 90 tablet 0    Ascorbic Acid (VITAMIN C) 500 MG CAPS Take 500 mg by mouth daily.      atorvastatin (LIPITOR) 10 MG tablet Take 1 tablet (10 mg) by mouth daily Do not fill until contacted by patient 90 tablet 3    blood glucose (NO BRAND SPECIFIED) lancets standard Use to test blood sugar 1 times daily or as directed. 100 Lancet 11    blood glucose (ONETOUCH VERIO IQ) test strip USE TO TEST BLOOD GLUCOSE ONCE DAILY 100 strip 2    budesonide-formoterol (SYMBICORT/BREYNA) 160-4.5 MCG/ACT Inhaler Inhale 2 puffs into the lungs 2 times daily 10.2 g 1    colestipol (COLESTID) 1 g tablet Take 1 tablet (1 g) by mouth 2 times daily. Take 1 tablet (1g) by mouth 2 times daily. 180 tablet 1    cyanocobalamin 1000 MCG sublingual tablet Place 1,000 mcg under the tongue daily.      famotidine (PEPCID) 40 MG tablet Take 1 tablet (40 mg) by mouth 2 times daily 180 tablet 1    folic acid (FOLVITE) 1 MG tablet TAKE ONE TABLET BY MOUTH ONE TIME DAILY 90 tablet 0    gabapentin (NEURONTIN) 100 MG capsule Take 1 capsule (100 mg) by mouth 3 times daily as needed for neuropathic pain 90 capsule 3    Lancets  (ONETOUCH DELICA PLUS [id number]) MISC USE TO TEST BLOOD GLUCOSE ONCE DAILY 100 each 0    loperamide (IMODIUM) 2 MG capsule Take 2 mg by mouth 4 times daily as needed for diarrhea.      losartan (COZAAR) 100 MG tablet TAKE ONE TABLET BY MOUTH ONE TIME DAILY 90 tablet 0    metFORMIN (GLUCOPHAGE XR) 500 MG 24 hr tablet Take two tablets by mouth once daily with dinner. 180 tablet 0    multivitamin w/minerals (THERA-VIT-M) tablet Take 1 tablet by mouth daily      potassium chloride daniel ER (KLOR-CON M20) 20 MEQ CR tablet Take 1 tablet (20 mEq) by mouth daily 90 tablet 1    scopolamine (TRANSDERM) 1 MG/3DAYS 72 hr patch Place 1 patch onto the skin every 72 hours 12 patch 1    Vitamin D, Cholecalciferol, 25 MCG (1000 UT) TABS Take 2 tablets by mouth daily      bisacodyl (DULCOLAX) 5 MG EC tablet Two days prior to exam take two (2) tablets at 4pm. One day prior to exam take two (2) tablets at 4pm (Patient not taking: Reported on 2025) 4 tablet 0    polyethylene glycol (GOLYTELY) 236 g suspension Two days before procedure at 5PM fill first container with water. Mix and drink an 8 oz glass every 15 minutes until HALF of the container is gone. Place the remainder in the refrigerator. One day before procedure at 5PM drink second half of bowel prep. Drink an 8 oz glass every 15 minutes until it is gone. Day of procedure 6 hours before arrival time fill the 2nd container with water. Mix and drink an 8 oz glass every 15 minutes until HALF of the container is gone. Discard the remaining solution. (Patient not taking: Reported on 2025) 8000 mL 0       Allergies   Allergen Reactions    Seasonal Allergies         Social History     Tobacco Use    Smoking status: Former     Current packs/day: 0.00     Types: Cigarettes     Quit date: 1991     Years since quittin.0     Passive exposure: Never    Smokeless tobacco: Never   Substance Use Topics    Alcohol use: Yes     Comment: Wine- couple x/ week       History   Drug  "Use Unknown             Review of Systems  Constitutional, HEENT, cardiovascular, pulmonary, gi and gu systems are negative, except as otherwise noted.    Objective    /80   Pulse 77   Temp 98.6  F (37  C) (Oral)   Resp 20   Ht 1.499 m (4' 11\")   Wt 75.6 kg (166 lb 9.6 oz)   SpO2 99%   BMI 33.65 kg/m     Estimated body mass index is 33.65 kg/m  as calculated from the following:    Height as of this encounter: 1.499 m (4' 11\").    Weight as of this encounter: 75.6 kg (166 lb 9.6 oz).  Physical Exam  GENERAL: alert and no distress  EYES: Eyes grossly normal to inspection, PERRL and conjunctivae and sclerae normal  HENT: ear canals and TM's normal, nose and mouth without ulcers or lesions  NECK: no adenopathy, no asymmetry, masses, or scars  RESP: lungs clear to auscultation - no rales, rhonchi or wheezes  CV: regular rate and rhythm, normal S1 S2, no S3 or S4, no murmur, click or rub, no peripheral edema  ABDOMEN: soft, nontender, no hepatosplenomegaly, no masses and bowel sounds normal  MS: no gross musculoskeletal defects noted, no edema  SKIN: no suspicious lesions or rashes  NEURO: Normal strength and tone, mentation intact and speech normal  PSYCH: mentation appears normal, affect normal/bright    Recent Labs   Lab Test 11/15/24  1202 05/31/24  1247   HGB 11.1*  --      --     140   POTASSIUM 4.0 4.4   CR 0.84 0.86   A1C 6.1* 6.3*        Diagnostics  Labs pending at this time.  Results will be reviewed when available.   No EKG required, no history of coronary heart disease, significant arrhythmia, peripheral arterial disease or other structural heart disease.    Revised Cardiac Risk Index (RCRI)  The patient has the following serious cardiovascular risks for perioperative complications:   - No serious cardiac risks = 0 points     RCRI Interpretation: 0 points: Class I (very low risk - 0.4% complication rate)         Signed Electronically by: Iza Cage, DO  A copy of this evaluation " report is provided to the requesting physician.

## 2025-04-17 NOTE — PATIENT INSTRUCTIONS
How to Take Your Medication Before Surgery  Preoperative Medication Instructions   Antiplatelet or Anticoagulation Medication Instructions   - We reviewed the medication list and the patient is not on an antiplatelet or anticoagulation medications.    Additional Medication Instructions   - Calcium Channel Blockers (amlodipine, diltiazem, felodipine): May be continued on the day of surgery.   - Statins (atorvastatin, simvastatin, pravastatin) : Continue taking on the day of surgery.    - metformin: DO NOT TAKE day of surgery.   - pregabalin, gabapentin: Continue without modification.   - LABA, inhaled corticosteroid, long-acting anticholinergics: Continue without modification.       Patient Education   Preparing for Your Surgery  For Adults  Getting started  In most cases, a nurse will call to review your health history and instructions. They will give you an arrival time based on your scheduled surgery time. Please be ready to share:  Your doctor's clinic name and phone number  Your medical, surgical, and anesthesia history  A list of allergies and sensitivities  A list of medicines, including herbal treatments and over-the-counter drugs  Whether the patient has a legal guardian (ask how to send us the papers in advance)  Note: You may not receive a call if you were seen at our PAC (Preoperative Assessment Center).  Please tell us if you're pregnant--or if there's any chance you might be pregnant. Some surgeries may injure a fetus (unborn baby), so they require a pregnancy test. Surgeries that are safe for a fetus don't always need a test, and you can choose whether to have one.   Preparing for surgery  Within 10 to 30 days of surgery: Have a pre-op exam (sometimes called an H&P, or History and Physical). This can be done at a clinic or pre-operative center.  If you're having a , you may not need this exam. Talk to your care team.  At your pre-op exam, talk to your care team about all medicines you take.  (This includes CBD oil and any drugs, such as THC, marijuana, and other forms of cannabis.) If you need to stop any medicine before surgery, ask when to start taking it again.  This is for your safety. Many medicines and drugs can make you bleed too much during surgery. Some change how well surgery (anesthesia) drugs work.  Call your insurance company to let them know you're having surgery. (If you don't have insurance, call [phone].)  Call your clinic if there's any change in your health. This includes a scrape or scratch near the surgery site, or any signs of a cold (sore throat, runny nose, cough, rash, fever).  Eating and drinking guidelines  For your safety: Unless your surgeon tells you otherwise, follow the guidelines below.  Eat and drink as normal until 8 hours before you arrive for surgery. After that, no food or milk. You can spit out gum when you arrive.  Drink clear liquids until 2 hours before you arrive. These are liquids you can see through, like water, Gatorade, and Propel Water. They also include plain black coffee and tea (no cream or milk).  No alcohol for 24 hours before you arrive. The night before surgery, stop any drinks that contain THC.  If your care team tells you to take medicine on the morning of surgery, it's okay to take it with a sip of water. No other medicines or drugs are allowed (including CBD oil)--follow your care team's instructions.  If you have questions the day of surgery, call your hospital or surgery center.   Preventing infection  Shower or bathe the night before and the morning of surgery. Follow the instructions your clinic gave you. (If no instructions, use regular soap.)  Don't shave or clip hair near your surgery site. We'll remove the hair if needed.  Don't smoke or vape the morning of surgery. No chewing tobacco for 6 hours before you arrive. A nicotine patch is okay. You may spit out nicotine gum when you arrive.  For some surgeries, the surgeon will tell you  to fully quit smoking and nicotine.  We will make every effort to keep you safe from infection. We will:  Clean our hands often with soap and water (or an alcohol-based hand rub).  Clean the skin at your surgery site with a special soap that kills germs.  Give you a special gown to keep you warm. (Cold raises the risk of infection.)  Wear hair covers, masks, gowns, and gloves during surgery.  Give antibiotic medicine, if prescribed. Not all surgeries need this medicine.  What to bring on the day of surgery  Photo ID and insurance card  Copy of your health care directive, if you have one  Glasses and hearing aids (bring cases)  You can't wear contacts during surgery  Inhaler and eye drops, if you use them (tell us about these when you arrive)  CPAP machine or breathing device, if you use them  A few personal items, if spending the night  If you have . . .  A pacemaker, ICD (cardiac defibrillator), or other implant: Bring the ID card.  An implanted stimulator: Bring the remote control.  A legal guardian: Bring a copy of the certified (court-stamped) guardianship papers.  Please remove any jewelry, including body piercings. Leave jewelry and other valuables at home.  If you're going home the day of surgery  You must have a responsible adult drive you home. They should stay with you overnight as well.  If you don't have someone to stay with you, and you aren't safe to go home alone, we may keep you overnight. Insurance often won't pay for this.  After surgery  If it's hard to control your pain or you need more pain medicine, please call your surgeon's office.  Questions?   If you have any questions for your care team, list them here:   ____________________________________________________________________________________________________________________________________________________________________________________________________________________________________________________________  For informational purposes only.  Not to replace the advice of your health care provider. Copyright   2003, 2019 [facility]. All rights reserved. Clinically reviewed by Stevie Bach MD. [employer] 191777 - REV 08/24.

## 2025-04-23 ENCOUNTER — VIRTUAL VISIT (OUTPATIENT)
Dept: GASTROENTEROLOGY | Facility: CLINIC | Age: 75
End: 2025-04-23
Payer: COMMERCIAL

## 2025-04-23 DIAGNOSIS — K50.018 CROHN'S DISEASE OF ILEUM WITH OTHER COMPLICATION (H): Primary | ICD-10-CM

## 2025-04-23 NOTE — PATIENT INSTRUCTIONS
PLAN  --Start Skyrizi    --[facility] pharmacy referral  -------Repeat colonoscopy 6 months after starting Skyrizi, in Nov/Dec, under [facility]   --IV iron infusions to address anemia    RTC 4 months

## 2025-04-23 NOTE — NURSING NOTE
Current patient location: [address] [place], [place] [address]   [place] [address]    Is the patient currently in the state of [place]? YES    Visit mode: VIDEO    If the visit is dropped, the patient can be reconnected by:VIDEO VISIT: Send to e-mail at: [email or web address]    Will anyone else be joining the visit? NO  (If patient encounters technical issues they should call [phone]    Are changes needed to the allergy or medication list? No    Are refills needed on medications prescribed by this physician? Discuss with provider    Rooming Documentation:  Questionnaire(s) completed    Reason for visit: RECHECK    Lauri CROCKETT

## 2025-04-23 NOTE — LETTER
4/23/2025      Carli Monreal  [address]  [place] [address]      Dear Colleague,    Thank you for referring your patient, Carli Monreal, to the [facility]. Please see a copy of my visit note below.    Virtual Visit Details    Type of service:  Video Visit   Video Start Time: 10:53 AM  Video End Time:11:25 AM    Originating Location (pt. Location): Home    Distant Location (provider location):  Off-site  Platform used for Video Visit: [facility]    CD follow up  MRN: [id number]    Date of Birth 1950    Tel: [phone] (home)     PCP: Iza Cage     HPI: Ms. Monreal is a 74 year old female here for evaluation of Crohn's disease.     When she was 19, was told she had colitis in the setting of loose stools +/- blood. Diagnosed with CD in 2000/2001 when presented to the hospital with severe abd pain.      Was on Remicade around 2005, but then stopped due to an infusion reaction (sharp back and chest pains). Transitioned to Humira but then stopped due to inability to pay. In 2012 had an SBO and underwent surgical resection.  Then switched to Cimzia/MTX, got sick and then stopped this medication Summer 2020.  Was hospitalized in July 2020 for breathing issues. Has not been on any Crohn's maintenance therapy since then.        Last seen by me in 6/2021 and was reporting 5-10 BMs per day as well as arthralgia.     Has not retried cholestyramine since 2020.   Takes imodium (2 pills will work).   Typically, does not take imodium but when she does, takes up to 3-4 pills per day.   Endorses significant gas.     Noteworthy diet history- unremarkable    HBI  General well-being 2 = poor  Abdominal pain 0 = none  Number of liquid stools per day 10-15, + FI (post prandial)  Abdominal mass 0 = none  Current Complications Arthralgia    Constitutional symptoms:  Fever NO  Weight loss NO    Extraintestinal manifestations (anytime during the course of disease)  Possible  arthralgias    Parshall Classification  AGE AT DIAGNOSIS: A3 above 40 y  CURRENT DISEASE LOCATION: L1: ileal  L4 upper GI: NO  DISEASE BEHAVIOR (since disease onset): B2: stricturing  Perianal disease: NO    Total number of IBD surgeries (except perianal): 1 (resection in )     Current IBD Medications:  none    Past IBD Medications:  Remicade  Humira  Cimzia +MTX    Interval history, 3/2025 (in person)  Colestipol is working well. Taking this twice per day. Has 3-5 BMs per day while on this. Thinks this is significantly improved and is happy.     No weight loss. No blood in stool.     Interval history, 2025 (virtual)    More belching that usual.   No abdominal pain, weight loss.     Still having 3-5 BMs per day.     Having a meniscus repair next week.    Past Medical History:   Diagnosis Date     Diabetes mellitus (H)      Right bundle branch block      Uncomplicated asthma         Past Surgical History:   Procedure Laterality Date     ARTHROPLASTY CARPOMETACARPAL (THUMB JOINT) Right 2023    Procedure: ARTHROPLASTY, CARPOMETACARPAL JOINT,RIGHT  THUMB;  Surgeon: Jasmeet Haque MD;  [place] [facility] [place]     CATARACT IOL, RT/LT Bilateral 2017    Valeriano Javier MD ([place], [place]).     COLONOSCOPY N/A 2021    Procedure: COLONOSCOPY;  Surgeon: Keesha Mancuso MD;  [place] [facility] [place]     COLONOSCOPY N/A 3/24/2025    Procedure: COLONOSCOPY, WITH BIOPSY;  Surgeon: Keesha Mancuso MD;  Location: [facility] OR     INJECT NERVE BLOCK SUPRASCAPULAR Left 8/15/2024    Procedure: BLOCK, NERVE, SUPRASCAPULAR (left);  Surgeon: Ana M Nicholson MD;  [place] [facility] [place]     SMALL BOWEL RESECTION         Social History     Tobacco Use     Smoking status: Former     Current packs/day: 0.00     Types: Cigarettes     Quit date: 1991     Years since quittin.0     Passive exposure: Never     Smokeless tobacco: Never   Substance Use Topics     Alcohol use: Yes     Comment: Wine- couple x/ week       Family History    Problem Relation Age of Onset     Rheumatoid Arthritis Mother      Eye Surgery Father      Glaucoma Father      Amblyopia Cousin      Inflammatory Bowel Disease No family hx of      Colon Cancer No family hx of      Macular Degeneration No family hx of        Allergies   Allergen Reactions     Seasonal Allergies         Outpatient Encounter Medications as of 4/23/2025   Medication Sig Dispense Refill     alcohol swab prep pads Use to swab area of injection/venkata as directed. 100 each 3     amLODIPine (NORVASC) 2.5 MG tablet TAKE ONE TABLET BY MOUTH ONE TIME DAILY 90 tablet 0     Ascorbic Acid (VITAMIN C) 500 MG CAPS Take 500 mg by mouth daily.       atorvastatin (LIPITOR) 10 MG tablet Take 1 tablet (10 mg) by mouth daily Do not fill until contacted by patient 90 tablet 3     bisacodyl (DULCOLAX) 5 MG EC tablet Two days prior to exam take two (2) tablets at 4pm. One day prior to exam take two (2) tablets at 4pm (Patient not taking: Reported on 4/17/2025) 4 tablet 0     blood glucose (NO BRAND SPECIFIED) lancets standard Use to test blood sugar 1 times daily or as directed. 100 Lancet 11     blood glucose (ONETOUCH VERIO IQ) test strip USE TO TEST BLOOD GLUCOSE ONCE DAILY 100 strip 2     budesonide-formoterol (SYMBICORT/BREYNA) 160-4.5 MCG/ACT Inhaler Inhale 2 puffs into the lungs 2 times daily 10.2 g 1     colestipol (COLESTID) 1 g tablet Take 1 tablet (1 g) by mouth 2 times daily. Take 1 tablet (1g) by mouth 2 times daily. 180 tablet 1     cyanocobalamin 1000 MCG sublingual tablet Place 1,000 mcg under the tongue daily.       famotidine (PEPCID) 40 MG tablet Take 1 tablet (40 mg) by mouth 2 times daily 180 tablet 1     folic acid (FOLVITE) 1 MG tablet TAKE ONE TABLET BY MOUTH ONE TIME DAILY 90 tablet 0     gabapentin (NEURONTIN) 100 MG capsule Take 1 capsule (100 mg) by mouth 3 times daily as needed for neuropathic pain 90 capsule 3     Lancets (ONETOUCH DELICA PLUS BTCAEX69Y) MISC USE TO TEST BLOOD GLUCOSE ONCE  DAILY 100 each 0     loperamide (IMODIUM) 2 MG capsule Take 2 mg by mouth 4 times daily as needed for diarrhea.       loratadine (CLARITIN) 10 MG tablet Take 1 tablet (10 mg) by mouth daily. 30 tablet 5     losartan (COZAAR) 100 MG tablet TAKE ONE TABLET BY MOUTH ONE TIME DAILY 90 tablet 0     metFORMIN (GLUCOPHAGE XR) 500 MG 24 hr tablet Take two tablets by mouth once daily with dinner. 180 tablet 0     multivitamin w/minerals (THERA-VIT-M) tablet Take 1 tablet by mouth daily       polyethylene glycol (GOLYTELY) 236 g suspension Two days before procedure at 5PM fill first container with water. Mix and drink an 8 oz glass every 15 minutes until HALF of the container is gone. Place the remainder in the refrigerator. One day before procedure at 5PM drink second half of bowel prep. Drink an 8 oz glass every 15 minutes until it is gone. Day of procedure 6 hours before arrival time fill the 2nd container with water. Mix and drink an 8 oz glass every 15 minutes until HALF of the container is gone. Discard the remaining solution. (Patient not taking: Reported on 4/17/2025) 8000 mL 0     potassium chloride daniel ER (KLOR-CON M20) 20 MEQ CR tablet Take 1 tablet (20 mEq) by mouth daily 90 tablet 1     scopolamine (TRANSDERM) 1 MG/3DAYS 72 hr patch Place 1 patch onto the skin every 72 hours 12 patch 1     Vitamin D, Cholecalciferol, 25 MCG (1000 UT) TABS Take 2 tablets by mouth daily       No facility-administered encounter medications on file as of 4/23/2025.      NSAID  YES 2 x/month    Review of Systems  Complete 10 System ROS performed. All are negative except as documented below, in the HPI, or in patient questionnaire from today's visit.    1) Constitutional: No fevers, chills, night sweats or malaise, weight loss or gain  2) Skin: No rash  3) Pulmonary: No wheeze, SOB, cough, sputum or hemoptysis  4) Cardiovascular: No Chest pain or palpitations  5) Genitourinary: No blood in urine or dysuria  6) Endocrine: No increased  "sweating, hunger, thirst or thyroid problems  7) Hematologic: No bruising and easy bleeding  8) Musculoskeletal: no new pain in joints or limitation in ROM  9) Neurologic: No dizziness, paresthesias or weakness or falls  10) Psychiatric:  not depressed/anxious, no sleep problems    PHYSICAL EXAM  Vitals: There were no vitals taken for this visit.    No Pain (0)     General appearance  Healthy appearing adult, in no acute distress     Eyes  Sclera anicteric  Pupils round and reactive to light     Ears, nose, mouth and throat  No obvious external lesions of ears and nose  Hearing intact     Neck  Symmetric  No obvious external lesions     Respiratory  Normal respiration, no use of accessory muscles      MSK  Gait normal     Skin  No rashes or jaundice      Psychiatric  Oriented to person, place and time  Appropriate mood and affect.       DATA:  Reviewed in detail past documentation, medications and prior workup available in electronic health records or through outside records.    PERTINENT STUDIES:    6/2021 Fecal calprotectin 50.2     Most recent CBC:  WBC   Date Value Ref Range Status   05/26/2021 6.2 4.0 - 11.0 10e9/L Final     WBC Count   Date Value Ref Range Status   04/17/2025 6.6 4.0 - 11.0 10e3/uL Final   ]  Hemoglobin   Date Value Ref Range Status   04/17/2025 10.9 (L) 11.7 - 15.7 g/dL Final   05/26/2021 11.7 11.7 - 15.7 g/dL Final   ]   Platelet Count   Date Value Ref Range Status   04/17/2025 193 150 - 450 10e3/uL Final   05/26/2021 191 150 - 450 10e9/L Final       Most recent coag:  No results found for: \"INR\"    Most recent hepatic panel:  AST   Date Value Ref Range Status   05/26/2021 35 0 - 45 U/L Final     ALT   Date Value Ref Range Status   05/26/2021 38 0 - 50 U/L Final     No results found for: \"BILICONJ\"   Bilirubin Total   Date Value Ref Range Status   05/26/2021 0.3 0.2 - 1.3 mg/dL Final     Albumin   Date Value Ref Range Status   05/26/2021 3.6 3.4 - 5.0 g/dL Final     Alkaline Phosphatase "   Date Value Ref Range Status   05/26/2021 98 40 - 150 U/L Final       Most recent creatinine:  Creatinine   Date Value Ref Range Status   04/17/2025 0.81 0.51 - 0.95 mg/dL Final   05/26/2021 0.65 0.52 - 1.04 mg/dL Final     Endoscopy:   Icscope 3/2025:  Impression:            - Simple Endoscopic Score for Crohn's Disease: 3,                          mucosal inflammatory changes secondary to Crohn's                          disease with ileitis. Biopsied.                          - Diverticulosis in the sigmoid colon.                          - Biopsies were taken with a cold forceps from the                          right colon and left colon for evaluation of                          microscopic colitis.     PATH  A. TERMINAL ILEUM BIOPSY:  - Ileal mucosa with no significant histopathologic abnormality  - No evidence of  dysplasia      B. RIGHT COLON BIOPSY:  - Colonic mucosa with mild crypt distortion and no significant inflammation  - Negative for dysplasia        C. LEFT COLON BIOPSY:  - Colonic mucosa with no significant inflammation  - Negative for dysplasia        9/2021:  The perianal and digital rectal examinations were normal.        The Simple Endoscopic Score for Crohn's Disease was determined based on        the endoscopic appearance of the mucosa in the following segments:        - Ileum: Findings include no ulcers present, no ulcerated surfaces, no        affected surfaces and no narrowings. Segment score: 0.        - Right Colon: Findings include no ulcers present, no ulcerated        surfaces, no affected surfaces and no narrowings. Segment score: 0.        - Transverse Colon: Findings include no ulcers present, no ulcerated        surfaces, no affected surfaces and no narrowings. Segment score: 0.        - Left Colon: Findings include no ulcers present, no ulcerated surfaces,        no affected surfaces and no narrowings. Segment score: 0.        - Rectum: Findings include no ulcers present, no  ulcerated surfaces, no        affected surfaces and no narrowings. Segment score: 0.        - Total SES-CD aggregate score: 0.        Non-bleeding internal hemorrhoids were found during retroflexion. The        hemorrhoids were mild.        An inverted diverticulum (about 1 cm) was seen in the sigmoid colon.                                           Imaging:  MRE 7/2021:  IMPRESSION: Short segment of fibrosis involving the terminal ileum,  spanning approximately 2 cm. No upstream small bowel dilation. No  evidence of active inflammation at this area or elsewhere throughout  the small bowel or colon.     I have personally reviewed the examination and initial interpretation  and I agree with the findings.      IMPRESSION:    Ms. Monreal is a 74 year old here with Crohn's ileitis, likely stricturing, requiring resection in 2012, previously on Remicade (infusion reaction), Humira (stopped for financial reasons), Cimzia and methotrexate (stopped due to hospitalization for breathing issues, but unrelated to Crohn's or treatment), currently on no maintenance therapy. 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     Carli endorses significant, post-prandial diarrhea for at least 8 months with some response to colestipol.     Given active disease on recent colonoscopy, it would be prudent to start a biologic. We discussed various options and decided to proceed with Skyrizi due to the excellent safety and efficacy profiles.   We will avoid Entocort for now, given Carli's planned ortho surgery next week.     We will proceed with the following:    DIAGNOSIS:  # Crohn's ileitis with recurrence, s/p resection in 2012 previously on anti-TNF therapy, on no therapy x years     PLAN:  --Start Skyrizi.    --[facility] pharmacy referral  -------Repeat colonoscopy 6 months after starting Skyrizi, in Nov/Dec, under [facility]   --IV iron infusions to address anemia    RTC 6 months     Keesha Mancuso MD   of  Medicine  Division of Gastroenterology, Hepatology and Nutrition  [facility]    40 minutes spent on the date of the encounter performing chart review, history and exam, documentation and further activities as noted above.          Again, thank you for allowing me to participate in the care of your patient.        Sincerely,        Keesha Mancuso MD    Electronically signed

## 2025-04-23 NOTE — PROGRESS NOTES
Virtual Visit Details    Type of service:  Video Visit   Video Start Time: 10:53 AM  Video End Time:11:25 AM    Originating Location (pt. Location): Home    Distant Location (provider location):  Off-site  Platform used for Video Visit: Pa    CD follow up  MRN: [id number]    Date of Birth 1950    Tel: [phone] (home)     PCP: Iza Cage     HPI: Ms. Monreal is a 74 year old female here for evaluation of Crohn's disease.     When she was 19, was told she had colitis in the setting of loose stools +/- blood. Diagnosed with CD in 2000/2001 when presented to the hospital with severe abd pain.      Was on Remicade around 2005, but then stopped due to an infusion reaction (sharp back and chest pains). Transitioned to Humira but then stopped due to inability to pay. In 2012 had an SBO and underwent surgical resection.  Then switched to Cimzia/MTX, got sick and then stopped this medication Summer 2020.  Was hospitalized in July 2020 for breathing issues. Has not been on any Crohn's maintenance therapy since then.        Last seen by me in 6/2021 and was reporting 5-10 BMs per day as well as arthralgia.     Has not retried cholestyramine since 2020.   Takes imodium (2 pills will work).   Typically, does not take imodium but when she does, takes up to 3-4 pills per day.   Endorses significant gas.     Noteworthy diet history- unremarkable    HBI  General well-being 2 = poor  Abdominal pain 0 = none  Number of liquid stools per day 10-15, + FI (post prandial)  Abdominal mass 0 = none  Current Complications Arthralgia    Constitutional symptoms:  Fever NO  Weight loss NO    Extraintestinal manifestations (anytime during the course of disease)  Possible arthralgias    Patric Classification  AGE AT DIAGNOSIS: A3 above 40 y  CURRENT DISEASE LOCATION: L1: ileal  L4 upper GI: NO  DISEASE BEHAVIOR (since disease onset): B2: stricturing  Perianal disease: NO    Total number of IBD surgeries (except perianal): 1  (resection in )     Current IBD Medications:  none    Past IBD Medications:  Remicade  Humira  Cimzia +MTX    Interval history, 3/2025 (in person)  Colestipol is working well. Taking this twice per day. Has 3-5 BMs per day while on this. Thinks this is significantly improved and is happy.     No weight loss. No blood in stool.     Interval history, 2025 (virtual)    More belching that usual.   No abdominal pain, weight loss.     Still having 3-5 BMs per day.     Having a meniscus repair next week.    Past Medical History:   Diagnosis Date    Diabetes mellitus (H)     Right bundle branch block     Uncomplicated asthma         Past Surgical History:   Procedure Laterality Date    ARTHROPLASTY CARPOMETACARPAL (THUMB JOINT) Right 2023    Procedure: ARTHROPLASTY, CARPOMETACARPAL JOINT,RIGHT  THUMB;  Surgeon: Jasmeet Haque MD;  Location: [facility] OR    CATARACT IOL, RT/LT Bilateral     Valeriano Javier MD ([place], [place]).    COLONOSCOPY N/A 2021    Procedure: COLONOSCOPY;  Surgeon: Keesha Mancuso MD;  Location: [facility] OR    COLONOSCOPY N/A 3/24/2025    Procedure: COLONOSCOPY, WITH BIOPSY;  Surgeon: Keesha Mancuso MD;  Location: [facility] OR    INJECT NERVE BLOCK SUPRASCAPULAR Left 8/15/2024    Procedure: BLOCK, NERVE, SUPRASCAPULAR (left);  Surgeon: Ana M Nicholson MD;  Location: [facility] OR    SMALL BOWEL RESECTION         Social History     Tobacco Use    Smoking status: Former     Current packs/day: 0.00     Types: Cigarettes     Quit date: 1991     Years since quittin.0     Passive exposure: Never    Smokeless tobacco: Never   Substance Use Topics    Alcohol use: Yes     Comment: Wine- couple x/ week       Family History   Problem Relation Age of Onset    Rheumatoid Arthritis Mother     Eye Surgery Father     Glaucoma Father     Amblyopia Cousin     Inflammatory Bowel Disease No family hx of     Colon Cancer No family hx of     Macular Degeneration No family hx of        Allergies   Allergen  Reactions    Seasonal Allergies         Outpatient Encounter Medications as of 4/23/2025   Medication Sig Dispense Refill    alcohol swab prep pads Use to swab area of injection/venkata as directed. 100 each 3    amLODIPine (NORVASC) 2.5 MG tablet TAKE ONE TABLET BY MOUTH ONE TIME DAILY 90 tablet 0    Ascorbic Acid (VITAMIN C) 500 MG CAPS Take 500 mg by mouth daily.      atorvastatin (LIPITOR) 10 MG tablet Take 1 tablet (10 mg) by mouth daily Do not fill until contacted by patient 90 tablet 3    bisacodyl (DULCOLAX) 5 MG EC tablet Two days prior to exam take two (2) tablets at 4pm. One day prior to exam take two (2) tablets at 4pm (Patient not taking: Reported on 4/17/2025) 4 tablet 0    blood glucose (NO BRAND SPECIFIED) lancets standard Use to test blood sugar 1 times daily or as directed. 100 Lancet 11    blood glucose (ONETOUCH VERIO IQ) test strip USE TO TEST BLOOD GLUCOSE ONCE DAILY 100 strip 2    budesonide-formoterol (SYMBICORT/BREYNA) 160-4.5 MCG/ACT Inhaler Inhale 2 puffs into the lungs 2 times daily 10.2 g 1    colestipol (COLESTID) 1 g tablet Take 1 tablet (1 g) by mouth 2 times daily. Take 1 tablet (1g) by mouth 2 times daily. 180 tablet 1    cyanocobalamin 1000 MCG sublingual tablet Place 1,000 mcg under the tongue daily.      famotidine (PEPCID) 40 MG tablet Take 1 tablet (40 mg) by mouth 2 times daily 180 tablet 1    folic acid (FOLVITE) 1 MG tablet TAKE ONE TABLET BY MOUTH ONE TIME DAILY 90 tablet 0    gabapentin (NEURONTIN) 100 MG capsule Take 1 capsule (100 mg) by mouth 3 times daily as needed for neuropathic pain 90 capsule 3    Lancets (ONETOUCH DELICA PLUS [id number]) MISC USE TO TEST BLOOD GLUCOSE ONCE DAILY 100 each 0    loperamide (IMODIUM) 2 MG capsule Take 2 mg by mouth 4 times daily as needed for diarrhea.      loratadine (CLARITIN) 10 MG tablet Take 1 tablet (10 mg) by mouth daily. 30 tablet 5    losartan (COZAAR) 100 MG tablet TAKE ONE TABLET BY MOUTH ONE TIME DAILY 90 tablet 0     metFORMIN (GLUCOPHAGE XR) 500 MG 24 hr tablet Take two tablets by mouth once daily with dinner. 180 tablet 0    multivitamin w/minerals (THERA-VIT-M) tablet Take 1 tablet by mouth daily      polyethylene glycol (GOLYTELY) 236 g suspension Two days before procedure at 5PM fill first container with water. Mix and drink an 8 oz glass every 15 minutes until HALF of the container is gone. Place the remainder in the refrigerator. One day before procedure at 5PM drink second half of bowel prep. Drink an 8 oz glass every 15 minutes until it is gone. Day of procedure 6 hours before arrival time fill the 2nd container with water. Mix and drink an 8 oz glass every 15 minutes until HALF of the container is gone. Discard the remaining solution. (Patient not taking: Reported on 4/17/2025) 8000 mL 0    potassium chloride daniel ER (KLOR-CON M20) 20 MEQ CR tablet Take 1 tablet (20 mEq) by mouth daily 90 tablet 1    scopolamine (TRANSDERM) 1 MG/3DAYS 72 hr patch Place 1 patch onto the skin every 72 hours 12 patch 1    Vitamin D, Cholecalciferol, 25 MCG (1000 UT) TABS Take 2 tablets by mouth daily       No facility-administered encounter medications on file as of 4/23/2025.      NSAID  YES 2 x/month    Review of Systems  Complete 10 System ROS performed. All are negative except as documented below, in the HPI, or in patient questionnaire from today's visit.    1) Constitutional: No fevers, chills, night sweats or malaise, weight loss or gain  2) Skin: No rash  3) Pulmonary: No wheeze, SOB, cough, sputum or hemoptysis  4) Cardiovascular: No Chest pain or palpitations  5) Genitourinary: No blood in urine or dysuria  6) Endocrine: No increased sweating, hunger, thirst or thyroid problems  7) Hematologic: No bruising and easy bleeding  8) Musculoskeletal: no new pain in joints or limitation in ROM  9) Neurologic: No dizziness, paresthesias or weakness or falls  10) Psychiatric:  not depressed/anxious, no sleep problems    PHYSICAL  "EXAM  Vitals: There were no vitals taken for this visit.    No Pain (0)     General appearance  Healthy appearing adult, in no acute distress     Eyes  Sclera anicteric  Pupils round and reactive to light     Ears, nose, mouth and throat  No obvious external lesions of ears and nose  Hearing intact     Neck  Symmetric  No obvious external lesions     Respiratory  Normal respiration, no use of accessory muscles      MSK  Gait normal     Skin  No rashes or jaundice      Psychiatric  Oriented to person, place and time  Appropriate mood and affect.       DATA:  Reviewed in detail past documentation, medications and prior workup available in electronic health records or through outside records.    PERTINENT STUDIES:    6/2021 Fecal calprotectin 50.2     Most recent CBC:  WBC   Date Value Ref Range Status   05/26/2021 6.2 4.0 - 11.0 10e9/L Final     WBC Count   Date Value Ref Range Status   04/17/2025 6.6 4.0 - 11.0 10e3/uL Final   ]  Hemoglobin   Date Value Ref Range Status   04/17/2025 10.9 (L) 11.7 - 15.7 g/dL Final   05/26/2021 11.7 11.7 - 15.7 g/dL Final   ]   Platelet Count   Date Value Ref Range Status   04/17/2025 193 150 - 450 10e3/uL Final   05/26/2021 191 150 - 450 10e9/L Final       Most recent coag:  No results found for: \"INR\"    Most recent hepatic panel:  AST   Date Value Ref Range Status   05/26/2021 35 0 - 45 U/L Final     ALT   Date Value Ref Range Status   05/26/2021 38 0 - 50 U/L Final     No results found for: \"BILICONJ\"   Bilirubin Total   Date Value Ref Range Status   05/26/2021 0.3 0.2 - 1.3 mg/dL Final     Albumin   Date Value Ref Range Status   05/26/2021 3.6 3.4 - 5.0 g/dL Final     Alkaline Phosphatase   Date Value Ref Range Status   05/26/2021 98 40 - 150 U/L Final       Most recent creatinine:  Creatinine   Date Value Ref Range Status   04/17/2025 0.81 0.51 - 0.95 mg/dL Final   05/26/2021 0.65 0.52 - 1.04 mg/dL Final     Endoscopy:   Icscope 3/2025:  Impression:            - Simple " Endoscopic Score for Crohn's Disease: 3,                          mucosal inflammatory changes secondary to Crohn's                          disease with ileitis. Biopsied.                          - Diverticulosis in the sigmoid colon.                          - Biopsies were taken with a cold forceps from the                          right colon and left colon for evaluation of                          microscopic colitis.     PATH  A. TERMINAL ILEUM BIOPSY:  - Ileal mucosa with no significant histopathologic abnormality  - No evidence of  dysplasia      B. RIGHT COLON BIOPSY:  - Colonic mucosa with mild crypt distortion and no significant inflammation  - Negative for dysplasia        C. LEFT COLON BIOPSY:  - Colonic mucosa with no significant inflammation  - Negative for dysplasia        9/2021:  The perianal and digital rectal examinations were normal.        The Simple Endoscopic Score for Crohn's Disease was determined based on        the endoscopic appearance of the mucosa in the following segments:        - Ileum: Findings include no ulcers present, no ulcerated surfaces, no        affected surfaces and no narrowings. Segment score: 0.        - Right Colon: Findings include no ulcers present, no ulcerated        surfaces, no affected surfaces and no narrowings. Segment score: 0.        - Transverse Colon: Findings include no ulcers present, no ulcerated        surfaces, no affected surfaces and no narrowings. Segment score: 0.        - Left Colon: Findings include no ulcers present, no ulcerated surfaces,        no affected surfaces and no narrowings. Segment score: 0.        - Rectum: Findings include no ulcers present, no ulcerated surfaces, no        affected surfaces and no narrowings. Segment score: 0.        - Total SES-CD aggregate score: 0.        Non-bleeding internal hemorrhoids were found during retroflexion. The        hemorrhoids were mild.        An inverted diverticulum (about 1 cm) was seen  in the sigmoid colon.                                           Imaging:  MRE 7/2021:  IMPRESSION: Short segment of fibrosis involving the terminal ileum,  spanning approximately 2 cm. No upstream small bowel dilation. No  evidence of active inflammation at this area or elsewhere throughout  the small bowel or colon.     I have personally reviewed the examination and initial interpretation  and I agree with the findings.      IMPRESSION:    Ms. Monreal is a 74 year old here with Crohn's ileitis, likely stricturing, requiring resection in 2012, previously on Remicade (infusion reaction), Humira (stopped for financial reasons), Cimzia and methotrexate (stopped due to hospitalization for breathing issues, but unrelated to Crohn's or treatment), currently on no maintenance therapy. 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     Carli endorses significant, post-prandial diarrhea for at least 8 months with some response to colestipol.     Given active disease on recent colonoscopy, it would be prudent to start a biologic. We discussed various options and decided to proceed with Skyrizi due to the excellent safety and efficacy profiles.   We will avoid Entocort for now, given Carli's planned ortho surgery next week.     We will proceed with the following:    DIAGNOSIS:  # Crohn's ileitis with recurrence, s/p resection in 2012 previously on anti-TNF therapy, on no therapy x years     PLAN:  --Start Skyrizi.    --[facility] pharmacy referral  -------Repeat colonoscopy 6 months after starting Skyrizi, in Nov/Dec, under [facility]   --IV iron infusions to address anemia    RTC 6 months     Keesha Mancuso MD  Associate Professor of Medicine  Division of Gastroenterology, Hepatology and Nutrition  [facility]    40 minutes spent on the date of the encounter performing chart review, history and exam, documentation and further activities as noted above.

## 2025-04-24 ENCOUNTER — TELEPHONE (OUTPATIENT)
Dept: GASTROENTEROLOGY | Facility: CLINIC | Age: 75
End: 2025-04-24
Payer: COMMERCIAL

## 2025-04-24 PROBLEM — D50.9 ANEMIA, IRON DEFICIENCY: Status: ACTIVE | Noted: 2025-04-24

## 2025-04-24 NOTE — TELEPHONE ENCOUNTER
PA Initiation    Medication: SKYRIZI 360 MG/2.4ML SC SOCT  Insurance Company: [facility] - Phone [phone] Fax [phone]  Pharmacy Filling the Rx: [facility] - [place], [place] - [address]  Filling Pharmacy Phone:    Filling Pharmacy Fax:    Start Date: 4/24/2025     [id number]

## 2025-04-28 NOTE — TELEPHONE ENCOUNTER
Prior Authorization Approval    Medication: SKYRIZI 360 MG/2.4ML SC SOCT  Authorization Effective Date: 4/26/2025  Authorization Expiration Date: 4/26/2026  Approved Dose/Quantity: 2.4/56  Reference #: [id number]   Insurance Company: [employer] - Phone [phone] Fax [phone]  Expected CoPay: $ 1,800  CoPay Card Available:      Financial Assistance Needed: yes, offered FPAP  Which Pharmacy is filling the prescription: [facility] - [place], [place] - [address]   Pharmacy Notified: no  Patient Notified: yes

## 2025-04-29 ENCOUNTER — TELEPHONE (OUTPATIENT)
Dept: GASTROENTEROLOGY | Facility: CLINIC | Age: 75
End: 2025-04-29
Payer: COMMERCIAL

## 2025-04-29 NOTE — TELEPHONE ENCOUNTER
Left Voicemail (1st Attempt) and Sent Mychart (1st Attempt) for the patient to call back and schedule the following:    Appointment type: Return  Provider:   Return date: ~ 8/23 Approx.   Specialty phone number: [phone]  Additional appointment(s) needed:   Additonal Notes:       4/29 Pt contacted to schedule follow up appts -  Pt is not able to schedule right now - left call back number AA

## 2025-04-30 ENCOUNTER — TELEPHONE (OUTPATIENT)
Dept: GASTROENTEROLOGY | Facility: CLINIC | Age: 75
End: 2025-04-30
Payer: COMMERCIAL

## 2025-04-30 NOTE — TELEPHONE ENCOUNTER
[facility] Call Center    Phone Message    May a detailed message be left on voicemail: yes     Reason for Call: Other: Pt is requesting a call back from the team please to discuss the cost of the Skyrizi and how to bring the cost down as it is too expensive. Please advise. Thank you!     Action Taken: Message routed to:  [facility]): GI    Travel Screening: Not Applicable     Date of Service:

## 2025-05-01 NOTE — TELEPHONE ENCOUNTER
PERRY INITIATED    Medication: SKYRIZI 360 MG/2.4ML SC SOCT  [facility] Name: [facility]  Date submitted: 4/28/2025 12:50 PM   Foundation Phone:    Foundation Fax:

## 2025-05-02 DIAGNOSIS — I10 ESSENTIAL HYPERTENSION: ICD-10-CM

## 2025-05-02 DIAGNOSIS — K50.919 CROHN'S DISEASE WITH COMPLICATION, UNSPECIFIED GASTROINTESTINAL TRACT LOCATION (H): ICD-10-CM

## 2025-05-02 NOTE — TELEPHONE ENCOUNTER
PERRY APPROVED    Medication: SKYRIZI 360 MG/2.4ML SC SOCT  Amount: $ 2,000  [facility] Name: [facility] Effective Date: 5/2/2025  Foundation Expiration Date: 12/31/2025  Additional Information:   Patient Notified: yes

## 2025-05-04 RX ORDER — FOLIC ACID 1 MG/1
1000 TABLET ORAL
Qty: 90 TABLET | Refills: 0 | Status: SHIPPED | OUTPATIENT
Start: 2025-05-04

## 2025-05-04 RX ORDER — LOSARTAN POTASSIUM 100 MG/1
100 TABLET ORAL DAILY
Qty: 90 TABLET | Refills: 0 | Status: SHIPPED | OUTPATIENT
Start: 2025-05-04

## 2025-05-06 ENCOUNTER — LAB (OUTPATIENT)
Dept: LAB | Facility: CLINIC | Age: 75
End: 2025-05-06
Payer: COMMERCIAL

## 2025-05-06 DIAGNOSIS — K50.019 CROHN'S DISEASE OF SMALL INTESTINE WITH COMPLICATION (H): ICD-10-CM

## 2025-05-06 LAB
ALBUMIN SERPL BCG-MCNC: 4.1 G/DL (ref 3.5–5.2)
ALP SERPL-CCNC: 78 U/L (ref 40–150)
ALT SERPL W P-5'-P-CCNC: 21 U/L (ref 0–50)
ANION GAP SERPL CALCULATED.3IONS-SCNC: 10 MMOL/L (ref 7–15)
AST SERPL W P-5'-P-CCNC: 27 U/L (ref 0–45)
BASOPHILS # BLD AUTO: 0 10E3/UL (ref 0–0.2)
BASOPHILS NFR BLD AUTO: 0 %
BILIRUB SERPL-MCNC: 0.6 MG/DL
BUN SERPL-MCNC: 11.5 MG/DL (ref 8–23)
CALCIUM SERPL-MCNC: 9.6 MG/DL (ref 8.8–10.4)
CHLORIDE SERPL-SCNC: 103 MMOL/L (ref 98–107)
CREAT SERPL-MCNC: 0.76 MG/DL (ref 0.51–0.95)
CRP SERPL-MCNC: <3 MG/L
EGFRCR SERPLBLD CKD-EPI 2021: 82 ML/MIN/1.73M2
EOSINOPHIL # BLD AUTO: 0.1 10E3/UL (ref 0–0.7)
EOSINOPHIL NFR BLD AUTO: 2 %
ERYTHROCYTE [DISTWIDTH] IN BLOOD BY AUTOMATED COUNT: 13.3 % (ref 10–15)
GLUCOSE SERPL-MCNC: 133 MG/DL (ref 70–99)
HCO3 SERPL-SCNC: 25 MMOL/L (ref 22–29)
HCT VFR BLD AUTO: 34.8 % (ref 35–47)
HGB BLD-MCNC: 11.1 G/DL (ref 11.7–15.7)
IMM GRANULOCYTES # BLD: 0 10E3/UL
IMM GRANULOCYTES NFR BLD: 0 %
LYMPHOCYTES # BLD AUTO: 1.9 10E3/UL (ref 0.8–5.3)
LYMPHOCYTES NFR BLD AUTO: 27 %
MCH RBC QN AUTO: 26.4 PG (ref 26.5–33)
MCHC RBC AUTO-ENTMCNC: 31.9 G/DL (ref 31.5–36.5)
MCV RBC AUTO: 83 FL (ref 78–100)
MONOCYTES # BLD AUTO: 0.7 10E3/UL (ref 0–1.3)
MONOCYTES NFR BLD AUTO: 10 %
NEUTROPHILS # BLD AUTO: 4.4 10E3/UL (ref 1.6–8.3)
NEUTROPHILS NFR BLD AUTO: 61 %
PLATELET # BLD AUTO: 200 10E3/UL (ref 150–450)
POTASSIUM SERPL-SCNC: 4.4 MMOL/L (ref 3.4–5.3)
PROT SERPL-MCNC: 7.3 G/DL (ref 6.4–8.3)
RBC # BLD AUTO: 4.21 10E6/UL (ref 3.8–5.2)
SODIUM SERPL-SCNC: 138 MMOL/L (ref 135–145)
WBC # BLD AUTO: 7.2 10E3/UL (ref 4–11)

## 2025-05-06 PROCEDURE — 36415 COLL VENOUS BLD VENIPUNCTURE: CPT

## 2025-05-06 PROCEDURE — 85025 COMPLETE CBC W/AUTO DIFF WBC: CPT

## 2025-05-06 PROCEDURE — 80053 COMPREHEN METABOLIC PANEL: CPT

## 2025-05-06 PROCEDURE — 86140 C-REACTIVE PROTEIN: CPT

## 2025-05-06 PROCEDURE — 86481 TB AG RESPONSE T-CELL SUSP: CPT

## 2025-05-07 LAB
GAMMA INTERFERON BACKGROUND BLD IA-ACNC: 0.07 IU/ML
M TB IFN-G BLD-IMP: NEGATIVE
M TB IFN-G CD4+ BCKGRND COR BLD-ACNC: 7.37 IU/ML
MITOGEN IGNF BCKGRD COR BLD-ACNC: 0.01 IU/ML
MITOGEN IGNF BCKGRD COR BLD-ACNC: 0.01 IU/ML
QUANTIFERON MITOGEN: 7.44 IU/ML
QUANTIFERON NIL TUBE: 0.07 IU/ML
QUANTIFERON TB1 TUBE: 0.08 IU/ML
QUANTIFERON TB2 TUBE: 0.08

## 2025-05-09 ENCOUNTER — VIRTUAL VISIT (OUTPATIENT)
Dept: PHARMACY | Facility: CLINIC | Age: 75
End: 2025-05-09
Attending: INTERNAL MEDICINE
Payer: COMMERCIAL

## 2025-05-09 VITALS — WEIGHT: 166 LBS | BODY MASS INDEX: 33.47 KG/M2 | HEIGHT: 59 IN

## 2025-05-09 DIAGNOSIS — I10 ESSENTIAL HYPERTENSION: ICD-10-CM

## 2025-05-09 DIAGNOSIS — R52 PAIN: ICD-10-CM

## 2025-05-09 DIAGNOSIS — K50.019 CROHN'S DISEASE OF SMALL INTESTINE WITH COMPLICATION (H): Primary | ICD-10-CM

## 2025-05-09 DIAGNOSIS — K21.9 GERD (GASTROESOPHAGEAL REFLUX DISEASE): ICD-10-CM

## 2025-05-09 DIAGNOSIS — J45.40 MODERATE PERSISTENT ASTHMA, UNSPECIFIED WHETHER COMPLICATED: ICD-10-CM

## 2025-05-09 DIAGNOSIS — E78.5 HYPERLIPIDEMIA LDL GOAL <100: ICD-10-CM

## 2025-05-09 DIAGNOSIS — Z78.9 TAKES DIETARY SUPPLEMENTS: ICD-10-CM

## 2025-05-09 DIAGNOSIS — E11.9 TYPE 2 DIABETES MELLITUS WITHOUT COMPLICATION, WITHOUT LONG-TERM CURRENT USE OF INSULIN (H): ICD-10-CM

## 2025-05-09 DIAGNOSIS — T75.3XXA MOTION SICKNESS: ICD-10-CM

## 2025-05-09 ASSESSMENT — PAIN SCALES - GENERAL: PAINLEVEL_OUTOF10: MODERATE PAIN (5)

## 2025-05-09 NOTE — NURSING NOTE
Current patient location: [address] [place] [address]   [place] [address]    Is the patient currently in the state of [place]? YES    Visit mode: TELEPHONE    If the visit is dropped, the patient can be reconnected by:TELEPHONE VISIT: Phone number: [phone]    Will anyone else be joining the visit? Yes  (If patient encounters technical issues they should call [phone]    Are changes needed to the allergy or medication list? Pt stated no changes to allergies and Pt stated no med changes    Are refills needed on medications prescribed by this physician? NO    Rooming Documentation:  Questionnaire(s) not done per department protocol    Reason for visit: Consult    Jamel CROCKETT

## 2025-05-09 NOTE — PROGRESS NOTES
Medication Therapy Management (MTM) Encounter    ASSESSMENT:                            Medication Adherence/Access: See below for considerations.    Crohn's Disease:  Provided education on Skyrizi today including dosing, general administration, side effects (both common/serious), precautions, monitoring and time to efficacy. Discussed data on malignancy and risk of serious infection in depth. Encouraged indicated non-live vaccines and avoidance of live vaccines. Discussed potential need to hold therapy in the setting of signs/symptoms of active infection. Encouraged her to contact the gastroenterology clinic in the event she has questions on this.     Carli up to date on prebiologic labs. She is indicated for a few vaccinations which were recommended to her. Most recent DEXA scan showed osteoporosis, encouraged her to follow-up with her primary care provider on this. Reminders for routine cancer screening were provided. Given cost is a barrier to colestipol use, could consider using an online pharmacy like [facility] to obtain the medication more affordably.     Hypertension:  Patient is meeting BP goal of < 130/80mmHg.  Current treatment plan is effective, no change in therapy.    Diabetes:  Patient is meeting A1c goal of < 7%. Continue to monitor GI symptoms on Skyrizi.      Allergies/Asthma:  Stable.    Supplements:  Folic acid was likely started when Carli was on methotrexate and was not stopped when methotrexate was stopped. Given previously high level and additional supplementation in her multivitamin, Carli likely does not need to continue folic acid 1 mg daily. Will review with Dr. Cage.     Hyperlipidemia:  Patient is meeting LDL goal <100 mg/dL. No changes to therapy needed.    GERD:   Given symptom control, it would be reasonable for Carli to continue famotidine 40 mg once daily.     Motion sickness:  Stable.    Pain:  Stable.    PLAN:                            Start Skyrizi 600 mg IV at weeks 0,  4, and 8, followed by 360 mg subcutaneous at week 12 and every 8 weeks thereafter as planned. You can call your insurance company to get a copay estimate for the infusions.   Carli will consider the following vaccines:  Updated COVID-19 vaccine  Pneumococcal pneumonia (Prevnar-20)  Shingles (Shingrix 2-dose series)  RSV  [facility] is an online pharmacy that provides generic drugs at a lower cost. You can review this and if you would like your colestipol filled here please let us know. [email or web address]    I've placed a referral to dermatology for a routine annual skin check. If you don't hear from a  in 2-3 business days, you can call [phone] to schedule an appointment.  I recommend reviewing treatment for osteoporosis with your primary care provider.   You likely do not need to be on additional folic acid supplementation. I will reach out to Dr. Cage to see if this can be stopped.      Follow-up: 7/21/2025 11:30 AM phone visit    SUBJECTIVE/OBJECTIVE:                          Carli Monreal is a 74 year old female seen for an initial visit. She was referred to me from Keesha Mancuso MD.      Reason for visit: Skyrizi start.  - Seen by Ting Marx, PharmD Sept 2024     Allergies/ADRs: Reviewed in chart  Past Medical History: Reviewed in chart  Tobacco: She reports that she quit smoking about 34 years ago. Her smoking use included cigarettes. She has never been exposed to tobacco smoke. She has never used smokeless tobacco.  Alcohol: 1-3 beverages / week    Medication Adherence/Access: Skyrizi OBI through [facility]    Crohn's Disease:   Skyrizi 600 mg IV at weeks 0, 4, and 8, followed by 360 mg subcutaneous at week 12 and every 8 weeks thereafter (not started)  Colestipol 1 g twice daily  Loperamide 2 mg four times daily as needed    Carli has not been on Crohn's maintenance therapy since July 2020. She has been having diarrhea for which she takes colestipol. She notes  that this works well for the diarrhea but it is cost-prohibitive for her to take daily so she is mostly using imodium. She can use the bathroom up to 5 times daily in the morning. She denies blood in her stool or abdominal pain.     Last provider visit: 4/23/2025 - Keesha Mancuso MD  Next provider visit: 9/2/2025 - Keesha Mancuso MD  Last Quantiferon: 5/6/2025    Skyrizi Induction Schedule:  Week 0: 5/19/2025 (scheduled)  Week 4: 6/17/2025 (scheduled)  Week 8: 7/17/2025 (scheduled)    Last endoscopic evaluation/MRE: Colonoscopy 3/24/2025    impression:     - Simple Endoscopic Score for Crohn's Disease: 3,                          mucosal inflammatory changes secondary to Crohn's                          disease with ileitis. Biopsied.                          - Diverticulosis in the sigmoid colon.                          - Biopsies were taken with a cold forceps from the                          right colon and left colon for evaluation of                          microscopic colitis.     IBD Health Maintenance    Vaccinations:  All patients on immunosuppression should avoid live vaccines unless specifically indicated.    -- Influenza (last dose): not on file  -- Tetanus booster (last dose): 8/2021  -- Pneumococcal Pneumonia    PCV-13: 1/16/2017   PPSV-23: 1/4/2006, 1/28/2020   PCV-20/PCV-21: not on file  -- COVID-19 (last dose): 10/31/2024  -- RSV: not on file    One time confirmation of immunity or serologies:  -- Hepatitis A (serologies or immunizations): not on file  -- Hepatitis B (serologies or immunizations) serologies do not indicate immunity  -- Varicella/Zoster    Varicella: presumed exposure based on age   Zoster: not on file  -- MMR: not on file  -- Meningococcal meningitis (all patients at risk for meningitis)-- not on file    Due to the immunosuppression in this patient, I would not advise administration of live vaccines such as varicella/VZV, intranasal influenza, MMR, or yellow fever vaccine (if  traveling).      Immunosuppressive Screening:    Lab Results   Component Value Date    AUSAB 0.00 01/03/2023    HCVAB Nonreactive 08/20/2021    TBRES Negative 05/06/2025     Bone mineral density screening   -- Recommend all patients supplement with calcium and vitamin D  -- DEXA 12/26/2022 - osteoporosis,     Cancer Screening:    Cervical cancer screening: Per USPSTF guidelines    Skin cancer screening: Recommended annually due to patient's immunosuppression and diagnosis of IBD.    Depression Screening:    PHQ-2 Score:         4/23/2025    10:29 AM 3/18/2025     1:21 PM   PHQ-2 ( 1999 [employer])   Q1: Little interest or pleasure in doing things 0 0   Q2: Feeling down, depressed or hopeless 0 0   PHQ-2 Score 0 0    Q1: Little interest or pleasure in doing things  Not at all   Q2: Feeling down, depressed or hopeless  Not at all   PHQ-2 Score  0       Patient-reported       Research:  Are you interested in being contacted about enrollment in clinical research studies? Yes    Misc:  -- Avoid tobacco use  -- Avoid NSAIDs as there is potentially a 25% chance of causing an IBD flare    Hypertension:  Amlodipine 2.5 mg daily  Losartan 100 mg daily    No reported medication concerns.     BP Readings from Last 3 Encounters:   04/17/25 126/80   03/24/25 118/72   03/18/25 (!) 142/73     Diabetes:  Metformin XR 1000 mg daily    Reports that metformin does cause some GI symptoms.     Lab Results   Component Value Date    A1C 6.7 04/17/2025    A1C 6.1 11/15/2024    A1C 6.3 05/31/2024    A1C 6.1 12/07/2023    A1C 6.2 05/08/2023    A1C 7.0 04/13/2021    A1C 8.4 02/09/2021     Allergies/Asthma:  Symbicort 2 puffs twice daily as needed  Loratadine 10 mg daily    Carli reports having allergy induced asthma. She takes Symbicort as needed during allergy season and notes that it works well for her.     Supplements:  Vitamin C 500 mg daily  B12 1000 mcg daily  Folic acid 1 mg daily  Vitamin D 2000 units daily  Multivitamin 1 tablet  daily    Carli notes she does not know why she is taking a folic acid supplementation. Her multivitamin has 400 mcg of folic acid in it.    Folic Acid   Date Value Ref Range Status   01/03/2023 >40.0 (H) 4.6 - 34.8 ng/mL Final     Hyperlipidemia:  Atorvastatin 10 mg daily    No reported medication concerns.    LDL Cholesterol Calculated   Date Value Ref Range Status   11/15/2024 21 <100 mg/dL Final   02/09/2021 63.7 0.0 - 130.0 mg/dL Final     GERD:   Famotidine 40 mg daily    Carli reports taking famotidine once daily, instead of twice daily as prescribed. She notes her reflux symptoms are controlled on once daily dosing. Coffee tends to be most problematic for her reflux.     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 She also uses and over the counter oil that she puts behind her ear which she finds helpful too.     Pain:  Gabapentin 100 mg three times daily as needed    Carli reports taking one gabapentin every night to help her sleep. It helps with her hip pain that keeps her up at night. She notes that if she takes more than one it causes too much drowsiness.     ----------------    I spent 62 minutes with this patient today. All changes were made via collaborative practice agreement with Keesha Mancuso.     A summary of these recommendations was sent via [employer].    Mary Ellen Brock PharmD, BCPS  MTM Pharmacist   [facility]   Phone: [phone]    Telemedicine Visit Details  The patient's medications can be safely assessed via a telemedicine encounter.  Type of service:  Telephone visit  Originating Location (pt. Location): Home    Distant Location (provider location):  On-site  Start Time: 10:00 AM  End Time: 11:02 AM     Medication Therapy Recommendations  Crohn's disease of small intestine with complication (H)   1 Rationale: Preventive therapy - Needs additional  medication therapy - Indication   Recommendation: Order Vaccine - Prevnar 20 0.5 ML Rosa Maria   Status: Patient Agreed - Adherence/Education   Identified Date: 5/9/2025         Takes dietary supplements   1 Current Medication: folic acid (FOLVITE) 1 MG tablet   Current Medication Sig: TAKE ONE TABLET BY MOUTH ONE TIME DAILY   Rationale: No medical indication at this time - Unnecessary medication therapy - Indication   Recommendation: Discontinue Medication   Status: Contact Provider - Awaiting Response   Identified Date: 5/9/2025

## 2025-05-09 NOTE — PATIENT INSTRUCTIONS
"Recommendations from today's MTM visit:                                                      Start Skyrizi 600 mg IV at weeks 0, 4, and 8, followed by 360 mg subcutaneous at week 12 and every 8 weeks thereafter as planned. You can call your insurance company to get a copay estimate for the infusions.   Carli will consider the following vaccines:  Updated COVID-19 vaccine  Pneumococcal pneumonia (Prevnar-20)  Shingles (Shingrix 2-dose series)  RSV  [facility] is an online pharmacy that provides generic drugs at a lower cost. You can review this and if you would like your colestipol filled here please let us know. [email or web address]    I've placed a referral to dermatology for a routine annual skin check. If you don't hear from a  in 2-3 business days, you can call [phone] to schedule an appointment.  I recommend reviewing treatment for osteoporosis with your primary care provider.   You likely do not need to be on additional folic acid supplementation. I will reach out to Dr. Cage to see if this can be stopped.      Follow-up: 7/21/2025 11:30 AM phone visit    It was great speaking with you today.  I value your experience and would be very thankful for your time in providing feedback in our clinic survey. In the next few days, you may receive an email or text message from [employer] with a link to a survey related to your  clinical pharmacist.\"     To schedule another MTM appointment, please call the clinic directly or you may call the MTM scheduling line at [phone].    My Clinical Pharmacist's contact information:                                                      Please feel free to contact me with any questions or concerns you have.      Mary Ellen WestD, BCPS  MTM Pharmacist   [facility]   Phone: [phone]     "

## 2025-05-12 ENCOUNTER — HOSPITAL ENCOUNTER (OUTPATIENT)
Facility: AMBULATORY SURGERY CENTER | Age: 75
End: 2025-05-12
Attending: INTERNAL MEDICINE
Payer: COMMERCIAL

## 2025-05-12 ENCOUNTER — TELEPHONE (OUTPATIENT)
Dept: GASTROENTEROLOGY | Facility: CLINIC | Age: 75
End: 2025-05-12
Payer: COMMERCIAL

## 2025-05-12 ENCOUNTER — PATIENT OUTREACH (OUTPATIENT)
Dept: CARE COORDINATION | Facility: CLINIC | Age: 75
End: 2025-05-12
Payer: COMMERCIAL

## 2025-05-12 NOTE — TELEPHONE ENCOUNTER
"Endoscopy Scheduling Screen    Caller: patient    Have you had any respiratory illness or flu-like symptoms in the last 10 days?  No    What is your communication preference for Instructions and/or Bowel Prep?   MyChart    What insurance is in the chart?  Other:  [facility]    Ordering/Referring Provider:   ARSH PONCE    (If ordering provider performs procedure, schedule with ordering provider unless otherwise instructed. )    BMI: Estimated body mass index is 33.53 kg/m  as calculated from the following:    Height as of 5/9/25: 1.499 m (4' 11\").    Weight as of 5/9/25: 75.3 kg (166 lb).     Sedation Ordered  MAC/deep sedation.   BMI<= 45 45 < BMI <= 48 48 < BMI < = 50  BMI > 50   No Restrictions No [facility]  No [facility]  [place] [facility] with exceptions Hospital Only OR Only       Do you have a history of malignant hyperthermia?  No    (Females) Are you currently pregnant?   No     Have you been diagnosed or told you have pulmonary hypertension?   No    Do you have an LVAD?  No    Have you been told you have moderate to severe sleep apnea?  No.    Have you been told you have COPD, asthma, or any other lung disease?  Yes     What breathing problems do you have?  Asthma     Do you use home oxygen?  No    Have your breathing problems required an ED visit or hospitalization in the last year?  No.    Has your doctor ordered any cardiac tests like echo, angiogram, stress test, ablation, or EKG, that you have not completed yet?  No    Do you  have a history of any heart conditions?  No     Have you ever had or are you waiting for an organ transplant?  No. Continue scheduling, no site restrictions.    Have you had a stroke or transient ischemic attack (TIA aka \"mini stroke\") in the last 2 years?   No.    Have you been diagnosed with or been told you have cirrhosis of the liver?   No.    Are you currently on dialysis?   No    Do you need assistance transferring?   No    BMI: Estimated body mass index is 33.53 kg/m  as calculated " "from the following:    Height as of 5/9/25: 1.499 m (4' 11\").    Weight as of 5/9/25: 75.3 kg (166 lb).     Is patients BMI > 40 and scheduling location [facility]?  No    Do you take an injectable or oral medication for weight loss or diabetes (excluding insulin)?  Yes, hold time can be up to 7 days. Please consult with you prescribing provider to discuss endoscopy recommendations. (Please schedule at least 7 days out.) metformin    Do you take the medication Naltrexone?  No    Do you take blood thinners?  No       Prep   Are you currently on dialysis or do you have chronic kidney disease?  No    Do you have a diagnosis of diabetes?  Yes (Golytely Prep)    Do you have a diagnosis of cystic fibrosis (CF)?  No    On a regular basis do you go 3 -5 days between bowel movements?  No    BMI > 40?  No    Preferred Pharmacy:    [address] [address]  Phone: [phone] Fax: [phone]      Final Scheduling Details     Procedure scheduled  Colonoscopy    Surgeon:  ARSH PONCE       Date of procedure:  11/17     Pre-OP / PAC:   No - Not required for this site.    Location  [facility] - [facility] - Patient preference.    Sedation   MAC/Deep Sedation - Per order.      Patient Reminders:   You will receive a call from a Nurse to review instructions and health history.  This assessment must be completed prior to your procedure.  Failure to complete the Nurse assessment may result in the procedure being cancelled.      On the day of your procedure, please designate an adult(s) who can drive you home stay with you for the next 24 hours. The medicines used in the exam will make you sleepy. You will not be able to drive.      You cannot take public transportation, ride share services, or non-medical taxi service without a responsible caregiver.  Medical transport services are allowed with the requirement that a responsible caregiver will receive you at your " destination.  We require that drivers and caregivers are confirmed prior to your procedure.

## 2025-05-14 ENCOUNTER — PATIENT OUTREACH (OUTPATIENT)
Dept: CARE COORDINATION | Facility: CLINIC | Age: 75
End: 2025-05-14
Payer: COMMERCIAL

## 2025-05-19 ENCOUNTER — INFUSION THERAPY VISIT (OUTPATIENT)
Dept: INFUSION THERAPY | Facility: CLINIC | Age: 75
End: 2025-05-19
Attending: INTERNAL MEDICINE
Payer: COMMERCIAL

## 2025-05-19 VITALS
RESPIRATION RATE: 18 BRPM | SYSTOLIC BLOOD PRESSURE: 147 MMHG | DIASTOLIC BLOOD PRESSURE: 71 MMHG | TEMPERATURE: 97.9 F | WEIGHT: 171.7 LBS | HEART RATE: 74 BPM | OXYGEN SATURATION: 99 % | BODY MASS INDEX: 34.68 KG/M2

## 2025-05-19 DIAGNOSIS — K50.019 CROHN'S DISEASE OF SMALL INTESTINE WITH COMPLICATION (H): Primary | ICD-10-CM

## 2025-05-19 LAB
ALBUMIN SERPL BCG-MCNC: 4.4 G/DL (ref 3.5–5.2)
ALP SERPL-CCNC: 87 U/L (ref 40–150)
ALT SERPL W P-5'-P-CCNC: 19 U/L (ref 0–50)
AST SERPL W P-5'-P-CCNC: 28 U/L (ref 0–45)
BILIRUB SERPL-MCNC: 0.5 MG/DL
BILIRUBIN DIRECT (ROCHE PRO & PURE): 0.25 MG/DL (ref 0–0.45)
PROT SERPL-MCNC: 7.8 G/DL (ref 6.4–8.3)

## 2025-05-19 PROCEDURE — 36415 COLL VENOUS BLD VENIPUNCTURE: CPT | Performed by: INTERNAL MEDICINE

## 2025-05-19 PROCEDURE — 258N000003 HC RX IP 258 OP 636: Performed by: INTERNAL MEDICINE

## 2025-05-19 PROCEDURE — 82247 BILIRUBIN TOTAL: CPT | Performed by: INTERNAL MEDICINE

## 2025-05-19 PROCEDURE — 96365 THER/PROPH/DIAG IV INF INIT: CPT

## 2025-05-19 PROCEDURE — 250N000011 HC RX IP 250 OP 636: Mod: JZ | Performed by: INTERNAL MEDICINE

## 2025-05-19 RX ORDER — DIPHENHYDRAMINE HYDROCHLORIDE 50 MG/ML
50 INJECTION, SOLUTION INTRAMUSCULAR; INTRAVENOUS
Start: 2025-06-16

## 2025-05-19 RX ORDER — DIPHENHYDRAMINE HYDROCHLORIDE 50 MG/ML
25 INJECTION, SOLUTION INTRAMUSCULAR; INTRAVENOUS
Start: 2025-06-16

## 2025-05-19 RX ORDER — HEPARIN SODIUM (PORCINE) LOCK FLUSH IV SOLN 100 UNIT/ML 100 UNIT/ML
5 SOLUTION INTRAVENOUS
OUTPATIENT
Start: 2025-06-16

## 2025-05-19 RX ORDER — ALBUTEROL SULFATE 0.83 MG/ML
2.5 SOLUTION RESPIRATORY (INHALATION)
OUTPATIENT
Start: 2025-06-16

## 2025-05-19 RX ORDER — METHYLPREDNISOLONE SODIUM SUCCINATE 40 MG/ML
40 INJECTION INTRAMUSCULAR; INTRAVENOUS
Start: 2025-06-16

## 2025-05-19 RX ORDER — ALBUTEROL SULFATE 90 UG/1
1-2 INHALANT RESPIRATORY (INHALATION)
Start: 2025-06-16

## 2025-05-19 RX ORDER — MEPERIDINE HYDROCHLORIDE 25 MG/ML
25 INJECTION INTRAMUSCULAR; INTRAVENOUS; SUBCUTANEOUS
OUTPATIENT
Start: 2025-06-16

## 2025-05-19 RX ORDER — HEPARIN SODIUM,PORCINE 10 UNIT/ML
5-20 VIAL (ML) INTRAVENOUS DAILY PRN
OUTPATIENT
Start: 2025-06-16

## 2025-05-19 RX ORDER — EPINEPHRINE 1 MG/ML
0.3 INJECTION, SOLUTION INTRAMUSCULAR; SUBCUTANEOUS EVERY 5 MIN PRN
OUTPATIENT
Start: 2025-06-16

## 2025-05-19 RX ADMIN — SODIUM CHLORIDE 600 MG: 0.9 INJECTION, SOLUTION INTRAVENOUS at 14:58

## 2025-05-19 NOTE — PROGRESS NOTES
Nursing Note  Carli Monreal presents today to [facility] for:   Chief Complaint   Patient presents with    Infusion     Skyrizi FIRST DOSE     During today's [facility] appointment, orders from Dr. Mancuso were completed.  Frequency: first dose    Progress note:  Patient identification verified by name and date of birth.  Assessment completed.  Vitals recorded in Doc Flowsheets.  Patient was provided with education regarding medication/procedure and possible side effects.  Patient verbalized understanding.     present during visit today: Not Applicable.    Treatment Conditions: ~~~ NOTE: If the patient answers yes to any of the questions below, hold the infusion and contact ordering provider or on-call provider.    Have you recently had an elevated temperature, fever, chills, productive cough, coughing for 3 weeks or longer or hemoptysis,  abnormal vital signs, night sweats,  chest pain or have you noticed a decrease in your appetite, unexplained weight loss or fatigue? No  Do you have any open wounds or new incisions? Yes, surgery 4/29/25 on right knee, healing  Do you have any upcoming hospitalizations or surgeries? Does not include esophagogastroduodenoscopy, colonoscopy, endoscopic retrograde cholangiopancreatography (ERCP), endoscopic ultrasound (EUS), dental procedures or joint aspiration/steroid injections: No  Do you currently have any signs of illness or infection or are you on any antibiotics? No  Have you had any new, sudden or worsening abdominal pain? No  Have you or anyone in your household received a live vaccination in the past 4 weeks? Please note: No live vaccines while on biologic/chemotherapy until 6 months after the last treatment. Patient can receive the flu vaccine (shot only), pneumovax and the Covid vaccine. It is optimal for the patient to get these vaccines mid cycle, but they can be given at any time as long as it is not  on the day of the infusion. No  Have you recently been diagnosed with any new nervous system diseases (ie. Multiple sclerosis, Guillain [place], seizures, neurological changes) or cancer diagnosis? Are you on any form of radiation or chemotherapy? No  Are you pregnant or breast feeding or do you have plans of pregnancy in the future? No  Have there been any other new onset medical symptoms? No    Premedications: were not ordered.    Drug Waste Record: No    Infusion length and rate:  infusion given over approximately 60 minutes    Labs: were drawn per orders.     Vascular access: peripheral IV was placed by vascular access nurse.    Is the next appt scheduled? yes    Post Infusion Assessment:  Patient tolerated infusion without incident.     Discharge Plan:   Follow up plan of care with: ongoing infusions at [facility]. and ordering provider as scheduled.  Discharge instructions were reviewed with patient.  Patient/representative verbalized understanding of discharge instructions and all questions answered.  Patient discharged from [facility] in stable condition.    Berta Mathur RN    Administrations This Visit       risankizumab-rzaa (SKYRIZI) 600 mg in sodium chloride 0.9 % 285 mL infusion       Admin Date  05/19/2025 Action  $New Bag Dose  600 mg Rate  285 mL/hr Route  Intravenous Documented By  Berta Mathur RN                  BP (!) 160/81 (BP Location: Left arm)   Pulse 76   Temp 97.9  F (36.6  C) (Oral)   Resp 18   Wt 77.9 kg (171 lb 11.2 oz)   SpO2 99%   BMI 34.68 kg/m

## 2025-05-24 DIAGNOSIS — K21.00 GASTROESOPHAGEAL REFLUX DISEASE WITH ESOPHAGITIS WITHOUT HEMORRHAGE: ICD-10-CM

## 2025-05-24 DIAGNOSIS — E11.9 TYPE 2 DIABETES MELLITUS WITHOUT COMPLICATION, WITHOUT LONG-TERM CURRENT USE OF INSULIN (H): ICD-10-CM

## 2025-05-24 DIAGNOSIS — I10 ESSENTIAL HYPERTENSION: ICD-10-CM

## 2025-05-27 RX ORDER — FAMOTIDINE 40 MG/1
40 TABLET, FILM COATED ORAL 2 TIMES DAILY
Qty: 180 TABLET | Refills: 0 | Status: SHIPPED | OUTPATIENT
Start: 2025-05-27

## 2025-05-27 RX ORDER — ATORVASTATIN CALCIUM 10 MG/1
10 TABLET, FILM COATED ORAL DAILY
Qty: 90 TABLET | Refills: 0 | Status: SHIPPED | OUTPATIENT
Start: 2025-05-27

## 2025-05-27 RX ORDER — METFORMIN HYDROCHLORIDE 500 MG/1
1000 TABLET, EXTENDED RELEASE ORAL
Qty: 180 TABLET | Refills: 0 | Status: SHIPPED | OUTPATIENT
Start: 2025-05-27

## 2025-05-27 RX ORDER — AMLODIPINE BESYLATE 2.5 MG/1
2.5 TABLET ORAL DAILY
Qty: 90 TABLET | Refills: 0 | Status: SHIPPED | OUTPATIENT
Start: 2025-05-27

## 2025-05-29 ENCOUNTER — INFUSION THERAPY VISIT (OUTPATIENT)
Dept: INFUSION THERAPY | Facility: CLINIC | Age: 75
End: 2025-05-29
Attending: INTERNAL MEDICINE
Payer: COMMERCIAL

## 2025-05-29 VITALS
DIASTOLIC BLOOD PRESSURE: 65 MMHG | HEART RATE: 70 BPM | OXYGEN SATURATION: 99 % | RESPIRATION RATE: 16 BRPM | SYSTOLIC BLOOD PRESSURE: 140 MMHG | TEMPERATURE: 98.5 F

## 2025-05-29 DIAGNOSIS — D50.9 ANEMIA, IRON DEFICIENCY: ICD-10-CM

## 2025-05-29 DIAGNOSIS — K50.019 CROHN'S DISEASE OF SMALL INTESTINE WITH COMPLICATION (H): ICD-10-CM

## 2025-05-29 DIAGNOSIS — K50.019 CROHN'S DISEASE OF SMALL INTESTINE WITH COMPLICATION (H): Primary | ICD-10-CM

## 2025-05-29 DIAGNOSIS — R19.7 DIARRHEA, UNSPECIFIED TYPE: ICD-10-CM

## 2025-05-29 DIAGNOSIS — K63.8219 SMALL INTESTINAL BACTERIAL OVERGROWTH (SIBO): ICD-10-CM

## 2025-05-29 PROCEDURE — 250N000011 HC RX IP 250 OP 636: Performed by: INTERNAL MEDICINE

## 2025-05-29 RX ORDER — DIPHENHYDRAMINE HYDROCHLORIDE 50 MG/ML
50 INJECTION, SOLUTION INTRAMUSCULAR; INTRAVENOUS
Start: 2025-05-31

## 2025-05-29 RX ORDER — ALBUTEROL SULFATE 90 UG/1
1-2 INHALANT RESPIRATORY (INHALATION)
Start: 2025-05-31

## 2025-05-29 RX ORDER — ALBUTEROL SULFATE 0.83 MG/ML
2.5 SOLUTION RESPIRATORY (INHALATION)
OUTPATIENT
Start: 2025-05-31

## 2025-05-29 RX ORDER — HEPARIN SODIUM,PORCINE 10 UNIT/ML
5-20 VIAL (ML) INTRAVENOUS DAILY PRN
OUTPATIENT
Start: 2025-05-31

## 2025-05-29 RX ORDER — HEPARIN SODIUM (PORCINE) LOCK FLUSH IV SOLN 100 UNIT/ML 100 UNIT/ML
5 SOLUTION INTRAVENOUS
OUTPATIENT
Start: 2025-05-31

## 2025-05-29 RX ORDER — METHYLPREDNISOLONE SODIUM SUCCINATE 40 MG/ML
40 INJECTION INTRAMUSCULAR; INTRAVENOUS
Start: 2025-05-31

## 2025-05-29 RX ORDER — COLESTIPOL HYDROCHLORIDE 1 G/1
1 TABLET ORAL 2 TIMES DAILY
Qty: 180 TABLET | Refills: 1 | Status: SHIPPED | OUTPATIENT
Start: 2025-05-29

## 2025-05-29 RX ORDER — MEPERIDINE HYDROCHLORIDE 25 MG/ML
25 INJECTION INTRAMUSCULAR; INTRAVENOUS; SUBCUTANEOUS
OUTPATIENT
Start: 2025-05-31

## 2025-05-29 RX ORDER — EPINEPHRINE 1 MG/ML
0.3 INJECTION, SOLUTION INTRAMUSCULAR; SUBCUTANEOUS EVERY 5 MIN PRN
OUTPATIENT
Start: 2025-05-31

## 2025-05-29 RX ORDER — DIPHENHYDRAMINE HYDROCHLORIDE 50 MG/ML
25 INJECTION, SOLUTION INTRAMUSCULAR; INTRAVENOUS
Start: 2025-05-31

## 2025-05-29 RX ADMIN — IRON SUCROSE 200 MG: 20 INJECTION, SOLUTION INTRAVENOUS at 14:35

## 2025-05-29 ASSESSMENT — PAIN SCALES - GENERAL: PAINLEVEL_OUTOF10: SEVERE PAIN (8)

## 2025-05-29 NOTE — PROGRESS NOTES
Infusion Nursing Note:  Carli Monreal presents today for   Chief Complaint   Patient presents with    Infusion     Iron Sucrose (Venofer)       Patient seen by provider today: No   present during visit today: No    Note:   -Orders from Keesha Mancuso MD completed. Frequency: 5 doses, each at least 48hrs apart; patient's last infusion was 5/23/25.  -200mg Venofer IV over ~5min, given concurrently with 50ml free-flowing NS.  -Patient observed x15min following infusion per therapy plan.    Intravenous Access:  PIV placed in Lt AC by LPN.    Treatment Conditions:  Not Applicable.    Post Infusion Assessment:  Patient tolerated infusion without incident.  Blood return noted pre and post infusion.  Site patent and intact, free from redness, edema or discomfort.  No evidence of extravasations.  Access discontinued per protocol.     Discharge Plan:   Discharge instructions reviewed with: Patient.  Patient and/or family verbalized understanding of discharge instructions and all questions answered.  AVS to patient via MYCHART.    Patient discharged in stable condition accompanied by: self.  Departure Mode: Ambulatory.    Future Appointments   Date Time Provider   6/11/2025  3:30 PM  [facility] INFUSION NURSE - Venofer #3   6/13/2025  2:00 PM  [facility] INFUSION NURSE - Venofer #4   6/17/2025  2:30 PM [facility] INFUSION NURSE  - Em Roy RN    BP (!) 140/65 (BP Location: Left arm, Patient Position: Chair, Cuff Size: Adult Regular)   Pulse 70   Temp 98.5  F (36.9  C) (Oral)   Resp 16   SpO2 99%     Administrations This Visit       iron sucrose (VENOFER) injection 200 mg       Admin Date  05/29/2025 Action  $Given Dose  200 mg Route  Intravenous Documented By  Claribel Roy, RN

## 2025-05-29 NOTE — TELEPHONE ENCOUNTER
Colestipol sent to [facility] under [facility] with Keesha Mancuso MD     Last provider visit: 4/23/2025 - Keesha Mancuso MD  Next provider visit: 9/2/2025 - Keesha Mancuso MD

## 2025-06-05 ENCOUNTER — OFFICE VISIT (OUTPATIENT)
Dept: FAMILY MEDICINE | Facility: CLINIC | Age: 75
End: 2025-06-05
Payer: COMMERCIAL

## 2025-06-05 VITALS
HEART RATE: 79 BPM | SYSTOLIC BLOOD PRESSURE: 124 MMHG | BODY MASS INDEX: 33.3 KG/M2 | WEIGHT: 165.2 LBS | DIASTOLIC BLOOD PRESSURE: 76 MMHG | HEIGHT: 59 IN | TEMPERATURE: 98.1 F | RESPIRATION RATE: 20 BRPM | OXYGEN SATURATION: 97 %

## 2025-06-05 DIAGNOSIS — Z00.00 ENCOUNTER FOR ANNUAL WELLNESS EXAM IN MEDICARE PATIENT: Primary | ICD-10-CM

## 2025-06-05 DIAGNOSIS — K50.019 CROHN'S DISEASE OF SMALL INTESTINE WITH COMPLICATION (H): ICD-10-CM

## 2025-06-05 DIAGNOSIS — I10 ESSENTIAL HYPERTENSION: ICD-10-CM

## 2025-06-05 DIAGNOSIS — G31.84 MILD COGNITIVE IMPAIRMENT OF UNCERTAIN OR UNKNOWN ETIOLOGY: ICD-10-CM

## 2025-06-05 DIAGNOSIS — R41.3 MEMORY LOSS: ICD-10-CM

## 2025-06-05 DIAGNOSIS — E11.9 TYPE 2 DIABETES MELLITUS WITHOUT COMPLICATION, WITHOUT LONG-TERM CURRENT USE OF INSULIN (H): ICD-10-CM

## 2025-06-05 DIAGNOSIS — M53.3 SACROILIAC JOINT PAIN: ICD-10-CM

## 2025-06-05 DIAGNOSIS — Z23 NEED FOR VACCINATION: ICD-10-CM

## 2025-06-05 LAB
BASOPHILS # BLD AUTO: 0 10E3/UL (ref 0–0.2)
BASOPHILS NFR BLD AUTO: 0 %
EOSINOPHIL # BLD AUTO: 0.1 10E3/UL (ref 0–0.7)
EOSINOPHIL NFR BLD AUTO: 1 %
ERYTHROCYTE [DISTWIDTH] IN BLOOD BY AUTOMATED COUNT: 13.4 % (ref 10–15)
HCT VFR BLD AUTO: 35.6 % (ref 35–47)
HGB BLD-MCNC: 11.6 G/DL (ref 11.7–15.7)
IMM GRANULOCYTES # BLD: 0 10E3/UL
IMM GRANULOCYTES NFR BLD: 0 %
LYMPHOCYTES # BLD AUTO: 2.4 10E3/UL (ref 0.8–5.3)
LYMPHOCYTES NFR BLD AUTO: 32 %
MCH RBC QN AUTO: 27.3 PG (ref 26.5–33)
MCHC RBC AUTO-ENTMCNC: 32.6 G/DL (ref 31.5–36.5)
MCV RBC AUTO: 84 FL (ref 78–100)
MONOCYTES # BLD AUTO: 0.6 10E3/UL (ref 0–1.3)
MONOCYTES NFR BLD AUTO: 9 %
NEUTROPHILS # BLD AUTO: 4.2 10E3/UL (ref 1.6–8.3)
NEUTROPHILS NFR BLD AUTO: 58 %
PLATELET # BLD AUTO: 170 10E3/UL (ref 150–450)
RBC # BLD AUTO: 4.25 10E6/UL (ref 3.8–5.2)
WBC # BLD AUTO: 7.4 10E3/UL (ref 4–11)

## 2025-06-05 RX ORDER — LOSARTAN POTASSIUM 100 MG/1
100 TABLET ORAL DAILY
Qty: 90 TABLET | Refills: 1 | Status: SHIPPED | OUTPATIENT
Start: 2025-06-05

## 2025-06-05 RX ORDER — METFORMIN HYDROCHLORIDE 500 MG/1
1000 TABLET, EXTENDED RELEASE ORAL
Qty: 180 TABLET | Refills: 1 | Status: SHIPPED | OUTPATIENT
Start: 2025-06-05

## 2025-06-05 RX ORDER — AMLODIPINE BESYLATE 5 MG/1
5 TABLET ORAL DAILY
Qty: 90 TABLET | Refills: 1 | Status: SHIPPED | OUTPATIENT
Start: 2025-06-05

## 2025-06-05 SDOH — HEALTH STABILITY: PHYSICAL HEALTH: ON AVERAGE, HOW MANY MINUTES DO YOU ENGAGE IN EXERCISE AT THIS LEVEL?: 60 MIN

## 2025-06-05 SDOH — HEALTH STABILITY: PHYSICAL HEALTH: ON AVERAGE, HOW MANY DAYS PER WEEK DO YOU ENGAGE IN MODERATE TO STRENUOUS EXERCISE (LIKE A BRISK WALK)?: 1 DAY

## 2025-06-05 ASSESSMENT — ASTHMA QUESTIONNAIRES
QUESTION_4 LAST FOUR WEEKS HOW OFTEN HAVE YOU USED YOUR RESCUE INHALER OR NEBULIZER MEDICATION (SUCH AS ALBUTEROL): NOT AT ALL
ACT_TOTALSCORE: 21
QUESTION_3 LAST FOUR WEEKS HOW OFTEN DID YOUR ASTHMA SYMPTOMS (WHEEZING, COUGHING, SHORTNESS OF BREATH, CHEST TIGHTNESS OR PAIN) WAKE YOU UP AT NIGHT OR EARLIER THAN USUAL IN THE MORNING: NOT AT ALL
QUESTION_2 LAST FOUR WEEKS HOW OFTEN HAVE YOU HAD SHORTNESS OF BREATH: THREE TO SIX TIMES A WEEK
QUESTION_1 LAST FOUR WEEKS HOW MUCH OF THE TIME DID YOUR ASTHMA KEEP YOU FROM GETTING AS MUCH DONE AT WORK, SCHOOL OR AT HOME: NONE OF THE TIME
QUESTION_5 LAST FOUR WEEKS HOW WOULD YOU RATE YOUR ASTHMA CONTROL: SOMEWHAT CONTROLLED

## 2025-06-05 ASSESSMENT — SOCIAL DETERMINANTS OF HEALTH (SDOH): HOW OFTEN DO YOU GET TOGETHER WITH FRIENDS OR RELATIVES?: ONCE A WEEK

## 2025-06-05 ASSESSMENT — PAIN SCALES - GENERAL: PAINLEVEL_OUTOF10: SEVERE PAIN (7)

## 2025-06-05 NOTE — PROGRESS NOTES
Preventive Care Visit  [facility]  Iza Cage DO, Family Medicine  Jun 5, 2025      Assessment & Plan     Encounter for annual wellness exam in Medicare patient      Memory loss    Patient slum test showed mild cognitive impairment.  I have done the memory loss Smartset see will also refer her to the memory clinic after the below workup    - Memory Clinic Referral; Future  - OFFICE/OUTPT VISIT,EST,LEVL IV  - COMPREHENSIVE METABOLIC PANEL; Future  - CBC WITH PLATELETS; Future  - TSH; Future  - VITAMIN B12; Future  - FOLATE [LAB69]; Future  - PET Brain Amyloid (preferred for donanemab); Future  - APOLIPOPROTEIN E GENOTYPING, ALZHEIMER DISEASE RISK [PUD0343]; Future    Sacroiliac joint pain  On exam the patient's pain seems to be primarily from her right sacroiliac joint.  Will refer to pain management to determine appropriate injection  - Pain Management  Referral; Future  - OFFICE/OUTPT VISIT,EST,LEVL IV    Type 2 diabetes mellitus without complication, without long-term current use of insulin (H)  The current medical regimen is effective;  continue present plan and medications.    - FOOT EXAM  - metFORMIN (GLUCOPHAGE XR) 500 MG 24 hr tablet; Take 2 tablets (1,000 mg) by mouth daily (with dinner).  - OFFICE/OUTPT VISIT,EST,LEVL IV    Essential hypertension  Blood pressure is spiking into the 150s often.  Will increase her amlodipine from 2.5 to 5 mg daily and recheck in a few weeks.  - losartan (COZAAR) 100 MG tablet; Take 1 tablet (100 mg) by mouth daily.  - amLODIPine (NORVASC) 5 MG tablet; Take 1 tablet (5 mg) by mouth daily.  - OFFICE/OUTPT VISIT,EST,LEVL IV    Need for vaccination  Patient will get below prescriptions at the pharmacy  - zoster vaccine recombinant adjuvanted (SHINGRIX) injection; Inject 0.5 mLs into the muscle once for 1 dose. Pharmacist administered  - hepatitis A vaccine (VAQTA) 50 UNIT/ML injection; Inject 1 mL (50 Units) into the muscle once for 1  "dose.  - hepatitis b vaccine recombinant (RECOMBIVAX-HB) 10 MCG/ML injection; Inject 1 mL (10 mcg) into the muscle once for 1 dose.  - RSV vaccine, bivalent, ABRYSVO, injection; Inject 0.5 mLs into the muscle once for 1 dose. Pharmacist administered    Crohn's disease of small intestine with complication (H)  Managed by gastroenterology  - CBC with Platelets & Differential  - Comprehensive metabolic panel  - CRP inflammation    Mild cognitive impairment of uncertain or unknown etiology  Referring to the memory clinic see above            BMI  Estimated body mass index is 33.37 kg/m  as calculated from the following:    Height as of this encounter: 1.499 m (4' 11\").    Weight as of this encounter: 74.9 kg (165 lb 3.2 oz).   Weight management plan: Discussed healthy diet and exercise guidelines    Counseling  Appropriate preventive services were addressed with this patient via screening, questionnaire, or discussion as appropriate for fall prevention, nutrition, physical activity, Tobacco-use cessation, social engagement, weight loss and cognition.  Checklist reviewing preventive services available has been given to the patient.  Reviewed patient's diet, addressing concerns and/or questions.   She is at risk for lack of exercise and has been provided with information to increase physical activity for the benefit of her well-being.   The patient was instructed to see the dentist every 6 months.   Discussed possible causes of fatigue.     Follow-up   Return in about 4 weeks (around 7/3/2025) for BP Recheck.        Mary Boyce is a 74 year old, presenting for the following:  Physical        6/5/2025    12:19 PM   Additional Questions   Roomed by Shantal BRITTON   Accompanied by self          HPI    -- Memory issues, forgetful, cannot find words.  She is not forgetting people in her life.  She forgot to turn on the coffee.   Her sleep is poor due to pain.  She is taking gabapentin for lower back pain.  She will wake up at " 5 am to urinate sometimes.  She goes to bed 10:20.   She gets up for the day at 630.  She wakes in the night a lot.  She is not a daily drinker.       -- Low back pain.  It is the right buttocks at night and with prolonged walking.  It can be all day depending on her activities.  She is sitting a heating pad a lot.            Diabetes Follow-up    How often are you checking your blood sugar? One time daily  What time of day are you checking your blood sugars (select all that apply)?  Before meals  Have you had any blood sugars above 200?  No- today: 130  Have you had any blood sugars below 70?  No  What symptoms do you notice when your blood sugar is low?  Shaky, Dizzy, and Weak  What concerns do you have today about your diabetes? Other:  Diarrhea 7x/daily- could it be Metformin or Crohns?   Do you have any of these symptoms? (Select all that apply)  Numbness in feet and Burning in feet  Have you had a diabetic eye exam in the last 12 months? No    Metformin 1000 mg daily    Lab Results   Component Value Date    A1C 6.7 04/17/2025    A1C 6.1 11/15/2024    A1C 6.3 05/31/2024    A1C 6.1 12/07/2023    A1C 6.2 05/08/2023    A1C 7.0 04/13/2021    A1C 8.4 02/09/2021             Hyperlipidemia Follow-Up    Are you regularly taking any medication or supplement to lower your cholesterol?   Yes- atorvastatin  Are you having muscle aches or other side effects that you think could be caused by your cholesterol lowering medication?  No  LDL Cholesterol Calculated   Date Value Ref Range Status   11/15/2024 21 <100 mg/dL Final   02/09/2021 63.7 0.0 - 130.0 mg/dL Final         Hypertension Follow-up    Do you check your blood pressure regularly outside of the clinic? No   Are you following a low salt diet? Yes  Are your blood pressures ever more than 140 on the top number (systolic) OR more   than 90 on the bottom number (diastolic), for example 140/90? Yes    Amlodipine 2.5 mg daily and losartan 100 mg daily    BP Readings from  Last 2 Encounters:   06/05/25 (!) 144/70   05/29/25 (!) 140/65     Hemoglobin A1C (%)   Date Value   04/17/2025 6.7 (H)   11/15/2024 6.1 (H)   04/13/2021 7.0 (H)   02/09/2021 8.4 (A)     LDL Cholesterol Calculated (mg/dL)   Date Value   11/15/2024 21   12/07/2023 22   02/09/2021 63.7         Asthma      6/5/2025    12:15 PM   ACT Total Scores   ACT TOTAL SCORE (Goal Greater than or Equal to 20) 21    In the past 12 months, how many times did you visit the emergency room for your asthma without being admitted to the hospital? 0    In the past 12 months, how many times were you hospitalized overnight because of your asthma? 0        Proxy-reported     Do you have any of the following symptoms? Cough  What makes your asthma/breathing worse?  Humidity and Cold air  Do you want more information about how to use your inhaler? No    The patient has Crohn's disease and rheumatoid arthritis.  She is on immunosuppressants due to this.      Advance Care Planning    Discussed advance care planning with patient; informed AVS has link to [employer].        6/5/2025   General Health   How would you rate your overall physical health? Good   Feel stress (tense, anxious, or unable to sleep) Not at all         6/5/2025   Nutrition   Diet: Regular (no restrictions)         6/5/2025   Exercise   Days per week of moderate/strenous exercise 1 day   Average minutes spent exercising at this level 60 min   (!) EXERCISE CONCERN      6/5/2025   Social Factors   Frequency of gathering with friends or relatives Once a week   Worry food won't last until get money to buy more No   Food not last or not have enough money for food? No   Do you have housing? (Housing is defined as stable permanent housing and does not include staying outside in a car, in a tent, in an abandoned building, in an overnight shelter, or couch-surfing.) Yes   Are you worried about losing your housing? No   Lack of transportation? No   Unable to get utilities  (heat,electricity)? No         2025   Fall Risk   Fallen 2 or more times in the past year? No     No    Trouble with walking or balance? No     No        Proxy-reported    Multiple values from one day are sorted in reverse-chronological order          2025   Activities of Daily Living- Home Safety   Needs help with the following daily activites None of the above   Safety concerns in the home None of the above         2025   Dental   Dentist two times every year? (!) NO         2025   Hearing Screening   Hearing concerns? None of the above         2025   Driving Risk Screening   Patient/family members have concerns about driving No         2025   General Alertness/Fatigue Screening   Have you been more tired than usual lately? (!) YES         2025   Urinary Incontinence Screening   Bothered by leaking urine in past 6 months No         Today's PHQ-2 Score:       2025    12:13 PM   PHQ-2 (  [employer])   Q1: Little interest or pleasure in doing things 0    Q2: Feeling down, depressed or hopeless 0    PHQ-2 Score 0    Q1: Little interest or pleasure in doing things Not at all   Q2: Feeling down, depressed or hopeless Not at all   PHQ-2 Score 0       Proxy-reported           2025   Substance Use   Alcohol more than 3/day or more than 7/wk No   Do you have a current opioid prescription? No   How severe/bad is pain from 1 to 10? 6/10   Do you use any other substances recreationally? No     Social History     Tobacco Use    Smoking status: Former     Current packs/day: 0.00     Types: Cigarettes     Quit date: 1991     Years since quittin.1     Passive exposure: Never    Smokeless tobacco: Never   Vaping Use    Vaping status: Never Used   Substance Use Topics    Alcohol use: Yes     Comment: Wine- couple x/ week    Drug use: Never           2024   LAST FHS-7 RESULTS   1st degree relative breast or ovarian cancer No   Any relative bilateral breast cancer No   Any male have  breast cancer No   Any ONE woman have BOTH breast AND ovarian cancer No   Any woman with breast cancer before 50yrs No   2 or more relatives with breast AND/OR ovarian cancer No   2 or more relatives with breast AND/OR bowel cancer No        Mammogram Screening - Mammogram every 1-2 years updated in Health Maintenance based on mutual decision making    ASCVD Risk   The ASCVD Risk score (Jose L CARDONA, et al., 2019) failed to calculate for the following reasons:    The valid total cholesterol range is 130 to 320 mg/dL            Reviewed and updated as needed this visit by Provider                      Current providers sharing in care for this patient include:  Patient Care Team:  Iza Cage DO as PCP - General (Family Medicine)  Kj Rasheed MD as MD (Pulmonary Disease)  Iza Cage DO as Assigned PCP  Keesha Mancuso MD as MD (Gastroenterology)  Gordon Roth MD as MD (Ophthalmology)  Teresa Dejesus MD as MD (Anesthesiology)  Artur Vergara MD as Assigned Musculoskeletal Provider  Keesha Mancuso MD as Assigned Surgical Provider  Mary Ellen Brock RPH as Pharmacist (Pharmacist)  Mary Ellen Brock RPH as Assigned MTM Pharmacist    The following health maintenance items are reviewed in Epic and correct as of today:  Health Maintenance   Topic Date Due    ASTHMA ACTION PLAN  Never done    ZOSTER VACCINE (1 of 2) Never done    HEPATITIS A VACCINE (1 of 2 - Risk 2-dose series) Never done    HEPATITIS B VACCINE (1 of 3 - Risk 3-dose series) Never done    RSV VACCINE (1 - Risk 60-74 years 1-dose series) Never done    DIABETIC FOOT EXAM  12/07/2024    EYE EXAM  03/18/2025    COVID-19 VACCINE (10 - Moderna risk 2024-25 season) 04/30/2025    MEDICARE ANNUAL WELLNESS VISIT  05/31/2025    A1C  07/17/2025    INFLUENZA VACCINE (Season Ended) 09/01/2025    LIPID  11/15/2025    MICROALBUMIN  11/15/2025    ASTHMA CONTROL TEST  12/05/2025    ANNUAL REVIEW OF HM ORDERS  04/17/2026    BMP  05/06/2026     "FALL RISK ASSESSMENT  06/05/2026    MAMMO SCREENING  11/05/2026    ADVANCE CARE PLANNING  04/17/2030    DTAP/TDAP/TD VACCINE (2 - Td or Tdap) 08/20/2031    COLORECTAL CANCER SCREENING  03/24/2035    DEXA  12/26/2037    HEPATITIS C SCREENING  Completed    PHQ-2 (once per calendar year)  Completed    PNEUMOCOCCAL VACCINE 50+ YEARS  Completed    HPV VACCINE  Aged Out    MENINGITIS VACCINE  Aged Out    URINE DRUG SCREEN  Discontinued         Review of Systems  Constitutional, HEENT, cardiovascular, pulmonary, gi and gu systems are negative, except as otherwise noted.     Objective    Exam  BP (!) 144/70   Pulse 79   Temp 98.1  F (36.7  C) (Oral)   Resp 20   Ht 1.499 m (4' 11\")   Wt 74.9 kg (165 lb 3.2 oz)   SpO2 97%   BMI 33.37 kg/m     Estimated body mass index is 33.37 kg/m  as calculated from the following:    Height as of this encounter: 1.499 m (4' 11\").    Weight as of this encounter: 74.9 kg (165 lb 3.2 oz).    Physical Exam  GENERAL: alert and no distress  EYES: Eyes grossly normal to inspection, PERRL and conjunctivae and sclerae normal  HENT: ear canals and TM's normal, nose and mouth without ulcers or lesions  NECK: no adenopathy, no asymmetry, masses, or scars  RESP: lungs clear to auscultation - no rales, rhonchi or wheezes  CV: regular rate and rhythm, normal S1 S2, no S3 or S4, no murmur, click or rub, no peripheral edema  ABDOMEN: soft, nontender, no hepatosplenomegaly, no masses and bowel sounds normal  MS: no gross musculoskeletal defects noted, no edema  SKIN: no suspicious lesions or rashes  NEURO: Normal strength and tone, mentation intact and speech normal  PSYCH: mentation appears normal, affect normal/bright         6/5/2025   Mini Cog   Clock Draw Score 2 Normal   3 Item Recall 3 objects recalled   Mini Cog Total Score 5              Signed Electronically by: Iza Cage DO    "

## 2025-06-09 ENCOUNTER — RESULTS FOLLOW-UP (OUTPATIENT)
Dept: FAMILY MEDICINE | Facility: CLINIC | Age: 75
End: 2025-06-09

## 2025-06-09 LAB
APOE ALLELE E2+E3+E4 BLD/T: NORMAL
SPECIMEN SOURCE: NORMAL

## 2025-06-11 ENCOUNTER — INFUSION THERAPY VISIT (OUTPATIENT)
Dept: INFUSION THERAPY | Facility: CLINIC | Age: 75
End: 2025-06-11
Attending: INTERNAL MEDICINE
Payer: COMMERCIAL

## 2025-06-11 VITALS
TEMPERATURE: 98.2 F | RESPIRATION RATE: 16 BRPM | SYSTOLIC BLOOD PRESSURE: 126 MMHG | HEART RATE: 68 BPM | OXYGEN SATURATION: 100 % | DIASTOLIC BLOOD PRESSURE: 73 MMHG

## 2025-06-11 DIAGNOSIS — D50.9 ANEMIA, IRON DEFICIENCY: ICD-10-CM

## 2025-06-11 DIAGNOSIS — K50.019 CROHN'S DISEASE OF SMALL INTESTINE WITH COMPLICATION (H): ICD-10-CM

## 2025-06-11 DIAGNOSIS — D50.9 IRON DEFICIENCY ANEMIA, UNSPECIFIED IRON DEFICIENCY ANEMIA TYPE: Primary | ICD-10-CM

## 2025-06-11 PROCEDURE — 250N000011 HC RX IP 250 OP 636: Performed by: INTERNAL MEDICINE

## 2025-06-11 PROCEDURE — 258N000003 HC RX IP 258 OP 636: Performed by: INTERNAL MEDICINE

## 2025-06-11 PROCEDURE — 96374 THER/PROPH/DIAG INJ IV PUSH: CPT

## 2025-06-11 RX ORDER — HEPARIN SODIUM,PORCINE 10 UNIT/ML
5-20 VIAL (ML) INTRAVENOUS DAILY PRN
OUTPATIENT
Start: 2025-06-12

## 2025-06-11 RX ORDER — DIPHENHYDRAMINE HYDROCHLORIDE 50 MG/ML
25 INJECTION, SOLUTION INTRAMUSCULAR; INTRAVENOUS
Start: 2025-06-12

## 2025-06-11 RX ORDER — METHYLPREDNISOLONE SODIUM SUCCINATE 40 MG/ML
40 INJECTION INTRAMUSCULAR; INTRAVENOUS
Start: 2025-06-12

## 2025-06-11 RX ORDER — ALBUTEROL SULFATE 0.83 MG/ML
2.5 SOLUTION RESPIRATORY (INHALATION)
OUTPATIENT
Start: 2025-06-12

## 2025-06-11 RX ORDER — DIPHENHYDRAMINE HYDROCHLORIDE 50 MG/ML
50 INJECTION, SOLUTION INTRAMUSCULAR; INTRAVENOUS
Start: 2025-06-12

## 2025-06-11 RX ORDER — EPINEPHRINE 1 MG/ML
0.3 INJECTION, SOLUTION INTRAMUSCULAR; SUBCUTANEOUS EVERY 5 MIN PRN
OUTPATIENT
Start: 2025-06-12

## 2025-06-11 RX ORDER — MEPERIDINE HYDROCHLORIDE 25 MG/ML
25 INJECTION INTRAMUSCULAR; INTRAVENOUS; SUBCUTANEOUS
OUTPATIENT
Start: 2025-06-12

## 2025-06-11 RX ORDER — HEPARIN SODIUM (PORCINE) LOCK FLUSH IV SOLN 100 UNIT/ML 100 UNIT/ML
5 SOLUTION INTRAVENOUS
OUTPATIENT
Start: 2025-06-12

## 2025-06-11 RX ORDER — ALBUTEROL SULFATE 90 UG/1
1-2 INHALANT RESPIRATORY (INHALATION)
Start: 2025-06-12

## 2025-06-11 RX ADMIN — IRON SUCROSE 200 MG: 20 INJECTION, SOLUTION INTRAVENOUS at 15:53

## 2025-06-11 RX ADMIN — SODIUM CHLORIDE 250 ML: 0.9 INJECTION, SOLUTION INTRAVENOUS at 15:52

## 2025-06-11 ASSESSMENT — PAIN SCALES - GENERAL: PAINLEVEL_OUTOF10: NO PAIN (0)

## 2025-06-11 NOTE — PATIENT INSTRUCTIONS
Dear Carli Monreal    Thank you for choosing [facility] ([facility] for your infusion.  The following information is a summary of our appointment as well as important reminders.        If you are a transplant patient and require transplant education, please click on this link: [email or web address].    If you have any questions on your upcoming Specialty Infusion appointments, please call scheduling at [phone].  It was a pleasure taking care of you today.    Sincerely,    [facility]  [address] [place] [address]  [address]  Phone:  [phone]

## 2025-06-11 NOTE — PROGRESS NOTES
Infusion Nursing Note:  Carli Monreal presents today for Venofer.    Patient seen by provider today: No   present during visit today: Not Applicable.    Note: Pt given Venofer via slow IVP run concurrently with NS.      Intravenous Access:  Peripheral IV placed.    Treatment Conditions:  Not Applicable.      Post Infusion Assessment:  Patient tolerated infusion without incident.  Patient observed for 15 minutes after Venofer per order without s/s reaction.  Blood return noted pre and post infusion.  Site patent and intact, free from redness, edema or discomfort.  No evidence of extravasations.  Access discontinued per protocol.       Discharge Plan:   Discharge instructions reviewed with: Patient.  Patient and/or family verbalized understanding of discharge instructions and all questions answered.  AVS to patient via [employer].  Patient will return 06/13 for next appointment.   Patient discharged in stable condition accompanied by: self.  Departure Mode: Ambulatory.    Administrations This Visit       iron sucrose (VENOFER) injection 200 mg       Admin Date  06/11/2025 Action  $Given Dose  200 mg Route  Intravenous Documented By  Jinny Gray RN              sodium chloride 0.9% BOLUS 250 mL       Admin Date  06/11/2025 Action  $New Bag Dose  250 mL Route  Intravenous Documented By  Jinny Gray RN                        Jinny Gray RN

## 2025-06-12 ENCOUNTER — TELEPHONE (OUTPATIENT)
Dept: PALLIATIVE MEDICINE | Facility: CLINIC | Age: 75
End: 2025-06-12
Payer: COMMERCIAL

## 2025-06-12 NOTE — TELEPHONE ENCOUNTER
"Injection Order (copy and paste all info under \"order questions\" section:   My clinical question is:  right buttocks pain    Reason for Referral:  Procedure Order    Procedure:  Injection to be Determined by Pain Management Specialist    Patient Scheduling Instructions:  [employer] will call you to coordinate care as prescribed your provider. If you don t hear from a representative within 2 business days, please call [phone].    Additional Information:  Associated Diagnosis: Sacroiliac joint pain [M53.3]    Referring Provider: [facility]za Cage DO in [facility]    Injection History (previous injections with pain, type and date): NO    After Review please route to Pain Nurse Pool for nursing to triage.    Amelie Hdz       [facility]  Pain Management    "

## 2025-06-16 ENCOUNTER — MYC MEDICAL ADVICE (OUTPATIENT)
Dept: GASTROENTEROLOGY | Facility: CLINIC | Age: 75
End: 2025-06-16
Payer: COMMERCIAL

## 2025-06-17 ENCOUNTER — INFUSION THERAPY VISIT (OUTPATIENT)
Dept: INFUSION THERAPY | Facility: CLINIC | Age: 75
End: 2025-06-17
Attending: INTERNAL MEDICINE
Payer: COMMERCIAL

## 2025-06-17 VITALS
HEART RATE: 75 BPM | RESPIRATION RATE: 16 BRPM | DIASTOLIC BLOOD PRESSURE: 73 MMHG | SYSTOLIC BLOOD PRESSURE: 152 MMHG | OXYGEN SATURATION: 98 % | TEMPERATURE: 98.2 F

## 2025-06-17 DIAGNOSIS — R41.3 MEMORY LOSS: ICD-10-CM

## 2025-06-17 DIAGNOSIS — D50.9 IRON DEFICIENCY ANEMIA, UNSPECIFIED IRON DEFICIENCY ANEMIA TYPE: Primary | ICD-10-CM

## 2025-06-17 DIAGNOSIS — K50.019 CROHN'S DISEASE OF SMALL INTESTINE WITH COMPLICATION (H): ICD-10-CM

## 2025-06-17 LAB
ALBUMIN SERPL BCG-MCNC: 4.2 G/DL (ref 3.5–5.2)
ALBUMIN SERPL BCG-MCNC: 4.3 G/DL (ref 3.5–5.2)
ALP SERPL-CCNC: 69 U/L (ref 40–150)
ALP SERPL-CCNC: 70 U/L (ref 40–150)
ALT SERPL W P-5'-P-CCNC: 22 U/L (ref 0–50)
ALT SERPL W P-5'-P-CCNC: 22 U/L (ref 0–50)
ANION GAP SERPL CALCULATED.3IONS-SCNC: 12 MMOL/L (ref 7–15)
AST SERPL W P-5'-P-CCNC: 29 U/L (ref 0–45)
AST SERPL W P-5'-P-CCNC: 30 U/L (ref 0–45)
BILIRUB SERPL-MCNC: 0.5 MG/DL
BILIRUB SERPL-MCNC: 0.5 MG/DL
BILIRUBIN DIRECT (ROCHE PRO & PURE): 0.25 MG/DL (ref 0–0.45)
BUN SERPL-MCNC: 10.5 MG/DL (ref 8–23)
CALCIUM SERPL-MCNC: 9.6 MG/DL (ref 8.8–10.4)
CHLORIDE SERPL-SCNC: 106 MMOL/L (ref 98–107)
CREAT SERPL-MCNC: 0.78 MG/DL (ref 0.51–0.95)
EGFRCR SERPLBLD CKD-EPI 2021: 80 ML/MIN/1.73M2
ERYTHROCYTE [DISTWIDTH] IN BLOOD BY AUTOMATED COUNT: 14 % (ref 10–15)
FOLATE SERPL-MCNC: 35 NG/ML (ref 4.6–34.8)
GLUCOSE SERPL-MCNC: 182 MG/DL (ref 70–99)
HCO3 SERPL-SCNC: 23 MMOL/L (ref 22–29)
HCT VFR BLD AUTO: 33.8 % (ref 35–47)
HGB BLD-MCNC: 10.8 G/DL (ref 11.7–15.7)
MCH RBC QN AUTO: 26.9 PG (ref 26.5–33)
MCHC RBC AUTO-ENTMCNC: 32 G/DL (ref 31.5–36.5)
MCV RBC AUTO: 84 FL (ref 78–100)
PLATELET # BLD AUTO: 150 10E3/UL (ref 150–450)
POTASSIUM SERPL-SCNC: 3.5 MMOL/L (ref 3.4–5.3)
PROT SERPL-MCNC: 7 G/DL (ref 6.4–8.3)
PROT SERPL-MCNC: 7.1 G/DL (ref 6.4–8.3)
RBC # BLD AUTO: 4.01 10E6/UL (ref 3.8–5.2)
SODIUM SERPL-SCNC: 141 MMOL/L (ref 135–145)
WBC # BLD AUTO: 5.8 10E3/UL (ref 4–11)

## 2025-06-17 PROCEDURE — 84155 ASSAY OF PROTEIN SERUM: CPT

## 2025-06-17 PROCEDURE — 96375 TX/PRO/DX INJ NEW DRUG ADDON: CPT

## 2025-06-17 PROCEDURE — 36415 COLL VENOUS BLD VENIPUNCTURE: CPT | Performed by: INTERNAL MEDICINE

## 2025-06-17 PROCEDURE — 82248 BILIRUBIN DIRECT: CPT | Performed by: INTERNAL MEDICINE

## 2025-06-17 PROCEDURE — 82746 ASSAY OF FOLIC ACID SERUM: CPT

## 2025-06-17 PROCEDURE — 85014 HEMATOCRIT: CPT

## 2025-06-17 PROCEDURE — 250N000011 HC RX IP 250 OP 636: Mod: JZ | Performed by: INTERNAL MEDICINE

## 2025-06-17 PROCEDURE — 258N000003 HC RX IP 258 OP 636: Performed by: INTERNAL MEDICINE

## 2025-06-17 PROCEDURE — 96365 THER/PROPH/DIAG IV INF INIT: CPT

## 2025-06-17 RX ORDER — ALBUTEROL SULFATE 90 UG/1
1-2 INHALANT RESPIRATORY (INHALATION)
Status: CANCELLED
Start: 2025-06-17

## 2025-06-17 RX ORDER — DIPHENHYDRAMINE HYDROCHLORIDE 50 MG/ML
50 INJECTION, SOLUTION INTRAMUSCULAR; INTRAVENOUS
Status: CANCELLED
Start: 2025-06-17

## 2025-06-17 RX ORDER — DIPHENHYDRAMINE HYDROCHLORIDE 50 MG/ML
50 INJECTION, SOLUTION INTRAMUSCULAR; INTRAVENOUS
Start: 2025-07-14

## 2025-06-17 RX ORDER — METHYLPREDNISOLONE SODIUM SUCCINATE 40 MG/ML
40 INJECTION INTRAMUSCULAR; INTRAVENOUS
Status: CANCELLED
Start: 2025-06-17

## 2025-06-17 RX ORDER — DIPHENHYDRAMINE HYDROCHLORIDE 50 MG/ML
25 INJECTION, SOLUTION INTRAMUSCULAR; INTRAVENOUS
Start: 2025-07-14

## 2025-06-17 RX ORDER — EPINEPHRINE 1 MG/ML
0.3 INJECTION, SOLUTION INTRAMUSCULAR; SUBCUTANEOUS EVERY 5 MIN PRN
Status: CANCELLED | OUTPATIENT
Start: 2025-06-17

## 2025-06-17 RX ORDER — EPINEPHRINE 1 MG/ML
0.3 INJECTION, SOLUTION INTRAMUSCULAR; SUBCUTANEOUS EVERY 5 MIN PRN
OUTPATIENT
Start: 2025-07-14

## 2025-06-17 RX ORDER — HEPARIN SODIUM (PORCINE) LOCK FLUSH IV SOLN 100 UNIT/ML 100 UNIT/ML
5 SOLUTION INTRAVENOUS
OUTPATIENT
Start: 2025-07-14

## 2025-06-17 RX ORDER — HEPARIN SODIUM,PORCINE 10 UNIT/ML
5-20 VIAL (ML) INTRAVENOUS DAILY PRN
OUTPATIENT
Start: 2025-07-14

## 2025-06-17 RX ORDER — DIPHENHYDRAMINE HYDROCHLORIDE 50 MG/ML
25 INJECTION, SOLUTION INTRAMUSCULAR; INTRAVENOUS
Status: CANCELLED
Start: 2025-06-17

## 2025-06-17 RX ORDER — METHYLPREDNISOLONE SODIUM SUCCINATE 40 MG/ML
40 INJECTION INTRAMUSCULAR; INTRAVENOUS
Start: 2025-07-14

## 2025-06-17 RX ORDER — HEPARIN SODIUM,PORCINE 10 UNIT/ML
5-20 VIAL (ML) INTRAVENOUS DAILY PRN
Status: CANCELLED | OUTPATIENT
Start: 2025-06-17

## 2025-06-17 RX ORDER — ALBUTEROL SULFATE 0.83 MG/ML
2.5 SOLUTION RESPIRATORY (INHALATION)
OUTPATIENT
Start: 2025-07-14

## 2025-06-17 RX ORDER — MEPERIDINE HYDROCHLORIDE 25 MG/ML
25 INJECTION INTRAMUSCULAR; INTRAVENOUS; SUBCUTANEOUS
OUTPATIENT
Start: 2025-07-14

## 2025-06-17 RX ORDER — MEPERIDINE HYDROCHLORIDE 25 MG/ML
25 INJECTION INTRAMUSCULAR; INTRAVENOUS; SUBCUTANEOUS
Status: CANCELLED | OUTPATIENT
Start: 2025-06-17

## 2025-06-17 RX ORDER — ALBUTEROL SULFATE 0.83 MG/ML
2.5 SOLUTION RESPIRATORY (INHALATION)
Status: CANCELLED | OUTPATIENT
Start: 2025-06-17

## 2025-06-17 RX ORDER — ALBUTEROL SULFATE 90 UG/1
1-2 INHALANT RESPIRATORY (INHALATION)
Start: 2025-07-14

## 2025-06-17 RX ORDER — HEPARIN SODIUM (PORCINE) LOCK FLUSH IV SOLN 100 UNIT/ML 100 UNIT/ML
5 SOLUTION INTRAVENOUS
Status: CANCELLED | OUTPATIENT
Start: 2025-06-17

## 2025-06-17 RX ADMIN — IRON SUCROSE 200 MG: 20 INJECTION, SOLUTION INTRAVENOUS at 15:17

## 2025-06-17 RX ADMIN — SODIUM CHLORIDE 600 MG: 0.9 INJECTION, SOLUTION INTRAVENOUS at 15:54

## 2025-06-17 NOTE — PROGRESS NOTES
Post Infusion Assessment:  Patient tolerated infusion  Blood return noted pre and post infusion.  Site patent and intact, free from redness, edema or discomfort.  No evidence of extravasations.  Access discontinued per protocol.     Carli Monreal was evaluated in a specialty clinic today at which time her blood pressure was noted to be elevated.  She has been advised to recheck blood pressure at home and follow up with PCP. Routing this message to her primary provider as an FYI.    BP (!) 152/73   Pulse 75       Discharge Plan:   Discharge instructions reviewed with: Patient.  Patient and/or family verbalized understanding of discharge instructions and all questions answered.  AVS to patient via [employer].  Patient will return 7/17 for next appointment.   Patient discharged in stable condition accompanied by: .  Departure Mode: Ambulatory.    Maia Dempsey RN

## 2025-06-17 NOTE — PATIENT INSTRUCTIONS
Dear Carli Monreal    Thank you for choosing [facility] ([facility] for your infusion.  The following information is a summary of our appointment as well as important reminders.      EDUCATION POST BIOLOGICAL/CHEMOTHERAPY INFUSION  Call the triage nurse at your clinic or seek medical attention if you have chills and/or temperature greater than or equal to 100.5, uncontrolled nausea/vomiting, diarrhea, constipation, dizziness, shortness of breath, chest pain, heart palpitations, weakness or any other new or concerning symptoms, questions or concerns.  You can not have any live virus vaccines prior to or during treatment or up to 6 months post infusion.  If you have an upcoming surgery, medical procedure or dental procedure during treatment, this should be discussed with your ordering physician and your surgeon/dentist.  If you are having any concerning symptom, if you are unsure if you should get your next infusion or wish to speak to a provider before your next infusion, please call your care coordinator or triage nurse at your clinic to notify them so we can adequately serve you.     If you are a transplant patient and require transplant education, please click on this link: [email or web address].    If you have any questions on your upcoming Specialty Infusion appointments, please call scheduling at [phone].  It was a pleasure taking care of you today.    Sincerely,    [facility]  [address] [place] [address]  [address]  Phone:  [phone]

## 2025-06-17 NOTE — TELEPHONE ENCOUNTER
Called patient regarding request for injection.    Patient states her pain is located in her right buttock area, patient does endorse pain in tailbone area. Patient stated pain feels like it is central buttock area. Patient states pain is mostly on right side today but does go over to left side. Patient states pain goes down both legs to mid-thigh region, does not travel to knees on the outside of the bilateral mid-thighs. Patient states the pain does not wrap around to the front of her hips/front of body. No red flags.    Last MR Lumbar Spine: 7/2023    Impression:   1. Post surgical changes of instrumented anterior and posterior spinal  fusion and spinal canal decompression at L4-5.  2. Multilevel lumbar spondylosis, most pronounced at L2-3 where there  is moderate spinal canal and moderate right neural foraminal stenosis.    FYI to schedulers: Patient stated that she has a lot of procedures at the [facility] on [address] and would like to get this injection completed there if possible. Patient has also gotten an injection done previous with Dr. Watson in 7/2023.     Routing to interventionists for review and advise.    Mariah LESTER, RN Patient Care Coordinator    [facility]  Pain Management

## 2025-06-17 NOTE — PROGRESS NOTES
Infusion Nursing Note:  Carli Monreal  presents today for Patient presents with:  Infusion: Skyrizzi, Venofer    .    Patient seen by provider today: No   present during visit today: Not Applicable.    Note: Patient identification verified by name and date of birth.  Assessment completed.  Vitals recorded in Doc Flowsheets.  Patient was provided with education regarding medication/procedure and possible side effects.  Patient verbalized understanding.    During [facility]'s [facility] appointment, orders from Keesha Mancuso were completed.    Note:  Frequency: Venofer Q 2 days x 5 doses, Skyrizzi Q 28 days x 3 today is 2/3  Labs: drawn per therapy plan and per open orders  Premedications:none  Infusion Rate/Length: Venofer given IVP with concomitant fluids running over 5 min with 15 minute observation.   Skyrizzi  infused at *285 mL/hr. Total infusion time of approximately 1 hour  .  Administrations This Visit       iron sucrose (VENOFER) injection 200 mg       Admin Date  06/17/2025 Action  $Given Dose  200 mg Route  Intravenous Documented By  Gisel Hyman RN              risankizumab-rzaa (SKYRIZI) 600 mg in sodium chloride 0.9 % 285 mL infusion       Admin Date  06/17/2025 Action  $New Bag Dose  600 mg Rate  285 mL/hr Route  Intravenous Documented By  Liliam Dominguez RN                  Intravenous Access:  Labs drawn without difficulty.  Peripheral IV placed by VAT    Treatment Conditions:  Biological Infusion Checklist:  ~~~ NOTE: If the patient answers yes to any of the questions below, hold the infusion and contact ordering provider or on-call provider.    Have you recently had an elevated temperature, fever, chills, productive cough, coughing for 3 weeks or longer or hemoptysis,  abnormal vital signs, night sweats,  chest pain or have you noticed a decrease in your appetite, unexplained weight loss or fatigue? No  Do you have any open wounds or new incisions? No  Do  you have any upcoming hospitalizations or surgeries? Does not include esophagogastroduodenoscopy, colonoscopy, endoscopic retrograde cholangiopancreatography (ERCP), endoscopic ultrasound (EUS), dental procedures or joint aspiration/steroid injections No  Do you currently have any signs of illness or infection or are you on any antibiotics? No  Have you had any new, sudden or worsening abdominal pain? No  Have you or anyone in your household received a live vaccination in the past 4 weeks? Please note: No live vaccines while on biologic/chemotherapy until 6 months after the last treatment. Patient can receive the flu vaccine (shot only), pneumovax and the Covid vaccine. It is optimal for the patient to get these vaccines mid cycle, but they can be given at any time as long as it is not on the day of the infusion. No  Have you recently been diagnosed with any new nervous system diseases (ie. Multiple sclerosis, Guillain Morehead, seizures, neurological changes) or cancer diagnosis? Are you on any form of radiation or chemotherapy? No  Are you pregnant or breast feeding or do you have plans of pregnancy in the future? No  Have there been any other new onset medical symptoms? No    POST-INFUSION OF BIOLOGICAL MEDICATION:     Reviewed with patient.  Given biologic medication or medication hand-out. Inform patient if any fever, chills or signs of infection, new symptoms, abdominal pain, heart palpitations, shortness of breath, reaction, weakness, neurological changes, seek medical attention immediately and should not receive infusions. No live virus vaccines prior to or during treatment or up to 6 months post infusion. If the patient has an upcoming procedure or surgery, this should be discussed with the rheumatologist and surgeon or provider.    Discharge Plan:   Discharge instructions reviewed with: Patient.  Patient and/or family verbalized understanding of discharge instructions and all questions answered.  AVS to  patient via MYCHART.  Patient will return   Future Appointments   Date Time Provider Department Center   7/10/2025  2:00 PM PP[facility]1 [facility]   7/17/2025  1:00 PM [facility] INFUSION NURSE [facility]   7/21/2025 11:30 AM Mary Ellen Brock [facility]   7/31/2025  2:00 PM Iza Cage DO [facility]   9/2/2025  3:40 PM Keesha Mancuso MD [facility]      for next appointment.     /71   Pulse 75   Temp 98.2  F (36.8  C) (Oral)   Resp 16   SpO2 98%     Care of patient transferred to [facility] nurse at 1630    Gisel Hyman RN on 6/17/2025 at 2:42 PM

## 2025-06-23 NOTE — TELEPHONE ENCOUNTER
Reason for call:  Other   Patient called regarding (reason for call): appointment  Additional comments: Please call the pt back and get him schedule for an interventional evaluation with Dr. James.     Phone number to reach patient:  Home number on file [phone] (home)    Best Time:  Any     Can we leave a detailed message on this number?  YES    Amelie Hdz      [facility]  Pain Management

## 2025-07-09 DIAGNOSIS — E11.9 TYPE 2 DIABETES MELLITUS WITHOUT COMPLICATION, WITHOUT LONG-TERM CURRENT USE OF INSULIN (H): ICD-10-CM

## 2025-07-10 RX ORDER — LANCETS 33 GAUGE
EACH MISCELLANEOUS
Qty: 100 EACH | Refills: 0 | Status: SHIPPED | OUTPATIENT
Start: 2025-07-10

## 2025-07-18 ENCOUNTER — PREP FOR PROCEDURE (OUTPATIENT)
Dept: OTHER | Facility: CLINIC | Age: 75
End: 2025-07-18

## 2025-07-18 DIAGNOSIS — M53.3 SACROILIAC JOINT DYSFUNCTION OF RIGHT SIDE: Primary | ICD-10-CM

## 2025-07-18 DIAGNOSIS — M47.817 LUMBOSACRAL SPONDYLOSIS WITHOUT MYELOPATHY: ICD-10-CM

## 2025-07-21 ENCOUNTER — MYC MEDICAL ADVICE (OUTPATIENT)
Dept: GASTROENTEROLOGY | Facility: CLINIC | Age: 75
End: 2025-07-21
Payer: COMMERCIAL

## 2025-07-21 ENCOUNTER — TELEPHONE (OUTPATIENT)
Dept: GASTROENTEROLOGY | Facility: CLINIC | Age: 75
End: 2025-07-21
Payer: COMMERCIAL

## 2025-07-21 NOTE — TELEPHONE ENCOUNTER
Health Call Center    Phone Message    May a detailed message be left on voicemail: yes     Reason for Call: Other: Grace is calling and is needing the team to confirm that Carli's risankizumab-rzaa (SKYRIZI) 600 MG/10ML injection is being shipped to [address] [place] [address] For the patient. Please call them back to confirm.      Action Taken: Message routed to:  [facility] ([facility] GI    Travel Screening: Not Applicable     Date of Service:

## 2025-07-22 ENCOUNTER — VIRTUAL VISIT (OUTPATIENT)
Dept: PHARMACY | Facility: CLINIC | Age: 75
End: 2025-07-22
Attending: INTERNAL MEDICINE
Payer: COMMERCIAL

## 2025-07-22 VITALS — HEIGHT: 59 IN | WEIGHT: 166 LBS | BODY MASS INDEX: 33.47 KG/M2

## 2025-07-22 DIAGNOSIS — G30.9 ALZHEIMER'S DISEASE (H): ICD-10-CM

## 2025-07-22 DIAGNOSIS — K50.019 CROHN'S DISEASE OF SMALL INTESTINE WITH COMPLICATION (H): Primary | ICD-10-CM

## 2025-07-22 DIAGNOSIS — F02.80 ALZHEIMER'S DISEASE (H): ICD-10-CM

## 2025-07-22 RX ORDER — RISANKIZUMAB-RZAA 360 MG/2.4
360 WEARABLE INJECTOR SUBCUTANEOUS
Qty: 2.4 ML | Refills: 2 | Status: SHIPPED | OUTPATIENT
Start: 2025-07-22

## 2025-07-22 ASSESSMENT — PAIN SCALES - GENERAL: PAINLEVEL_OUTOF10: MODERATE PAIN (5)

## 2025-07-22 NOTE — TELEPHONE ENCOUNTER
Returned call to [facility]. Sky[facility] no longer needed. [facility]

## 2025-07-22 NOTE — PATIENT INSTRUCTIONS
"Recommendations from today's MTM visit:                                                      Continue on Skyrizi. You will be due for your first on-body injection August 14th. After this you will do your injections every 8 weeks.  You will be due to have you liver labs checked around the time of your first on-body injection. These are standing orders under Dr. Mancuso and can be done at any [facility] lab  Skyrizi is good out of the fridge for 14 days. I would recommend ordering this in advance and taking it with you on your cruise.  Carli will consider the following vaccines:  Pneumococcal pneumonia (Prevnar-20)  Shingles (Shingrix 2-dose series)  RSV  Call to schedule with [facility] [phone]     Follow-up: 11/19/2025 11:30 AM phone visit    It was great speaking with you today.  I value your experience and would be very thankful for your time in providing feedback in our clinic survey. In the next few days, you may receive an email or text message from [employer] with a link to a survey related to your  clinical pharmacist.\"     To schedule another MTM appointment, please call the clinic directly or you may call the MTM scheduling line at [phone].    My Clinical Pharmacist's contact information:                                                      Please feel free to contact me with any questions or concerns you have.      Mary Ellen WestD, BCPS  MTM Pharmacist   [facility]   Phone: [phone]     "

## 2025-07-22 NOTE — NURSING NOTE
Current patient location: [address] [place] [address]   [place] [address]    Is the patient currently in the state of [place]? YES    Visit mode: TELEPHONE    If the visit is dropped, the patient can be reconnected by:TELEPHONE VISIT: Phone number: [phone]    Will anyone else be joining the visit? NO  (If patient encounters technical issues they should call [phone])    Are changes needed to the allergy or medication list? Pt stated no changes to allergies, Pt declined med review, and Pt stated no med changes    Are refills needed on medications prescribed by this physician? NO    Rooming Documentation:  Questionnaire(s) not done per department protocol    Reason for visit: SERA PASTRANAF

## 2025-07-22 NOTE — PROGRESS NOTES
Medication Therapy Management (MTM) Encounter    ASSESSMENT:                            Medication Adherence/Access: No issues identified.    Crohn's Disease:  Carli would benefit from continued treatment with Skyrizi 360 mg subcutaneous every 8 weeks. She is up to date on routine maintenance labs. She is up to date on annual tuberculosis screening. She is indicated for additional lab work including a hepatic panel at week 12 of therapy. No access issues for her advanced therapy are present. She is indicated for a few vaccinations which were recommended to her.     Alzheimer's Disease:   Carli has been referred to the memory clinic. Encouraged her to call to schedule an appointment with them.     PLAN:                            Continue on Skyrizi. You will be due for your first on-body injection August 14th. After this you will do your injections every 8 weeks.  You will be due to have you liver labs checked around the time of your first on-body injection. These are standing orders under Dr. Mancuso and can be done at any [facility] lab  Skyrizi is good out of the fridge for 14 days. I would recommend ordering this in advance and taking it with you on your cruise.  Carli will consider the following vaccines:  Pneumococcal pneumonia (Prevnar-20)  Shingles (Shingrix 2-dose series)  RSV  Call to schedule with [facility] - [phone]     Follow-up: 11/19/2025 11:30 AM phone visit    SUBJECTIVE/OBJECTIVE:                          Carli Monreal is a 75 year old female seen for a follow-up visit.     Reason for visit: Skyrizi check-in.    Allergies/ADRs: Reviewed in chart  Past Medical History: Reviewed in chart  Tobacco: She reports that she quit smoking about 34 years ago. Her smoking use included cigarettes. She has never been exposed to tobacco smoke. She has never used smokeless tobacco.  Alcohol: 1-3 beverages / week    Medication Adherence/Access: Skyrizi OBI through [facility]     Crohn's  Disease:   Skyrizi 360 mg subcutaneous every 8 weeks  Colestipol 1 g twice daily  Loperamide 2 mg four times daily as needed    Carli reports that the Skyrizi has been going well. She is still going to the bathroom 4-5 times per day based on what she eats. She notes that the stools are sometimes formed and sometimes loose. She doesn't seemed to have noticed much of a change on Skyrizi. She is still taking the colestipol twice daily which does help. She denies side effects from Skyrizi. She reports that she is set up with the Skyrizi nurse ambassador program and they will help her with her first injection.     Carli does note she will be going on a  cruise from Oct 1-15 and will be due for her second on-body injection on October 9th.     Last provider visit: 4/23/2025 - Keesha Mancuso MD  Next provider visit: 9/2/2025 - Keesha Mancuso MD  Last Quantiferon: 5/6/2025    Therapy Start Date: 5/19/2025    Last endoscopic evaluation/MRE: Colonoscopy 3/24/2025     impression:     - Simple Endoscopic Score for Crohn's Disease: 3,                          mucosal inflammatory changes secondary to Crohn's                          disease with ileitis. Biopsied.                          - Diverticulosis in the sigmoid colon.                          - Biopsies were taken with a cold forceps from the                          right colon and left colon for evaluation of                          microscopic colitis.     IBD Health Maintenance     Vaccinations:  All patients on immunosuppression should avoid live vaccines unless specifically indicated.    -- Influenza (last dose): not on file  -- Tetanus booster (last dose): 8/2021  -- Pneumococcal Pneumonia               PCV-13: 1/16/2017              PPSV-23: 1/4/2006, 1/28/2020              PCV-20/PCV-21: not on file  -- COVID-19 (last dose): 6/2025  -- RSV: not on file     One time confirmation of immunity or serologies:  -- Hepatitis A (serologies or immunizations):  not on file  -- Hepatitis B (serologies or immunizations) serologies do not indicate immunity  -- Varicella/Zoster               Varicella: presumed exposure based on age              Zoster: not on file  -- MMR: not on file  -- Meningococcal meningitis (all patients at risk for meningitis)-- not on file     Due to the immunosuppression in this patient, I would not advise administration of live vaccines such as varicella/VZV, intranasal influenza, MMR, or yellow fever vaccine (if traveling).      Alzheimer's Disease:     Carli reports she was recently diagnosed with Alzheimer's. She is wondering if she is supposed to meet with a neurologist for this.     ----------------    I spent 32 minutes with this patient today. All changes were made via collaborative practice agreement with Keesha Mancuso.     A summary of these recommendations was sent via [employer].    Mary Ellen Brock PharmD, BCPS  MTM Pharmacist   [facility]   Phone: [phone]    Telemedicine Visit Details  The patient's medications can be safely assessed via a telemedicine encounter.  Type of service:  Telephone visit  Originating Location (pt. Location): Home    Distant Location (provider location):  Off-site  Start Time: 11:31 AM  End Time: 12:03 PM     Medication Therapy Recommendations  No medication therapy recommendations to display

## 2025-07-23 NOTE — TELEPHONE ENCOUNTER
Received communication from [facility] pharmacist that patient has scheduled Skyrizi OBI teaching with the Nurse Ambassador from "[employer] Refill protocol instructions sent to patient via [employer].

## 2025-07-26 ENCOUNTER — HEALTH MAINTENANCE LETTER (OUTPATIENT)
Age: 75
End: 2025-07-26

## 2025-07-30 ENCOUNTER — TELEPHONE (OUTPATIENT)
Dept: PALLIATIVE MEDICINE | Facility: OTHER | Age: 75
End: 2025-07-30
Payer: COMMERCIAL

## 2025-07-30 PROBLEM — M47.817 LUMBOSACRAL SPONDYLOSIS WITHOUT MYELOPATHY: Status: ACTIVE | Noted: 2025-07-18

## 2025-07-30 PROBLEM — M53.3 SACROILIAC JOINT DYSFUNCTION OF RIGHT SIDE: Status: ACTIVE | Noted: 2025-07-18

## 2025-07-30 NOTE — TELEPHONE ENCOUNTER
RN reviewed patient chart. Pre procedure instructions were reviewed with the patient at clinic visit.     Ni Maxwell RNCC

## 2025-07-30 NOTE — TELEPHONE ENCOUNTER
Called patient to schedule procedure with Dr. James    Date of Procedure: 8/22/25 & 9/19/25    Arrival time given: Yes: Arrival Time 10am      Procedure Location: [facility]     Verified Location with Patient:  Yes  Address provided to the patient     Pre-op H&P Required:  No: Local anesthesia       Post-Op/Follow Up Appt:  Not Indicated in Request      Informed patient they will need a  to drive them home:  Yes    Patients : Spouse     Patient is aware that pre-op RN from the procedure center will call 2-3 days prior to scheduled procedure to confirm arrival time and review any instructions:  Yes       Additional Comments: SHANNAN Schneider MA on 7/30/2025 at 12:44 PM      P: [phone]

## 2025-08-18 ENCOUNTER — TELEPHONE (OUTPATIENT)
Dept: GASTROENTEROLOGY | Facility: CLINIC | Age: 75
End: 2025-08-18
Payer: COMMERCIAL

## 2025-08-20 ENCOUNTER — OFFICE VISIT (OUTPATIENT)
Dept: NEUROLOGY | Facility: CLINIC | Age: 75
End: 2025-08-20
Payer: COMMERCIAL

## 2025-08-20 VITALS
TEMPERATURE: 98.2 F | DIASTOLIC BLOOD PRESSURE: 69 MMHG | WEIGHT: 161.2 LBS | OXYGEN SATURATION: 100 % | BODY MASS INDEX: 32.56 KG/M2 | SYSTOLIC BLOOD PRESSURE: 138 MMHG | HEART RATE: 72 BPM

## 2025-08-20 DIAGNOSIS — R41.3 MEMORY LOSS: ICD-10-CM

## 2025-08-20 DIAGNOSIS — R41.89 COGNITIVE DECLINE: Primary | ICD-10-CM

## 2025-08-21 ENCOUNTER — LAB (OUTPATIENT)
Dept: LAB | Facility: CLINIC | Age: 75
End: 2025-08-21
Payer: COMMERCIAL

## 2025-08-21 DIAGNOSIS — R41.89 COGNITIVE DECLINE: ICD-10-CM

## 2025-08-22 ENCOUNTER — HOSPITAL ENCOUNTER (OUTPATIENT)
Facility: AMBULATORY SURGERY CENTER | Age: 75
Discharge: HOME | End: 2025-08-22
Attending: STUDENT IN AN ORGANIZED HEALTH CARE EDUCATION/TRAINING PROGRAM | Admitting: STUDENT IN AN ORGANIZED HEALTH CARE EDUCATION/TRAINING PROGRAM
Payer: COMMERCIAL

## 2025-08-22 VITALS
WEIGHT: 161 LBS | RESPIRATION RATE: 16 BRPM | SYSTOLIC BLOOD PRESSURE: 114 MMHG | TEMPERATURE: 96.9 F | DIASTOLIC BLOOD PRESSURE: 63 MMHG | BODY MASS INDEX: 32.46 KG/M2 | OXYGEN SATURATION: 97 % | HEART RATE: 73 BPM | HEIGHT: 59 IN

## 2025-08-22 LAB — GLUCOSE BLDC GLUCOMTR-MCNC: 140 MG/DL (ref 70–99)

## 2025-08-22 PROCEDURE — 82962 GLUCOSE BLOOD TEST: CPT | Performed by: PATHOLOGY

## 2025-08-22 PROCEDURE — 27096 INJECT SACROILIAC JOINT: CPT | Mod: RT | Performed by: STUDENT IN AN ORGANIZED HEALTH CARE EDUCATION/TRAINING PROGRAM

## 2025-08-22 PROCEDURE — G0260 INJ FOR SACROILIAC JT ANESTH: HCPCS | Mod: RT

## 2025-08-22 RX ORDER — TRIAMCINOLONE ACETONIDE 40 MG/ML
INJECTION, SUSPENSION INTRA-ARTICULAR; INTRAMUSCULAR PRN
Status: DISCONTINUED | OUTPATIENT
Start: 2025-08-22 | End: 2025-08-22 | Stop reason: HOSPADM

## 2025-08-22 RX ORDER — IOPAMIDOL 408 MG/ML
INJECTION, SOLUTION INTRATHECAL PRN
Status: DISCONTINUED | OUTPATIENT
Start: 2025-08-22 | End: 2025-08-22 | Stop reason: HOSPADM

## 2025-08-22 RX ORDER — ROPIVACAINE HYDROCHLORIDE 2 MG/ML
INJECTION, SOLUTION EPIDURAL; INFILTRATION; PERINEURAL PRN
Status: DISCONTINUED | OUTPATIENT
Start: 2025-08-22 | End: 2025-08-22 | Stop reason: HOSPADM

## 2025-08-22 RX ORDER — LIDOCAINE HYDROCHLORIDE 10 MG/ML
INJECTION, SOLUTION EPIDURAL; INFILTRATION; INTRACAUDAL; PERINEURAL PRN
Status: DISCONTINUED | OUTPATIENT
Start: 2025-08-22 | End: 2025-08-22 | Stop reason: HOSPADM

## 2025-08-25 LAB
IMMUNOLOGIST REVIEW: NORMAL
P-TAU217 SERPL IA-MCNC: 0.11 PG/ML

## 2025-09-02 ENCOUNTER — OFFICE VISIT (OUTPATIENT)
Dept: GASTROENTEROLOGY | Facility: CLINIC | Age: 75
End: 2025-09-02
Attending: INTERNAL MEDICINE
Payer: COMMERCIAL

## 2025-09-02 VITALS
WEIGHT: 167.6 LBS | BODY MASS INDEX: 33.79 KG/M2 | HEIGHT: 59 IN | DIASTOLIC BLOOD PRESSURE: 76 MMHG | SYSTOLIC BLOOD PRESSURE: 144 MMHG | OXYGEN SATURATION: 100 % | HEART RATE: 76 BPM

## 2025-09-02 DIAGNOSIS — K50.00 CROHN'S DISEASE OF ILEUM WITHOUT COMPLICATION (H): Primary | ICD-10-CM

## 2025-09-02 PROCEDURE — 1125F AMNT PAIN NOTED PAIN PRSNT: CPT | Performed by: INTERNAL MEDICINE

## 2025-09-02 PROCEDURE — 99215 OFFICE O/P EST HI 40 MIN: CPT | Performed by: INTERNAL MEDICINE

## 2025-09-02 PROCEDURE — 3077F SYST BP >= 140 MM HG: CPT | Performed by: INTERNAL MEDICINE

## 2025-09-02 PROCEDURE — 3078F DIAST BP <80 MM HG: CPT | Performed by: INTERNAL MEDICINE

## 2025-09-02 ASSESSMENT — PAIN SCALES - GENERAL: PAINLEVEL_OUTOF10: SEVERE PAIN (8)

## (undated) DEVICE — RX BACITRACIN OINTMENT 0.9G 1/32OZ CUR001109

## (undated) DEVICE — LINEN TOWEL PACK X5 5464

## (undated) DEVICE — COVER CAMERA IN-LIGHT DISP LT-C02

## (undated) DEVICE — KIT ENDO TURNOVER/PROCEDURE CARRY-ON 101822

## (undated) DEVICE — LINEN GOWN XLG 5407

## (undated) DEVICE — SU FIBERLOOP 4-0 BLUE TPR NDL AR-7229-20

## (undated) DEVICE — SU SILK 0 SH 30" K834H

## (undated) DEVICE — SPECIMEN CONTAINER 3OZ W/FORMALIN 59901

## (undated) DEVICE — NDL SPINAL 22GA 3.5" QUINCKE 405181

## (undated) DEVICE — GOWN IMPERVIOUS 2XL BLUE

## (undated) DEVICE — PREP CHLORAPREP W/ORANGE TINT 10.5ML 260715

## (undated) DEVICE — SU UMBILICAL TAPE .125X30" U11T

## (undated) DEVICE — TRAY PAIN INJECTION 97A 640

## (undated) DEVICE — PACK HAND CUSTOM ASC

## (undated) DEVICE — ENDO FORCEP BX CAPTURA PRO SPIKE G50696

## (undated) DEVICE — SU PDS II 4-0 P-3 18" Z494G

## (undated) DEVICE — PROBE COVER INTRAOPERATIVE 5"X96" PC1308

## (undated) DEVICE — BLADE KNIFE BEAVER MINI BEAVER6400

## (undated) DEVICE — TUBING SUCTION 12"X1/4" N612

## (undated) DEVICE — PREP SKIN SCRUB TRAY 4461A

## (undated) DEVICE — GLOVE PROTEXIS BLUE W/NEU-THERA 7.5  2D73EB75

## (undated) DEVICE — ESU HOLSTER PLASTIC DISP E2400

## (undated) DEVICE — NDL CANNULA SOLOPLEX STIM 21GX100MM 001187-77

## (undated) DEVICE — SU ETHIBOND 2-0 MO-7 CR 8X18" CX42D

## (undated) DEVICE — SOL WATER IRRIG 500ML BOTTLE 2F7113

## (undated) DEVICE — LINEN ORTHO PACK 5446

## (undated) DEVICE — TUBING SUCTION MEDI-VAC 1/4"X20' N620A

## (undated) DEVICE — SUCTION MANIFOLD NEPTUNE 2 SYS 1 PORT 702-025-000

## (undated) DEVICE — NDL 25GA 1.5" 305127

## (undated) DEVICE — DRSG STERI STRIP 1/2X4" R1547

## (undated) DEVICE — DRAPE STERI TOWEL LG 1010

## (undated) DEVICE — GLOVE PROTEXIS W/NEU-THERA 7.0  2D73TE70

## (undated) DEVICE — GLOVE BIOGEL PI ULTRATOUCH G SZ 7.0 42170

## (undated) DEVICE — SOL NACL 0.9% IRRIG 500ML BOTTLE 2F7123

## (undated) DEVICE — DRAPE C-ARM OEC MINI VIEW 6800   00-901917-01

## (undated) DEVICE — Device

## (undated) RX ORDER — LIDOCAINE HYDROCHLORIDE 10 MG/ML
INJECTION, SOLUTION EPIDURAL; INFILTRATION; INTRACAUDAL; PERINEURAL
Status: DISPENSED
Start: 2023-01-20

## (undated) RX ORDER — FENTANYL CITRATE 50 UG/ML
INJECTION, SOLUTION INTRAMUSCULAR; INTRAVENOUS
Status: DISPENSED
Start: 2023-01-20

## (undated) RX ORDER — CEFAZOLIN SODIUM 2 G/50ML
SOLUTION INTRAVENOUS
Status: DISPENSED
Start: 2023-01-20

## (undated) RX ORDER — METOPROLOL TARTRATE 1 MG/ML
INJECTION, SOLUTION INTRAVENOUS
Status: DISPENSED
Start: 2021-11-13

## (undated) RX ORDER — ATROPINE SULFATE 0.4 MG/ML
AMPUL (ML) INJECTION
Status: DISPENSED
Start: 2021-11-13

## (undated) RX ORDER — ALBUTEROL SULFATE 0.83 MG/ML
SOLUTION RESPIRATORY (INHALATION)
Status: DISPENSED
Start: 2022-05-04

## (undated) RX ORDER — ACETAMINOPHEN 325 MG/1
TABLET ORAL
Status: DISPENSED
Start: 2023-01-20

## (undated) RX ORDER — PROPOFOL 10 MG/ML
INJECTION, EMULSION INTRAVENOUS
Status: DISPENSED
Start: 2023-01-20

## (undated) RX ORDER — BUPIVACAINE HYDROCHLORIDE 5 MG/ML
INJECTION, SOLUTION EPIDURAL; INTRACAUDAL
Status: DISPENSED
Start: 2023-01-20

## (undated) RX ORDER — DOBUTAMINE HYDROCHLORIDE 200 MG/100ML
INJECTION INTRAVENOUS
Status: DISPENSED
Start: 2021-11-13

## (undated) RX ORDER — BUPIVACAINE HYDROCHLORIDE 2.5 MG/ML
INJECTION, SOLUTION EPIDURAL; INFILTRATION; INTRACAUDAL
Status: DISPENSED
Start: 2023-01-20

## (undated) RX ORDER — EPHEDRINE SULFATE 50 MG/ML
INJECTION, SOLUTION INTRAMUSCULAR; INTRAVENOUS; SUBCUTANEOUS
Status: DISPENSED
Start: 2023-01-20

## (undated) RX ORDER — LIDOCAINE HYDROCHLORIDE AND EPINEPHRINE 10; 10 MG/ML; UG/ML
INJECTION, SOLUTION INFILTRATION; PERINEURAL
Status: DISPENSED
Start: 2023-01-20

## (undated) RX ORDER — EPINEPHRINE 1 MG/ML
INJECTION, SOLUTION INTRAMUSCULAR; SUBCUTANEOUS
Status: DISPENSED
Start: 2023-01-20

## (undated) RX ORDER — FENTANYL CITRATE-0.9 % NACL/PF 10 MCG/ML
PLASTIC BAG, INJECTION (ML) INTRAVENOUS
Status: DISPENSED
Start: 2023-01-20